# Patient Record
Sex: MALE | Race: OTHER | NOT HISPANIC OR LATINO | URBAN - METROPOLITAN AREA
[De-identification: names, ages, dates, MRNs, and addresses within clinical notes are randomized per-mention and may not be internally consistent; named-entity substitution may affect disease eponyms.]

---

## 2021-01-16 ENCOUNTER — INPATIENT (INPATIENT)
Facility: HOSPITAL | Age: 61
LOS: 10 days | Discharge: HOME | DRG: 640 | End: 2021-01-27
Attending: HOSPITALIST | Admitting: STUDENT IN AN ORGANIZED HEALTH CARE EDUCATION/TRAINING PROGRAM
Payer: MEDICAID

## 2021-01-16 VITALS
RESPIRATION RATE: 18 BRPM | OXYGEN SATURATION: 100 % | TEMPERATURE: 98 F | DIASTOLIC BLOOD PRESSURE: 121 MMHG | SYSTOLIC BLOOD PRESSURE: 190 MMHG | HEIGHT: 70 IN | WEIGHT: 105.82 LBS | HEART RATE: 111 BPM

## 2021-01-16 DIAGNOSIS — N18.6 END STAGE RENAL DISEASE: ICD-10-CM

## 2021-01-16 DIAGNOSIS — E87.5 HYPERKALEMIA: ICD-10-CM

## 2021-01-16 DIAGNOSIS — I77.0 ARTERIOVENOUS FISTULA, ACQUIRED: Chronic | ICD-10-CM

## 2021-01-16 DIAGNOSIS — I16.0 HYPERTENSIVE URGENCY: ICD-10-CM

## 2021-01-16 DIAGNOSIS — N18.9 CHRONIC KIDNEY DISEASE, UNSPECIFIED: ICD-10-CM

## 2021-01-16 DIAGNOSIS — N25.0 RENAL OSTEODYSTROPHY: ICD-10-CM

## 2021-01-16 LAB
ALBUMIN SERPL ELPH-MCNC: 4.3 G/DL — SIGNIFICANT CHANGE UP (ref 3.3–5)
ALP SERPL-CCNC: 152 U/L — HIGH (ref 40–120)
ALT FLD-CCNC: 23 U/L — SIGNIFICANT CHANGE UP (ref 10–45)
ANION GAP SERPL CALC-SCNC: 24 MMOL/L — HIGH (ref 5–17)
ANION GAP SERPL CALC-SCNC: 26 MMOL/L — HIGH (ref 5–17)
AST SERPL-CCNC: 18 U/L — SIGNIFICANT CHANGE UP (ref 10–40)
BASOPHILS # BLD AUTO: 0.02 K/UL — SIGNIFICANT CHANGE UP (ref 0–0.2)
BASOPHILS NFR BLD AUTO: 0.3 % — SIGNIFICANT CHANGE UP (ref 0–2)
BILIRUB SERPL-MCNC: 0.3 MG/DL — SIGNIFICANT CHANGE UP (ref 0.2–1.2)
BUN SERPL-MCNC: 112 MG/DL — HIGH (ref 7–23)
BUN SERPL-MCNC: 119 MG/DL — HIGH (ref 7–23)
CALCIUM SERPL-MCNC: 7 MG/DL — LOW (ref 8.4–10.5)
CALCIUM SERPL-MCNC: 8.3 MG/DL — LOW (ref 8.4–10.5)
CHLORIDE SERPL-SCNC: 100 MMOL/L — SIGNIFICANT CHANGE UP (ref 96–108)
CHLORIDE SERPL-SCNC: 101 MMOL/L — SIGNIFICANT CHANGE UP (ref 96–108)
CO2 SERPL-SCNC: 12 MMOL/L — LOW (ref 22–31)
CO2 SERPL-SCNC: 15 MMOL/L — LOW (ref 22–31)
CREAT SERPL-MCNC: 16.04 MG/DL — HIGH (ref 0.5–1.3)
CREAT SERPL-MCNC: 16.09 MG/DL — HIGH (ref 0.5–1.3)
EOSINOPHIL # BLD AUTO: 0.23 K/UL — SIGNIFICANT CHANGE UP (ref 0–0.5)
EOSINOPHIL NFR BLD AUTO: 3.8 % — SIGNIFICANT CHANGE UP (ref 0–6)
GLUCOSE SERPL-MCNC: 131 MG/DL — HIGH (ref 70–99)
GLUCOSE SERPL-MCNC: 152 MG/DL — HIGH (ref 70–99)
HCT VFR BLD CALC: 24 % — LOW (ref 39–50)
HGB BLD-MCNC: 7.4 G/DL — LOW (ref 13–17)
IMM GRANULOCYTES NFR BLD AUTO: 0.2 % — SIGNIFICANT CHANGE UP (ref 0–1.5)
LYMPHOCYTES # BLD AUTO: 1.07 K/UL — SIGNIFICANT CHANGE UP (ref 1–3.3)
LYMPHOCYTES # BLD AUTO: 17.9 % — SIGNIFICANT CHANGE UP (ref 13–44)
MAGNESIUM SERPL-MCNC: 3 MG/DL — HIGH (ref 1.6–2.6)
MCHC RBC-ENTMCNC: 27.1 PG — SIGNIFICANT CHANGE UP (ref 27–34)
MCHC RBC-ENTMCNC: 30.8 GM/DL — LOW (ref 32–36)
MCV RBC AUTO: 87.9 FL — SIGNIFICANT CHANGE UP (ref 80–100)
MONOCYTES # BLD AUTO: 0.71 K/UL — SIGNIFICANT CHANGE UP (ref 0–0.9)
MONOCYTES NFR BLD AUTO: 11.9 % — SIGNIFICANT CHANGE UP (ref 2–14)
NEUTROPHILS # BLD AUTO: 3.94 K/UL — SIGNIFICANT CHANGE UP (ref 1.8–7.4)
NEUTROPHILS NFR BLD AUTO: 65.9 % — SIGNIFICANT CHANGE UP (ref 43–77)
NRBC # BLD: 0 /100 WBCS — SIGNIFICANT CHANGE UP (ref 0–0)
PHOSPHATE SERPL-MCNC: 9.9 MG/DL — HIGH (ref 2.5–4.5)
PLATELET # BLD AUTO: 170 K/UL — SIGNIFICANT CHANGE UP (ref 150–400)
POTASSIUM SERPL-MCNC: 5.2 MMOL/L — SIGNIFICANT CHANGE UP (ref 3.5–5.3)
POTASSIUM SERPL-MCNC: 6.5 MMOL/L — CRITICAL HIGH (ref 3.5–5.3)
POTASSIUM SERPL-SCNC: 5.2 MMOL/L — SIGNIFICANT CHANGE UP (ref 3.5–5.3)
POTASSIUM SERPL-SCNC: 6.5 MMOL/L — CRITICAL HIGH (ref 3.5–5.3)
PROT SERPL-MCNC: 8.4 G/DL — HIGH (ref 6–8.3)
RBC # BLD: 2.73 M/UL — LOW (ref 4.2–5.8)
RBC # FLD: 15.2 % — HIGH (ref 10.3–14.5)
SODIUM SERPL-SCNC: 138 MMOL/L — SIGNIFICANT CHANGE UP (ref 135–145)
SODIUM SERPL-SCNC: 140 MMOL/L — SIGNIFICANT CHANGE UP (ref 135–145)
WBC # BLD: 5.98 K/UL — SIGNIFICANT CHANGE UP (ref 3.8–10.5)
WBC # FLD AUTO: 5.98 K/UL — SIGNIFICANT CHANGE UP (ref 3.8–10.5)

## 2021-01-16 PROCEDURE — 93010 ELECTROCARDIOGRAM REPORT: CPT

## 2021-01-16 PROCEDURE — 99291 CRITICAL CARE FIRST HOUR: CPT

## 2021-01-16 PROCEDURE — 71045 X-RAY EXAM CHEST 1 VIEW: CPT | Mod: 26

## 2021-01-16 RX ORDER — DEXTROSE 50 % IN WATER 50 %
50 SYRINGE (ML) INTRAVENOUS ONCE
Refills: 0 | Status: COMPLETED | OUTPATIENT
Start: 2021-01-16 | End: 2021-01-16

## 2021-01-16 RX ORDER — CARVEDILOL PHOSPHATE 80 MG/1
6.25 CAPSULE, EXTENDED RELEASE ORAL EVERY 12 HOURS
Refills: 0 | Status: DISCONTINUED | OUTPATIENT
Start: 2021-01-16 | End: 2021-01-16

## 2021-01-16 RX ORDER — NIFEDIPINE 30 MG
20 TABLET, EXTENDED RELEASE 24 HR ORAL ONCE
Refills: 0 | Status: COMPLETED | OUTPATIENT
Start: 2021-01-16 | End: 2021-01-16

## 2021-01-16 RX ORDER — SODIUM ZIRCONIUM CYCLOSILICATE 10 G/10G
10 POWDER, FOR SUSPENSION ORAL ONCE
Refills: 0 | Status: COMPLETED | OUTPATIENT
Start: 2021-01-16 | End: 2021-01-16

## 2021-01-16 RX ORDER — CALCIUM GLUCONATE 100 MG/ML
2 VIAL (ML) INTRAVENOUS ONCE
Refills: 0 | Status: COMPLETED | OUTPATIENT
Start: 2021-01-16 | End: 2021-01-16

## 2021-01-16 RX ORDER — CARVEDILOL PHOSPHATE 80 MG/1
6.25 CAPSULE, EXTENDED RELEASE ORAL ONCE
Refills: 0 | Status: COMPLETED | OUTPATIENT
Start: 2021-01-16 | End: 2021-01-16

## 2021-01-16 RX ORDER — ONDANSETRON 8 MG/1
4 TABLET, FILM COATED ORAL ONCE
Refills: 0 | Status: COMPLETED | OUTPATIENT
Start: 2021-01-16 | End: 2021-01-16

## 2021-01-16 RX ORDER — ALBUTEROL 90 UG/1
5 AEROSOL, METERED ORAL ONCE
Refills: 0 | Status: COMPLETED | OUTPATIENT
Start: 2021-01-16 | End: 2021-01-16

## 2021-01-16 RX ORDER — INSULIN HUMAN 100 [IU]/ML
5 INJECTION, SOLUTION SUBCUTANEOUS ONCE
Refills: 0 | Status: COMPLETED | OUTPATIENT
Start: 2021-01-16 | End: 2021-01-16

## 2021-01-16 RX ADMIN — CARVEDILOL PHOSPHATE 6.25 MILLIGRAM(S): 80 CAPSULE, EXTENDED RELEASE ORAL at 20:46

## 2021-01-16 RX ADMIN — INSULIN HUMAN 5 UNIT(S): 100 INJECTION, SOLUTION SUBCUTANEOUS at 21:30

## 2021-01-16 RX ADMIN — ONDANSETRON 4 MILLIGRAM(S): 8 TABLET, FILM COATED ORAL at 22:42

## 2021-01-16 RX ADMIN — Medication 20 MILLIGRAM(S): at 20:46

## 2021-01-16 RX ADMIN — SODIUM ZIRCONIUM CYCLOSILICATE 10 GRAM(S): 10 POWDER, FOR SUSPENSION ORAL at 21:53

## 2021-01-16 RX ADMIN — Medication 200 GRAM(S): at 21:53

## 2021-01-16 RX ADMIN — Medication 50 MILLILITER(S): at 21:53

## 2021-01-16 RX ADMIN — ALBUTEROL 5 MILLIGRAM(S): 90 AEROSOL, METERED ORAL at 21:54

## 2021-01-16 NOTE — CHART NOTE - NSCHARTNOTEFT_GEN_A_CORE
Dialysis Consent  Thoroughly reviewed risks and benefits of RRT/HD with patient's wife and son via telephone conversation who agrees to dialytic therapy if needed. All questions answered. Dialysis Consent  Thoroughly reviewed risks and benefits of RRT/HD with patient's wife (Ankur) and son (Dane) via telephone conversation who agrees to dialytic therapy if needed. All questions answered. Dialysis Consent   Thoroughly reviewed risks and benefits of RRT/HD with patient in ED who agrees to continue maintenance dialysis. All questions answered.   Witnessed by GIOVANA Oliveira.  Paper form consent obtained/signed and given to HD unit

## 2021-01-16 NOTE — ED PROVIDER NOTE - CARE PLAN
Principal Discharge DX:	Hyperkalemia  Secondary Diagnosis:	ESRD on hemodialysis   Principal Discharge DX:	Hyperkalemia  Secondary Diagnosis:	ESRD on hemodialysis  Secondary Diagnosis:	Anemia secondary to renal failure

## 2021-01-16 NOTE — ED PROVIDER NOTE - CCCP TRG CHIEF CMPLNT
sent for dialysis Crescentic Advancement Flap Text: The defect edges were debeveled with a #15 scalpel blade.  Given the location of the defect and the proximity to free margins a crescentic advancement flap was deemed most appropriate.  Using a sterile surgical marker, the appropriate advancement flap was drawn incorporating the defect and placing the expected incisions within the relaxed skin tension lines where possible.    The area thus outlined was incised deep to adipose tissue with a #15 scalpel blade.  The skin margins were undermined to an appropriate distance in all directions utilizing iris scissors.

## 2021-01-16 NOTE — CONSULT NOTE ADULT - ASSESSMENT
60M PMHx ESRD on HD (initiated in [facility] via LUE AVF), CAD s/p stent (on plavix/asa), DM2, HTN present to ED for weakness after not having hemodialysis over 3 days.        # ESRD  Pt. with ESRD (presumed diabetic nephropathy) on HD in [facility] (HD initiated 10/2020), minimal urine. Last HD in [facility] was on 1/13/21 via LUE AVF. Triage vitals hypertensive urgency BP 190s/120s.  Labs pending.  - Pt will likely need maintenance HD today, UF goal **L as BP tolerated  - please check renal duplex arteries/vein (son report told vessel ds in [facility])  - monitor electrolyte, strict I/O, daily weight, fluid restriction 1L, renally dose medication per HD, renal diet (low K/phos)  - will need / to setup outpatient HD center (family lives in [facility])    # Hypertensive urgency  On admission patient's triage /121 likely 2/2 missing HD.    - will need IV anti-hypertensive to bring down BP slowly, will likely plan for HD today  - monitor BP, low salt diet    # Anemia  Pt with hx anemia likely multifactorial including ACD in the setting of ESRD. Hgb level *** target (on admission hgb **).   - will hold off starting DEANA/epogen given uncontrolled BP, will need to be initiated after stabilization of BP  - Check iron studies and monitor Hgb    # Renal bone disease  Pt with hyperphosphatemia in the setting of ESRD.   - check serum phosphorus, if >5.5 would start phos binder (if serum calcium high start sevelamer 800 TID w/ meals and if serum calcium low start phos-low 667mg TID w/ meals).  Check intact PTH level. Low phosphorus diet advised. Monitor serum phosphorus 60M PMHx ESRD on HD (initiated in [facility] via LUE AVF), CAD s/p stent (on plavix/asa), DM2, HTN present to ED for weakness after not having hemodialysis over 3 days.        # ESRD  Pt. with ESRD (presumed diabetic nephropathy) on HD in [facility] (HD initiated 10/2020), minimal urine. Last HD in [facility] was on 1/13/21 via LUE AVF. Triage vitals hypertensive urgency BP 190s/120s.  Labs pending.  - ordered for hepatitis B/C needed prior to hemodialysis  - Pt will likely need maintenance HD today, UF goal **L as BP tolerated  - please check renal duplex arteries/vein (son report told vessel ds in [facility])  - monitor electrolyte, strict I/O, daily weight, fluid restriction 1L, renally dose medication per HD, renal diet (low K/phos)  - will need / to setup outpatient HD center (family lives in [facility])    # Hypertensive urgency  On admission patient's triage /121 likely 2/2 missing HD.    - will need IV anti-hypertensive to bring down BP slowly, will likely plan for HD today  - monitor BP, low salt diet    # Anemia  Pt with hx anemia likely multifactorial including ACD in the setting of ESRD. Hgb level *** target (on admission hgb **).   - will hold off starting DEANA/epogen given uncontrolled BP, will need to be initiated after stabilization of BP  - Check iron studies and monitor Hgb    # Renal bone disease  Pt with hyperphosphatemia in the setting of ESRD.   - check serum phosphorus, if >5.5 would start phos binder (if serum calcium high start sevelamer 800 TID w/ meals and if serum calcium low start phos-low 667mg TID w/ meals).  Check intact PTH level. Low phosphorus diet advised. Monitor serum phosphorus 60M PMHx ESRD on HD (initiated in [facility] via LUE AVF), CAD s/p stent (on plavix/asa), DM2, HTN present to ED for weakness after not having hemodialysis over 3 days.      Nephrology consulted for ESRD/HD management.  Per family, patient has history CKD secondary to diabetes (never had kidney biopsy) and was initiated on hemodialysis March 26, 2020 (at Dr [facility]), HD access LUE AVF, makes minimal urine, was on Monday/Wednesday/Saturday schedule with last hemodialysis treatment on Thursday 1/14/21, usual UF 2-3L and does have intradialytic hypotension.  Patient report had low blood count and has required multiple blood transfusion however unsure exact cause. Pt denies any history kidney stones, recurrent UTI, family history kidney disease, hematuria.  Outpatient medication include nifedipine 20, coreg 12.5, plavix, aspirin, omeprazole, iron IV.      # ESRD  Pt. with ESRD (presumed diabetic nephropathy) on HD in [facility] (HD initiated 3/26/2020), minimal urine. Last HD in [facility] was on 1/14/20 via LUE AVF. Triage vitals hypertensive urgency BP 190s/120s.  Labs pending.  - ordered for hepatitis B/C needed prior to hemodialysis  - Pt will likely need maintenance HD today, UF goal 2L as BP tolerated  - please check renal duplex arteries/vein (son report told vessel ds in [facility])  - monitor electrolyte, strict I/O, daily weight, fluid restriction 1L, renally dose medication per HD, renal diet (low K/phos)  - will need / to setup outpatient HD center    # Hypertensive urgency  On admission patient's triage /121 likely 2/2 missing HD.    - home meds include nifedipine, coreg  - will need IV anti-hypertensive to bring down BP slowly, will likely plan for HD today  - please check renal duplex arteries/vein r/o DAVONTE (son report told vessel ds in [facility])  - monitor BP, low salt diet    # Anemia  Pt with hx anemia likely multifactorial including ACD in the setting of ESRD. Pending CBC/Hgb  - will hold off starting DEANA/epogen given uncontrolled BP, will need to be initiated after stabilization of BP  - Check iron studies and monitor Hgb    # Renal bone disease  Pt with hyperphosphatemia in the setting of ESRD.   - check serum phosphorus, if >5.5 would start phos binder (if serum calcium high start sevelamer 800 TID w/ meals and if serum calcium low start phos-low 667mg TID w/ meals).  Check intact PTH level. Low phosphorus diet advised. Monitor serum phosphorus    Case discussed with Dr. Grossman and ED team 60M PMHx ESRD on HD (initiated in [facility] via LUE AVF), CAD s/p stent (on plavix/asa), DM2, HTN present to ED for weakness after not having hemodialysis over 3 days.      Nephrology consulted for ESRD/HD management.  Per family, patient has history CKD secondary to diabetes (never had kidney biopsy) and was initiated on hemodialysis March 26, 2020 (at Dr Andrade [facility]), HD access LUE AVF, makes minimal urine, was on Monday/Wednesday/Saturday schedule with last hemodialysis treatment on Thursday 1/14/21, usual UF 2-3L and does have intradialytic hypotension.  Patient report had low blood count and has required multiple blood transfusion however unsure exact cause. Pt denies any history kidney stones, recurrent UTI, family history kidney disease, hematuria.  Outpatient medication include nifedipine 20, coreg 12.5, plavix, aspirin, omeprazole, iron IV.      # ESRD  Pt. with ESRD (presumed diabetic nephropathy) on HD in [facility] (HD initiated 3/26/2020), minimal urine. Last HD in [facility] was on 1/14/20 via LUE AVF. Triage vitals hypertensive urgency BP 190s/120s.  Labs pending.  - ordered for hepatitis B/C needed prior to hemodialysis  - plan for maintenance HD today, UF goal 2L as BP tolerated  - ordered for renal duplex arteries/vein (son report told vessel ds in [facility])  - monitor electrolyte, strict I/O, daily weight, fluid restriction 1L, renally dose medication per HD, renal diet (low K/phos)  - will need / to setup outpatient HD center    # Hypertensive urgency  On admission patient's triage /121 likely 2/2 missing HD, possibly component fluid overload.    - home meds include nifedipine, coreg  - will need IV anti-hypertensive to bring down BP slowly, will likely plan for HD today  - please check renal duplex arteries/vein r/o DAVONTE (son report told vessel ds in [facility])  - monitor BP, low salt diet    # Anemia  Pt with hx anemia likely multifactorial including ACD in the setting of ESRD. Pending CBC/Hgb  - will hold off starting DEANA/epogen given uncontrolled BP, will need to be initiated after stabilization of BP  - Check iron studies and monitor Hgb    # Renal bone disease  Pt with hyperphosphatemia in the setting of ESRD.   - check serum phosphorus, if >5.5 would start phos binder (if serum calcium high start sevelamer 800 TID w/ meals and if serum calcium low start phos-low 667mg TID w/ meals).  Check intact PTH level. Low phosphorus diet advised. Monitor serum phosphorus    Case discussed with Dr. Grossman and ED team 60M PMHx ESRD on HD (initiated in [facility] via LUE AVF), CAD s/p stent (on plavix/asa), DM2, HTN present to ED for weakness after not having hemodialysis over 3 days.  Nephrology consulted for ESRD/HD management.       # ESRD  Pt. with ESRD (presumed diabetic nephropathy) on HD in [facility] (HD initiated 3/26/2020), minimal urine. Last HD in [facility] was on 1/14/20 via LUE AVF. Triage vitals hypertensive urgency BP 190s/120s.  Labs pending.  - ordered for hepatitis B/C needed prior to hemodialysis  - plan for maintenance HD today, UF goal 2L as BP tolerated  - ordered for renal duplex arteries/vein (son report told vessel ds in [facility])  - monitor electrolyte, strict I/O, daily weight, fluid restriction 1L, renally dose medication per HD, renal diet (low K/phos)  - will need / to setup outpatient HD center    # Hypertensive urgency  On admission patient's triage /121 likely 2/2 missing HD, possibly component fluid overload.    - home meds include nifedipine, coreg ? 2 calcium channel blocker?  - will need IV anti-hypertensive to bring down BP slowly, will likely plan for HD today  - please check renal duplex arteries/vein r/o DAVONTE (son report told vessel ds in [facility])  - monitor BP, low salt diet    # Anemia  Pt with hx anemia likely multifactorial including ACD in the setting of ESRD. Pending CBC/Hgb  - will hold off starting DEANA/epogen given uncontrolled BP, will need to be initiated after stabilization of BP  - Check iron studies and monitor Hgb    # Renal bone disease  Pt with hyperphosphatemia in the setting of ESRD.   - check serum phosphorus, if >5.5 would start phos binder (if serum calcium high start sevelamer 800 TID w/ meals and if serum calcium low start phos-low 667mg TID w/ meals).  Check intact PTH level. Low phosphorus diet advised. Monitor serum phosphorus    Case discussed with Dr. Grossman and ED team 60M PMHx ESRD on HD (initiated in [facility] via LUE AVF), CAD s/p stent (on plavix/asa), DM2, HTN present to ED for weakness after not having hemodialysis over 3 days.  Nephrology consulted for ESRD/HD management.       # ESRD  Pt. with ESRD (presumed diabetic nephropathy) on HD in [facility] (HD initiated 3/26/2020), minimal urine. Last HD in [facility] was on 1/14/20 via LUE AVF. Triage vitals hypertensive urgency BP 190s/120s.  Labs pending.  - ordered for hepatitis B/C needed prior to hemodialysis  - plan for maintenance HD today. Will do a session for 2.4 hrs and 250 BFR as he is at risk of dialysis dysequilibrium . UF goal 1L as BP tolerated  - ordered for renal duplex arteries/vein (son report told vessel ds in [facility])  - monitor electrolyte, strict I/O, daily weight, fluid restriction 1L, renally dose medication per HD, renal diet (low K/phos)  - will need / to setup outpatient HD center    # Hypertensive urgency  On admission patient's triage /121 likely 2/2 missing HD, possibly component fluid overload.    - home meds include nifedipine, coreg ? 2 calcium channel blocker?  - will need IV anti-hypertensive to bring down BP slowly, will likely plan for HD today  - please check renal duplex arteries/vein r/o DAVONTE (son report told vessel ds in [facility])  - monitor BP, low salt diet    # Anemia  Pt with hx anemia likely multifactorial including ACD in the setting of ESRD. Pending CBC/Hgb  - will hold off starting DEANA/epogen given uncontrolled BP, will need to be initiated after stabilization of BP  - Check iron studies and monitor Hgb    # Renal bone disease  Pt with hyperphosphatemia in the setting of ESRD.   - check serum phosphorus, if >5.5 would start phos binder (if serum calcium high start sevelamer 800 TID w/ meals and if serum calcium low start phos-low 667mg TID w/ meals).  Check intact PTH level. Low phosphorus diet advised. Monitor serum phosphorus    Case discussed with Dr. Grossman and ED team

## 2021-01-16 NOTE — ED ADULT NURSE REASSESSMENT NOTE - NS ED NURSE REASSESS COMMENT FT1
Report received from Roxanne AKHTAR. Pt resting comfortably without complaints & does not appear to be in any acute distress at this time with VSS. Updated pt that he is admitted, pt verbalizes understanding.

## 2021-01-16 NOTE — CONSULT NOTE ADULT - SUBJECTIVE AND OBJECTIVE BOX
[facility] DIVISION OF KIDNEY DISEASES AND HYPERTENSION   -- INITIAL CONSULT NOTE --  Tita Rolle, Nephrology Fellow     NS Pager: [phone] / KEISHA Pager: [phone]  After 5pm or on weekend, please page the on-call fellow)  --------------------------------------------------------------------------------    HPI:  60M PMHx ESRD on HD (initiated in [facility] via LUE AVF), CAD s/p stent (on plavix/asa), DM2, HTN who present to [facility] ED for weakness.  History obtain from patient's wife and son over phone - who report patient has history of kidney disease believed to be secondary to diabetes and was initiated on hemodialysis about 4 months ago (Oct 2020) in [facility]. Patient didn't have any fever, chills, sore throat, chest pain, shortness of breath, cough, nausea, vomiting, diarrhea, confusing.      Nephrology consulted for ESRD/HD management.  Per family, patient has history CKD secondary to diabetes and was initiated on hemodialysis about 4 months ago (October 2020), minimal urination, currently has LUE AVF, was on Monday/Wednesday/Friday schedule with last hemodialysis treatment on Wednesday 1/13/21.  Per son, patient had low blood count requiring transfusion often.  Also patient was told his blood vessels to kidneys were calcified and needed procedure however unclear exact details.         PAST HISTORY  --------------------------------------------------------------------------------  PAST MEDICAL & SURGICAL HISTORY:    FAMILY HISTORY:    PAST SOCIAL HISTORY:  ALLERGIES & MEDICATIONS  --------------------------------------------------------------------------------  Allergies  No Known Allergies    Intolerances    Standing Inpatient Medications    PRN Inpatient Medications    REVIEW OF SYSTEMS  Gen: no fever, chills, ++ weakness, fatigue  Respiratory: No dyspnea, cough  CV: No chest pain  GI: No abdominal pain, nausea, vomiting, diarrhea  MSK: edema  Neuro: No dizziness, lightheadedness  All other systems were reviewed and are negative, except as noted.    VITALS/PHYSICAL EXAM  --------------------------------------------------------------------------------  T(C): 36.4 (01-16-21 @ 17:40), Max: 36.6 (01-16-21 @ 14:38)  HR: 69 (01-16-21 @ 17:40) (69 - 111)  BP: 195/102 (01-16-21 @ 17:40) (190/121 - 195/102)  RR: 18 (01-16-21 @ 17:40) (18 - 18)  SpO2: 100% (01-16-21 @ 17:40) (100% - 100%)  Wt(kg): --  Height (cm): 177.8 (01-16-21 @ 14:38)  Weight (kg): 48 (01-16-21 @ 14:38)  BMI (kg/m2): 15.2 (01-16-21 @ 14:38)  BSA (m2): 1.59 (01-16-21 @ 14:38)    Physical Exam:      LABS/STUDIES  --------------------------------------------------------------------------------    Creatinine Trend:             [facility] DIVISION OF KIDNEY DISEASES AND HYPERTENSION   -- INITIAL CONSULT NOTE --  Tita Rolle, Nephrology Fellow     NS Pager: [phone] / KEISHA Pager: [phone]  After 5pm or on weekend, please page the on-call fellow)  --------------------------------------------------------------------------------    HPI: 60M PMHx ESRD on HD (initiated in [facility] via LUE AVF), CAD s/p stent (on plavix/asa), DM2, HTN, HLD who present to [facility] ED for weakness. Patient report he recently came back from [facility] last week and for past few days felt very weak, dizzy, lightheadedness.  Per family (wife/son), patient was very lethargic, sleeping all day. Patient didn't have any fever, chills, sore throat, chest pain, shortness of breath, cough, nausea, vomiting, diarrhea, confusing.      Nephrology consulted for ESRD/HD management.  Per family, patient has history CKD secondary to diabetes (never had kidney biopsy) and was initiated on hemodialysis March 26, 2020 (at Dr Andrade [facility]), HD access LUE AVF, makes minimal urine, was on Monday/Wednesday/Saturday schedule with last hemodialysis treatment on Thursday 1/14/21, usual UF 2-3L and does have intradialytic hypotension.  Patient report had low blood count and has required multiple blood transfusion however unsure exact cause. Pt denies any history kidney stones, recurrent UTI, family history kidney disease, hematuria.  Outpatient medication include nifedipine 20, coreg 12.5, plavix, aspirin, omeprazole, iron IV.    PAST HISTORY  ESRD on HD (initiated in [facility] via LUE AVF)  CAD s/p stent (on plavix/asa)  DM2  HTN  HLD   --------------------------------------------------------------------------------  PAST MEDICAL & SURGICAL HISTORY:  KWASI DICKERSON     FAMILY HISTORY:   PAST SOCIAL HISTORY:  with children, retired   ALLERGIES & MEDICATIONS  --------------------------------------------------------------------------------  Allergies  No Known Allergies    Intolerances    Standing Inpatient Medications    PRN Inpatient Medications    REVIEW OF SYSTEMS  Gen: no fever, chills, ++ weakness, fatigue  Respiratory: No dyspnea, cough  CV: No chest pain  GI: No abdominal pain, nausea, vomiting, diarrhea  MSK: edema  Neuro: No dizziness, lightheadedness  All other systems were reviewed and are negative, except as noted.    VITALS/PHYSICAL EXAM  --------------------------------------------------------------------------------  T(C): 36.4 (01-16-21 @ 17:40), Max: 36.6 (01-16-21 @ 14:38)  HR: 69 (01-16-21 @ 17:40) (69 - 111)  BP: 195/102 (01-16-21 @ 17:40) (190/121 - 195/102)  RR: 18 (01-16-21 @ 17:40) (18 - 18)  SpO2: 100% (01-16-21 @ 17:40) (100% - 100%)  Wt(kg): --  Height (cm): 177.8 (01-16-21 @ 14:38)  Weight (kg): 48 (01-16-21 @ 14:38)  BMI (kg/m2): 15.2 (01-16-21 @ 14:38)  BSA (m2): 1.59 (01-16-21 @ 14:38)    Physical Exam:      LABS/STUDIES  --------------------------------------------------------------------------------    Creatinine Trend:             [facility] DIVISION OF KIDNEY DISEASES AND HYPERTENSION   -- INITIAL CONSULT NOTE --  Tita Rolle, Nephrology Fellow     NS Pager: [phone] / KEISHA Pager: [phone]  After 5pm or on weekend, please page the on-call fellow)  --------------------------------------------------------------------------------    HPI: 60M PMHx ESRD on HD (initiated in [facility] via LUE AVF), CAD s/p stent (on plavix/asa), DM2, HTN, HLD who present to [facility] ED for weakness. Patient report he recently came back from [facility] last week and for past few days felt very weak, dizzy, lightheadedness.  Per family (wife/son), patient was very lethargic, sleeping all day. Patient didn't have any fever, chills, sore throat, chest pain, shortness of breath, cough, nausea, vomiting, diarrhea, confusing.      Nephrology consulted for ESRD/HD management.  Per family, patient has history CKD secondary to diabetes (never had kidney biopsy) and was initiated on hemodialysis March 26, 2020 (at Dr Andrade [facility]), HD access LUE AVF, makes minimal urine, was on Monday/Wednesday/Saturday schedule with last hemodialysis treatment on Thursday 1/14/21, usual UF 2-3L and does have intradialytic hypotension.  Patient report had low blood count and has required multiple blood transfusion however unsure exact cause. Pt denies any history kidney stones, recurrent UTI, family history kidney disease, hematuria.  Outpatient medication include nifedipine 20, coreg 12.5, plavix, aspirin, omeprazole, iron IV.    PAST HISTORY  ESRD on HD (initiated in [facility] via LUE AVF)  CAD s/p stent (on plavix/asa)  DM2  HTN  HLD   --------------------------------------------------------------------------------  PAST MEDICAL & SURGICAL HISTORY:  KWASI DICKERSON     FAMILY HISTORY:   PAST SOCIAL HISTORY:  with children, retired   ALLERGIES & MEDICATIONS  --------------------------------------------------------------------------------  Allergies  No Known Allergies    Intolerances    Standing Inpatient Medications    PRN Inpatient Medications    REVIEW OF SYSTEMS  Gen: no fever, chills, ++ weakness, fatigue  Respiratory: No dyspnea, cough  CV: No chest pain  GI: No abdominal pain, nausea, vomiting, diarrhea  MSK: edema  Neuro: No dizziness, lightheadedness  All other systems were reviewed and are negative, except as noted.    VITALS/PHYSICAL EXAM  --------------------------------------------------------------------------------  T(C): 36.4 (01-16-21 @ 17:40), Max: 36.6 (01-16-21 @ 14:38)  HR: 69 (01-16-21 @ 17:40) (69 - 111)  BP: 195/102 (01-16-21 @ 17:40) (190/121 - 195/102)  RR: 18 (01-16-21 @ 17:40) (18 - 18)  SpO2: 100% (01-16-21 @ 17:40) (100% - 100%)  Wt(kg): --  Height (cm): 177.8 (01-16-21 @ 14:38)  Weight (kg): 48 (01-16-21 @ 14:38)  BMI (kg/m2): 15.2 (01-16-21 @ 14:38)  BSA (m2): 1.59 (01-16-21 @ 14:38)    Physical Exam:  	Gen: NAD, well-appearing on room air  	HEENT: moist mucous membrane  	Pulm: CTA B/L, no crackles  	CV: RRR, S1S2; no rub/murmur  	GI: +BS, soft, nontender/nondistended  	MSK: Warm, no edema              Neuro: AAOx3  	Psych: Normal affect and mood  	Skin: Warm  	Vascular access: LUE AVF +thrills/bruits      LABS/STUDIES - pending   [facility] DIVISION OF KIDNEY DISEASES AND HYPERTENSION   -- INITIAL CONSULT NOTE --  Tita Rolle, Nephrology Fellow     NS Pager: [phone] / KEISHA Pager: [phone]  After 5pm or on weekend, please page the on-call fellow)  --------------------------------------------------------------------------------    HPI: 60M PMHx ESRD on HD (initiated in [facility] via LUE AVF), CAD s/p stent (on plavix/asa), DM2, HTN, HLD who present to [facility] ED for weakness. Patient report he recently came back from [facility] last week and for past few days felt very weak, dizzy, lightheadedness.  Per family (wife/son), patient was very lethargic, sleeping all day. Patient didn't have any fever, chills, sore throat, chest pain, shortness of breath, cough, nausea, vomiting, diarrhea, confusing.      Nephrology consulted for ESRD/HD management.  Per family, patient has history CKD secondary to diabetes (never had kidney biopsy) and was initiated on hemodialysis March 26, 2020 (at Dr Andrade [facility]), HD access LUE AVF, makes minimal urine, was on Monday/Wednesday/Saturday schedule with last hemodialysis treatment on Thursday 1/14/21, usual UF 2-3L and does have intradialytic hypotension.  Patient report had low blood count and has required multiple blood transfusion however unsure exact cause. Pt denies any history kidney stones, recurrent UTI, family history kidney disease, hematuria.  Outpatient medication list include nifedipine 20, coreg 12.5, plavix, aspirin, omeprazole, iron IV.    PAST HISTORY  ESRD on HD (initiated in [facility] via LUE AVF)  CAD s/p stent (on plavix/asa)  DM2  HTN  HLD   --------------------------------------------------------------------------------  PAST MEDICAL & SURGICAL HISTORY:  KWASI DICKERSON     FAMILY HISTORY:   PAST SOCIAL HISTORY:  with children, retired   ALLERGIES & MEDICATIONS  --------------------------------------------------------------------------------  Allergies  No Known Allergies    Intolerances    Standing Inpatient Medications    PRN Inpatient Medications    REVIEW OF SYSTEMS  Gen: no fever, chills, ++ weakness, fatigue  Respiratory: No dyspnea, cough  CV: No chest pain  GI: No abdominal pain, nausea, vomiting, diarrhea  MSK: edema  Neuro: No dizziness, lightheadedness  All other systems were reviewed and are negative, except as noted.    VITALS/PHYSICAL EXAM  --------------------------------------------------------------------------------  T(C): 36.4 (01-16-21 @ 17:40), Max: 36.6 (01-16-21 @ 14:38)  HR: 69 (01-16-21 @ 17:40) (69 - 111)  BP: 195/102 (01-16-21 @ 17:40) (190/121 - 195/102)  RR: 18 (01-16-21 @ 17:40) (18 - 18)  SpO2: 100% (01-16-21 @ 17:40) (100% - 100%)  Wt(kg): --  Height (cm): 177.8 (01-16-21 @ 14:38)  Weight (kg): 48 (01-16-21 @ 14:38)  BMI (kg/m2): 15.2 (01-16-21 @ 14:38)  BSA (m2): 1.59 (01-16-21 @ 14:38)    Physical Exam:  	Gen: NAD, well-appearing on room air  	HEENT: moist mucous membrane  	Pulm: CTA B/L, no crackles  	CV: RRR, S1S2; no rub/murmur  	GI: +BS, soft, nontender/nondistended  	MSK: Warm, no edema              Neuro: AAOx3  	Psych: Normal affect and mood  	Skin: Warm  	Vascular access: LUE AVF +thrills/bruits      LABS/STUDIES - pending

## 2021-01-16 NOTE — ED ADULT NURSE NOTE - OBJECTIVE STATEMENT
pt arrived from [facility] 5 days ago   he was on dialysis in [facility]  he arrives to ER with a need for dialysis

## 2021-01-16 NOTE — ED PROVIDER NOTE - OBJECTIVE STATEMENT
60 year old M with pmhx of ESRD on HD  (initiated in [facility] via LUE AVF), DM2, HTN, CAD s/p stent on Pavix and aspirin presents to ED c/o weakness. Pt notes decreased appetite, has been drinking liquids  Reports minimal urine output. Pt started HD in October 2020 while in [facility] where he had his last dialysis session was on 1/14/20. Pt returned from [facility] on 1/14/20 and has not had dialysis since arriving to [place]. Denies diarrhea, fever, and cough. Pt on Nifedipine 20mg, Carvedilol 6.25mg, Atorvastatin 20mg, Clopidogrel, Omperpazole, B complex. Nephrology at bedside

## 2021-01-16 NOTE — ED PROVIDER NOTE - PROGRESS NOTE DETAILS
Dr. Phoenix Note: appreciate nephro consult. Will treat for hyperkalemia, stable for tele admission.  SPoke to pt's wife on phone to update.

## 2021-01-16 NOTE — ED ADULT NURSE NOTE - CHIEF COMPLAINT QUOTE
sent for dialysis  M, We, Sa - did not get it today. left AV fistula. was getting dialysis in [facility], has never had it here.   caleb, son - [phone]

## 2021-01-16 NOTE — ED PROVIDER NOTE - PMH
CAD (coronary artery disease)    ESRD on hemodialysis    HTN (hypertension)    T2DM (type 2 diabetes mellitus)

## 2021-01-16 NOTE — ED ADULT TRIAGE NOTE - CHIEF COMPLAINT QUOTE
sent for dialysis  M, We, Sa - did not get it today. left AV fistula. was getting dialysis in [facility], has never had it here.   caleb, son - [phone]

## 2021-01-16 NOTE — ED PROVIDER NOTE - CLINICAL SUMMARY MEDICAL DECISION MAKING FREE TEXT BOX
Dr. Phoenix Note: acute hyperkalemia in setting of ESRD on HD with poor outpatient access, for admission for hyperkalemia treatment, dialysis, and further treatment

## 2021-01-17 ENCOUNTER — TRANSCRIPTION ENCOUNTER (OUTPATIENT)
Age: 61
End: 2021-01-17

## 2021-01-17 DIAGNOSIS — I10 ESSENTIAL (PRIMARY) HYPERTENSION: ICD-10-CM

## 2021-01-17 DIAGNOSIS — I25.10 ATHEROSCLEROTIC HEART DISEASE OF NATIVE CORONARY ARTERY WITHOUT ANGINA PECTORIS: ICD-10-CM

## 2021-01-17 DIAGNOSIS — R65.10 SYSTEMIC INFLAMMATORY RESPONSE SYNDROME (SIRS) OF NON-INFECTIOUS ORIGIN WITHOUT ACUTE ORGAN DYSFUNCTION: ICD-10-CM

## 2021-01-17 DIAGNOSIS — E11.9 TYPE 2 DIABETES MELLITUS WITHOUT COMPLICATIONS: ICD-10-CM

## 2021-01-17 DIAGNOSIS — E87.5 HYPERKALEMIA: ICD-10-CM

## 2021-01-17 DIAGNOSIS — E87.2 ACIDOSIS: ICD-10-CM

## 2021-01-17 DIAGNOSIS — Z29.9 ENCOUNTER FOR PROPHYLACTIC MEASURES, UNSPECIFIED: ICD-10-CM

## 2021-01-17 LAB
A1C WITH ESTIMATED AVERAGE GLUCOSE RESULT: 6.2 % — HIGH (ref 4–5.6)
ANION GAP SERPL CALC-SCNC: 24 MMOL/L — HIGH (ref 5–17)
ANION GAP SERPL CALC-SCNC: 24 MMOL/L — HIGH (ref 5–17)
APTT BLD: 30.8 SEC — SIGNIFICANT CHANGE UP (ref 27.5–35.5)
BASOPHILS # BLD AUTO: 0.02 K/UL — SIGNIFICANT CHANGE UP (ref 0–0.2)
BASOPHILS NFR BLD AUTO: 0.4 % — SIGNIFICANT CHANGE UP (ref 0–2)
BLD GP AB SCN SERPL QL: NEGATIVE — SIGNIFICANT CHANGE UP
BUN SERPL-MCNC: 122 MG/DL — HIGH (ref 7–23)
BUN SERPL-MCNC: 124 MG/DL — HIGH (ref 7–23)
CALCIUM SERPL-MCNC: 6.8 MG/DL — LOW (ref 8.4–10.5)
CALCIUM SERPL-MCNC: 7.2 MG/DL — LOW (ref 8.4–10.5)
CHLORIDE SERPL-SCNC: 101 MMOL/L — SIGNIFICANT CHANGE UP (ref 96–108)
CHLORIDE SERPL-SCNC: 96 MMOL/L — SIGNIFICANT CHANGE UP (ref 96–108)
CO2 SERPL-SCNC: 12 MMOL/L — LOW (ref 22–31)
CO2 SERPL-SCNC: 15 MMOL/L — LOW (ref 22–31)
CORTIS AM PEAK SERPL-MCNC: 11.7 UG/DL — SIGNIFICANT CHANGE UP (ref 6–18.4)
CREAT SERPL-MCNC: 15.91 MG/DL — HIGH (ref 0.5–1.3)
CREAT SERPL-MCNC: 16.22 MG/DL — HIGH (ref 0.5–1.3)
EOSINOPHIL # BLD AUTO: 0.16 K/UL — SIGNIFICANT CHANGE UP (ref 0–0.5)
EOSINOPHIL NFR BLD AUTO: 3 % — SIGNIFICANT CHANGE UP (ref 0–6)
ESTIMATED AVERAGE GLUCOSE: 131 MG/DL — HIGH (ref 68–114)
FERRITIN SERPL-MCNC: 551 NG/ML — HIGH (ref 30–400)
FOLATE SERPL-MCNC: 8.6 NG/ML — SIGNIFICANT CHANGE UP
GLUCOSE SERPL-MCNC: 125 MG/DL — HIGH (ref 70–99)
GLUCOSE SERPL-MCNC: 92 MG/DL — SIGNIFICANT CHANGE UP (ref 70–99)
HAPTOGLOB SERPL-MCNC: 51 MG/DL — SIGNIFICANT CHANGE UP (ref 34–200)
HBV CORE AB SER-ACNC: SIGNIFICANT CHANGE UP
HBV CORE IGM SER-ACNC: SIGNIFICANT CHANGE UP
HBV SURFACE AB SER-ACNC: <3 MIU/ML — LOW
HBV SURFACE AG SER-ACNC: SIGNIFICANT CHANGE UP
HCT VFR BLD CALC: 15.9 % — CRITICAL LOW (ref 39–50)
HCT VFR BLD CALC: 20.3 % — CRITICAL LOW (ref 39–50)
HCT VFR BLD CALC: 20.6 % — CRITICAL LOW (ref 39–50)
HCV AB S/CO SERPL IA: 0.09 S/CO — SIGNIFICANT CHANGE UP (ref 0–0.99)
HCV AB SERPL-IMP: SIGNIFICANT CHANGE UP
HGB BLD-MCNC: 5 G/DL — CRITICAL LOW (ref 13–17)
HGB BLD-MCNC: 6.4 G/DL — CRITICAL LOW (ref 13–17)
HGB BLD-MCNC: 6.5 G/DL — CRITICAL LOW (ref 13–17)
IMM GRANULOCYTES NFR BLD AUTO: 0.2 % — SIGNIFICANT CHANGE UP (ref 0–1.5)
INR BLD: 1.23 RATIO — HIGH (ref 0.88–1.16)
IRON SATN MFR SERPL: 28 % — SIGNIFICANT CHANGE UP (ref 16–55)
IRON SATN MFR SERPL: 58 UG/DL — SIGNIFICANT CHANGE UP (ref 45–165)
LDH SERPL L TO P-CCNC: 264 U/L — HIGH (ref 50–242)
LYMPHOCYTES # BLD AUTO: 0.75 K/UL — LOW (ref 1–3.3)
LYMPHOCYTES # BLD AUTO: 14.2 % — SIGNIFICANT CHANGE UP (ref 13–44)
MAGNESIUM SERPL-MCNC: 2.5 MG/DL — SIGNIFICANT CHANGE UP (ref 1.6–2.6)
MAGNESIUM SERPL-MCNC: 2.8 MG/DL — HIGH (ref 1.6–2.6)
MCHC RBC-ENTMCNC: 27.8 PG — SIGNIFICANT CHANGE UP (ref 27–34)
MCHC RBC-ENTMCNC: 27.9 PG — SIGNIFICANT CHANGE UP (ref 27–34)
MCHC RBC-ENTMCNC: 27.9 PG — SIGNIFICANT CHANGE UP (ref 27–34)
MCHC RBC-ENTMCNC: 31.4 GM/DL — LOW (ref 32–36)
MCHC RBC-ENTMCNC: 31.5 GM/DL — LOW (ref 32–36)
MCHC RBC-ENTMCNC: 31.6 GM/DL — LOW (ref 32–36)
MCV RBC AUTO: 88.3 FL — SIGNIFICANT CHANGE UP (ref 80–100)
MCV RBC AUTO: 88.4 FL — SIGNIFICANT CHANGE UP (ref 80–100)
MCV RBC AUTO: 88.6 FL — SIGNIFICANT CHANGE UP (ref 80–100)
MONOCYTES # BLD AUTO: 0.59 K/UL — SIGNIFICANT CHANGE UP (ref 0–0.9)
MONOCYTES NFR BLD AUTO: 11.2 % — SIGNIFICANT CHANGE UP (ref 2–14)
NEUTROPHILS # BLD AUTO: 3.74 K/UL — SIGNIFICANT CHANGE UP (ref 1.8–7.4)
NEUTROPHILS NFR BLD AUTO: 71 % — SIGNIFICANT CHANGE UP (ref 43–77)
NRBC # BLD: 0 /100 WBCS — SIGNIFICANT CHANGE UP (ref 0–0)
PHOSPHATE SERPL-MCNC: 9.7 MG/DL — HIGH (ref 2.5–4.5)
PHOSPHATE SERPL-MCNC: 9.8 MG/DL — HIGH (ref 2.5–4.5)
PLATELET # BLD AUTO: 144 K/UL — LOW (ref 150–400)
PLATELET # BLD AUTO: 145 K/UL — LOW (ref 150–400)
PLATELET # BLD AUTO: 155 K/UL — SIGNIFICANT CHANGE UP (ref 150–400)
POTASSIUM SERPL-MCNC: 4.7 MMOL/L — SIGNIFICANT CHANGE UP (ref 3.5–5.3)
POTASSIUM SERPL-MCNC: 6.1 MMOL/L — HIGH (ref 3.5–5.3)
POTASSIUM SERPL-SCNC: 4.7 MMOL/L — SIGNIFICANT CHANGE UP (ref 3.5–5.3)
POTASSIUM SERPL-SCNC: 6.1 MMOL/L — HIGH (ref 3.5–5.3)
PROTHROM AB SERPL-ACNC: 14.6 SEC — HIGH (ref 10.6–13.6)
RBC # BLD: 1.8 M/UL — LOW (ref 4.2–5.8)
RBC # BLD: 2.29 M/UL — LOW (ref 4.2–5.8)
RBC # BLD: 2.33 M/UL — LOW (ref 4.2–5.8)
RBC # FLD: 15 % — HIGH (ref 10.3–14.5)
RBC # FLD: 15.1 % — HIGH (ref 10.3–14.5)
RBC # FLD: 15.1 % — HIGH (ref 10.3–14.5)
RH IG SCN BLD-IMP: POSITIVE — SIGNIFICANT CHANGE UP
RH IG SCN BLD-IMP: POSITIVE — SIGNIFICANT CHANGE UP
SARS-COV-2 IGG SERPL QL IA: NEGATIVE — SIGNIFICANT CHANGE UP
SARS-COV-2 IGM SERPL IA-ACNC: 0.06 INDEX — SIGNIFICANT CHANGE UP
SARS-COV-2 RNA SPEC QL NAA+PROBE: SIGNIFICANT CHANGE UP
SODIUM SERPL-SCNC: 135 MMOL/L — SIGNIFICANT CHANGE UP (ref 135–145)
SODIUM SERPL-SCNC: 137 MMOL/L — SIGNIFICANT CHANGE UP (ref 135–145)
T4 AB SER-ACNC: 2.8 UG/DL — LOW (ref 4.6–12)
TIBC SERPL-MCNC: 204 UG/DL — LOW (ref 220–430)
TSH SERPL-MCNC: 1.46 UIU/ML — SIGNIFICANT CHANGE UP (ref 0.27–4.2)
UIBC SERPL-MCNC: 146 UG/DL — SIGNIFICANT CHANGE UP (ref 110–370)
VIT B12 SERPL-MCNC: >2000 PG/ML — HIGH (ref 232–1245)
WBC # BLD: 4.86 K/UL — SIGNIFICANT CHANGE UP (ref 3.8–10.5)
WBC # BLD: 5.27 K/UL — SIGNIFICANT CHANGE UP (ref 3.8–10.5)
WBC # BLD: 5.64 K/UL — SIGNIFICANT CHANGE UP (ref 3.8–10.5)
WBC # FLD AUTO: 4.86 K/UL — SIGNIFICANT CHANGE UP (ref 3.8–10.5)
WBC # FLD AUTO: 5.27 K/UL — SIGNIFICANT CHANGE UP (ref 3.8–10.5)
WBC # FLD AUTO: 5.64 K/UL — SIGNIFICANT CHANGE UP (ref 3.8–10.5)

## 2021-01-17 PROCEDURE — 71045 X-RAY EXAM CHEST 1 VIEW: CPT | Mod: 26

## 2021-01-17 PROCEDURE — 99223 1ST HOSP IP/OBS HIGH 75: CPT | Mod: GC

## 2021-01-17 RX ORDER — SODIUM CHLORIDE 9 MG/ML
1000 INJECTION, SOLUTION INTRAVENOUS
Refills: 0 | Status: DISCONTINUED | OUTPATIENT
Start: 2021-01-17 | End: 2021-01-27

## 2021-01-17 RX ORDER — DEXTROSE 50 % IN WATER 50 %
50 SYRINGE (ML) INTRAVENOUS ONCE
Refills: 0 | Status: COMPLETED | OUTPATIENT
Start: 2021-01-17 | End: 2021-01-17

## 2021-01-17 RX ORDER — CALCIUM ACETATE 667 MG
667 TABLET ORAL
Refills: 0 | Status: DISCONTINUED | OUTPATIENT
Start: 2021-01-17 | End: 2021-01-27

## 2021-01-17 RX ORDER — CALCIUM GLUCONATE 100 MG/ML
1 VIAL (ML) INTRAVENOUS ONCE
Refills: 0 | Status: COMPLETED | OUTPATIENT
Start: 2021-01-17 | End: 2021-01-17

## 2021-01-17 RX ORDER — DEXTROSE 50 % IN WATER 50 %
25 SYRINGE (ML) INTRAVENOUS ONCE
Refills: 0 | Status: DISCONTINUED | OUTPATIENT
Start: 2021-01-17 | End: 2021-01-27

## 2021-01-17 RX ORDER — SODIUM ZIRCONIUM CYCLOSILICATE 10 G/10G
10 POWDER, FOR SUSPENSION ORAL ONCE
Refills: 0 | Status: COMPLETED | OUTPATIENT
Start: 2021-01-17 | End: 2021-01-17

## 2021-01-17 RX ORDER — VANCOMYCIN HCL 1 G
750 VIAL (EA) INTRAVENOUS ONCE
Refills: 0 | Status: COMPLETED | OUTPATIENT
Start: 2021-01-17 | End: 2021-01-17

## 2021-01-17 RX ORDER — GLUCAGON INJECTION, SOLUTION 0.5 MG/.1ML
1 INJECTION, SOLUTION SUBCUTANEOUS ONCE
Refills: 0 | Status: DISCONTINUED | OUTPATIENT
Start: 2021-01-17 | End: 2021-01-27

## 2021-01-17 RX ORDER — INSULIN HUMAN 100 [IU]/ML
5 INJECTION, SOLUTION SUBCUTANEOUS ONCE
Refills: 0 | Status: COMPLETED | OUTPATIENT
Start: 2021-01-17 | End: 2021-01-17

## 2021-01-17 RX ORDER — PIPERACILLIN AND TAZOBACTAM 4; .5 G/20ML; G/20ML
3.38 INJECTION, POWDER, LYOPHILIZED, FOR SOLUTION INTRAVENOUS ONCE
Refills: 0 | Status: COMPLETED | OUTPATIENT
Start: 2021-01-17 | End: 2021-01-17

## 2021-01-17 RX ORDER — INFLUENZA VIRUS VACCINE 15; 15; 15; 15 UG/.5ML; UG/.5ML; UG/.5ML; UG/.5ML
0.5 SUSPENSION INTRAMUSCULAR ONCE
Refills: 0 | Status: DISCONTINUED | OUTPATIENT
Start: 2021-01-17 | End: 2021-01-27

## 2021-01-17 RX ORDER — ONDANSETRON 8 MG/1
4 TABLET, FILM COATED ORAL EVERY 6 HOURS
Refills: 0 | Status: DISCONTINUED | OUTPATIENT
Start: 2021-01-17 | End: 2021-01-27

## 2021-01-17 RX ORDER — ERYTHROPOIETIN 10000 [IU]/ML
4000 INJECTION, SOLUTION INTRAVENOUS; SUBCUTANEOUS ONCE
Refills: 0 | Status: COMPLETED | OUTPATIENT
Start: 2021-01-17 | End: 2021-01-17

## 2021-01-17 RX ORDER — ASPIRIN/CALCIUM CARB/MAGNESIUM 324 MG
81 TABLET ORAL DAILY
Refills: 0 | Status: DISCONTINUED | OUTPATIENT
Start: 2021-01-17 | End: 2021-01-27

## 2021-01-17 RX ORDER — DEXTROSE 50 % IN WATER 50 %
15 SYRINGE (ML) INTRAVENOUS ONCE
Refills: 0 | Status: DISCONTINUED | OUTPATIENT
Start: 2021-01-17 | End: 2021-01-27

## 2021-01-17 RX ORDER — INSULIN LISPRO 100/ML
VIAL (ML) SUBCUTANEOUS
Refills: 0 | Status: DISCONTINUED | OUTPATIENT
Start: 2021-01-17 | End: 2021-01-27

## 2021-01-17 RX ORDER — PIPERACILLIN AND TAZOBACTAM 4; .5 G/20ML; G/20ML
3.38 INJECTION, POWDER, LYOPHILIZED, FOR SOLUTION INTRAVENOUS EVERY 12 HOURS
Refills: 0 | Status: DISCONTINUED | OUTPATIENT
Start: 2021-01-17 | End: 2021-01-17

## 2021-01-17 RX ORDER — INSULIN LISPRO 100/ML
VIAL (ML) SUBCUTANEOUS AT BEDTIME
Refills: 0 | Status: DISCONTINUED | OUTPATIENT
Start: 2021-01-17 | End: 2021-01-27

## 2021-01-17 RX ORDER — DEXTROSE 50 % IN WATER 50 %
12.5 SYRINGE (ML) INTRAVENOUS ONCE
Refills: 0 | Status: DISCONTINUED | OUTPATIENT
Start: 2021-01-17 | End: 2021-01-27

## 2021-01-17 RX ORDER — CLOPIDOGREL BISULFATE 75 MG/1
75 TABLET, FILM COATED ORAL DAILY
Refills: 0 | Status: DISCONTINUED | OUTPATIENT
Start: 2021-01-17 | End: 2021-01-27

## 2021-01-17 RX ORDER — HEPARIN SODIUM 5000 [USP'U]/ML
5000 INJECTION INTRAVENOUS; SUBCUTANEOUS EVERY 12 HOURS
Refills: 0 | Status: DISCONTINUED | OUTPATIENT
Start: 2021-01-17 | End: 2021-01-17

## 2021-01-17 RX ADMIN — Medication 250 MILLIGRAM(S): at 06:18

## 2021-01-17 RX ADMIN — Medication 667 MILLIGRAM(S): at 09:32

## 2021-01-17 RX ADMIN — Medication 667 MILLIGRAM(S): at 13:11

## 2021-01-17 RX ADMIN — Medication 1: at 16:29

## 2021-01-17 RX ADMIN — Medication 50 MILLILITER(S): at 06:43

## 2021-01-17 RX ADMIN — PIPERACILLIN AND TAZOBACTAM 25 GRAM(S): 4; .5 INJECTION, POWDER, LYOPHILIZED, FOR SOLUTION INTRAVENOUS at 13:16

## 2021-01-17 RX ADMIN — Medication 667 MILLIGRAM(S): at 20:27

## 2021-01-17 RX ADMIN — SODIUM ZIRCONIUM CYCLOSILICATE 10 GRAM(S): 10 POWDER, FOR SUSPENSION ORAL at 13:11

## 2021-01-17 RX ADMIN — Medication 1: at 09:32

## 2021-01-17 RX ADMIN — INSULIN HUMAN 5 UNIT(S): 100 INJECTION, SOLUTION SUBCUTANEOUS at 06:47

## 2021-01-17 RX ADMIN — CLOPIDOGREL BISULFATE 75 MILLIGRAM(S): 75 TABLET, FILM COATED ORAL at 13:11

## 2021-01-17 RX ADMIN — Medication 81 MILLIGRAM(S): at 13:11

## 2021-01-17 RX ADMIN — SODIUM ZIRCONIUM CYCLOSILICATE 10 GRAM(S): 10 POWDER, FOR SUSPENSION ORAL at 06:16

## 2021-01-17 RX ADMIN — Medication 100 GRAM(S): at 06:43

## 2021-01-17 RX ADMIN — ERYTHROPOIETIN 4000 UNIT(S): 10000 INJECTION, SOLUTION INTRAVENOUS; SUBCUTANEOUS at 23:57

## 2021-01-17 RX ADMIN — PIPERACILLIN AND TAZOBACTAM 200 GRAM(S): 4; .5 INJECTION, POWDER, LYOPHILIZED, FOR SOLUTION INTRAVENOUS at 04:03

## 2021-01-17 NOTE — CONSULT NOTE ADULT - ASSESSMENT
60M PMHx ESRD on HD (initiated on 3/2020 in [facility] via LUE AVF), CAD s/p stent (06/2020, on plavix/asa), DM2, HTN, HLD, anemia (unknown cause) who present to [facility] ED for weakness that started 2 days ago. Patient in need of urgent HD but unable to receive HD through left forearm avf. Patient s/p left IJ shiley placement 1/17/21.     - Chest xray to confirm location  - Patient needs transfusion, primary team notified  - Obtain ultrasound of LUE  - Discussed with vascular fellow    1991

## 2021-01-17 NOTE — H&P ADULT - PROBLEM SELECTOR PLAN 2
On admission patient's triage /121 likely 2/2 missing HD, possibly component fluid overload.    --s/p oral Nifedipine 20mg and coreg 6.25mg   --BP improved to 100s/60s    --per nephro note, pt on nifedipine 20, coreg 12.5 at home. Need to confirm home frequency and restart home meds   --monitor BP, low salt diet On admission patient's triage /121 likely 2/2 missing HD, possibly component fluid overload.    --s/p oral Nifedipine 20mg and coreg 6.25mg   --BP improved to 100s/60s    --per nephro note, pt on nifedipine 20 TID, coreg 12.5 BID at home.  --monitor BP, low salt diet On admission patient's triage /121 likely 2/2 missing HD, possibly noncompliance  --s/p oral Nifedipine 20mg and coreg 6.25mg   --BP improved to 100s/60s  and then dropped to SBP 90s.  --per nephro note, pt on nifedipine 20 TID, coreg 12.5 BID at home.  We will hold BP meds for now given low BP.    --monitor BP, low salt diet

## 2021-01-17 NOTE — DISCHARGE NOTE PROVIDER - NSDCCPCAREPLAN_GEN_ALL_CORE_FT
PRINCIPAL DISCHARGE DIAGNOSIS  Diagnosis: Hyperkalemia  Assessment and Plan of Treatment: You came to the hospital because you were feeling lethargic and having a poor appetite. Your blood work showed you had an elevated potassium. You were given medications to help reduced your potassium and received dialysis.      SECONDARY DISCHARGE DIAGNOSES  Diagnosis: Anemia secondary to renal failure  Assessment and Plan of Treatment: Anemia secondary to renal failure    Diagnosis: ESRD on hemodialysis  Assessment and Plan of Treatment:      PRINCIPAL DISCHARGE DIAGNOSIS  Diagnosis: ESRD on hemodialysis  Assessment and Plan of Treatment: You presented with lethargy and were noted to have derangements in your electrolytes on labs. You were missing recent dialysis due to no dialysis placement in the [place]. Your labs and symptoms improved after dialysis. You have been set up with a dialysis center. Please continue to not miss any dialysis sessions.  Please follow up with nephrology for your kidney disease.   Please follow up with vascular surgery for further evaluation of your AV fistula***      SECONDARY DISCHARGE DIAGNOSES  Diagnosis: Orthostatic hypotension  Assessment and Plan of Treatment: You were noted to have a drop in blood pressure when changing from a lying down to sitting and standing position. Please make sure to take a few minutes sitting down between laying down and slowly standing. You have been prescribed a medication (midodrine)*** to help you with this. If you continue to feel as if you are about to pass out, please seek medical attention.  You also have a history of heart disease for which you should follow up with cardiology as well.     PRINCIPAL DISCHARGE DIAGNOSIS  Diagnosis: ESRD on hemodialysis  Assessment and Plan of Treatment: You presented with lethargy and were noted to have derangements in your electrolytes on labs. You were missing recent dialysis due to no dialysis placement in the [place]. Your labs and symptoms improved after dialysis. You have been set up with a dialysis center. Please continue to not miss any dialysis sessions.  Please follow up with nephrology for your kidney disease.   Please follow up with interventional radiology or vascular surgery for further evaluation of your AV fistula. You have been provided with referral information today.      SECONDARY DISCHARGE DIAGNOSES  Diagnosis: Orthostatic hypotension  Assessment and Plan of Treatment: You were noted to have a drop in blood pressure when changing from a lying down to sitting and standing position. Please make sure to take a few minutes sitting down between laying down and slowly standing. You have been prescribed a medication (droxidopa[Northera]) to help you with this. If you continue to feel as if you are about to pass out, please seek medical attention.  You also have a history of heart disease for which you should follow up with cardiology as well. You have been provided a referral.     PRINCIPAL DISCHARGE DIAGNOSIS  Diagnosis: ESRD on hemodialysis  Assessment and Plan of Treatment: You presented with lethargy and were noted to have derangements in your electrolytes on labs. You were missing recent dialysis due to no dialysis placement in the [place]. Your labs and symptoms improved after dialysis. You have been set up with a dialysis center. Please continue to not miss any dialysis sessions.  Please follow up with nephrology for your kidney disease.   Please follow up with interventional radiology or vascular surgery for further evaluation of your AV fistula. You have been provided with referral information today.      SECONDARY DISCHARGE DIAGNOSES  Diagnosis: Orthostatic hypotension  Assessment and Plan of Treatment: You were noted to have a drop in blood pressure when changing from a lying down to sitting and standing position. Please make sure to take a few minutes sitting down between laying down and slowly standing. You have been prescribed a medication (droxidopa[Northera]) to help you with this. If you continue to feel as if you are about to pass out, please seek medical attention.  You have been prescribed Northera and   You also have a history of heart disease for which you should follow up with cardiology as well. You have been provided a referral.

## 2021-01-17 NOTE — H&P ADULT - PROBLEM SELECTOR PLAN 9
--DVT: aspirin/plavix  --Diet: DASH/TLC, Renal, CC   --Dispo: Home Likely 2/2 ESRD.   --per nephro, will hold off starting DEANA/epogen given uncontrolled BP, will consider restarting when BP is under control  --f/u iron studies, B12, folate and hemolysis labs

## 2021-01-17 NOTE — H&P ADULT - NSHPLABSRESULTS_GEN_ALL_CORE
Labs:                        7.4    5.98  )-----------( 170      ( 16 Jan 2021 20:00 )             24.0     Auto Eosinophil # 0.23  / Auto Eosinophil % 3.8   / Auto Neutrophil # 3.94  / Auto Neutrophil % 65.9  / BANDS % x        Hgb Trend: 7.4<--    01-16    138  |  100  |  112<H>  ----------------------------<  152<H>  5.2   |  12<L>  |  16.04<H>  01-16    140  |  101  |  119<H>  ----------------------------<  131<H>  6.5<HH>   |  15<L>  |  16.09<H>    Ca    8.3<L>      16 Jan 2021 22:40  Mg     3.0     01-16  Phos  9.9     01-16  TPro  8.4<H>  /  Alb  4.3  /  TBili  0.3  /  DBili  x   /  AST  18  /  ALT  23  /  AlkPhos  152<H>  01-16    Creatinine Trend: 16.04<--, 16.09<--            ABG:   VENT:     EKG: NSR, HR: 60, peaked T waves    Labs result reviewed by me. Labs:                        7.4    5.98  )-----------( 170      ( 16 Jan 2021 20:00 )             24.0     Auto Eosinophil # 0.23  / Auto Eosinophil % 3.8   / Auto Neutrophil # 3.94  / Auto Neutrophil % 65.9  / BANDS % x        Hgb Trend: 7.4<--    01-16    138  |  100  |  112<H>  ----------------------------<  152<H>  5.2   |  12<L>  |  16.04<H>  01-16    140  |  101  |  119<H>  ----------------------------<  131<H>  6.5<HH>   |  15<L>  |  16.09<H>    Ca    8.3<L>      16 Jan 2021 22:40  Mg     3.0     01-16  Phos  9.9     01-16  TPro  8.4<H>  /  Alb  4.3  /  TBili  0.3  /  DBili  x   /  AST  18  /  ALT  23  /  AlkPhos  152<H>  01-16    Creatinine Trend: 16.04<--, 16.09<--            ABG:   VENT:     EKG: NSR, HR: 60, peaked T waves  CXR: No evidence of volume overload    Labs/imaging result reviewed by me. Labs reviewed:                        7.4    5.98  )-----------( 170      ( 16 Jan 2021 20:00 )             24.0     Auto Eosinophil # 0.23  / Auto Eosinophil % 3.8   / Auto Neutrophil # 3.94  / Auto Neutrophil % 65.9  / BANDS % x        Hgb Trend: 7.4<--    01-16    138  |  100  |  112<H>  ----------------------------<  152<H>  5.2   |  12<L>  |  16.04<H>  01-16    140  |  101  |  119<H>  ----------------------------<  131<H>  6.5<HH>   |  15<L>  |  16.09<H>    Ca    8.3<L>      16 Jan 2021 22:40  Mg     3.0     01-16  Phos  9.9     01-16  TPro  8.4<H>  /  Alb  4.3  /  TBili  0.3  /  DBili  x   /  AST  18  /  ALT  23  /  AlkPhos  152<H>  01-16    Creatinine Trend: 16.04<--, 16.09<--    ABG:   VENT:     EKG reviewed: NSR, HR: 60, peaked T waves  CXR reviewed: No evidence of volume overload    Labs/imaging result reviewed by me.

## 2021-01-17 NOTE — H&P ADULT - PROBLEM SELECTOR PLAN 3
Pt. with ESRD (presumed diabetic nephropathy) on HD in [facility] (HD initiated 3/26/2020), minimal urine. Last HD in [facility] was on 1/14/20 via LUE AVF.   - plan for maintenance HD today, UF goal 2L as BP tolerated   - f/u renal duplex arteries/vein (son report told vessel ds in [facility])   --start phosplo 667 TID   - monitor electrolyte, strict I/O, daily weight, fluid restriction 1L, renally dose medication per HD, renal diet (low K/phos)   - social work consult placed to setup outpatient HD center Pt became hypothermic (93F), hypotensive (90s/60s) and tachypnic. No clear sourse of infection  --f/u B/C  --f/u UA, U/C (patient makes some urine)  --CXR unremarkable  --Vancomycin 750mg x1 given  --start Zosyn Pt became hypothermic (93F), hypotensive (90s/60s) and tachypnic. No clear sourse of infection  --f/u B/C  --f/u UA, U/C (patient makes some urine)  --CXR unremarkable  --Vancomycin 750mg x1 given  --start Zosyn  - hold BP meds  - EKG showing sinus chantel  - repeat CBC, CMP; monitor electrolytes

## 2021-01-17 NOTE — CONSULT NOTE ADULT - SUBJECTIVE AND OBJECTIVE BOX
Vascular Surgery Consult  Consulting surgical team: Vascular  Consulting attending: Dr. Hurst    Vascular consulted for non-working left forearm AVF. Attempted yesterday and today by HD nurse with no success on low-flow HD. Patient in need of HD tonight and blood transfusion.     HPI:  60M PMHx ESRD on HD (initiated on 3/2020 in [facility] via LUE AVF), CAD s/p stent (06/2020, on plavix/asa), DM2, HTN, HLD, anemia (unknown cause) who present to [facility] ED for weakness that started 2 days ago. Patient report he recently came back from [facility] two days ago and for the past 2 days felt very weak, dizzy, and nausea that is progressive worsening, with 1 episode of NBNB emesis in the ED.  Per family (wife/son), patient was very lethargic, sleeping all day which prompted him to go to the ED. Pt usually gets dialysis M/W/Saturday in [facility], last got dialysis on Thursday before flight to [place]. Pt makes minimal urine at baseline. ROS negative for fever, chills, sore throat, chest pain, shortness of breath, cough, diarrhea. No history of kidney stones, recurrent UTI, family history kidney disease, hematuria.      ED: afebrile, HR: 111, BP: 190/121, 100% RA. K+ 6.5, Regular insulin 5 units, albuterol, calcium gluconate, lokelma, nifedipine 20, coreg 6.25   (17 Jan 2021 01:24)      PAST MEDICAL HISTORY:  HTN (hypertension)    CAD (coronary artery disease)    T2DM (type 2 diabetes mellitus)    ESRD on hemodialysis        PAST SURGICAL HISTORY:  AV fistula        MEDICATIONS:  aspirin enteric coated 81 milliGRAM(s) Oral daily  calcium acetate 667 milliGRAM(s) Oral three times a day with meals  clopidogrel Tablet 75 milliGRAM(s) Oral daily  dextrose 40% Gel 15 Gram(s) Oral once  dextrose 5%. 1000 milliLiter(s) IV Continuous <Continuous>  dextrose 5%. 1000 milliLiter(s) IV Continuous <Continuous>  dextrose 50% Injectable 25 Gram(s) IV Push once  dextrose 50% Injectable 12.5 Gram(s) IV Push once  dextrose 50% Injectable 25 Gram(s) IV Push once  epoetin junior-epbx (RETACRIT) Injectable 4000 Unit(s) IV Push once  glucagon  Injectable 1 milliGRAM(s) IntraMuscular once  influenza   Vaccine 0.5 milliLiter(s) IntraMuscular once  insulin lispro (ADMELOG) corrective regimen sliding scale   SubCutaneous three times a day before meals  insulin lispro (ADMELOG) corrective regimen sliding scale   SubCutaneous at bedtime  ondansetron Injectable 4 milliGRAM(s) IV Push every 6 hours PRN      ALLERGIES:  No Known Allergies      VITALS & I/Os:  Vital Signs Last 24 Hrs  T(C): 36.4 (17 Jan 2021 19:51), Max: 36.9 (16 Jan 2021 22:43)  T(F): 97.5 (17 Jan 2021 19:51), Max: 98.4 (16 Jan 2021 22:43)  HR: 70 (17 Jan 2021 19:51) (45 - 70)  BP: 176/88 (17 Jan 2021 19:51) (96/57 - 176/88)  BP(mean): 74 (17 Jan 2021 04:15) (74 - 81)  RR: 20 (17 Jan 2021 19:51) (14 - 22)  SpO2: 98% (17 Jan 2021 19:51) (98% - 100%)    I&O's Summary      PHYSICAL EXAM:  General: No acute distress  Respiratory: Nonlabored  Cardiovascular: RRR  Abdominal: Soft, nondistended, nontender.  Extremities: Warm, left avf with good thrill    LABS:                        5.0    5.64  )-----------( 155      ( 17 Jan 2021 21:23 )             15.9     01-17    135  |  96  |  124<H>  ----------------------------<  125<H>  4.7   |  15<L>  |  16.22<H>    Ca    6.8<L>      17 Jan 2021 21:23  Phos  9.7     01-17  Mg     2.5     01-17    TPro  8.4<H>  /  Alb  4.3  /  TBili  0.3  /  DBili  x   /  AST  18  /  ALT  23  /  AlkPhos  152<H>  01-16    Lactate:  01-17 @ 04:37  1.3    PT/INR - ( 17 Jan 2021 21:23 )   PT: 14.6 sec;   INR: 1.23 ratio         PTT - ( 17 Jan 2021 21:23 )  PTT:30.8 sec              IMAGING:

## 2021-01-17 NOTE — H&P ADULT - NSHPPHYSICALEXAM_GEN_ALL_CORE
T(C): 36.9 (01-16-21 @ 22:43), Max: 36.9 (01-16-21 @ 22:43)  HR: 45 (01-17-21 @ 00:49) (45 - 111)  BP: 106/69 (01-17-21 @ 00:49) (103/89 - 195/102)  RR: 16 (01-17-21 @ 00:49) (14 - 18)  SpO2: 99% (01-17-21 @ 00:49) (99% - 100%)    GENERAL: Laying comfortably, NAD  EYES: EOMI, PERRL, no scleral icterus  NECK: No JVD  LUNG: Clear to auscultation bilaterally; No wheeze, crackles or rhonci  HEART: Regular rate and rhythm; No murmurs, rubs, or gallops  ABDOMEN: Soft, Nontender, Nondistended  EXTREMITIES:  No LE edema, Peripheral Pulses intact, No clubbing, cyanosis, or edema  PSYCH: AAOx3  NEUROLOGY: non-focal, strength 5/5 in all extremities, sensation intact  SKIN: No rashes or lesions

## 2021-01-17 NOTE — PROGRESS NOTE ADULT - PROBLEM SELECTOR PLAN 3
Pt became hypothermic (93F), hypotensive (90s/60s) and tachypnic. No clear sourse of infection  --f/u B/C  --f/u UA, U/C (patient makes some urine)  --CXR unremarkable  --Vancomycin 750mg x1 given  --start Zosyn  - hold BP meds  - EKG showing sinus chantel  - repeat CBC, CMP; monitor electrolytes

## 2021-01-17 NOTE — H&P ADULT - PROBLEM SELECTOR PROBLEM 10
Bethany Lopez is a 44 y.o. female (: 1976) presenting to address:    Chief Complaint   Patient presents with    Abdominal Pain       Medication list and allergies have been reviewed with Bethany Lopez and updated as of today's date. I have gone over all Medical, Surgical and Social History with Bethany Lopez and updated/added the information accordingly. 1. Have you been to the ER, Urgent Care or Hospitalized since your last visit? YES      2. Have you followed up with your PCP or any other Physicians since your procedure/ last office visit?    NO Preventive measure

## 2021-01-17 NOTE — H&P ADULT - NSICDXPASTMEDICALHX_GEN_ALL_CORE_FT
PAST MEDICAL HISTORY:  CAD (coronary artery disease) s/p sent in 06/2020    ESRD on hemodialysis M/W/Saturday at home in [facility]    HTN (hypertension)     T2DM (type 2 diabetes mellitus)

## 2021-01-17 NOTE — PROGRESS NOTE ADULT - PROBLEM SELECTOR PLAN 7
Per nephro note, pt on nifedipine 20 TID, coreg 12.5 BID at home.. Patient, son and wife do not know patients medications  --most recent BP 90s/60s, hold home BP meds

## 2021-01-17 NOTE — ED ADULT NURSE REASSESSMENT NOTE - NS ED NURSE REASSESS COMMENT FT1
Pt returned from HD. Per Jeri HD RN, pt was unable to get HD treatment d/t symptomatic bradycardia & hypotension. Upon arrival, pt has reports of dizziness with rectal temperature 93.1, HR 47, & BP 96/57 MAP 74. Pt remains A&Ox4 & answering questions appropriately. EKG obtained, BCx drawn, & warm blankets & Beir hugger applied. Jay LE (Spectra# [id number]) coming to assess pt at bedside.

## 2021-01-17 NOTE — ED ADULT NURSE REASSESSMENT NOTE - NS ED NURSE REASSESS COMMENT FT1
Pt's HR 45. Woke pt up from sleeping & started to talk to him but HR did not increase. Pt remains A&Ox4, mentating appropriately & denies dizziness or lightheadedness. Jay LE aware, no recommendations at this time.

## 2021-01-17 NOTE — PROVIDER CONTACT NOTE (CRITICAL VALUE NOTIFICATION) - ACTION/TREATMENT ORDERED:
Team notified. Shiley being put in at bedside. Pt will receive dialysis upon insertion of shiley. PRBC's to be ordered during dialysis.

## 2021-01-17 NOTE — H&P ADULT - PROBLEM SELECTOR PLAN 7
Likely 2/2 ESRD.   --per nephro, will hold off starting DEANA/epogen given uncontrolled BP, will consider restarting when BP is under control  --f/u iron studies, B12, folate and hemolysis labs Not on any meds at home according to patient   --f/u A1c   --Low dose ISS qac, qhs   --CC diet Per nephro note, pt on nifedipine 20 TID, coreg 12.5 BID at home.. Patient, son and wife do not know patients medications  --most recent BP 90s/60s, hold home BP meds

## 2021-01-17 NOTE — H&P ADULT - ATTENDING COMMENTS
I was physically present for the key portions of the evaluation and management (E/M) service provided.  I agree with the above history, physical, and plan which I have reviewed and edited where appropriate.     Plan discussed with Patient, RN, Resident    60M PMHx ESRD on HD (initiated on 3/2020 in [facility] via LUE AVF), CAD s/p stent (06/2020, on plavix/asa), DM2, HTN, HLD, anemia, who present to [facility] ED for weakness that started 2 days ago after coming from [facility].  Patient otherwise denies cough, SOB, nausea, vomiting, fever or chills.  In ED, patient was found to have hyperkalemia and hypertensive urgency.  s/p hyperkalemia cocktail and restarting home medication (nifedipine and carvedilol).  Course complicated by hypotension, bradycardia, hyperthermia of unclear etiology.  Full septic work up sent.  Started on IV vanc/zosyn.  Repeat CMP and VBG ordered.

## 2021-01-17 NOTE — PROGRESS NOTE ADULT - PROBLEM SELECTOR PLAN 4
Pt. with ESRD (presumed diabetic nephropathy) on HD in [facility] [place] (HD initiated 3/26/2020), minimal urine. Last HD in [facility] was on 1/14/20 via LUE AVF.   - plan for maintenance HD today, UF goal 2L as BP tolerated   - f/u renal duplex arteries/vein (son report told vessel ds in [facility])   --start phosplo 667 TID   - monitor electrolyte, strict I/O, daily weight, fluid restriction 1L, renally dose medication per HD, renal diet (low K/phos)   - social work consult placed to setup outpatient HD center

## 2021-01-17 NOTE — PROGRESS NOTE ADULT - ASSESSMENT
60M PMHx ESRD on HD (initiated in [facility] via LUE AVF), CAD s/p stent (on plavix/asa), DM2, HTN present to ED for weakness after not having hemodialysis over 3 days.  Nephrology consulted for ESRD/HD management.       # ESRD  Pt. with ESRD (presumed diabetic nephropathy) on HD in [facility] (HD initiated 3/26/2020), minimal urine. Last outpatient HD in [facility] was on 1/14/20 via LUE AVF. Triage vitals hypertensive urgency BP 190s/120s.  BUN/Cr elevated 122/15.6  - plan for maintenance HD today, 2.5 hour, , UF goal 2L as BP tolerated  - consider CT head r/o any acute process  - ordered for renal duplex arteries/vein (son report told vessel ds in [facility])  - monitor electrolyte, strict I/O, daily weight, fluid restriction 1L, renally dose medication per HD, renal diet (low K/phos)  - will need / to setup outpatient HD center prior to discharge    # Hyperkalemia  Pt with hyperkalemia in the setting of ESRD not having HD few days, last serum K 6.1  - ordered for lokelma 10g x 1 dose stat  - plan for HD as outline above, monitor electrolytes, low potassium diet    # Hypertensive urgency  On admission patient's triage /121 likely 2/2 missing HD, decrease BP after meds given 90-100s overnight, last BP 120s/60s  - please check CT head r/o any acute process  - please check renal duplex arteries/vein r/o DAVONTE (son report told vessel ds in [facility])  - monitor BP, low salt diet    # Anemia  Pt with hx anemia likely multifactorial including ACD in the setting of ESRD. Hgb level below target (on admission hgb 7.4), drop hgb 6.5 s/p 1U prbc 1/17.   - will start epogen 4000U TIW on HD days   - Check iron studies and monitor Hgb    # Renal bone disease  Pt with hyperphosphatemia in the setting of ESRD.   - continue phos binder phos-low 667mg TID w/ meals,  Check intact PTH level. Low phosphorus diet advised. Monitor serum phosphorus 60M PMHx ESRD on HD (initiated in [facility] via LUE AVF), CAD s/p stent (on plavix/asa), DM2, HTN present to ED for weakness after not having hemodialysis over 3 days.  Nephrology consulted for ESRD/HD management.       # ESRD  Pt. with ESRD (presumed diabetic nephropathy) on HD in [facility] (HD initiated 3/26/2020), minimal urine. Last outpatient HD in [facility] was on 1/14/20 via LUE AVF. Triage vitals hypertensive urgency BP 190s/120s.  BUN/Cr elevated 122/15.6  - plan for maintenance HD today, 2.5 hour, , UF goal 2L as BP tolerated  - consider CT head r/o any acute process  - ordered for renal duplex arteries/vein (son report told vessel ds in [facility])  - monitor electrolyte, strict I/O, daily weight, fluid restriction 1L, renally dose medication per HD, renal diet (low K/phos)  - will need / to setup outpatient HD center prior to discharge    # Hyperkalemia  Pt with hyperkalemia in the setting of ESRD not having HD few days, last serum K 6.1  - ordered for lokelma 10g x 1 dose stat  - plan for HD as outline above, monitor electrolytes, low potassium diet    # Hypertensive urgency  On admission patient's triage /121 likely 2/2 missing HD, decrease BP after meds given 90-100s overnight, last BP 120s/60s  - please check CT head r/o any acute process  - please check renal duplex arteries/vein r/o DAVONTE (son report told vessel ds in [facility])  - monitor BP, low salt diet    # Anemia  Pt with hx anemia likely multifactorial including ACD in the setting of ESRD. Hgb level below target (on admission hgb 7.4), drop hgb 6.5 s/p 1U prbc 1/17.   - will start epogen 4000U TIW on HD days   - Iron stores replete    # Renal bone disease  Pt with hyperphosphatemia in the setting of ESRD.   - continue phos binder phos-low 667mg TID w/ meals,  Check intact PTH level. Low phosphorus diet advised. Monitor serum phosphorus

## 2021-01-17 NOTE — PROGRESS NOTE ADULT - PROBLEM SELECTOR PLAN 6
Decreased bicarb, elevated anion gap. Likely in the setting of uremia  --f/u VBG, lactate  --monitor BMP

## 2021-01-17 NOTE — PROGRESS NOTE ADULT - SUBJECTIVE AND OBJECTIVE BOX
Debbie York MD  PGY 1 Department of Internal Medicine  Pager: [phone], [phone] J      Patient is a 60y old  Male who presents with a chief complaint of Weakness (17 Jan 2021 01:24)      SUBJECTIVE / OVERNIGHT EVENTS: Pt seen and examined. No acute overnight events.   Denies fevers, chills, CP/palpitations, SOB/cough, abdominal pain, N/V, constipation, or diarrhea.        MEDICATIONS  (STANDING):  aspirin enteric coated 81 milliGRAM(s) Oral daily  calcium acetate 667 milliGRAM(s) Oral three times a day with meals  clopidogrel Tablet 75 milliGRAM(s) Oral daily  dextrose 40% Gel 15 Gram(s) Oral once  dextrose 5%. 1000 milliLiter(s) (50 mL/Hr) IV Continuous <Continuous>  dextrose 5%. 1000 milliLiter(s) (100 mL/Hr) IV Continuous <Continuous>  dextrose 50% Injectable 25 Gram(s) IV Push once  dextrose 50% Injectable 12.5 Gram(s) IV Push once  dextrose 50% Injectable 25 Gram(s) IV Push once  glucagon  Injectable 1 milliGRAM(s) IntraMuscular once  insulin lispro (ADMELOG) corrective regimen sliding scale   SubCutaneous three times a day before meals  insulin lispro (ADMELOG) corrective regimen sliding scale   SubCutaneous at bedtime  piperacillin/tazobactam IVPB.. 3.375 Gram(s) IV Intermittent every 12 hours    MEDICATIONS  (PRN):  ondansetron Injectable 4 milliGRAM(s) IV Push every 6 hours PRN Nausea      I&O's Summary      Vital Signs Last 24 Hrs  T(C): 35.7 (17 Jan 2021 05:30), Max: 36.9 (16 Jan 2021 22:43)  T(F): 96.3 (17 Jan 2021 05:30), Max: 98.4 (16 Jan 2021 22:43)  HR: 53 (17 Jan 2021 06:11) (45 - 111)  BP: 117/69 (17 Jan 2021 06:11) (96/57 - 195/102)  BP(mean): 74 (17 Jan 2021 04:15) (74 - 81)  RR: 17 (17 Jan 2021 06:11) (14 - 22)  SpO2: 100% (17 Jan 2021 06:11) (99% - 100%)    CAPILLARY BLOOD GLUCOSE      POCT Blood Glucose.: 104 mg/dL (16 Jan 2021 22:40)      PHYSICAL EXAM:  GENERAL: NAD,   HEAD:  Atraumatic, Normocephalic  EYES: EOMI, PERRL, conjunctiva and sclera clear  NECK: No JVD  CHEST/LUNG: Clear to auscultation bilaterally; No wheeze  HEART: Regular rate and rhythm; No murmurs, rubs, or gallops  ABDOMEN: Soft, Nontender, Nondistended; Bowel sounds present  EXTREMITIES:  2+ Peripheral Pulses, No clubbing, cyanosis, or edema  PSYCH: AAOx3  NEUROLOGY: non-focal  SKIN: No rashes or lesions       LABS:                        6.4    4.86  )-----------( 144      ( 17 Jan 2021 05:46 )             20.3     Auto Eosinophil # x     / Auto Eosinophil % x     / Auto Neutrophil # x     / Auto Neutrophil % x     / BANDS % x                            6.5    5.27  )-----------( 145      ( 17 Jan 2021 04:29 )             20.6     Auto Eosinophil # 0.16  / Auto Eosinophil % 3.0   / Auto Neutrophil # 3.74  / Auto Neutrophil % 71.0  / BANDS % x                            7.4    5.98  )-----------( 170      ( 16 Jan 2021 20:00 )             24.0     Auto Eosinophil # 0.23  / Auto Eosinophil % 3.8   / Auto Neutrophil # 3.94  / Auto Neutrophil % 65.9  / BANDS % x        01-17    137  |  101  |  122<H>  ----------------------------<  92  6.1<H>   |  12<L>  |  15.91<H>  01-16    138  |  100  |  112<H>  ----------------------------<  152<H>  5.2   |  12<L>  |  16.04<H>  01-16    140  |  101  |  119<H>  ----------------------------<  131<H>  6.5<HH>   |  15<L>  |  16.09<H>    Ca    7.2<L>      17 Jan 2021 04:29  Mg     2.8     01-17  Phos  9.8     01-17  TPro  8.4<H>  /  Alb  4.3  /  TBili  0.3  /  DBili  x   /  AST  18  /  ALT  23  /  AlkPhos  152<H>  01-16

## 2021-01-17 NOTE — PROGRESS NOTE ADULT - SUBJECTIVE AND OBJECTIVE BOX
[facility] DIVISION OF KIDNEY DISEASES AND HYPERTENSION   -- FOLLOW UP NOTE --   Tita Rolle  Nephrology Fellow  Pager NS: [phone]   /  Pager KEISHA: [phone]  (after 5pm or weekend please page the on-call fellow)  ======================================================  24 hour events/subjective:  - patient yesterday went to HD unit however was bradycardic 40s, hypotensive sbp 80 therefore HD aborted.  Pt given glucagon IV w/ improve HR  - overnight hypothermic 92.6F given Beir hugger, on room air  - patient seen and examined at bedside this morning without complaints on room air in no acute distress  - vitals/lab/medications reviewed, noted for BUN/Cr 122/15.9, K 6.1    PAST HISTORY  --------------------------------------------------------------------------------  No significant changes to PMH, PSH, FHx, SHx, unless otherwise noted    ALLERGIES & MEDICATIONS  --------------------------------------------------------------------------------  Allergies  No Known Allergies    Intolerances    Standing Inpatient Medications  aspirin enteric coated 81 milliGRAM(s) Oral daily  calcium acetate 667 milliGRAM(s) Oral three times a day with meals  clopidogrel Tablet 75 milliGRAM(s) Oral daily  dextrose 40% Gel 15 Gram(s) Oral once  dextrose 5%. 1000 milliLiter(s) IV Continuous <Continuous>  dextrose 5%. 1000 milliLiter(s) IV Continuous <Continuous>  dextrose 50% Injectable 25 Gram(s) IV Push once  dextrose 50% Injectable 12.5 Gram(s) IV Push once  dextrose 50% Injectable 25 Gram(s) IV Push once  glucagon  Injectable 1 milliGRAM(s) IntraMuscular once  insulin lispro (ADMELOG) corrective regimen sliding scale   SubCutaneous three times a day before meals  insulin lispro (ADMELOG) corrective regimen sliding scale   SubCutaneous at bedtime  piperacillin/tazobactam IVPB.. 3.375 Gram(s) IV Intermittent every 12 hours    PRN Inpatient Medications  ondansetron Injectable 4 milliGRAM(s) IV Push every 6 hours PRN    REVIEW OF SYSTEMS  --------------------------------------------------------------------------------  Gen: no fever, chills, weakness. +fatigue  Respiratory: No dyspnea, cough  CV: No chest pain, orthopnea  GI: No abdominal pain, nausea, vomiting, diarrhea  MSK: no edema  Neuro: No dizziness, lightheadedness  Heme: No bleeding  All other systems were reviewed and are negative, except as noted.    VITALS/PHYSICAL EXAM  --------------------------------------------------------------------------------  T(C): 36.3 (01-17-21 @ 07:51), Max: 36.9 (01-16-21 @ 22:43)  HR: 62 (01-17-21 @ 07:51) (45 - 111)  BP: 125/69 (01-17-21 @ 07:51) (96/57 - 195/102)  RR: 20 (01-17-21 @ 07:51) (14 - 22)  SpO2: 100% (01-17-21 @ 07:51) (99% - 100%)  Wt(kg): --  Height (cm): 177.8 (01-16-21 @ 14:38)  Weight (kg): 48 (01-16-21 @ 14:38)  BMI (kg/m2): 15.2 (01-16-21 @ 14:38)  BSA (m2): 1.59 (01-16-21 @ 14:38)    Physical Exam:  	Gen: NAD, well-appearing on room air  	HEENT: moist mucous membrane  	Pulm: CTA B/L, no crackles  	CV: RRR, S1S2; no rub/murmur  	GI: +BS, soft, nontender/nondistended  	MSK: Warm, no edema              Neuro: AAOx3  	Psych: sleepy  	Skin: Warm  	Vascular access: LUE AVF +thrills/bruits    LABS/STUDIES  --------------------------------------------------------------------------------              6.4    4.86  >-----------<  144      [01-17-21 @ 05:46]              20.3     137  |  101  |  122  ----------------------------<  92      [01-17-21 @ 04:29]  6.1   |  12  |  15.91        Ca     7.2     [01-17-21 @ 04:29]      Mg     2.8     [01-17-21 @ 04:29]      Phos  9.8     [01-17-21 @ 04:29]    TPro  8.4  /  Alb  4.3  /  TBili  0.3  /  DBili  x   /  AST  18  /  ALT  23  /  AlkPhos  152  [01-16-21 @ 20:00]          [01-17-21 @ 07:55]    Creatinine Trend:  SCr 15.91 [01-17 @ 04:29]  SCr 16.04 [01-16 @ 22:40]  SCr 16.09 [01-16 @ 20:00]    Iron 58, TIBC 204, %sat 28      [01-17-21 @ 10:08]    HBsAb <3.0      [01-17-21 @ 00:52]  HBsAg Nonreact      [01-17-21 @ 00:52]  HBcAb Nonreact      [01-17-21 @ 00:52]  HCV 0.09, Nonreact      [01-17-21 @ 00:52]

## 2021-01-17 NOTE — DISCHARGE NOTE PROVIDER - NSFOLLOWUPCLINICS_GEN_ALL_ED_FT
[facility]  Nephrology  [address] [place] [address], [address] [place] [address]  Phone: [phone]  Fax:   Follow Up Time:

## 2021-01-17 NOTE — H&P ADULT - HISTORY OF PRESENT ILLNESS
60M PMHx ESRD on HD (initiated on 3/2020 in [facility] via LUE AVF), CAD s/p stent (06/2020, on plavix/asa), DM2, HTN, HLD, anemia (unknown cause) who present to [facility] ED for weakness that started 2 days ago. Patient report he recently came back from [facility] two days ago and for the past 2 days felt very weak, dizzy, and nausea that is progressive worsening, with 1 episode of NBNB emesis in the ED.  Per family (wife/son), patient was very lethargic, sleeping all day which prompted him to go to the ED. Pt usually gets dialysis M/W/Saturday in [facility], last got dialysis on Thursday before flight to [place]. Pt makes minimal urine at baseline. ROS negative for fever, chills, sore throat, chest pain, shortness of breath, cough, diarrhea. No history of kidney stones, recurrent UTI, family history kidney disease, hematuria.      ED: afebrile, HR: 111, BP: 190/121, 100% RA. K+ 6.5, Regular insulin 5 units, albuterol, calcium gluconate, lokelma, nifedipine 20, coreg 6.25

## 2021-01-17 NOTE — DISCHARGE NOTE PROVIDER - HOSPITAL COURSE
60M PMHx ESRD on HD (initiated on 3/2020 in [facility] via LUE AVF), CAD s/p stent (06/2020, on plavix/asa), DM2, HTN, HLD, anemia (unknown cause) who present to [facility] ED for weakness that started 2 days ago. Patient report he recently came back from [facility] two days ago and for the past 2 days felt very weak, dizzy, and nausea that is progressive worsening, with 1 episode of NBNB emesis in the ED.  Per family (wife/son), patient was very lethargic, sleeping all day which prompted him to go to the ED. Pt usually gets dialysis M/W/Saturday in [facility], last got dialysis on Thursday before flight to [place]. Pt makes minimal urine at baseline. ROS negative for fever, chills, sore throat, chest pain, shortness of breath, cough, diarrhea. No history of kidney stones, recurrent UTI, family history kidney disease, hematuria.      ED: afebrile, HR: 111, BP: 190/121, 100% RA. K+ 6.5, Regular insulin 5 units, albuterol, calcium gluconate, lokelma, nifedipine 20, coreg 6.25      Hospital Course    CBC remarkable for 6.4. Pt received 1 pRBC during hemodialysis. 60M PMHx ESRD on HD (initiated on 3/2020 in [facility] via LUE AVF), CAD s/p stent (06/2020, on plavix/asa), DM2, HTN, HLD, anemia (unknown cause) who present to [facility] ED for weakness that started 2 days ago. Patient report he recently came back from [facility] two days ago and for the past 2 days felt very weak, dizzy, and nausea that is progressive worsening, with 1 episode of NBNB emesis in the ED.  Per family (wife/son), patient was very lethargic, sleeping all day which prompted him to go to the ED. Pt usually gets dialysis M/W/Saturday in [facility], last got dialysis on Thursday before flight to [place]. Pt makes minimal urine at baseline. ROS negative for fever, chills, sore throat, chest pain, shortness of breath, cough, diarrhea. No history of kidney stones, recurrent UTI, family history kidney disease, hematuria.      ED: afebrile, HR: 111, BP: 190/121, 100% RA. K+ 6.5, Regular insulin 5 units, albuterol, calcium gluconate, lokelma, nifedipine 20, coreg 6.25      Hospital Course    Patient admitted to the hospital for emergent hemodialysis. Patient was noted to have non function left radiocephalic fistula and a Shiley for dialysis was placed by IR. Patient also noted to be anemic to 6.4 and received a unit of PRBCs. Patient's symptoms improved after dialysis and subsequently had placement of a dialysis catheter. Patients' course complicated by orthostatic hypotension and was started on midodrine. Patient was seen by cardiology and nephrology during stay. Patient's orthostasis improved. Patient received an outpatient dialysis center. On ***, patient afebrile, lightheadedness improved, orthostatics improved, and ready for discharge home with dialysis set up. He will follow up with nephrology and cardiology. He will be seen by vascular surgery as an outpatient for possible intervention on non functioning AV fistula*** 60M PMHx ESRD on HD (initiated on 3/2020 in [facility] via LUE AVF), CAD s/p stent (06/2020, on plavix/asa), DM2, HTN, HLD, anemia (unknown cause) who present to [facility] ED for weakness that started 2 days ago. Patient report he recently came back from [facility] two days ago and for the past 2 days felt very weak, dizzy, and nausea that is progressive worsening, with 1 episode of NBNB emesis in the ED.  Per family (wife/son), patient was very lethargic, sleeping all day which prompted him to go to the ED. Pt usually gets dialysis M/W/Saturday in [facility], last got dialysis on Thursday before flight to [place]. Pt makes minimal urine at baseline. ROS negative for fever, chills, sore throat, chest pain, shortness of breath, cough, diarrhea. No history of kidney stones, recurrent UTI, family history kidney disease, hematuria.      ED: afebrile, HR: 111, BP: 190/121, 100% RA. K+ 6.5, Regular insulin 5 units, albuterol, calcium gluconate, lokelma, nifedipine 20, coreg 6.25      Hospital Course    Patient admitted to the hospital for emergent hemodialysis. Patient was noted to have non function left radiocephalic fistula and a Shiley for dialysis was placed by IR. Patient also noted to be anemic to 6.4 and received a unit of PRBCs. Patient's symptoms improved after dialysis and subsequently had placement of a dialysis catheter. Patients' course complicated by orthostatic hypotension and was started on midodrine. Patient was seen by cardiology and nephrology during stay. He was switched from Coreg to metoprolol for less alpha blockade. He was switched to droxidopa from midodrine with resolution of patient's orthostasis. Patient received an outpatient dialysis center. On 1/27/2021, patient afebrile, orthostatics resolved, and ready for discharge home with dialysis set up. He will follow up with nephrology and cardiology. He will be seen by interventional radiology/vascular surgery as an outpatient for possible intervention on non functioning AV fistula. 60M PMHx ESRD on HD (initiated on 3/2020 in [facility] via LUE AVF), CAD s/p stent (06/2020, on plavix/asa), DM2, HTN, HLD, anemia (unknown cause) who present to [facility] ED for weakness that started 2 days ago. Patient report he recently came back from [facility] two days ago and for the past 2 days felt very weak, dizzy, and nausea that is progressive worsening, with 1 episode of NBNB emesis in the ED.  Per family (wife/son), patient was very lethargic, sleeping all day which prompted him to go to the ED. Pt usually gets dialysis M/W/Saturday in [facility], last got dialysis on Thursday before flight to [place]. Pt makes minimal urine at baseline. ROS negative for fever, chills, sore throat, chest pain, shortness of breath, cough, diarrhea. No history of kidney stones, recurrent UTI, family history kidney disease, hematuria.      ED: afebrile, HR: 111, BP: 190/121, 100% RA. K+ 6.5, Regular insulin 5 units, albuterol, calcium gluconate, lokelma, nifedipine 20, coreg 6.25      Hospital Course    Patient admitted to the hospital for emergent hemodialysis. Patient was noted to have non function left radiocephalic fistula and a Shiley for dialysis was placed by IR. Patient also noted to be anemic to 6.4 and received a unit of PRBCs. Patient's symptoms improved after dialysis and subsequently had placement of a dialysis catheter. Patients' course complicated by orthostatic hypotension and was started on midodrine. Patient was seen by cardiology and nephrology during stay. He was switched from Coreg to metoprolol for less alpha blockade. He was switched to droxidopa from midodrine with resolution of patient's orthostasis. Patient accepted by an outpatient dialysis center. During hospitalization pt was on up to 3U Lantus qHS and 1-2U Admelog ISS qAC for elevated blood sugars to 200s-300s. HA1c 6.2% but may be unreliable in ESRD pts. Pt to be discharged on Januvia 25mg PO daily. On 1/27/2021, patient afebrile, orthostatics resolved, and ready for discharge home with dialysis set up. He will follow up with nephrology and cardiology. He will be seen by interventional radiology/vascular surgery as an outpatient for possible intervention on non functioning AV fistula.

## 2021-01-17 NOTE — DISCHARGE NOTE PROVIDER - DETAILS OF MALNUTRITION DIAGNOSIS/DIAGNOSES
This patient has been assessed with a concern for Malnutrition and was treated during this hospitalization for the following Nutrition diagnosis/diagnoses:     -  01/19/2021: Severe protein-calorie malnutrition   -  01/19/2021: Underweight (BMI < 19)   This patient has been assessed with a concern for Malnutrition and was treated during this hospitalization for the following Nutrition diagnosis/diagnoses:     -  01/19/2021: Severe protein-calorie malnutrition   -  01/19/2021: Underweight (BMI < 19)    This patient has been assessed with a concern for Malnutrition and was treated during this hospitalization for the following Nutrition diagnosis/diagnoses:     -  01/19/2021: Severe protein-calorie malnutrition   -  01/19/2021: Underweight (BMI < 19)   This patient has been assessed with a concern for Malnutrition and was treated during this hospitalization for the following Nutrition diagnosis/diagnoses:     -  01/19/2021: Severe protein-calorie malnutrition   -  01/19/2021: Underweight (BMI < 19)    This patient has been assessed with a concern for Malnutrition and was treated during this hospitalization for the following Nutrition diagnosis/diagnoses:     -  01/19/2021: Severe protein-calorie malnutrition   -  01/19/2021: Underweight (BMI < 19)    This patient has been assessed with a concern for Malnutrition and was treated during this hospitalization for the following Nutrition diagnosis/diagnoses:     -  01/19/2021: Severe protein-calorie malnutrition   -  01/19/2021: Underweight (BMI < 19)   This patient has been assessed with a concern for Malnutrition and was treated during this hospitalization for the following Nutrition diagnosis/diagnoses:     -  01/19/2021: Severe protein-calorie malnutrition   -  01/19/2021: Underweight (BMI < 19)    This patient has been assessed with a concern for Malnutrition and was treated during this hospitalization for the following Nutrition diagnosis/diagnoses:     -  01/19/2021: Severe protein-calorie malnutrition   -  01/19/2021: Underweight (BMI < 19)    This patient has been assessed with a concern for Malnutrition and was treated during this hospitalization for the following Nutrition diagnosis/diagnoses:     -  01/19/2021: Severe protein-calorie malnutrition   -  01/19/2021: Underweight (BMI < 19)    This patient has been assessed with a concern for Malnutrition and was treated during this hospitalization for the following Nutrition diagnosis/diagnoses:     -  01/19/2021: Severe protein-calorie malnutrition   -  01/19/2021: Underweight (BMI < 19)   This patient has been assessed with a concern for Malnutrition and was treated during this hospitalization for the following Nutrition diagnosis/diagnoses:     -  01/19/2021: Severe protein-calorie malnutrition   -  01/19/2021: Underweight (BMI < 19)    This patient has been assessed with a concern for Malnutrition and was treated during this hospitalization for the following Nutrition diagnosis/diagnoses:     -  01/19/2021: Severe protein-calorie malnutrition   -  01/19/2021: Underweight (BMI < 19)    This patient has been assessed with a concern for Malnutrition and was treated during this hospitalization for the following Nutrition diagnosis/diagnoses:     -  01/19/2021: Severe protein-calorie malnutrition   -  01/19/2021: Underweight (BMI < 19)    This patient has been assessed with a concern for Malnutrition and was treated during this hospitalization for the following Nutrition diagnosis/diagnoses:     -  01/19/2021: Severe protein-calorie malnutrition   -  01/19/2021: Underweight (BMI < 19)    This patient has been assessed with a concern for Malnutrition and was treated during this hospitalization for the following Nutrition diagnosis/diagnoses:     -  01/19/2021: Severe protein-calorie malnutrition   -  01/19/2021: Underweight (BMI < 19)   This patient has been assessed with a concern for Malnutrition and was treated during this hospitalization for the following Nutrition diagnosis/diagnoses:     -  01/19/2021: Severe protein-calorie malnutrition   -  01/19/2021: Underweight (BMI < 19)    This patient has been assessed with a concern for Malnutrition and was treated during this hospitalization for the following Nutrition diagnosis/diagnoses:     -  01/19/2021: Severe protein-calorie malnutrition   -  01/19/2021: Underweight (BMI < 19)    This patient has been assessed with a concern for Malnutrition and was treated during this hospitalization for the following Nutrition diagnosis/diagnoses:     -  01/19/2021: Severe protein-calorie malnutrition   -  01/19/2021: Underweight (BMI < 19)    This patient has been assessed with a concern for Malnutrition and was treated during this hospitalization for the following Nutrition diagnosis/diagnoses:     -  01/19/2021: Severe protein-calorie malnutrition   -  01/19/2021: Underweight (BMI < 19)    This patient has been assessed with a concern for Malnutrition and was treated during this hospitalization for the following Nutrition diagnosis/diagnoses:     -  01/19/2021: Severe protein-calorie malnutrition   -  01/19/2021: Underweight (BMI < 19)    This patient has been assessed with a concern for Malnutrition and was treated during this hospitalization for the following Nutrition diagnosis/diagnoses:     -  01/19/2021: Severe protein-calorie malnutrition   -  01/19/2021: Underweight (BMI < 19)   This patient has been assessed with a concern for Malnutrition and was treated during this hospitalization for the following Nutrition diagnosis/diagnoses:     -  01/19/2021: Severe protein-calorie malnutrition   -  01/19/2021: Underweight (BMI < 19)    This patient has been assessed with a concern for Malnutrition and was treated during this hospitalization for the following Nutrition diagnosis/diagnoses:     -  01/19/2021: Severe protein-calorie malnutrition   -  01/19/2021: Underweight (BMI < 19)    This patient has been assessed with a concern for Malnutrition and was treated during this hospitalization for the following Nutrition diagnosis/diagnoses:     -  01/19/2021: Severe protein-calorie malnutrition   -  01/19/2021: Underweight (BMI < 19)    This patient has been assessed with a concern for Malnutrition and was treated during this hospitalization for the following Nutrition diagnosis/diagnoses:     -  01/19/2021: Severe protein-calorie malnutrition   -  01/19/2021: Underweight (BMI < 19)    This patient has been assessed with a concern for Malnutrition and was treated during this hospitalization for the following Nutrition diagnosis/diagnoses:     -  01/19/2021: Severe protein-calorie malnutrition   -  01/19/2021: Underweight (BMI < 19)    This patient has been assessed with a concern for Malnutrition and was treated during this hospitalization for the following Nutrition diagnosis/diagnoses:     -  01/19/2021: Severe protein-calorie malnutrition   -  01/19/2021: Underweight (BMI < 19)    This patient has been assessed with a concern for Malnutrition and was treated during this hospitalization for the following Nutrition diagnosis/diagnoses:     -  01/19/2021: Severe protein-calorie malnutrition   -  01/19/2021: Underweight (BMI < 19)   This patient has been assessed with a concern for Malnutrition and was treated during this hospitalization for the following Nutrition diagnosis/diagnoses:     -  01/19/2021: Severe protein-calorie malnutrition   -  01/19/2021: Underweight (BMI < 19)    This patient has been assessed with a concern for Malnutrition and was treated during this hospitalization for the following Nutrition diagnosis/diagnoses:     -  01/19/2021: Severe protein-calorie malnutrition   -  01/19/2021: Underweight (BMI < 19)    This patient has been assessed with a concern for Malnutrition and was treated during this hospitalization for the following Nutrition diagnosis/diagnoses:     -  01/19/2021: Severe protein-calorie malnutrition   -  01/19/2021: Underweight (BMI < 19)    This patient has been assessed with a concern for Malnutrition and was treated during this hospitalization for the following Nutrition diagnosis/diagnoses:     -  01/19/2021: Severe protein-calorie malnutrition   -  01/19/2021: Underweight (BMI < 19)    This patient has been assessed with a concern for Malnutrition and was treated during this hospitalization for the following Nutrition diagnosis/diagnoses:     -  01/19/2021: Severe protein-calorie malnutrition   -  01/19/2021: Underweight (BMI < 19)    This patient has been assessed with a concern for Malnutrition and was treated during this hospitalization for the following Nutrition diagnosis/diagnoses:     -  01/19/2021: Severe protein-calorie malnutrition   -  01/19/2021: Underweight (BMI < 19)    This patient has been assessed with a concern for Malnutrition and was treated during this hospitalization for the following Nutrition diagnosis/diagnoses:     -  01/19/2021: Severe protein-calorie malnutrition   -  01/19/2021: Underweight (BMI < 19)    This patient has been assessed with a concern for Malnutrition and was treated during this hospitalization for the following Nutrition diagnosis/diagnoses:     -  01/19/2021: Severe protein-calorie malnutrition   -  01/19/2021: Underweight (BMI < 19)   This patient has been assessed with a concern for Malnutrition and was treated during this hospitalization for the following Nutrition diagnosis/diagnoses:     -  01/19/2021: Severe protein-calorie malnutrition   -  01/19/2021: Underweight (BMI < 19)    This patient has been assessed with a concern for Malnutrition and was treated during this hospitalization for the following Nutrition diagnosis/diagnoses:     -  01/19/2021: Severe protein-calorie malnutrition   -  01/19/2021: Underweight (BMI < 19)    This patient has been assessed with a concern for Malnutrition and was treated during this hospitalization for the following Nutrition diagnosis/diagnoses:     -  01/19/2021: Severe protein-calorie malnutrition   -  01/19/2021: Underweight (BMI < 19)    This patient has been assessed with a concern for Malnutrition and was treated during this hospitalization for the following Nutrition diagnosis/diagnoses:     -  01/19/2021: Severe protein-calorie malnutrition   -  01/19/2021: Underweight (BMI < 19)    This patient has been assessed with a concern for Malnutrition and was treated during this hospitalization for the following Nutrition diagnosis/diagnoses:     -  01/19/2021: Severe protein-calorie malnutrition   -  01/19/2021: Underweight (BMI < 19)    This patient has been assessed with a concern for Malnutrition and was treated during this hospitalization for the following Nutrition diagnosis/diagnoses:     -  01/19/2021: Severe protein-calorie malnutrition   -  01/19/2021: Underweight (BMI < 19)    This patient has been assessed with a concern for Malnutrition and was treated during this hospitalization for the following Nutrition diagnosis/diagnoses:     -  01/19/2021: Severe protein-calorie malnutrition   -  01/19/2021: Underweight (BMI < 19)    This patient has been assessed with a concern for Malnutrition and was treated during this hospitalization for the following Nutrition diagnosis/diagnoses:     -  01/19/2021: Severe protein-calorie malnutrition   -  01/19/2021: Underweight (BMI < 19)    This patient has been assessed with a concern for Malnutrition and was treated during this hospitalization for the following Nutrition diagnosis/diagnoses:     -  01/19/2021: Severe protein-calorie malnutrition   -  01/19/2021: Underweight (BMI < 19)

## 2021-01-17 NOTE — H&P ADULT - PROBLEM SELECTOR PLAN 8
--DVT: Heparin 5000 BID   --Diet: DASH/TLC, Renal, CC   --Dispo: Home Likely 2/2 ESRD.   --per nephro, will hold off starting DEANA/epogen given uncontrolled BP, will consider restarting when BP is under control  --f/u iron studies, B12, folate and hemolysis labs Not on any meds at home according to patient   --f/u A1c   --Low dose ISS qac, qhs   --CC diet

## 2021-01-17 NOTE — PROGRESS NOTE ADULT - ASSESSMENT
60M PMHx ESRD on HD (initiated on 3/2020 in [facility] via LUE AVF), CAD s/p stent (06/2020, on plavix/asa), DM2, HTN, HLD, anemia (unknown cause) who present to [facility] ED for weakness that started 2 days ago.

## 2021-01-17 NOTE — H&P ADULT - PROBLEM SELECTOR PLAN 4
S/p sent in June 2020   --c/w home aspirin, plavix Pt. with ESRD (presumed diabetic nephropathy) on HD in [facility] (HD initiated 3/26/2020), minimal urine. Last HD in [facility] was on 1/14/20 via LUE AVF.   - plan for maintenance HD today, UF goal 2L as BP tolerated   - f/u renal duplex arteries/vein (son report told vessel ds in [facility])   --start phosplo 667 TID   - monitor electrolyte, strict I/O, daily weight, fluid restriction 1L, renally dose medication per HD, renal diet (low K/phos)   - social work consult placed to setup outpatient HD center

## 2021-01-17 NOTE — ED ADULT NURSE REASSESSMENT NOTE - NS ED NURSE REASSESS COMMENT FT1
Pt's temperature increased to 93.6 but remains low despite Beir hugger, hot packs, & warm blankets. BCx drawn, abx administered, & repeat labs drawn. Pt's temperature increased to 93.6 but remains low despite Beir hugger, hot packs, & warm blankets. BCx drawn, abx administered, & repeat labs drawn. Pt remains A&Ox4, mentating appropriately & arousable to verbal stimuli. Sergio LE (pager# [phone]) aware, no recommendations at this time.

## 2021-01-17 NOTE — ED ADULT NURSE REASSESSMENT NOTE - NS ED NURSE REASSESS COMMENT FT1
Pt's rectal temperature decreased to 92.6 with Beir hugger applied. HR remains similar from earlier & BP improved to 104/60 MAP 74. Pt remains A&Ox4 & answering questions appropriately, reports relief of dizziness & lightheadedness. Stacey LE (medicine attending) aware & at bedside assessing pt, recommends abx administration & repeat labs.

## 2021-01-17 NOTE — DISCHARGE NOTE PROVIDER - NSDCFUADDAPPT_GEN_ALL_CORE_FT
Please establish care with a primary care doctor:  You may follow up with  Alesia Rojo MD  General Internal Medicine  [address], [address]  [place] [address]  Call [phone]  Follow up time: 2/1/2021 01:30 PM  Please call the clinic in 1 to 2 days to confirm appointment

## 2021-01-17 NOTE — H&P ADULT - ASSESSMENT
60M PMHx ESRD on HD (initiated on 3/2020 in [facility] via LUE AVF), CAD s/p stent (06/2020, on plavix/asa), DM2, HTN, HLD, anemia (unknown cause) who present to [facility] ED for weakness that started 2 days ago.

## 2021-01-17 NOTE — H&P ADULT - PROBLEM SELECTOR PLAN 5
According to Nephro note, pt on Nifedipine 20mg and Coreg 12.5mg. Patient, son and wife do not know patients medications  --confirm home meds and restart home meds Decreased bicarb, elevated anion gap. Likely in the setting of uremia  --f/u VBG  --monitor BMP S/p sent in June 2020   --c/w home aspirin, plavix

## 2021-01-17 NOTE — DISCHARGE NOTE PROVIDER - CARE PROVIDER_API CALL
Joe Navarro (DO)  Cardiology; Internal Medicine  [address] [place] [address], [address]  [place] [address]  Phone: [phone]  Fax: [phone]  Follow Up Time:    Joe Navarro (DO)  Cardiology; Internal Medicine  [address] [place] [address], [address]  [place] [address]  Phone: [phone]  Fax: [phone]  Follow Up Time: 1 week

## 2021-01-17 NOTE — PROGRESS NOTE ADULT - PROBLEM SELECTOR PLAN 9
Likely 2/2 ESRD.   --per nephro, will hold off starting DEANA/epogen given uncontrolled BP, will consider restarting when BP is under control  --f/u iron studies, B12, folate and hemolysis labs

## 2021-01-17 NOTE — H&P ADULT - PROBLEM SELECTOR PLAN 1
Likely the cause of his symptom's weakness, nausea. S/p Insulin, Albuterol, Lokelma, calcium gluconate in ED   --K+ improved 6.5 --> 5.2   --monitor K+ q12   --patient pending dialysis today

## 2021-01-17 NOTE — DISCHARGE NOTE PROVIDER - NSDCMRMEDTOKEN_GEN_ALL_CORE_FT
Aspir 81 oral delayed release tablet: 1 tab(s) orally once a day  Coreg 12.5 mg oral tablet: 1 tab(s) orally 2 times a day  NIFEdipine 20 mg oral capsule: 1 cap(s) orally 3 times a day  Plavix 75 mg oral tablet: 1 tab(s) orally once a day   Aspir 81 oral delayed release tablet: 1 tab(s) orally once a day  droxidopa 100 mg oral capsule: 1 cap(s) orally 3 times a day  metoprolol tartrate 25 mg oral tablet: 1 tab(s) orally 2 times a day  Plavix 75 mg oral tablet: 1 tab(s) orally once a day   Aspir 81 oral delayed release tablet: 1 tab(s) orally once a day  droxidopa 100 mg oral capsule: 1 cap(s) orally 3 times a day  Januvia 25 mg oral tablet: 1 tab(s) orally once a day   metoprolol tartrate 25 mg oral tablet: 1 tab(s) orally 2 times a day  Plavix 75 mg oral tablet: 1 tab(s) orally once a day

## 2021-01-17 NOTE — H&P ADULT - NSHPREVIEWOFSYSTEMS_GEN_ALL_CORE
CONSTITUTIONAL:  No weight loss, fever, chills, weakness or fatigue.  HEENT:  Eyes:  No visual loss, blurred vision, double vision or yellow sclerae. Ears, Nose, Throat:  No hearing loss, sneezing, congestion, runny nose or sore throat.  SKIN:  No rash or itching.  CARDIOVASCULAR:  No chest pain, chest pressure or chest discomfort. No palpitations.  RESPIRATORY:  No shortness of breath, cough or sputum.  GASTROINTESTINAL: +N/V. No diarrhea. No abdominal pain or blood.  GENITOURINARY:  Denies hematuria, dysuria.   NEUROLOGICAL:  +dizziness, weakness, No syncope, paralysis, ataxia, numbness or tingling in the extremities. No change in bowel or bladder control.  MUSCULOSKELETAL:  No muscle, back pain, joint pain or stiffness.  HEMATOLOGIC:  No anemia, bleeding or bruising.  LYMPHATICS:  No enlarged nodes.   PSYCHIATRIC:  No history of depression or anxiety.  ENDOCRINOLOGIC:  No reports of sweating, cold or heat intolerance. No polyuria or polydipsia.  ALLERGIES:  No history of asthma, hives, eczema or rhinitis.

## 2021-01-17 NOTE — H&P ADULT - NSHPSOCIALHISTORY_GEN_ALL_CORE
5 pack year smoking history, quit 1 yr ago. Quit drinking alcohol 1 year ago, used to drink 4x/week, ½ liter of hard liquor for many years. Denies drug useJust [facility], will live with wife and son. Walks without problem.

## 2021-01-17 NOTE — H&P ADULT - PROBLEM SELECTOR PLAN 6
Not on any meds at home according to patient   --f/u A1c   --Low dose ISS qac, qhs   --CC diet Per nephro note, pt on nifedipine 20 TID, coreg 12.5 BID at home.. Patient, son and wife do not know patients medications  --most recent BP 90s/60s, hold home BP meds Decreased bicarb, elevated anion gap. Likely in the setting of uremia  --f/u VBG, lactate  --monitor BMP

## 2021-01-17 NOTE — DISCHARGE NOTE PROVIDER - NSDCFUADDINST_GEN_ALL_CORE_FT
Please establish care with a primary care doctor:  You may follow up with  Alesia Rojo MD  General Internal Medicine  [address], [address]  [place] [address]  Call [phone] Please establish care with a primary care doctor:  You may follow up with  Alesia Rojo MD  General Internal Medicine  [address], [address]  [place] [address]  Call [phone]  Follow up time: 1 week    For your AV fistula, please reach out to Interventional Radiology within the next month:  Vascular and Interventional Radiology at [facility]  [address] [place] [address]  Phone: [phone]    If you are unable to follow up with Interventional Radiology above, please follow up with Vascular Surgery:  [facility] at [address]  [place] [address]   Phone: [phone]   For your AV fistula, please reach out to Interventional Radiology within the next month:  Vascular and Interventional Radiology at [address] [address]  Phone: [phone]    If you are unable to follow up with Interventional Radiology above, please follow up with Vascular Surgery:  [facility] at [address] [address]   Phone: [phone]

## 2021-01-17 NOTE — PROGRESS NOTE ADULT - PROBLEM SELECTOR PLAN 2
On admission patient's triage /121 likely 2/2 missing HD, possibly noncompliance  --s/p oral Nifedipine 20mg and coreg 6.25mg   --BP improved to 100s/60s  and then dropped to SBP 90s.  --per nephro note, pt on nifedipine 20 TID, coreg 12.5 BID at home.  We will hold BP meds for now given low BP.    --monitor BP, low salt diet

## 2021-01-18 LAB
ANION GAP SERPL CALC-SCNC: 17 MMOL/L — SIGNIFICANT CHANGE UP (ref 5–17)
ANION GAP SERPL CALC-SCNC: 18 MMOL/L — HIGH (ref 5–17)
APTT BLD: 31.4 SEC — SIGNIFICANT CHANGE UP (ref 27.5–35.5)
BASE EXCESS BLDV CALC-SCNC: -2.6 MMOL/L — LOW (ref -2–2)
BASOPHILS # BLD AUTO: 0.02 K/UL — SIGNIFICANT CHANGE UP (ref 0–0.2)
BASOPHILS NFR BLD AUTO: 0.3 % — SIGNIFICANT CHANGE UP (ref 0–2)
BUN SERPL-MCNC: 34 MG/DL — HIGH (ref 7–23)
BUN SERPL-MCNC: 67 MG/DL — HIGH (ref 7–23)
CA-I SERPL-SCNC: 1.02 MMOL/L — LOW (ref 1.12–1.3)
CALCIUM SERPL-MCNC: 8.3 MG/DL — LOW (ref 8.4–10.5)
CALCIUM SERPL-MCNC: 8.7 MG/DL — SIGNIFICANT CHANGE UP (ref 8.4–10.5)
CHLORIDE BLDV-SCNC: 102 MMOL/L — SIGNIFICANT CHANGE UP (ref 96–108)
CHLORIDE SERPL-SCNC: 96 MMOL/L — SIGNIFICANT CHANGE UP (ref 96–108)
CHLORIDE SERPL-SCNC: 98 MMOL/L — SIGNIFICANT CHANGE UP (ref 96–108)
CO2 BLDV-SCNC: 22 MMOL/L — SIGNIFICANT CHANGE UP (ref 22–30)
CO2 SERPL-SCNC: 18 MMOL/L — LOW (ref 22–31)
CO2 SERPL-SCNC: 23 MMOL/L — SIGNIFICANT CHANGE UP (ref 22–31)
CREAT SERPL-MCNC: 5.78 MG/DL — HIGH (ref 0.5–1.3)
CREAT SERPL-MCNC: 9.19 MG/DL — HIGH (ref 0.5–1.3)
EOSINOPHIL # BLD AUTO: 0.19 K/UL — SIGNIFICANT CHANGE UP (ref 0–0.5)
EOSINOPHIL NFR BLD AUTO: 3.2 % — SIGNIFICANT CHANGE UP (ref 0–6)
GAS PNL BLDV: 133 MMOL/L — LOW (ref 135–145)
GAS PNL BLDV: SIGNIFICANT CHANGE UP
GLUCOSE BLDV-MCNC: 111 MG/DL — HIGH (ref 70–99)
GLUCOSE SERPL-MCNC: 110 MG/DL — HIGH (ref 70–99)
GLUCOSE SERPL-MCNC: 123 MG/DL — HIGH (ref 70–99)
HCO3 BLDV-SCNC: 21 MMOL/L — SIGNIFICANT CHANGE UP (ref 21–29)
HCT VFR BLD CALC: 24.9 % — LOW (ref 39–50)
HCT VFR BLD CALC: 29.7 % — LOW (ref 39–50)
HCT VFR BLDA CALC: 26 % — LOW (ref 39–50)
HGB BLD CALC-MCNC: 8.4 G/DL — LOW (ref 13–17)
HGB BLD-MCNC: 8.1 G/DL — LOW (ref 13–17)
HGB BLD-MCNC: 9.7 G/DL — LOW (ref 13–17)
IMM GRANULOCYTES NFR BLD AUTO: 0.3 % — SIGNIFICANT CHANGE UP (ref 0–1.5)
LACTATE BLDV-MCNC: 0.9 MMOL/L — SIGNIFICANT CHANGE UP (ref 0.7–2)
LYMPHOCYTES # BLD AUTO: 1.14 K/UL — SIGNIFICANT CHANGE UP (ref 1–3.3)
LYMPHOCYTES # BLD AUTO: 19.4 % — SIGNIFICANT CHANGE UP (ref 13–44)
MAGNESIUM SERPL-MCNC: 2.1 MG/DL — SIGNIFICANT CHANGE UP (ref 1.6–2.6)
MCHC RBC-ENTMCNC: 27.7 PG — SIGNIFICANT CHANGE UP (ref 27–34)
MCHC RBC-ENTMCNC: 27.7 PG — SIGNIFICANT CHANGE UP (ref 27–34)
MCHC RBC-ENTMCNC: 32.5 GM/DL — SIGNIFICANT CHANGE UP (ref 32–36)
MCHC RBC-ENTMCNC: 32.7 GM/DL — SIGNIFICANT CHANGE UP (ref 32–36)
MCV RBC AUTO: 84.9 FL — SIGNIFICANT CHANGE UP (ref 80–100)
MCV RBC AUTO: 85.3 FL — SIGNIFICANT CHANGE UP (ref 80–100)
MONOCYTES # BLD AUTO: 0.87 K/UL — SIGNIFICANT CHANGE UP (ref 0–0.9)
MONOCYTES NFR BLD AUTO: 14.8 % — HIGH (ref 2–14)
NEUTROPHILS # BLD AUTO: 3.64 K/UL — SIGNIFICANT CHANGE UP (ref 1.8–7.4)
NEUTROPHILS NFR BLD AUTO: 62 % — SIGNIFICANT CHANGE UP (ref 43–77)
NRBC # BLD: 0 /100 WBCS — SIGNIFICANT CHANGE UP (ref 0–0)
NRBC # BLD: 0 /100 WBCS — SIGNIFICANT CHANGE UP (ref 0–0)
OTHER CELLS CSF MANUAL: 12 ML/DL — LOW (ref 18–22)
PCO2 BLDV: 32 MMHG — LOW (ref 35–50)
PH BLDV: 7.43 — SIGNIFICANT CHANGE UP (ref 7.35–7.45)
PHOSPHATE SERPL-MCNC: 4.5 MG/DL — SIGNIFICANT CHANGE UP (ref 2.5–4.5)
PLATELET # BLD AUTO: 149 K/UL — LOW (ref 150–400)
PLATELET # BLD AUTO: 174 K/UL — SIGNIFICANT CHANGE UP (ref 150–400)
PO2 BLDV: 129 MMHG — HIGH (ref 25–45)
POTASSIUM BLDV-SCNC: 3.4 MMOL/L — LOW (ref 3.5–5.3)
POTASSIUM SERPL-MCNC: 3.6 MMOL/L — SIGNIFICANT CHANGE UP (ref 3.5–5.3)
POTASSIUM SERPL-MCNC: 3.8 MMOL/L — SIGNIFICANT CHANGE UP (ref 3.5–5.3)
POTASSIUM SERPL-SCNC: 3.6 MMOL/L — SIGNIFICANT CHANGE UP (ref 3.5–5.3)
POTASSIUM SERPL-SCNC: 3.8 MMOL/L — SIGNIFICANT CHANGE UP (ref 3.5–5.3)
RBC # BLD: 2.92 M/UL — LOW (ref 4.2–5.8)
RBC # BLD: 3.5 M/UL — LOW (ref 4.2–5.8)
RBC # FLD: 14.7 % — HIGH (ref 10.3–14.5)
RBC # FLD: 15.1 % — HIGH (ref 10.3–14.5)
SAO2 % BLDV: 98 % — HIGH (ref 67–88)
SODIUM SERPL-SCNC: 134 MMOL/L — LOW (ref 135–145)
SODIUM SERPL-SCNC: 136 MMOL/L — SIGNIFICANT CHANGE UP (ref 135–145)
WBC # BLD: 5.05 K/UL — SIGNIFICANT CHANGE UP (ref 3.8–10.5)
WBC # BLD: 5.88 K/UL — SIGNIFICANT CHANGE UP (ref 3.8–10.5)
WBC # FLD AUTO: 5.05 K/UL — SIGNIFICANT CHANGE UP (ref 3.8–10.5)
WBC # FLD AUTO: 5.88 K/UL — SIGNIFICANT CHANGE UP (ref 3.8–10.5)

## 2021-01-18 PROCEDURE — 99233 SBSQ HOSP IP/OBS HIGH 50: CPT | Mod: GC

## 2021-01-18 PROCEDURE — 99232 SBSQ HOSP IP/OBS MODERATE 35: CPT | Mod: GC

## 2021-01-18 RX ORDER — NIFEDIPINE 30 MG
30 TABLET, EXTENDED RELEASE 24 HR ORAL DAILY
Refills: 0 | Status: DISCONTINUED | OUTPATIENT
Start: 2021-01-19 | End: 2021-01-19

## 2021-01-18 RX ORDER — CARVEDILOL PHOSPHATE 80 MG/1
12.5 CAPSULE, EXTENDED RELEASE ORAL EVERY 12 HOURS
Refills: 0 | Status: DISCONTINUED | OUTPATIENT
Start: 2021-01-19 | End: 2021-01-20

## 2021-01-18 RX ORDER — NIFEDIPINE 30 MG
30 TABLET, EXTENDED RELEASE 24 HR ORAL ONCE
Refills: 0 | Status: COMPLETED | OUTPATIENT
Start: 2021-01-18 | End: 2021-01-18

## 2021-01-18 RX ORDER — HYDRALAZINE HCL 50 MG
5 TABLET ORAL ONCE
Refills: 0 | Status: COMPLETED | OUTPATIENT
Start: 2021-01-18 | End: 2021-01-18

## 2021-01-18 RX ADMIN — Medication 30 MILLIGRAM(S): at 13:23

## 2021-01-18 RX ADMIN — Medication 667 MILLIGRAM(S): at 11:00

## 2021-01-18 RX ADMIN — Medication 81 MILLIGRAM(S): at 11:00

## 2021-01-18 RX ADMIN — CLOPIDOGREL BISULFATE 75 MILLIGRAM(S): 75 TABLET, FILM COATED ORAL at 11:00

## 2021-01-18 RX ADMIN — Medication 667 MILLIGRAM(S): at 17:06

## 2021-01-18 RX ADMIN — Medication 5 MILLIGRAM(S): at 04:36

## 2021-01-18 NOTE — PROGRESS NOTE ADULT - PROBLEM SELECTOR PLAN 8
Not on any meds at home according to patient   --f/u A1c   --Low dose ISS qac, qhs   --CC diet Likely 2/2 ESRD.   --per nephro, will hold off starting DEANA/epogen given uncontrolled BP initially  --f/u iron studies, B12, folate and hemolysis labs

## 2021-01-18 NOTE — PROGRESS NOTE ADULT - SUBJECTIVE AND OBJECTIVE BOX
PROGRESS NOTE:   Authored by Alesia Rojo MD  Pager [phone] [facility]     Patient is a 60y old  Male who presents with a chief complaint of Weakness (17 Jan 2021 22:17)      SUBJECTIVE / OVERNIGHT EVENTS:    MEDICATIONS  (STANDING):  aspirin enteric coated 81 milliGRAM(s) Oral daily  calcium acetate 667 milliGRAM(s) Oral three times a day with meals  clopidogrel Tablet 75 milliGRAM(s) Oral daily  dextrose 40% Gel 15 Gram(s) Oral once  dextrose 5%. 1000 milliLiter(s) (50 mL/Hr) IV Continuous <Continuous>  dextrose 5%. 1000 milliLiter(s) (100 mL/Hr) IV Continuous <Continuous>  dextrose 50% Injectable 25 Gram(s) IV Push once  dextrose 50% Injectable 12.5 Gram(s) IV Push once  dextrose 50% Injectable 25 Gram(s) IV Push once  glucagon  Injectable 1 milliGRAM(s) IntraMuscular once  influenza   Vaccine 0.5 milliLiter(s) IntraMuscular once  insulin lispro (ADMELOG) corrective regimen sliding scale   SubCutaneous three times a day before meals  insulin lispro (ADMELOG) corrective regimen sliding scale   SubCutaneous at bedtime    MEDICATIONS  (PRN):  ondansetron Injectable 4 milliGRAM(s) IV Push every 6 hours PRN Nausea      I&O's Summary    17 Jan 2021 07:01  -  18 Jan 2021 05:31  --------------------------------------------------------  IN: 0 mL / OUT: 1500 mL / NET: -1500 mL        PHYSICAL EXAM:  Vital Signs Last 24 Hrs  T(C): 37.1 (18 Jan 2021 04:43), Max: 37.1 (18 Jan 2021 04:43)  T(F): 98.7 (18 Jan 2021 04:43), Max: 98.7 (18 Jan 2021 04:43)  HR: 71 (18 Jan 2021 04:30) (53 - 71)  BP: 193/91 (18 Jan 2021 04:30) (111/65 - 193/91)  BP(mean): --  RR: 18 (18 Jan 2021 04:43) (17 - 20)  SpO2: 99% (18 Jan 2021 04:43) (98% - 100%)        LABS:                        8.1    5.05  )-----------( 149      ( 18 Jan 2021 04:00 )             24.9     01-18    134<L>  |  98  |  67<H>  ----------------------------<  110<H>  3.6   |  18<L>  |  9.19<H>    Ca    8.3<L>      18 Jan 2021 04:00  Phos  4.5     01-18  Mg     2.1     01-18    TPro  8.4<H>  /  Alb  4.3  /  TBili  0.3  /  DBili  x   /  AST  18  /  ALT  23  /  AlkPhos  152<H>  01-16    PT/INR - ( 17 Jan 2021 21:23 )   PT: 14.6 sec;   INR: 1.23 ratio         PTT - ( 18 Jan 2021 04:00 )  PTT:31.4 sec           PROGRESS NOTE:   Authored by Alesia Rojo MD  Pager [phone] [facility]     Patient is a 60y old  Male who presents with a chief complaint of Weakness (17 Jan 2021 22:17)      SUBJECTIVE / OVERNIGHT EVENTS:  Afebrile. Overnight hypertensive and received 5 mg hydralazine, asymptomatic. AVF did not work in dialysis and had to get Shiley placed overnight. This AM, feeling well.    MEDICATIONS  (STANDING):  aspirin enteric coated 81 milliGRAM(s) Oral daily  calcium acetate 667 milliGRAM(s) Oral three times a day with meals  clopidogrel Tablet 75 milliGRAM(s) Oral daily  dextrose 40% Gel 15 Gram(s) Oral once  dextrose 5%. 1000 milliLiter(s) (50 mL/Hr) IV Continuous <Continuous>  dextrose 5%. 1000 milliLiter(s) (100 mL/Hr) IV Continuous <Continuous>  dextrose 50% Injectable 25 Gram(s) IV Push once  dextrose 50% Injectable 12.5 Gram(s) IV Push once  dextrose 50% Injectable 25 Gram(s) IV Push once  glucagon  Injectable 1 milliGRAM(s) IntraMuscular once  influenza   Vaccine 0.5 milliLiter(s) IntraMuscular once  insulin lispro (ADMELOG) corrective regimen sliding scale   SubCutaneous three times a day before meals  insulin lispro (ADMELOG) corrective regimen sliding scale   SubCutaneous at bedtime    MEDICATIONS  (PRN):  ondansetron Injectable 4 milliGRAM(s) IV Push every 6 hours PRN Nausea      I&O's Summary    17 Jan 2021 07:01  -  18 Jan 2021 05:31  --------------------------------------------------------  IN: 0 mL / OUT: 1500 mL / NET: -1500 mL        PHYSICAL EXAM:  Vital Signs Last 24 Hrs  T(C): 37.1 (18 Jan 2021 04:43), Max: 37.1 (18 Jan 2021 04:43)  T(F): 98.7 (18 Jan 2021 04:43), Max: 98.7 (18 Jan 2021 04:43)  HR: 71 (18 Jan 2021 04:30) (53 - 71)  BP: 193/91 (18 Jan 2021 04:30) (111/65 - 193/91)  BP(mean): --  RR: 18 (18 Jan 2021 04:43) (17 - 20)  SpO2: 99% (18 Jan 2021 04:43) (98% - 100%)    GENERAL: Laying comfortably, NAD  EYES: EOMI,   NECK: No JVD  LUNG: Clear to auscultation bilaterally; No wheeze, crackles or rhonchi  HEART: Regular rate and rhythm; No murmurs, rubs, or gallops  ABDOMEN: Soft, Nontender, Nondistended  EXTREMITIES:  No LE edema, Peripheral Pulses intact, No clubbing, cyanosis, or edema. RUE no palpable thrill on fistula.  PSYCH: AAOx3  NEUROLOGY: non-focal, strength 5/5 in all extremities, sensation intact  SKIN: No rashes or lesions    LABS:                        8.1    5.05  )-----------( 149      ( 18 Jan 2021 04:00 )             24.9     01-18    134<L>  |  98  |  67<H>  ----------------------------<  110<H>  3.6   |  18<L>  |  9.19<H>    Ca    8.3<L>      18 Jan 2021 04:00  Phos  4.5     01-18  Mg     2.1     01-18    TPro  8.4<H>  /  Alb  4.3  /  TBili  0.3  /  DBili  x   /  AST  18  /  ALT  23  /  AlkPhos  152<H>  01-16    PT/INR - ( 17 Jan 2021 21:23 )   PT: 14.6 sec;   INR: 1.23 ratio         PTT - ( 18 Jan 2021 04:00 )  PTT:31.4 sec

## 2021-01-18 NOTE — PROGRESS NOTE ADULT - PROBLEM SELECTOR PLAN 2
On admission patient's triage /121 likely 2/2 missing HD, possibly noncompliance  --s/p oral Nifedipine 20mg and coreg 6.25mg   --BP improved to 100s/60s  and then dropped to SBP 90s.  --per nephro note, pt on nifedipine 20 TID, coreg 12.5 BID at home.  We will hold BP meds for now given low BP.    --monitor BP, low salt diet On admission patient's triage /121 likely 2/2 missing HD, possibly noncompliance  --s/p oral Nifedipine 20mg and coreg 6.25mg   --BP improved to 100s/60s  and then dropped to SBP 90s.  --per nephro note, pt on nifedipine 20 TID, coreg 12.5 BID at home.  We will hold BP meds for now given low BP.  -Stat nifedipine 30 today. Will resume home medications tomorrow.  --monitor BP, low salt diet

## 2021-01-18 NOTE — PROGRESS NOTE ADULT - ASSESSMENT
60M PMHx ESRD on HD (initiated in [facility] via LUE AVF), CAD s/p stent (on plavix/asa), DM2, HTN present to ED for weakness after not having hemodialysis over 3 days.  Nephrology consulted for ESRD/HD management.       # ESRD  Pt. with ESRD (presumed diabetic nephropathy) on HD in [facility] (HD initiated 3/26/2020), minimal urine. Last outpatient HD in [facility] was on 1/14/20 via LUE AVF. Triage vitals hypertensive urgency BP 190s/120s.  BUN/Cr elevated 122/15.6  - plan for maintenance HD today, 2.5 hour, UF goal 2L as BP tolerated  - Vascular evaluation for right AVF, Will need tunnelled HD catheter placement   - monitor electrolyte, strict I/O, daily weight, fluid restriction 1L, renally dose medication per HD, renal diet (low K/phos)  - will need / to setup outpatient HD center prior to discharge    # Hyperkalemia- resolved  Pt with hyperkalemia in the setting of ESRD not having HD few days  - plan for HD as outline above, monitor electrolytes, low potassium diet    # Hypertensive urgency  On admission patient's triage /121 likely 2/2 missing HD  - please check renal duplex arteries/vein r/o DAVONTE (son report told vessel ds in [facility])  - monitor BP, low salt diet    # Anemia  Pt with hx anemia likely multifactorial including ACD in the setting of ESRD. Hgb level below target at 8.1 today  - On epogen 4000U TIW on HD days   - Iron stores replete    # Renal bone disease  Pt with hyperphosphatemia in the setting of ESRD.   - continue phos binder phos-low 667mg TID w/ meals,  Check intact PTH level. Low phosphorus diet advised. Monitor serum phosphorus    If you have any questions, please feel free to contact me  Renetta Veronica  Nephrology Fellow  Pager: [phone] / [phone]  (After 5pm or on weekends please page the on-call fellow)

## 2021-01-18 NOTE — PROGRESS NOTE ADULT - PROBLEM SELECTOR PLAN 1
Likely the cause of his symptom's weakness, nausea. S/p Insulin, Albuterol, Lokelma, calcium gluconate in ED   --K+ improved 6.5 --> 5.2   --monitor K+ q12   --patient pending dialysis today Likely the cause of his symptom's weakness, nausea. S/p Insulin, Albuterol, Lokelma, calcium gluconate in ED   - Continue dialysis per renal  --monitor K+ q12

## 2021-01-18 NOTE — PROGRESS NOTE ADULT - SUBJECTIVE AND OBJECTIVE BOX
[facility] Division of Kidney Diseases & Hypertension  FOLLOW UP NOTE  [phone]--------------------------------------------------------------------------------  Chief Complaint:Hyperkalemia        24 hour events/subjective:        PAST HISTORY  --------------------------------------------------------------------------------  No significant changes to PMH, PSH, FHx, SHx, unless otherwise noted    ALLERGIES & MEDICATIONS  --------------------------------------------------------------------------------  Allergies    No Known Allergies    Intolerances      Standing Inpatient Medications  aspirin enteric coated 81 milliGRAM(s) Oral daily  calcium acetate 667 milliGRAM(s) Oral three times a day with meals  clopidogrel Tablet 75 milliGRAM(s) Oral daily  dextrose 40% Gel 15 Gram(s) Oral once  dextrose 5%. 1000 milliLiter(s) IV Continuous <Continuous>  dextrose 5%. 1000 milliLiter(s) IV Continuous <Continuous>  dextrose 50% Injectable 25 Gram(s) IV Push once  dextrose 50% Injectable 12.5 Gram(s) IV Push once  dextrose 50% Injectable 25 Gram(s) IV Push once  glucagon  Injectable 1 milliGRAM(s) IntraMuscular once  influenza   Vaccine 0.5 milliLiter(s) IntraMuscular once  insulin lispro (ADMELOG) corrective regimen sliding scale   SubCutaneous three times a day before meals  insulin lispro (ADMELOG) corrective regimen sliding scale   SubCutaneous at bedtime    PRN Inpatient Medications  ondansetron Injectable 4 milliGRAM(s) IV Push every 6 hours PRN      REVIEW OF SYSTEMS  --------------------------------------------------------------------------------  Gen: No  fevers/chills  Skin: No rashes  Head/Eyes/Ears/Mouth: No headache; Normal hearing; Normal vision w/o blurriness  Respiratory: No dyspnea, cough, wheezing, hemoptysis  CV: No chest pain, PND, orthopnea  GI: No abdominal pain, diarrhea, constipation, nausea, vomiting  : No increased frequency, dysuria, hematuria, nocturia  MSK: No joint pain/swelling; no back pain; no edema  Neuro: No dizziness/lightheadedness, weakness, seizures, numbness, tingling      All other systems were reviewed and are negative, except as noted.    VITALS/PHYSICAL EXAM  --------------------------------------------------------------------------------  T(C): 36.6 (01-18-21 @ 13:13), Max: 37.1 (01-18-21 @ 04:43)  HR: 72 (01-18-21 @ 13:13) (62 - 72)  BP: 202/109 (01-18-21 @ 13:13) (147/67 - 202/109)  RR: 20 (01-18-21 @ 13:13) (18 - 20)  SpO2: 98% (01-18-21 @ 13:13) (98% - 100%)  Wt(kg): --  Height (cm): 177.8 (01-16-21 @ 14:38)  Weight (kg): 48 (01-16-21 @ 14:38)  BMI (kg/m2): 15.2 (01-16-21 @ 14:38)  BSA (m2): 1.59 (01-16-21 @ 14:38)      01-17-21 @ 07:01  -  01-18-21 @ 07:00  --------------------------------------------------------  IN: 0 mL / OUT: 1500 mL / NET: -1500 mL      Physical Exam:  	Gen: NAD, well-appearing  	HEENT: PERRL, supple neck, clear oropharynx  	Pulm: CTA B/L  	CV: RRR, S1S2;  	Back: No spinal or CVA tenderness  	Abd: +BS, soft, nontender/nondistended  	: No suprapubic tenderness                      Extremities: no bilateral LE edema noted.                       Neuro: No focal deficits, intact gait  	Skin: Warm, without rashes  	Vascular access:    LABS/STUDIES  --------------------------------------------------------------------------------              8.1    5.05  >-----------<  149      [01-18-21 @ 04:00]              24.9     134  |  98  |  67  ----------------------------<  110      [01-18-21 @ 04:00]  3.6   |  18  |  9.19        Ca     8.3     [01-18-21 @ 04:00]      Mg     2.1     [01-18-21 @ 04:00]      Phos  4.5     [01-18-21 @ 04:00]    TPro  8.4  /  Alb  4.3  /  TBili  0.3  /  DBili  x   /  AST  18  /  ALT  23  /  AlkPhos  152  [01-16-21 @ 20:00]    PT/INR: PT 14.6 , INR 1.23       [01-17-21 @ 21:23]  PTT: 31.4       [01-18-21 @ 04:00]          [01-17-21 @ 07:55]    Creatinine Trend:  SCr 9.19 [01-18 @ 04:00]  SCr 16.22 [01-17 @ 21:23]  SCr 15.91 [01-17 @ 04:29]  SCr 16.04 [01-16 @ 22:40]  SCr 16.09 [01-16 @ 20:00]        Iron 58, TIBC 204, %sat 28      [01-17-21 @ 10:08]  Ferritin 551      [01-17-21 @ 10:07]  TSH 1.46      [01-17-21 @ 10:07]    HBsAb <3.0      [01-17-21 @ 00:52]  HBsAg Nonreact      [01-17-21 @ 00:52]  HBcAb Nonreact      [01-17-21 @ 00:52]  HCV 0.09, Nonreact      [01-17-21 @ 00:52]

## 2021-01-18 NOTE — PROGRESS NOTE ADULT - PROBLEM SELECTOR PLAN 9
Likely 2/2 ESRD.   --per nephro, will hold off starting DEANA/epogen given uncontrolled BP, will consider restarting when BP is under control  --f/u iron studies, B12, folate and hemolysis labs --DVT: aspirin/plavix  --Diet: DASH/TLC, Renal, CC

## 2021-01-18 NOTE — PROGRESS NOTE ADULT - PROBLEM SELECTOR PLAN 3
Pt became hypothermic (93F), hypotensive (90s/60s) and tachypnic. No clear sourse of infection  --f/u B/C  --f/u UA, U/C (patient makes some urine)  --CXR unremarkable  --Vancomycin 750mg x1 given  --start Zosyn  - hold BP meds  - EKG showing sinus chantel  - repeat CBC, CMP; monitor electrolytes Pt became hypothermic (93F), hypotensive (90s/60s) and tachypnic. No clear sourse of infection  --f/u B/C  --f/u UA, U/C (patient makes some urine)  --CXR unremarkable  --Vancomycin 750mg x1 given  --start Zosyn  - repeat CBC, CMP; monitor electrolytes

## 2021-01-18 NOTE — PROGRESS NOTE ADULT - ASSESSMENT
60M PMHx ESRD on HD (initiated on 3/2020 in [facility] via LUE AVF), CAD s/p stent (06/2020, on plavix/asa), DM2, HTN, HLD, anemia (unknown cause) who present to [facility] ED for weakness that started 2 days ago.  60M PMHx ESRD on HD (initiated on 3/2020 in [facility] via LUE AVF), CAD s/p stent (06/2020, on plavix/asa), DM2, HTN, HLD, anemia (unknown cause) who present to [facility] ED for weakness that started 2 days ago requiring emergent dialysis

## 2021-01-18 NOTE — CONSULT NOTE ADULT - SUBJECTIVE AND OBJECTIVE BOX
Vascular & Interventional Radiology Brief Consult Note    Evaluate for Procedure: LUE fistulogram and Permacath placement    HPI: 60M PMHx ESRD on HD (initiated on 3/2020 in [facility] via LUE AVF), CAD s/p stent (06/2020, on plavix/asa), DM2, HTN, HLD, anemia (unknown cause) who present to [facility] ED for weakness that started 2 days ago. Patient in need of urgent HD but unable to receive HD through left forearm avf. Patient s/p left IJ shiley placement 1/17/21.    Allergies:   Medications (Abx/Cardiac/Anticoagulation/Blood Products)  aspirin enteric coated: 81 milliGRAM(s) Oral (01-18 @ 11:00)  carvedilol: 6.25 milliGRAM(s) Oral (01-16 @ 20:46)  clopidogrel Tablet: 75 milliGRAM(s) Oral (01-18 @ 11:00)  hydrALAZINE Injectable: 5 milliGRAM(s) IV Push (01-18 @ 04:36)  NIFEdipine IR: 20 milliGRAM(s) Oral (01-16 @ 20:46)  NIFEdipine XL: 30 milliGRAM(s) Oral (01-18 @ 13:23)  piperacillin/tazobactam IVPB.: 200 mL/Hr IV Intermittent (01-17 @ 04:03)  piperacillin/tazobactam IVPB..: 25 mL/Hr IV Intermittent (01-17 @ 13:16)  vancomycin  IVPB: 250 mL/Hr IV Intermittent (01-17 @ 06:18)    Data:    T(C): 36.7  HR: 61  BP: 115/70  RR: 18  SpO2: 100%    -WBC 5.88 / HgB 9.7 / Hct 29.7 / Plt 174  -Na 134 / Cl 98 / BUN 67 / Glucose 110  -K 3.6 / CO2 18 / Cr 9.19  -ALT -- / Alk Phos -- / T.Bili --  -INR -- / PTT 31.4    Imaging: N/A    Assessment/Plan:   -60M PMHx ESRD on HD (initiated on 3/2020 in [facility] via LUE AVF), CAD s/p stent (06/2020, on plavix/asa), DM2, HTN, HLD, anemia (unknown cause) who present to [facility] ED for weakness that started 2 days ago. Patient in need of urgent HD but unable to receive HD through left forearm avf. Patient s/p left IJ shiley placement 1/17/21. IR consulted for fistulogram and permacath placement.   -Case approved for Wednesday.  -Place order under Dr. Choudhury. NPO after midnight on Tuesday and obtain cbc/bmp/coags on morning of procedure.  -Discussed with Dr. Choudhury.  Vascular & Interventional Radiology Brief Consult Note    Evaluate for Procedure: LUE fistulogram and Permacath placement    HPI: 60M PMHx ESRD on HD (initiated on 3/2020 in [facility] via LUE AVF), CAD s/p stent (06/2020, on plavix/asa), DM2, HTN, HLD, anemia (unknown cause) who present to [facility] ED for weakness that started 2 days ago. Patient in need of urgent HD but unable to receive HD through left forearm avf. Patient s/p left IJ shiley placement 1/17/21.    Allergies:   Medications (Abx/Cardiac/Anticoagulation/Blood Products)  aspirin enteric coated: 81 milliGRAM(s) Oral (01-18 @ 11:00)  carvedilol: 6.25 milliGRAM(s) Oral (01-16 @ 20:46)  clopidogrel Tablet: 75 milliGRAM(s) Oral (01-18 @ 11:00)  hydrALAZINE Injectable: 5 milliGRAM(s) IV Push (01-18 @ 04:36)  NIFEdipine IR: 20 milliGRAM(s) Oral (01-16 @ 20:46)  NIFEdipine XL: 30 milliGRAM(s) Oral (01-18 @ 13:23)  piperacillin/tazobactam IVPB.: 200 mL/Hr IV Intermittent (01-17 @ 04:03)  piperacillin/tazobactam IVPB..: 25 mL/Hr IV Intermittent (01-17 @ 13:16)  vancomycin  IVPB: 250 mL/Hr IV Intermittent (01-17 @ 06:18)    Data:    T(C): 36.7  HR: 61  BP: 115/70  RR: 18  SpO2: 100%    -WBC 5.88 / HgB 9.7 / Hct 29.7 / Plt 174  -Na 134 / Cl 98 / BUN 67 / Glucose 110  -K 3.6 / CO2 18 / Cr 9.19  -INR -- / PTT 31.4    Imaging: N/A    Assessment/Plan:   -60M PMHx ESRD on HD (initiated on 3/2020 in [facility] via LUE AVF), CAD s/p stent (06/2020, on plavix/asa), DM2, HTN, HLD, anemia (unknown cause) who present to [facility] ED for weakness that started 2 days ago. Patient in need of urgent HD but unable to receive HD through left forearm avf. Patient s/p left IJ shiley placement 1/17/21. IR consulted for fistulogram and permacath placement.   -Case approved for Wednesday.  -Place order under Dr. Choudhury. NPO after midnight on Tuesday and obtain cbc/bmp/coags on morning of procedure.  -Discussed with Dr. Choudhury.

## 2021-01-18 NOTE — PROGRESS NOTE ADULT - PROBLEM SELECTOR PLAN 6
Decreased bicarb, elevated anion gap. Likely in the setting of uremia  --f/u VBG, lactate  --monitor BMP Per nephro note, pt on nifedipine 20 TID, coreg 12.5 BID at home.. Patient, son and wife do not know patients medications  --most recent BP 90s/60s, hold home BP meds

## 2021-01-18 NOTE — PROGRESS NOTE ADULT - PROBLEM SELECTOR PLAN 7
Per nephro note, pt on nifedipine 20 TID, coreg 12.5 BID at home.. Patient, son and wife do not know patients medications  --most recent BP 90s/60s, hold home BP meds Not on any meds at home according to patient   --f/u A1c   --Low dose ISS qac, qhs   --CC diet

## 2021-01-18 NOTE — PROGRESS NOTE ADULT - PROBLEM SELECTOR PLAN 4
Pt. with ESRD (presumed diabetic nephropathy) on HD in [facility] (HD initiated 3/26/2020), minimal urine. Last HD in [facility] was on 1/14/20 via LUE AVF.   - plan for maintenance HD today, UF goal 2L as BP tolerated   - f/u renal duplex arteries/vein (son report told vessel ds in [facility])   --start phosplo 667 TID   - monitor electrolyte, strict I/O, daily weight, fluid restriction 1L, renally dose medication per HD, renal diet (low K/phos)   - social work consult placed to setup outpatient HD center Pt. with ESRD (presumed diabetic nephropathy) on HD in [facility] (HD initiated 3/26/2020), minimal urine. Had HD in [facility] was on 1/14/20 via LUE AVF.   - Dialysis per nephrology inpatient. Will need establishment of dialysis as outpatient here.  - f/u renal duplex arteries/vein (son report told vessel ds in [facility])   --start phosplo 667 TID   - monitor electrolyte, strict I/O, daily weight, fluid restriction 1L, renally dose medication per HD, renal diet (low K/phos)   - social work consult placed to setup outpatient HD center

## 2021-01-19 DIAGNOSIS — I95.1 ORTHOSTATIC HYPOTENSION: ICD-10-CM

## 2021-01-19 DIAGNOSIS — R55 SYNCOPE AND COLLAPSE: ICD-10-CM

## 2021-01-19 LAB
ANION GAP SERPL CALC-SCNC: 18 MMOL/L — HIGH (ref 5–17)
BASE EXCESS BLDV CALC-SCNC: 0.7 MMOL/L — SIGNIFICANT CHANGE UP (ref -2–2)
BUN SERPL-MCNC: 40 MG/DL — HIGH (ref 7–23)
CA-I SERPL-SCNC: 1.01 MMOL/L — LOW (ref 1.12–1.3)
CALCIUM SERPL-MCNC: 8.7 MG/DL — SIGNIFICANT CHANGE UP (ref 8.4–10.5)
CHLORIDE BLDV-SCNC: 103 MMOL/L — SIGNIFICANT CHANGE UP (ref 96–108)
CHLORIDE SERPL-SCNC: 97 MMOL/L — SIGNIFICANT CHANGE UP (ref 96–108)
CK MB BLD-MCNC: 4 % — HIGH (ref 0–3.5)
CK MB CFR SERPL CALC: 10.8 NG/ML — HIGH (ref 0–6.7)
CK SERPL-CCNC: 269 U/L — HIGH (ref 30–200)
CO2 BLDV-SCNC: 25 MMOL/L — SIGNIFICANT CHANGE UP (ref 22–30)
CO2 SERPL-SCNC: 21 MMOL/L — LOW (ref 22–31)
CREAT SERPL-MCNC: 7.53 MG/DL — HIGH (ref 0.5–1.3)
GAS PNL BLDV: 133 MMOL/L — LOW (ref 135–145)
GAS PNL BLDV: SIGNIFICANT CHANGE UP
GAS PNL BLDV: SIGNIFICANT CHANGE UP
GLUCOSE BLDC GLUCOMTR-MCNC: 203 MG/DL — HIGH (ref 70–99)
GLUCOSE BLDC GLUCOMTR-MCNC: 326 MG/DL — HIGH (ref 70–99)
GLUCOSE BLDC GLUCOMTR-MCNC: 511 MG/DL — CRITICAL HIGH (ref 70–99)
GLUCOSE BLDC GLUCOMTR-MCNC: 97 MG/DL — SIGNIFICANT CHANGE UP (ref 70–99)
GLUCOSE BLDV-MCNC: 152 MG/DL — HIGH (ref 70–99)
GLUCOSE SERPL-MCNC: 138 MG/DL — HIGH (ref 70–99)
HCO3 BLDV-SCNC: 24 MMOL/L — SIGNIFICANT CHANGE UP (ref 21–29)
HCT VFR BLD CALC: 27 % — LOW (ref 39–50)
HCT VFR BLD CALC: 27.1 % — LOW (ref 39–50)
HCT VFR BLDA CALC: 29 % — LOW (ref 39–50)
HGB BLD CALC-MCNC: 9.3 G/DL — LOW (ref 13–17)
HGB BLD-MCNC: 8.8 G/DL — LOW (ref 13–17)
HGB BLD-MCNC: 8.9 G/DL — LOW (ref 13–17)
LACTATE BLDV-MCNC: 1.6 MMOL/L — SIGNIFICANT CHANGE UP (ref 0.7–2)
MAGNESIUM SERPL-MCNC: 2.2 MG/DL — SIGNIFICANT CHANGE UP (ref 1.6–2.6)
MCHC RBC-ENTMCNC: 27.8 PG — SIGNIFICANT CHANGE UP (ref 27–34)
MCHC RBC-ENTMCNC: 28.2 PG — SIGNIFICANT CHANGE UP (ref 27–34)
MCHC RBC-ENTMCNC: 32.5 GM/DL — SIGNIFICANT CHANGE UP (ref 32–36)
MCHC RBC-ENTMCNC: 33 GM/DL — SIGNIFICANT CHANGE UP (ref 32–36)
MCV RBC AUTO: 85.4 FL — SIGNIFICANT CHANGE UP (ref 80–100)
MCV RBC AUTO: 85.5 FL — SIGNIFICANT CHANGE UP (ref 80–100)
NRBC # BLD: 0 /100 WBCS — SIGNIFICANT CHANGE UP (ref 0–0)
NRBC # BLD: 0 /100 WBCS — SIGNIFICANT CHANGE UP (ref 0–0)
OTHER CELLS CSF MANUAL: 11 ML/DL — LOW (ref 18–22)
PCO2 BLDV: 36 MMHG — SIGNIFICANT CHANGE UP (ref 35–50)
PH BLDV: 7.44 — SIGNIFICANT CHANGE UP (ref 7.35–7.45)
PHOSPHATE SERPL-MCNC: 4.8 MG/DL — HIGH (ref 2.5–4.5)
PLATELET # BLD AUTO: 168 K/UL — SIGNIFICANT CHANGE UP (ref 150–400)
PLATELET # BLD AUTO: 168 K/UL — SIGNIFICANT CHANGE UP (ref 150–400)
PO2 BLDV: 51 MMHG — HIGH (ref 25–45)
POTASSIUM BLDV-SCNC: 3.4 MMOL/L — LOW (ref 3.5–5.3)
POTASSIUM SERPL-MCNC: 3.7 MMOL/L — SIGNIFICANT CHANGE UP (ref 3.5–5.3)
POTASSIUM SERPL-SCNC: 3.7 MMOL/L — SIGNIFICANT CHANGE UP (ref 3.5–5.3)
RBC # BLD: 3.16 M/UL — LOW (ref 4.2–5.8)
RBC # BLD: 3.17 M/UL — LOW (ref 4.2–5.8)
RBC # FLD: 14.8 % — HIGH (ref 10.3–14.5)
RBC # FLD: 15 % — HIGH (ref 10.3–14.5)
SAO2 % BLDV: 87 % — SIGNIFICANT CHANGE UP (ref 67–88)
SODIUM SERPL-SCNC: 136 MMOL/L — SIGNIFICANT CHANGE UP (ref 135–145)
TROPONIN T, HIGH SENSITIVITY RESULT: 590 NG/L — HIGH (ref 0–51)
TROPONIN T, HIGH SENSITIVITY RESULT: 625 NG/L — HIGH (ref 0–51)
WBC # BLD: 5.1 K/UL — SIGNIFICANT CHANGE UP (ref 3.8–10.5)
WBC # BLD: 5.82 K/UL — SIGNIFICANT CHANGE UP (ref 3.8–10.5)
WBC # FLD AUTO: 5.1 K/UL — SIGNIFICANT CHANGE UP (ref 3.8–10.5)
WBC # FLD AUTO: 5.82 K/UL — SIGNIFICANT CHANGE UP (ref 3.8–10.5)

## 2021-01-19 PROCEDURE — 99233 SBSQ HOSP IP/OBS HIGH 50: CPT | Mod: GC

## 2021-01-19 PROCEDURE — 36558 INSERT TUNNELED CV CATH: CPT

## 2021-01-19 PROCEDURE — 76937 US GUIDE VASCULAR ACCESS: CPT | Mod: 26

## 2021-01-19 PROCEDURE — 93306 TTE W/DOPPLER COMPLETE: CPT | Mod: 26

## 2021-01-19 PROCEDURE — ZZZZZ: CPT

## 2021-01-19 PROCEDURE — 93010 ELECTROCARDIOGRAM REPORT: CPT | Mod: 76

## 2021-01-19 PROCEDURE — 93010 ELECTROCARDIOGRAM REPORT: CPT | Mod: 77

## 2021-01-19 PROCEDURE — 77001 FLUOROGUIDE FOR VEIN DEVICE: CPT | Mod: 26

## 2021-01-19 RX ORDER — SODIUM CHLORIDE 9 MG/ML
10 INJECTION INTRAMUSCULAR; INTRAVENOUS; SUBCUTANEOUS
Refills: 0 | Status: DISCONTINUED | OUTPATIENT
Start: 2021-01-19 | End: 2021-01-27

## 2021-01-19 RX ORDER — CHLORHEXIDINE GLUCONATE 213 G/1000ML
1 SOLUTION TOPICAL
Refills: 0 | Status: DISCONTINUED | OUTPATIENT
Start: 2021-01-19 | End: 2021-01-27

## 2021-01-19 RX ORDER — CALCIUM GLUCONATE 100 MG/ML
1 VIAL (ML) INTRAVENOUS ONCE
Refills: 0 | Status: COMPLETED | OUTPATIENT
Start: 2021-01-19 | End: 2021-01-19

## 2021-01-19 RX ORDER — HEPARIN SODIUM 5000 [USP'U]/ML
5000 INJECTION INTRAVENOUS; SUBCUTANEOUS EVERY 8 HOURS
Refills: 0 | Status: DISCONTINUED | OUTPATIENT
Start: 2021-01-19 | End: 2021-01-19

## 2021-01-19 RX ADMIN — Medication 81 MILLIGRAM(S): at 11:52

## 2021-01-19 RX ADMIN — Medication 1 TABLET(S): at 11:54

## 2021-01-19 RX ADMIN — Medication 667 MILLIGRAM(S): at 18:41

## 2021-01-19 RX ADMIN — CLOPIDOGREL BISULFATE 75 MILLIGRAM(S): 75 TABLET, FILM COATED ORAL at 11:52

## 2021-01-19 RX ADMIN — Medication 100 GRAM(S): at 02:25

## 2021-01-19 RX ADMIN — CARVEDILOL PHOSPHATE 12.5 MILLIGRAM(S): 80 CAPSULE, EXTENDED RELEASE ORAL at 18:41

## 2021-01-19 RX ADMIN — Medication 2: at 22:23

## 2021-01-19 NOTE — PROGRESS NOTE ADULT - PROBLEM SELECTOR PLAN 3
Pt became hypothermic (93F), hypotensive (90s/60s) and tachypnic. No clear sourse of infection  --f/u B/C  --f/u UA, U/C (patient makes some urine)  --CXR unremarkable  --Vancomycin 750mg x1 given  --start Zosyn  - repeat CBC, CMP; monitor electrolytes S/p stent in June 2020   -c/w home aspirin, plavix  - Troponins overnight 1/18-1/19 in ESRD with no significant trend.

## 2021-01-19 NOTE — DIETITIAN INITIAL EVALUATION ADULT. - SIGNS/SYMPTOMS
recent wt loss, pt with ESRD on HD po intake meeting <75% nutrient needs >1mo PTA, 8.6-15.9% ? wt loss, muscle/fat loss, BMI<19

## 2021-01-19 NOTE — PROGRESS NOTE ADULT - SUBJECTIVE AND OBJECTIVE BOX
Interventional Radiology Brief Post Procedure Note    Procedure: Tunneled dialysis catheter placement. Nontunneled dialysis catheter removal.     Operators: John Monge MD    Anesthesia (type): Provided by anesthesiology.     Contrast: None.     EBL: Minimal.     Findings/Follow up Plan of Care: Successful tunneled dialysis catheter placement via the right IJ.     Specimens Removed: Old nontunneled dialysis catheter.     Implants: 23 cm tip to cuff tunneled dialysis catheter.     Complications: No immediate complications.     Condition/Disposition: To recovery room.     Please call Interventional Radiology x [phone] with any questions, concerns, or issues.

## 2021-01-19 NOTE — DIETITIAN INITIAL EVALUATION ADULT. - OTHER INFO
Pt reports no medication for BG management PTA, no SMBG, does not own a glucometer.  Nutrition Supplements PTA: none    Pt UBW: ~106 lbs with pt endorses 10-20 lb wt loss over the past 10 months PTA.  Pt dosing wt noted as 105.8 lbs (stated wt) and subsequent weights obtained as follows: 117.5 lbs (1-18 post HD), 119.2 lbs (1-18 post HD). Recent weights are higher than pt stated weight, however no peripheral edema noted since admission ? Visually, pt appears thin.    Pt reports ongoing low appetite in-patient. He is NPO this AM pending testing. Yesterday, he is noted with 40-80% po intake x 3 meals documented in nursing flow sheet.  Pt denies chewing/swallowing difficulties.  Pt has no c/o nausea, vomiting, diarrhea, or constipation.     Nutrition education deferred per pt request at this time. Pt amenable to addition of Nepro supplement with diet resumption as able. Pt reports no medication for BG management PTA, no SMBG, does not own a glucometer.  Nutrition Supplements PTA: none    Pt reports he has recently been weighing ~106 lbs with pt reports 10-20 lb (8.6-15.9% ?) wt loss over the past 10 months PTA. Pt unable to state UBW.  Pt dosing wt noted as 105.8 lbs (stated wt) and subsequent weights obtained as follows: 117.5 lbs (1-18 post HD), 119.2 lbs (1-18 post HD). Recent weights are higher than pt stated weight, however no peripheral edema noted since admission ? Visually, pt appears thin.    Pt reports ongoing low appetite in-patient. He is NPO this AM pending testing. Yesterday, he is noted with 40-80% po intake x 3 meals documented in nursing flow sheet.  Pt denies chewing/swallowing difficulties.  Pt has no c/o nausea, vomiting, diarrhea, or constipation.     Nutrition education deferred per pt request at this time. Pt amenable to addition of Nepro supplement with diet resumption as able.

## 2021-01-19 NOTE — RAPID RESPONSE TEAM SUMMARY - NSADDTLFINDINGSRRT_GEN_ALL_CORE
Upon arrival, pt AOX4, in bed, in no visible distress, speaking in full sentences. Pt reported that he was walking to the bathroom to move his bowels.  Pt denies Chest pain, SOB, no LOC, palpitations, HA, dizziness, visual changes, chills, fever. On exam, CTA b/l, S1, S2 positive, RRR, skin warm and dry, mucus membranes dry, b/l LE with no edema noted, pulses present. As per primary RN, pt had an episode of blank staring and unresponsiveness while standing by the toilet, and with unsteady gait, did not response to his name.  No fall or injury, pt was assisted by 2 RNs into bed. Telemetry review, no arrhythmia on telemetry, NSR at 70bpm.   Near syncopal episode likely multifactorial in a setting of orthostatic hypotension due to recent fluid removal during HD tx, vs cardiac events r/o STEMI vs NSTEMI, r/o HTN emergency, vs electrolytes derangements, vs acute blood anemia, vs seizure like activity.   1. Most likely orthostatic hypotension due to recent fluid removal of 2.5 L during HD tx/ poor PO intake. Orthostatic +, lying SBP>180, sitting 's and standing SBP 70's. HR remained in the 60bmp; however, pt is on coreg.  Hold antihypertensives. Will administered NS 250ml bolus x1. Report orthostatic BP in 3hrs. Fall precautions, c/w telemetry and neuro checks q 3hrs.  2. Demand ischemia in a setting of orthostatic changes; EKG with TWI in inferior leads. Pending TTE, trend cardiac enzymes.  Pt has hx of CAD s/p stents continue with ASA and Plavix. Cardiology consult by primary team. Continue telemetry monitoring, repeat EKG in 6hrs.   3. On admission HTN emergency; however, found to be orthostatic hypotension, continue BP q3 hrs, continu   Upon arrival, pt AOX4, in bed, in no visible distress, speaking in full sentences. Pt reported that he was walking to the bathroom to move his bowels.  Pt denies Chest pain, SOB, no LOC, palpitations, HA, dizziness, visual changes, chills, fever. On exam, CTA b/l, S1, S2 positive, RRR, skin warm and dry, mucus membranes dry, b/l LE with no edema noted, pulses present. As per primary RN, pt had an episode of blank staring and unresponsiveness while standing by the toilet, and with unsteady gait, did not response to his name.  No fall or injury, pt was assisted by 2 RNs into bed. Telemetry review, no arrhythmia on telemetry, NSR at 70bpm.   Near syncopal episode likely multifactorial in a setting of orthostatic hypotension due to recent fluid removal during HD tx, vs cardiac events r/o STEMI vs NSTEMI, r/o HTN emergency, vs electrolytes derangements, vs acute blood anemia, vs seizure like activity.   1. Most likely orthostatic hypotension due to recent fluid removal of 2.5 L during HD tx/ poor PO intake. Orthostatic +, lying SBP>180, sitting 's and standing SBP 70's. HR remained in the 60bmp; however, pt is on coreg.  Hold antihypertensives. Will administered NS 250ml bolus x1. Report orthostatic BP in 3hrs. Fall precautions, c/w telemetry and neuro checks q 3hrs.  2. Demand ischemia in a setting of orthostatic changes; EKG with TWI in inferior leads. Pending TTE, trend cardiac enzymes.  Pt has hx of CAD s/p stents continue with ASA and Plavix. Cardiology consult by primary team. Continue telemetry monitoring, repeat EKG in 6hrs.   3. On admission HTN emergency; however, found to be orthostatic hypotension, continue BP q3 hrs monitoring.  4.  Upon arrival, pt AOX4, in bed, in no visible distress, speaking in full sentences. Pt reported that he was walking to the bathroom to move his bowels.  Pt denies Chest pain, SOB, no LOC, palpitations, HA, dizziness, visual changes, chills, fever. On exam, CTA b/l, S1, S2 positive, RRR, skin warm and dry, mucus membranes dry, b/l LE with no edema noted, pulses present. As per primary RN, pt had an episode of blank staring and unresponsiveness while standing by the toilet, and with unsteady gait, did not response to his name.  No fall or injury, pt was assisted by 2 RNs into bed. Telemetry review, no arrhythmia on telemetry, NSR at 70bpm.   Near syncopal episode likely multifactorial in a setting of orthostatic hypotension due to recent fluid removal during HD tx, vs cardiac events r/o STEMI vs NSTEMI, r/o HTN emergency, vs electrolytes derangements, vs acute blood anemia, vs seizure like activity.   1. Most likely orthostatic hypotension due to recent fluid removal of 2.5 L during HD tx/ poor PO intake. Orthostatic +, lying SBP>180, sitting 's and standing SBP 70's. HR remained in the 60bmp; however, pt is on coreg.  Hold antihypertensives. Will administered NS 250ml bolus x1. Report orthostatic BP in 3hrs. Fall precautions, c/w telemetry and neuro checks q 3hrs.  2. Demand ischemia in a setting of orthostatic changes; EKG with TWI in inferior leads. Pending TTE, trend cardiac enzymes.  Pt has hx of CAD s/p stents continue with ASA and Plavix. Cardiology consult by primary team. Continue telemetry monitoring, repeat EKG in 6hrs.   3. On admission HTN emergency; however, found to be orthostatic hypotension, continue BP q3 hrs monitoring.  4.R/O Electrolytes derangements, on admission pt found anemic, s/p PRBC 1/17; sent CMP, Mg, Aric, VBG with lytes, lactate, CBC, PT, INR, APTT.  5. Seizure less likely, pt not post ictal; A&Ox4. Continue neuro checks. If change obtain CT head.   Upon arrival, pt AOX4, in bed, in no visible distress, speaking in full sentences. Pt reported that he was walking to the bathroom to move his bowels.  Pt denies Chest pain, SOB, no LOC, palpitations, HA, dizziness, visual changes, chills, fever. On exam, CTA b/l, S1, S2 positive, RRR, skin warm and dry, mucus membranes dry, b/l LE with no edema noted, pulses present. As per primary RN, pt had an episode of blank staring and unresponsiveness while standing by the toilet, and with unsteady gait, did not response to his name.  No fall or injury, pt was assisted by 2 RNs into bed. Telemetry review, no arrhythmia on telemetry, NSR at 70bpm.   Near syncopal episode likely multifactorial in a setting of orthostatic hypotension due to recent fluid removal during HD tx, vs cardiac events r/o STEMI vs NSTEMI, r/o HTN emergency, vs electrolytes derangements, vs acute blood anemia, vs seizure like activity.   1. Most likely orthostatic hypotension due to recent fluid removal of 2.5 L during HD tx/ poor PO intake. Orthostatic +, lying SBP>180, sitting 's and standing SBP 70's. HR remained in the 60bmp; however, pt is on coreg.  Hold antihypertensives. Will administered NS 250ml bolus x1. Report orthostatic BP in 3hrs. Fall precautions, c/w telemetry and neuro checks q 3hrs. See RRT flow sheet for complete vital record.   2. Demand ischemia in a setting of orthostatic changes; EKG with TWI in inferior leads. Pending TTE, trend cardiac enzymes.  Pt has hx of CAD s/p stents continue with ASA and Plavix. Cardiology consult by primary team. Continue telemetry monitoring, repeat EKG in 6hrs.   3. On admission HTN emergency; however, found to be orthostatic hypotension, continue BP q3 hrs monitoring.  4.R/O Electrolytes derangements, on admission pt found anemic, s/p PRBC 1/17; POCG 160. sent CMP, Mg, Aric, VBG with lytes, lactate, CBC, PT, INR, APTT.  5. Seizure less likely, pt not post ictal; A&Ox4. Continue neuro checks. If change obtain CT head.   Upon arrival, pt AOX4, in bed, in no visible distress, speaking in full sentences. Pt reported that he was walking to the bathroom to move his bowels.  Pt denies Chest pain, SOB, no LOC, palpitations, HA, dizziness, visual changes, chills, fever. On exam, CTA b/l, S1, S2 positive, RRR, skin warm and dry, mucus membranes dry, b/l LE with no edema noted, pulses present. As per primary RN, pt had an episode of blank staring and unresponsiveness while standing by the toilet, and with unsteady gait, did not response to his name.  No fall or injury, pt was assisted by 2 RNs into bed. Telemetry review, no arrhythmia on telemetry, NSR at 70bpm.   Near syncopal episode likely multifactorial in a setting of orthostatic hypotension due to recent fluid removal during HD tx, vs cardiac events r/o STEMI vs NSTEMI, r/o HTN emergency, vs electrolytes derangements, vs acute blood anemia, vs seizure like activity vs other intercranial pathology included dialysis  dysequilibrium.   1. Most likely orthostatic hypotension due to recent fluid removal of 2.5 L during HD tx/ poor PO intake. Orthostatic +, lying SBP>180, sitting 's and standing SBP 70's. HR remained in the 60bmp; however, pt is on coreg.  Hold antihypertensives. Will administered NS 250ml bolus x1. Report orthostatic BP in 3hrs. Fall precautions, c/w telemetry and neuro checks q 3hrs. See RRT flow sheet for complete vital record.   2. Demand ischemia in a setting of orthostatic changes; EKG with TWI in inferior leads. Pending TTE, trend cardiac enzymes.  Pt has hx of CAD s/p stents continue with ASA and Plavix. Cardiology consult by primary team. Continue telemetry monitoring, repeat EKG in 6hrs.   3. On admission HTN emergency; however, found to be orthostatic hypotension, continue BP q3 hrs monitoring.  4.R/O Electrolytes derangements, on admission pt found anemic, s/p PRBC 1/17; POCG 160. sent CMP, Mg, Aric, VBG with lytes, lactate, CBC, PT, INR, APTT.  5. Seizure less likely, pt not post ictal; A&Ox4. Questionable dialysis dysequilibrium; however, at this time pt orthostatic +. Continue neuro checks. If change obtain CT head. Neurology consult. Nephrology follow up.

## 2021-01-19 NOTE — PROGRESS NOTE ADULT - PROBLEM SELECTOR PLAN 2
On admission patient's triage /121 likely 2/2 missing HD, possibly noncompliance  --s/p oral Nifedipine 20mg and coreg 6.25mg   --BP improved to 100s/60s  and then dropped to SBP 90s.  --per nephro note, pt on nifedipine 20 TID, coreg 12.5 BID at home.  We will hold BP meds for now given low BP.  -Stat nifedipine 30 today. Will resume home medications tomorrow.  --monitor BP, low salt diet - Hold Nifedepine  - Continue Coreg BID with hold parameters  - Fall precautions

## 2021-01-19 NOTE — PHYSICAL THERAPY INITIAL EVALUATION ADULT - PERTINENT HX OF CURRENT PROBLEM, REHAB EVAL
Pt is a 61 y/o M with PMH of ESRD on HD (via LUE AVF), CAD s/p stent, DM2, HTN, anemia (unknown cause) who presented to [facility] ED for weakness that started 2 days ago. Reported he recently came back from [facility] two days ago and for the past 2 days felt very weak, dizzy, and nausea that is progressive worsening, with 1 episode of NBNB emesis in the ED.  Per family (wife/son), patient very lethargic, sleeping all day which prompted him to go to the ED.

## 2021-01-19 NOTE — DIETITIAN NUTRITION RISK NOTIFICATION - TREATMENT: THE FOLLOWING DIET HAS BEEN RECOMMENDED
Diet, Consistent Carbohydrate Renal/No Snacks:   DASH/TLC {Sodium & Cholesterol Restricted} (DASH) (01-17-21 @ 01:03) [Active]

## 2021-01-19 NOTE — DIETITIAN INITIAL EVALUATION ADULT. - ORAL INTAKE PTA/DIET HISTORY
Pt reports poor appetite and po intake since initiation of HD x 10 months ago. He reports no therapeutic diet followed PTA, nor does he admit to any previous history of nutrition education. NKFA reported.

## 2021-01-19 NOTE — PRE-ANESTHESIA EVALUATION ADULT - NSANTHADDINFOFT_GEN_ALL_CORE
SIRS criteria met but no septic source  + troponins but assume elevated troponins related to ESRD  Continue ASA and Plavix  Repeat EKG post RRT reviewed.

## 2021-01-19 NOTE — CONSULT NOTE ADULT - SUBJECTIVE AND OBJECTIVE BOX
CHIEF COMPLAINT:    HISTORY OF PRESENT ILLNESS:  60M PMHx ESRD on HD (initiated in [facility] via LUE AVF), CAD s/p stent (on plavix/asa), DM2, HTN, HLD who present to [facility] ED for weakness on 1/16 and was found to be in HTN emergency SBP >190, anemic hgb<7, s/p 1 prbc, with appropriate response, and acute on chronic kidney failure, BUN>100 and cr >16, since pt had 2 episodes of HD tx within 24 hrs (on 1/18) with total removal of 2.5L.  He had a RRT called for syncopal episode earlier this am.  Pt reported that he was walking to the bathroom to move his bowels.  Pt denies Chest pain, SOB, no LOC, palpitations, HA, dizziness, visual changes, chills, fever.     PAST MEDICAL & SURGICAL HISTORY:  HTN (hypertension)    CAD (coronary artery disease)  s/p sent in 06/2020    T2DM (type 2 diabetes mellitus)    ESRD on hemodialysis  M/W/Saturday at home in [facility]    AV fistula            MEDICATIONS:  aspirin enteric coated 81 milliGRAM(s) Oral daily  carvedilol 12.5 milliGRAM(s) Oral every 12 hours  clopidogrel Tablet 75 milliGRAM(s) Oral daily        ondansetron Injectable 4 milliGRAM(s) IV Push every 6 hours PRN      dextrose 40% Gel 15 Gram(s) Oral once  dextrose 50% Injectable 25 Gram(s) IV Push once  dextrose 50% Injectable 12.5 Gram(s) IV Push once  dextrose 50% Injectable 25 Gram(s) IV Push once  glucagon  Injectable 1 milliGRAM(s) IntraMuscular once  insulin lispro (ADMELOG) corrective regimen sliding scale   SubCutaneous three times a day before meals  insulin lispro (ADMELOG) corrective regimen sliding scale   SubCutaneous at bedtime    calcium acetate 667 milliGRAM(s) Oral three times a day with meals  dextrose 5%. 1000 milliLiter(s) IV Continuous <Continuous>  dextrose 5%. 1000 milliLiter(s) IV Continuous <Continuous>  influenza   Vaccine 0.5 milliLiter(s) IntraMuscular once  Nephro-gabi 1 Tablet(s) Oral daily      FAMILY HISTORY:      SOCIAL HISTORY:    [ ] Non-smoker  [ ] Smoker  [ ] Alcohol    Allergies    No Known Allergies    Intolerances    	    REVIEW OF SYSTEMS:  CONSTITUTIONAL: No fever, weight loss, + fatigue  EYES: No eye pain, visual disturbances, or discharge  ENMT:  No difficulty hearing, tinnitus, vertigo; No sinus or throat pain  NECK: No pain or stiffness  RESPIRATORY: No cough, wheezing, chills or hemoptysis; No Shortness of Breath  CARDIOVASCULAR: No chest pain, palpitations, passing out, dizziness, or leg swelling  GASTROINTESTINAL: No abdominal or epigastric pain. No nausea, vomiting, or hematemesis; No diarrhea or constipation. No melena or hematochezia.  GENITOURINARY: No dysuria, frequency, hematuria, or incontinence  NEUROLOGICAL: No headaches, memory loss, loss of strength, numbness, or tremors  SKIN: No itching, burning, rashes, or lesions   LYMPH Nodes: No enlarged glands  ENDOCRINE: No heat or cold intolerance; No hair loss  MUSCULOSKELETAL: No joint pain or swelling; No muscle, back, or extremity pain  PSYCHIATRIC: No depression, anxiety, mood swings, or difficulty sleeping  HEME/LYMPH: No easy bruising, or bleeding gums  ALLERY AND IMMUNOLOGIC: No hives or eczema	    [ ] All others negative	  [ ] Unable to obtain    PHYSICAL EXAM:  T(C): 37.3 (01-19-21 @ 04:30), Max: 37.3 (01-19-21 @ 04:30)  HR: 69 (01-19-21 @ 04:30) (61 - 82)  BP: 144/77 (01-19-21 @ 04:30) (73/52 - 202/109)  RR: 18 (01-19-21 @ 04:30) (18 - 20)  SpO2: 98% (01-19-21 @ 04:30) (98% - 100%)  Wt(kg): --  I&O's Summary    18 Jan 2021 07:01  -  19 Jan 2021 07:00  --------------------------------------------------------  IN: 0 mL / OUT: 1000 mL / NET: -1000 mL        Appearance: Normal	  HEENT:   Normal oral mucosa, PERRL, EOMI	  Lymphatic: No lymphadenopathy R IJ HD catheter   Cardiovascular: Normal S1 S2, No JVD, No murmurs, No edema  Respiratory: Lungs clear to auscultation	  Psychiatry: A & O x 3, Mood & affect appropriate  Gastrointestinal:  Soft, Non-tender, + BS	  Skin: No rashes, No ecchymoses, No cyanosis	  Neurologic: Non-focal  Extremities: Normal range of motion, No clubbing, cyanosis or edema  Vascular: Peripheral pulses palpable 2+ bilaterally    TELEMETRY: SR	    ECG:  	NSR< no acute ischemic stt changes   RADIOLOGY:  < from: Xray Chest 1 View- PORTABLE-Urgent (Xray Chest 1 View- PORTABLE-Urgent .) (01.17.21 @ 22:37) >  EXAM:  XR CHEST PORTABLE URGENT 1V                            PROCEDURE DATE:  01/17/2021            INTERPRETATION:  TIME OF EXAM: January 17, 2021 at 10:31 PM    CLINICAL INFORMATION: Shiley catheter placement    TECHNIQUE:   Portable chest    INTERPRETATION:    Since the last exam, a right-sided IJ hemodialysis catheter has been placed. No complicating pneumothorax. The lungs are clear and there are no effusions.      COMPARISON:  January 16      IMPRESSION:  Status post hemodialysis catheter placement.      < end of copied text >    OTHER: 	  	  LABS:	 	    CARDIAC MARKERS:                                  8.9    5.82  )-----------( 168      ( 19 Jan 2021 06:01 )             27.0     01-19    136  |  97  |  40<H>  ----------------------------<  138<H>  3.7   |  21<L>  |  7.53<H>    Ca    8.7      19 Jan 2021 06:01  Phos  4.8     01-19  Mg     2.2     01-19      proBNP:   Lipid Profile:   HgA1c:   TSH:

## 2021-01-19 NOTE — DIETITIAN INITIAL EVALUATION ADULT. - ETIOLOGY
increased physiological demand for nutrients in setting of renal replacement therapy and with consideration for promoting wt re-gain poor appetite since initiation of HD

## 2021-01-19 NOTE — DIETITIAN INITIAL EVALUATION ADULT. - PROBLEM SELECTOR PLAN 4
Pt. with ESRD (presumed diabetic nephropathy) on HD in [facility] (HD initiated 3/26/2020), minimal urine. Last HD in [facility] was on 1/14/20 via LUE AVF.   - plan for maintenance HD today, UF goal 2L as BP tolerated   - f/u renal duplex arteries/vein (son report told vessel ds in [facility])   --start phosplo 667 TID   - monitor electrolyte, strict I/O, daily weight, fluid restriction 1L, renally dose medication per HD, renal diet (low K/phos)   - social work consult placed to setup outpatient HD center

## 2021-01-19 NOTE — PROGRESS NOTE ADULT - PROBLEM SELECTOR PLAN 7
Not on any meds at home according to patient   --f/u A1c   --Low dose ISS qac, qhs   --CC diet Likely 2/2 ESRD.   --per nephro, will hold off starting DEANA/epogen given uncontrolled BP initially

## 2021-01-19 NOTE — PROGRESS NOTE ADULT - PROBLEM SELECTOR PLAN 8
Likely 2/2 ESRD.   --per nephro, will hold off starting DEANA/epogen given uncontrolled BP initially  --f/u iron studies, B12, folate and hemolysis labs --DVT: heparin subq  --Diet: DASH/TLC, Renal, CC

## 2021-01-19 NOTE — DIETITIAN INITIAL EVALUATION ADULT. - ADD RECOMMEND
1. Recommend resume po diet as able, would recommend renal restrictions only in order to promote adequate po intake, and ongoing monitoring for need for Consistent Carbohydrate restriction 2. With po diet resumption, recommend provide 2 nepro daily 3. Consider addition of renal multivitamin if no contraindications 4. Consider appetite stimulant if no contraindications given prolonged poor appetite and wt loss PTA 5. Provide/review nutrition education as indicated- it appears pt with limited exposure PTA 6. Will continue to monitor nutrient intake, wt, labs, f/u per protocol

## 2021-01-19 NOTE — DIETITIAN INITIAL EVALUATION ADULT. - CHIEF COMPLAINT
The patient is a 60y Male complaining of  need for dialysis Per chart, pt is "60M PMHx ESRD on HD (initiated on 3/2020 in [facility] [place] via LUE AVF), CAD s/p stent (06/2020, on plavix/asa), DM2, HTN, HLD, anemia (unknown cause) who present to [facility] ED for weakness that started 2 days ago requiring emergent dialysis." SW following.

## 2021-01-19 NOTE — PROGRESS NOTE ADULT - SUBJECTIVE AND OBJECTIVE BOX
PROGRESS NOTE:   Authored by Alesia Rojo MD  Pager [phone] [facility]     Patient is a 60y old  Male who presents with a chief complaint of Weakness (18 Jan 2021 17:48)      SUBJECTIVE / OVERNIGHT EVENTS:    MEDICATIONS  (STANDING):  aspirin enteric coated 81 milliGRAM(s) Oral daily  calcium acetate 667 milliGRAM(s) Oral three times a day with meals  carvedilol 12.5 milliGRAM(s) Oral every 12 hours  clopidogrel Tablet 75 milliGRAM(s) Oral daily  dextrose 40% Gel 15 Gram(s) Oral once  dextrose 5%. 1000 milliLiter(s) (50 mL/Hr) IV Continuous <Continuous>  dextrose 5%. 1000 milliLiter(s) (100 mL/Hr) IV Continuous <Continuous>  dextrose 50% Injectable 25 Gram(s) IV Push once  dextrose 50% Injectable 12.5 Gram(s) IV Push once  dextrose 50% Injectable 25 Gram(s) IV Push once  glucagon  Injectable 1 milliGRAM(s) IntraMuscular once  influenza   Vaccine 0.5 milliLiter(s) IntraMuscular once  insulin lispro (ADMELOG) corrective regimen sliding scale   SubCutaneous three times a day before meals  insulin lispro (ADMELOG) corrective regimen sliding scale   SubCutaneous at bedtime  NIFEdipine XL 30 milliGRAM(s) Oral daily    MEDICATIONS  (PRN):  ondansetron Injectable 4 milliGRAM(s) IV Push every 6 hours PRN Nausea      I&O's Summary    17 Jan 2021 07:01  -  18 Jan 2021 07:00  --------------------------------------------------------  IN: 0 mL / OUT: 1500 mL / NET: -1500 mL    18 Jan 2021 07:01  -  19 Jan 2021 06:46  --------------------------------------------------------  IN: 0 mL / OUT: 1000 mL / NET: -1000 mL        PHYSICAL EXAM:  Vital Signs Last 24 Hrs  T(C): 37.3 (19 Jan 2021 04:30), Max: 37.3 (19 Jan 2021 04:30)  T(F): 99.1 (19 Jan 2021 04:30), Max: 99.1 (19 Jan 2021 04:30)  HR: 69 (19 Jan 2021 04:30) (61 - 82)  BP: 144/77 (19 Jan 2021 04:30) (73/52 - 202/109)  BP(mean): --  RR: 18 (19 Jan 2021 04:30) (18 - 20)  SpO2: 98% (19 Jan 2021 04:30) (98% - 100%)        LABS:                        8.9    5.82  )-----------( 168      ( 19 Jan 2021 06:01 )             27.0     01-19    136  |  97  |  40<H>  ----------------------------<  138<H>  3.7   |  21<L>  |  7.53<H>    Ca    8.7      19 Jan 2021 06:01  Phos  4.8     01-19  Mg     2.2     01-19      PT/INR - ( 17 Jan 2021 21:23 )   PT: 14.6 sec;   INR: 1.23 ratio         PTT - ( 18 Jan 2021 04:00 )  PTT:31.4 sec  CARDIAC MARKERS ( 19 Jan 2021 00:52 )  x     / x     / 269 U/L / x     / 10.8 ng/mL          Culture - Blood (collected 17 Jan 2021 08:16)  Source: .Blood Blood-Venous  Preliminary Report (18 Jan 2021 09:01):    No growth to date.    Culture - Blood (collected 17 Jan 2021 08:16)  Source: .Blood Blood-Peripheral  Preliminary Report (18 Jan 2021 09:01):    No growth to date.       PROGRESS NOTE:   Authored by Alesia Rojo MD  Pager [phone] [facility]     Patient is a 60y old  Male who presents with a chief complaint of Weakness (18 Jan 2021 17:48)      SUBJECTIVE / OVERNIGHT EVENTS:  Afebrile. Overnight concern for near-syncopal episode and had orthostatic hypotension (see RRT note). Patient denies chest pain, SOB. States feels well. Denies passing out. States usually has BMs twice a day but here not since weekend.    MEDICATIONS  (STANDING):  aspirin enteric coated 81 milliGRAM(s) Oral daily  calcium acetate 667 milliGRAM(s) Oral three times a day with meals  carvedilol 12.5 milliGRAM(s) Oral every 12 hours  clopidogrel Tablet 75 milliGRAM(s) Oral daily  dextrose 40% Gel 15 Gram(s) Oral once  dextrose 5%. 1000 milliLiter(s) (50 mL/Hr) IV Continuous <Continuous>  dextrose 5%. 1000 milliLiter(s) (100 mL/Hr) IV Continuous <Continuous>  dextrose 50% Injectable 25 Gram(s) IV Push once  dextrose 50% Injectable 12.5 Gram(s) IV Push once  dextrose 50% Injectable 25 Gram(s) IV Push once  glucagon  Injectable 1 milliGRAM(s) IntraMuscular once  influenza   Vaccine 0.5 milliLiter(s) IntraMuscular once  insulin lispro (ADMELOG) corrective regimen sliding scale   SubCutaneous three times a day before meals  insulin lispro (ADMELOG) corrective regimen sliding scale   SubCutaneous at bedtime  NIFEdipine XL 30 milliGRAM(s) Oral daily    MEDICATIONS  (PRN):  ondansetron Injectable 4 milliGRAM(s) IV Push every 6 hours PRN Nausea      I&O's Summary    17 Jan 2021 07:01  -  18 Jan 2021 07:00  --------------------------------------------------------  IN: 0 mL / OUT: 1500 mL / NET: -1500 mL    18 Jan 2021 07:01  -  19 Jan 2021 06:46  --------------------------------------------------------  IN: 0 mL / OUT: 1000 mL / NET: -1000 mL        PHYSICAL EXAM:  Vital Signs Last 24 Hrs  T(C): 37.3 (19 Jan 2021 04:30), Max: 37.3 (19 Jan 2021 04:30)  T(F): 99.1 (19 Jan 2021 04:30), Max: 99.1 (19 Jan 2021 04:30)  HR: 69 (19 Jan 2021 04:30) (61 - 82)  BP: 144/77 (19 Jan 2021 04:30) (73/52 - 202/109)  BP(mean): --  RR: 18 (19 Jan 2021 04:30) (18 - 20)  SpO2: 98% (19 Jan 2021 04:30) (98% - 100%)    GENERAL: Laying comfortably, NAD  EYES: EOMI,   NECK: No JVD  LUNG: Clear to auscultation bilaterally; No wheeze, crackles or rhonchi  HEART: Regular rate and rhythm; No murmurs, rubs, or gallops  ABDOMEN: Soft, Nontender, Nondistended  EXTREMITIES:  No LE edema, Peripheral Pulses intact, No clubbing, cyanosis, or edema. RUE no palpable thrill on fistula.  PSYCH: AAOx3  NEUROLOGY: non-focal, strength 5/5 in all extremities, sensation intact  SKIN: No rashes or lesions      LABS:                        8.9    5.82  )-----------( 168      ( 19 Jan 2021 06:01 )             27.0     01-19    136  |  97  |  40<H>  ----------------------------<  138<H>  3.7   |  21<L>  |  7.53<H>    Ca    8.7      19 Jan 2021 06:01  Phos  4.8     01-19  Mg     2.2     01-19      PT/INR - ( 17 Jan 2021 21:23 )   PT: 14.6 sec;   INR: 1.23 ratio         PTT - ( 18 Jan 2021 04:00 )  PTT:31.4 sec  CARDIAC MARKERS ( 19 Jan 2021 00:52 )  x     / x     / 269 U/L / x     / 10.8 ng/mL          Culture - Blood (collected 17 Jan 2021 08:16)  Source: .Blood Blood-Venous  Preliminary Report (18 Jan 2021 09:01):    No growth to date.    Culture - Blood (collected 17 Jan 2021 08:16)  Source: .Blood Blood-Peripheral  Preliminary Report (18 Jan 2021 09:01):    No growth to date.

## 2021-01-19 NOTE — DIETITIAN INITIAL EVALUATION ADULT. - PERTINENT MEDS FT
MEDICATIONS  (STANDING):  aspirin enteric coated 81 milliGRAM(s) Oral daily  calcium acetate 667 milliGRAM(s) Oral three times a day with meals  carvedilol 12.5 milliGRAM(s) Oral every 12 hours  clopidogrel Tablet 75 milliGRAM(s) Oral daily  dextrose 40% Gel 15 Gram(s) Oral once  dextrose 5%. 1000 milliLiter(s) (50 mL/Hr) IV Continuous <Continuous>  dextrose 5%. 1000 milliLiter(s) (100 mL/Hr) IV Continuous <Continuous>  dextrose 50% Injectable 25 Gram(s) IV Push once  dextrose 50% Injectable 12.5 Gram(s) IV Push once  dextrose 50% Injectable 25 Gram(s) IV Push once  glucagon  Injectable 1 milliGRAM(s) IntraMuscular once  influenza   Vaccine 0.5 milliLiter(s) IntraMuscular once  insulin lispro (ADMELOG) corrective regimen sliding scale   SubCutaneous three times a day before meals  insulin lispro (ADMELOG) corrective regimen sliding scale   SubCutaneous at bedtime  NIFEdipine XL 30 milliGRAM(s) Oral daily    MEDICATIONS  (PRN):  ondansetron Injectable 4 milliGRAM(s) IV Push every 6 hours PRN Nausea

## 2021-01-19 NOTE — DIETITIAN INITIAL EVALUATION ADULT. - PHYSCIAL ASSESSMENT
Nutrition Focused Physical Exam performed with pt's verbal consent with findings noted below/underweight/other (specify)

## 2021-01-19 NOTE — RAPID RESPONSE TEAM SUMMARY - NSSITUATIONBACKGROUNDRRT_GEN_ALL_CORE
60M PMHx ESRD on HD (initiated in [facility] via LUE AVF), CAD s/p stent (on plavix/asa), DM2, HTN, HLD who present to [facility] ED for weakness  60M PMHx ESRD on HD (initiated in [facility] via LUE AVF), CAD s/p stent (on plavix/asa), DM2, HTN, HLD who present to [facility] ED for weakness on 1/16 and was found to be in HTN emergency SBP >190, anemic hgb<7, s/p 1 prbc, with appropriate response, and acute on chronic kidney failure, BUN>100 and cr >16, since pt had 2 episodes of HD tx within 24 hrs (on 1/18) with total removal of 2.5L.  Now RRT called for syncopal episode.

## 2021-01-19 NOTE — PROGRESS NOTE ADULT - PROBLEM SELECTOR PLAN 5
S/p stent in June 2020   --c/w home aspirin, plavix Per nephro note, pt on nifedipine 20 TID, coreg 12.5 BID at home.  - Management of orthostatic hypotension as above  - Continue Coreg BID. Hold Nifedipine

## 2021-01-19 NOTE — CONSULT NOTE ADULT - PROBLEM SELECTOR RECOMMENDATION 9
Suspect vasovagal etiology   check orthostatics  He has supine hypertension. For now cont with Coreg as ordered.   Compression stockings   Check TTE

## 2021-01-19 NOTE — CONSULT NOTE ADULT - ATTENDING COMMENTS
Advanced care planning was discussed with patient and family.  Advanced care planning forms were reviewed and discussed as appropriate.  Differential diagnosis and plan of care discussed with patient after the evaluation.   Pain assessed and judicious use of narcotics when appropriate was discussed.  Importance of Fall prevention discussed.  Counseling on Smoking and Alcohol cessation was offered when appropriate.  Counseling on Diet, exercise, and medication compliance was done.
ESRD: Travelled from [facility]  With elevated BUN/Cr which appears disproportionately elevated for someone who is on chronic HD.   Will schedule for HD today. A short session, given the high BUN/Cr. Schedule another session in am   Please get CT scan of head to r/o intracranial pathology contributing to his AMS  Iron store replete. Start DEANA  pRBC can be given with HD  Give a dose of lokelma  Rest per Dr. Aquilino Grossman MD  O: [phone]  C: [phone]

## 2021-01-19 NOTE — PROGRESS NOTE ADULT - ASSESSMENT
60M PMHx ESRD on HD (initiated on 3/2020 in [facility] via LUE AVF), CAD s/p stent (06/2020, on plavix/asa), DM2, HTN, HLD, anemia (unknown cause) who present to [facility] ED for weakness that started 2 days ago requiring emergent dialysis

## 2021-01-19 NOTE — PROGRESS NOTE ADULT - PROBLEM SELECTOR PLAN 1
Likely the cause of his symptom's weakness, nausea. S/p Insulin, Albuterol, Lokelma, calcium gluconate in ED   - Continue dialysis per renal  --monitor K+ q12 Pt. with ESRD (presumed diabetic nephropathy) on HD in [facility] (HD initiated 3/26/2020), minimal urine. Had HD in [facility] was on 1/14/20 via LUE AVF.   - Dialysis per nephrology inpatient. Will need establishment of dialysis as outpatient here. SW assisting  - f/u renal duplex arteries/vein (son report told vessel ds in [facility])   - phosplo 667 TID   - monitor electrolyte, strict I/O, daily weight, fluid restriction 1L, renally dose medication per HD, renal diet (low K/phos)   - social work consult placed to setup outpatient HD center

## 2021-01-19 NOTE — DIETITIAN INITIAL EVALUATION ADULT. - REASON INDICATOR FOR ASSESSMENT
Pt seen for BMI<19 indicator  Information obtained from: pt (seems reluctant to participate in RD interview, initially observed with blanket covering face, however agrees to remove blanket for a few minutes), electronic medical record

## 2021-01-19 NOTE — PRE-ANESTHESIA EVALUATION ADULT - NSANTHOSAYNRD_GEN_A_CORE
No. YANN screening performed.  STOP BANG Legend: 0-2 = LOW Risk; 3-4 = INTERMEDIATE Risk; 5-8 = HIGH Risk

## 2021-01-19 NOTE — PROGRESS NOTE ADULT - PROBLEM SELECTOR PLAN 4
Pt. with ESRD (presumed diabetic nephropathy) on HD in [facility] (HD initiated 3/26/2020), minimal urine. Had HD in [facility] was on 1/14/20 via LUE AVF.   - Dialysis per nephrology inpatient. Will need establishment of dialysis as outpatient here.  - f/u renal duplex arteries/vein (son report told vessel ds in [facility])   --start phosplo 667 TID   - monitor electrolyte, strict I/O, daily weight, fluid restriction 1L, renally dose medication per HD, renal diet (low K/phos)   - social work consult placed to setup outpatient HD center Pt became hypothermic (93F), hypotensive (90s/60s) and tachypnic. No clear sourse of infection  --f/u B/C - NGTD  --f/u UA, U/C (patient makes some urine)  --CXR unremarkable  --Vancomycin 750mg x1 given at admission

## 2021-01-19 NOTE — RAPID RESPONSE TEAM SUMMARY - NSOTHERINTERVENTIONSRRT_GEN_ALL_CORE
Plan discuss with primary team at bedside. Primary team will follow all the labs, contact cardiology and nephrology.

## 2021-01-19 NOTE — PRE PROCEDURE NOTE - PRE PROCEDURE EVALUATION
Interventional Radiology Pre-Procedure Note    This is a 60y Male PMHx ESRD on HD (initiated in [facility] via LUE AVF), CAD s/p stent (on plavix/asa), DM2, HTN who present to ED for weakness after not having hemodialysis over 3 days. AVF non functioning, patient is currently receiving HD via RIJ shiley placed by vascular on 1/17. IR requested for tunnelled HD catheter placement.      NPO:  Antibiotics:  Anticoagulation:  Adverse events to anesthesia   Objection to blood products:     PAST MEDICAL & SURGICAL HISTORY:  HTN (hypertension)    CAD (coronary artery disease)  s/p sent in 06/2020    T2DM (type 2 diabetes mellitus)    ESRD on hemodialysis  M/W/Saturday at home in [facility]    AV fistula    Vital Signs Last 24 Hrs  T(C): 36.8 (19 Jan 2021 14:12), Max: 37.3 (19 Jan 2021 04:30)  T(F): 98.2 (19 Jan 2021 14:12), Max: 99.1 (19 Jan 2021 04:30)  HR: 69 (19 Jan 2021 14:12) (61 - 82)  BP: 190/95 (19 Jan 2021 14:12) (73/52 - 190/95)  BP(mean): --  RR: 17 (19 Jan 2021 14:12) (17 - 18)  SpO2: 99% (19 Jan 2021 14:12) (98% - 100%)    Allergies: No Known Allergies    Physical Exam: Gen:     Labs:                         8.9    5.82  )-----------( 168      ( 19 Jan 2021 06:01 )             27.0     01-19    136  |  97  |  40<H>  ----------------------------<  138<H>  3.7   |  21<L>  |  7.53<H>    Ca    8.7      19 Jan 2021 06:01  Phos  4.8     01-19  Mg     2.2     01-19      PT/INR - ( 17 Jan 2021 21:23 )   PT: 14.6 sec;   INR: 1.23 ratio         PTT - ( 18 Jan 2021 04:00 )  PTT:31.4 sec    Plan is for tunnelled HD catheter. Informed consent obtained. All questions and concerns have been addressed at this time.      Interventional Radiology Pre-Procedure Note    This is a 60y Male PMHx ESRD on HD (initiated in [facility] via LUE AVF), CAD s/p stent (on plavix/asa), DM2, HTN who present to ED for weakness after not having hemodialysis over 3 days. AVF non functioning, patient is currently receiving HD via RIJ shiley placed by vascular on 1/17. IR requested for tunnelled HD catheter placement.      NPO: 5pm yesterday  Antibiotics: n/a  Anticoagulation: asa, plavix  Adverse events to anesthesia: denies  Objection to blood products: none    PAST MEDICAL & SURGICAL HISTORY:  HTN (hypertension)    CAD (coronary artery disease)  s/p sent in 06/2020    T2DM (type 2 diabetes mellitus)    ESRD on hemodialysis  M/W/Saturday at home in [facility]    AV fistula    Vital Signs Last 24 Hrs  T(C): 36.8 (19 Jan 2021 14:12), Max: 37.3 (19 Jan 2021 04:30)  T(F): 98.2 (19 Jan 2021 14:12), Max: 99.1 (19 Jan 2021 04:30)  HR: 69 (19 Jan 2021 14:12) (61 - 82)  BP: 190/95 (19 Jan 2021 14:12) (73/52 - 190/95)  BP(mean): --  RR: 17 (19 Jan 2021 14:12) (17 - 18)  SpO2: 99% (19 Jan 2021 14:12) (98% - 100%)    Allergies: No Known Allergies    Physical Exam: Gen: NAD, A&Ox3    Labs:                         8.9    5.82  )-----------( 168      ( 19 Jan 2021 06:01 )             27.0     01-19    136  |  97  |  40<H>  ----------------------------<  138<H>  3.7   |  21<L>  |  7.53<H>    Ca    8.7      19 Jan 2021 06:01  Phos  4.8     01-19  Mg     2.2     01-19    PT/INR - ( 17 Jan 2021 21:23 )   PT: 14.6 sec;   INR: 1.23 ratio       PTT - ( 18 Jan 2021 04:00 )  PTT:31.4 sec    Plan is for tunnelled HD catheter. Informed consent obtained. All questions and concerns have been addressed at this time.

## 2021-01-19 NOTE — CONSULT NOTE ADULT - ASSESSMENT
60M PMHx ESRD on HD (initiated in [facility] via LUE AVF), CAD s/p stent (on plavix/asa), DM2, HTN, HLD who present to [facility] ED for weakness on 1/16 and was found to be in HTN emergency SBP >190, anemic hgb<7, s/p 1 prbc, with appropriate response, and acute on chronic kidney failure, BUN>100 and cr >16, since pt had 2 episodes of HD tx within 24 hrs (on 1/18) with total removal of 2.5L.  He had a RRT called for syncopal episode earlier this am.  Pt reported that he was walking to the bathroom to move his bowels.  Pt denies Chest pain, SOB, no LOC, palpitations, HA, dizziness, visual changes, chills, fever.

## 2021-01-19 NOTE — PHYSICAL THERAPY INITIAL EVALUATION ADULT - ADDITIONAL COMMENTS
Pt reports living in a private house with his spouse. There are +11 steps to enter with +BL handrails, none once inside. PTA, pt was independent with all functional mobility & ADL's without the use of an AD.

## 2021-01-20 DIAGNOSIS — R73.03 PREDIABETES: ICD-10-CM

## 2021-01-20 LAB
ANION GAP SERPL CALC-SCNC: 18 MMOL/L — HIGH (ref 5–17)
BUN SERPL-MCNC: 58 MG/DL — HIGH (ref 7–23)
CALCIUM SERPL-MCNC: 8.3 MG/DL — LOW (ref 8.4–10.5)
CHLORIDE SERPL-SCNC: 98 MMOL/L — SIGNIFICANT CHANGE UP (ref 96–108)
CO2 SERPL-SCNC: 22 MMOL/L — SIGNIFICANT CHANGE UP (ref 22–31)
CREAT SERPL-MCNC: 10.35 MG/DL — HIGH (ref 0.5–1.3)
GLUCOSE BLDC GLUCOMTR-MCNC: 139 MG/DL — HIGH (ref 70–99)
GLUCOSE BLDC GLUCOMTR-MCNC: 150 MG/DL — HIGH (ref 70–99)
GLUCOSE BLDC GLUCOMTR-MCNC: 154 MG/DL — HIGH (ref 70–99)
GLUCOSE BLDC GLUCOMTR-MCNC: 189 MG/DL — HIGH (ref 70–99)
GLUCOSE SERPL-MCNC: 163 MG/DL — HIGH (ref 70–99)
HCT VFR BLD CALC: 26.7 % — LOW (ref 39–50)
HGB BLD-MCNC: 8.5 G/DL — LOW (ref 13–17)
MAGNESIUM SERPL-MCNC: 2.3 MG/DL — SIGNIFICANT CHANGE UP (ref 1.6–2.6)
MCHC RBC-ENTMCNC: 28.1 PG — SIGNIFICANT CHANGE UP (ref 27–34)
MCHC RBC-ENTMCNC: 31.8 GM/DL — LOW (ref 32–36)
MCV RBC AUTO: 88.1 FL — SIGNIFICANT CHANGE UP (ref 80–100)
NRBC # BLD: 0 /100 WBCS — SIGNIFICANT CHANGE UP (ref 0–0)
PHOSPHATE SERPL-MCNC: 7.4 MG/DL — HIGH (ref 2.5–4.5)
PLATELET # BLD AUTO: 180 K/UL — SIGNIFICANT CHANGE UP (ref 150–400)
POTASSIUM SERPL-MCNC: 4.3 MMOL/L — SIGNIFICANT CHANGE UP (ref 3.5–5.3)
POTASSIUM SERPL-SCNC: 4.3 MMOL/L — SIGNIFICANT CHANGE UP (ref 3.5–5.3)
RBC # BLD: 3.03 M/UL — LOW (ref 4.2–5.8)
RBC # FLD: 14.9 % — HIGH (ref 10.3–14.5)
SODIUM SERPL-SCNC: 138 MMOL/L — SIGNIFICANT CHANGE UP (ref 135–145)
WBC # BLD: 6.18 K/UL — SIGNIFICANT CHANGE UP (ref 3.8–10.5)
WBC # FLD AUTO: 6.18 K/UL — SIGNIFICANT CHANGE UP (ref 3.8–10.5)

## 2021-01-20 PROCEDURE — 99233 SBSQ HOSP IP/OBS HIGH 50: CPT | Mod: GC

## 2021-01-20 PROCEDURE — 99232 SBSQ HOSP IP/OBS MODERATE 35: CPT | Mod: GC

## 2021-01-20 PROCEDURE — 93990 DOPPLER FLOW TESTING: CPT | Mod: 26

## 2021-01-20 PROCEDURE — 93975 VASCULAR STUDY: CPT | Mod: 26

## 2021-01-20 RX ORDER — METOPROLOL TARTRATE 50 MG
50 TABLET ORAL
Refills: 0 | Status: DISCONTINUED | OUTPATIENT
Start: 2021-01-20 | End: 2021-01-20

## 2021-01-20 RX ORDER — METOPROLOL TARTRATE 50 MG
25 TABLET ORAL
Refills: 0 | Status: DISCONTINUED | OUTPATIENT
Start: 2021-01-20 | End: 2021-01-27

## 2021-01-20 RX ORDER — ERYTHROPOIETIN 10000 [IU]/ML
4000 INJECTION, SOLUTION INTRAVENOUS; SUBCUTANEOUS
Refills: 0 | Status: DISCONTINUED | OUTPATIENT
Start: 2021-01-20 | End: 2021-01-22

## 2021-01-20 RX ADMIN — CARVEDILOL PHOSPHATE 12.5 MILLIGRAM(S): 80 CAPSULE, EXTENDED RELEASE ORAL at 06:06

## 2021-01-20 RX ADMIN — CLOPIDOGREL BISULFATE 75 MILLIGRAM(S): 75 TABLET, FILM COATED ORAL at 12:16

## 2021-01-20 RX ADMIN — Medication 667 MILLIGRAM(S): at 12:16

## 2021-01-20 RX ADMIN — ERYTHROPOIETIN 4000 UNIT(S): 10000 INJECTION, SOLUTION INTRAVENOUS; SUBCUTANEOUS at 14:14

## 2021-01-20 RX ADMIN — Medication 667 MILLIGRAM(S): at 17:34

## 2021-01-20 RX ADMIN — Medication 1: at 17:34

## 2021-01-20 RX ADMIN — CHLORHEXIDINE GLUCONATE 1 APPLICATION(S): 213 SOLUTION TOPICAL at 06:04

## 2021-01-20 RX ADMIN — Medication 1 TABLET(S): at 12:16

## 2021-01-20 RX ADMIN — Medication 25 MILLIGRAM(S): at 17:41

## 2021-01-20 RX ADMIN — Medication 81 MILLIGRAM(S): at 12:16

## 2021-01-20 NOTE — PROGRESS NOTE ADULT - PROBLEM SELECTOR PLAN 1
Pt. with ESRD (presumed diabetic nephropathy) on HD in [facility] (HD initiated 3/26/2020), minimal urine. Had HD in [facility] was on 1/14/20 via LUE AVF.   - Dialysis per nephrology inpatient. Will need establishment of dialysis as outpatient here. SW assisting  - f/u renal duplex arteries/vein (son report told vessel ds in [facility])   - phosplo 667 TID   - monitor electrolyte, strict I/O, daily weight, fluid restriction 1L, renally dose medication per HD, renal diet (low K/phos)   - social work consult placed to setup outpatient HD center Pt. with ESRD (presumed diabetic nephropathy) on HD in [facility] (HD initiated 3/26/2020), minimal urine. Had HD in [facility] was on 1/14/20 via LUE AVF.   - Dialysis per nephrology inpatient. Will need establishment of dialysis as outpatient here. SW assisting  - f/u renal duplex arteries/vein (son report told vessel ds in [facility])   - phosplo 667 TID   - monitor electrolyte, strict I/O, daily weight, fluid restriction 1L, renally dose medication per HD, renal diet (low K/phos)   - Vascular consult for non-functioning AVF  - social work consult placed to setup outpatient HD center

## 2021-01-20 NOTE — PROGRESS NOTE ADULT - SUBJECTIVE AND OBJECTIVE BOX
PROGRESS NOTE:   Authored by Alesia Rojo MD  Pager [phone] [facility]     Patient is a 60y old  Male who presents with a chief complaint of Weakness (19 Jan 2021 16:29)      SUBJECTIVE / OVERNIGHT EVENTS:    MEDICATIONS  (STANDING):  aspirin enteric coated 81 milliGRAM(s) Oral daily  calcium acetate 667 milliGRAM(s) Oral three times a day with meals  carvedilol 12.5 milliGRAM(s) Oral every 12 hours  chlorhexidine 4% Liquid 1 Application(s) Topical <User Schedule>  clopidogrel Tablet 75 milliGRAM(s) Oral daily  dextrose 40% Gel 15 Gram(s) Oral once  dextrose 5%. 1000 milliLiter(s) (50 mL/Hr) IV Continuous <Continuous>  dextrose 5%. 1000 milliLiter(s) (100 mL/Hr) IV Continuous <Continuous>  dextrose 50% Injectable 25 Gram(s) IV Push once  dextrose 50% Injectable 12.5 Gram(s) IV Push once  dextrose 50% Injectable 25 Gram(s) IV Push once  glucagon  Injectable 1 milliGRAM(s) IntraMuscular once  influenza   Vaccine 0.5 milliLiter(s) IntraMuscular once  insulin lispro (ADMELOG) corrective regimen sliding scale   SubCutaneous three times a day before meals  insulin lispro (ADMELOG) corrective regimen sliding scale   SubCutaneous at bedtime  Nephro-gabi 1 Tablet(s) Oral daily    MEDICATIONS  (PRN):  ondansetron Injectable 4 milliGRAM(s) IV Push every 6 hours PRN Nausea  sodium chloride 0.9% lock flush 10 milliLiter(s) IV Push every 1 hour PRN Pre/post blood products, medications, blood draw, and to maintain line patency      I&O's Summary    19 Jan 2021 07:01  -  20 Jan 2021 07:00  --------------------------------------------------------  IN: 450 mL / OUT: 0 mL / NET: 450 mL        PHYSICAL EXAM:  Vital Signs Last 24 Hrs  T(C): 37.3 (20 Jan 2021 04:48), Max: 37.3 (20 Jan 2021 04:48)  T(F): 99.1 (20 Jan 2021 04:48), Max: 99.1 (20 Jan 2021 04:48)  HR: 64 (20 Jan 2021 04:48) (63 - 79)  BP: 125/63 (20 Jan 2021 04:48) (97/58 - 190/95)  BP(mean): --  RR: 18 (20 Jan 2021 04:48) (12 - 23)  SpO2: 95% (20 Jan 2021 04:48) (95% - 100%)        LABS:                        8.9    5.82  )-----------( 168      ( 19 Jan 2021 06:01 )             27.0     01-19    136  |  97  |  40<H>  ----------------------------<  138<H>  3.7   |  21<L>  |  7.53<H>    Ca    8.7      19 Jan 2021 06:01  Phos  4.8     01-19  Mg     2.2     01-19        CARDIAC MARKERS ( 19 Jan 2021 00:52 )  x     / x     / 269 U/L / x     / 10.8 ng/mL          Culture - Blood (collected 17 Jan 2021 08:16)  Source: .Blood Blood-Venous  Preliminary Report (18 Jan 2021 09:01):    No growth to date.    Culture - Blood (collected 17 Jan 2021 08:16)  Source: .Blood Blood-Peripheral  Preliminary Report (18 Jan 2021 09:01):    No growth to date.       PROGRESS NOTE:   Authored by Alesia Rojo MD  Pager [phone] [facility]     Patient is a 60y old  Male who presents with a chief complaint of Weakness (19 Jan 2021 16:29)      SUBJECTIVE / OVERNIGHT EVENTS:  Afebrile. Orthostatic again this AM. Was constipated but now having loose BMs past day. On toilet seat this AM.    Later in AM reportedly very lightheaded on feet. No complaints while laying flat.    MEDICATIONS  (STANDING):  aspirin enteric coated 81 milliGRAM(s) Oral daily  calcium acetate 667 milliGRAM(s) Oral three times a day with meals  carvedilol 12.5 milliGRAM(s) Oral every 12 hours  chlorhexidine 4% Liquid 1 Application(s) Topical <User Schedule>  clopidogrel Tablet 75 milliGRAM(s) Oral daily  dextrose 40% Gel 15 Gram(s) Oral once  dextrose 5%. 1000 milliLiter(s) (50 mL/Hr) IV Continuous <Continuous>  dextrose 5%. 1000 milliLiter(s) (100 mL/Hr) IV Continuous <Continuous>  dextrose 50% Injectable 25 Gram(s) IV Push once  dextrose 50% Injectable 12.5 Gram(s) IV Push once  dextrose 50% Injectable 25 Gram(s) IV Push once  glucagon  Injectable 1 milliGRAM(s) IntraMuscular once  influenza   Vaccine 0.5 milliLiter(s) IntraMuscular once  insulin lispro (ADMELOG) corrective regimen sliding scale   SubCutaneous three times a day before meals  insulin lispro (ADMELOG) corrective regimen sliding scale   SubCutaneous at bedtime  Nephro-gabi 1 Tablet(s) Oral daily    MEDICATIONS  (PRN):  ondansetron Injectable 4 milliGRAM(s) IV Push every 6 hours PRN Nausea  sodium chloride 0.9% lock flush 10 milliLiter(s) IV Push every 1 hour PRN Pre/post blood products, medications, blood draw, and to maintain line patency      I&O's Summary    19 Jan 2021 07:01  -  20 Jan 2021 07:00  --------------------------------------------------------  IN: 450 mL / OUT: 0 mL / NET: 450 mL        PHYSICAL EXAM:  Vital Signs Last 24 Hrs  T(C): 37.3 (20 Jan 2021 04:48), Max: 37.3 (20 Jan 2021 04:48)  T(F): 99.1 (20 Jan 2021 04:48), Max: 99.1 (20 Jan 2021 04:48)  HR: 64 (20 Jan 2021 04:48) (63 - 79)  BP: 125/63 (20 Jan 2021 04:48) (97/58 - 190/95)  BP(mean): --  RR: 18 (20 Jan 2021 04:48) (12 - 23)  SpO2: 95% (20 Jan 2021 04:48) (95% - 100%)    General: Awake, alert, on toilet seat in no acute distress    LABS:                        8.9    5.82  )-----------( 168      ( 19 Jan 2021 06:01 )             27.0     01-19    136  |  97  |  40<H>  ----------------------------<  138<H>  3.7   |  21<L>  |  7.53<H>    Ca    8.7      19 Jan 2021 06:01  Phos  4.8     01-19  Mg     2.2     01-19        CARDIAC MARKERS ( 19 Jan 2021 00:52 )  x     / x     / 269 U/L / x     / 10.8 ng/mL          Culture - Blood (collected 17 Jan 2021 08:16)  Source: .Blood Blood-Venous  Preliminary Report (18 Jan 2021 09:01):    No growth to date.    Culture - Blood (collected 17 Jan 2021 08:16)  Source: .Blood Blood-Peripheral  Preliminary Report (18 Jan 2021 09:01):    No growth to date.

## 2021-01-20 NOTE — PROGRESS NOTE ADULT - ASSESSMENT
60M PMHx ESRD on HD (initiated on 3/2020 in [facility] via LUE AVF), CAD s/p stent (06/2020, on plavix/asa), DM2, HTN, HLD, anemia (unknown cause) who present to [facility] ED for weakness that started 2 days ago requiring emergent dialysis

## 2021-01-20 NOTE — PROGRESS NOTE ADULT - ASSESSMENT
60M PMHx ESRD on HD (initiated in [facility] via LUE AVF), CAD s/p stent (on plavix/asa), DM2, HTN present to ED for weakness after not having hemodialysis over 3 days.  Nephrology consulted for ESRD/HD management.       # ESRD  Pt. with ESRD (presumed diabetic nephropathy) on HD in [facility] (HD initiated 3/26/2020), minimal urine.    - plan for maintenance HD today with minimal UF due to diarrhea and low BP. UF: 0.5L  - Vascular evaluation for right AVF.. Pt s/p tunneled HD catheter on 1/19/21  - monitor electrolyte, strict I/O, daily weight, fluid restriction 1L, renally dose medication per HD, renal diet (low K/phos)  - will need / to setup outpatient HD center prior to discharge    # Hyperkalemia- resolved  Pt with hyperkalemia in the setting of ESRD not having HD few days  - plan for HD as outline above, monitor electrolytes, low potassium diet    # Hypertensive urgency  On admission patient's triage /121 likely 2/2 missing HD  - please check renal duplex arteries/vein r/o DAVONTE (son report told vessel ds in [facility])  - monitor BP, low salt diet    # Anemia  Pt with hx anemia likely multifactorial including ACD in the setting of ESRD. Hgb level below target at 8.5 today  - On epogen 4000U TIW on HD days   - Iron stores repleted  - check FOBT    # Renal bone disease  Pt with hyperphosphatemia in the setting of ESRD.   - continue phos binder phos-low 667mg TID w/ meals,    - Low phosphorus diet advised. Monitor serum phosphorus    If you have any questions, please feel free to contact me  Renetta Veronica  Nephrology Fellow  Pager: [phone] / [phone]  (After 5pm or on weekends please page the on-call fellow)   60M PMHx ESRD on HD (initiated in [facility] via LUE AVF), CAD s/p stent (on plavix/asa), DM2, HTN present to ED for weakness after not having hemodialysis over 3 days.  Nephrology consulted for ESRD/HD management.       # ESRD  Pt. with ESRD (presumed diabetic nephropathy) on HD in [facility] (HD initiated 3/26/2020), minimal urine.    - plan for maintenance HD today with minimal UF due to diarrhea and low BP. UF: 0.5L  - Vascular evaluation for right AVF.. Pt s/p tunneled HD catheter on 1/19/21  - monitor electrolyte, strict I/O, daily weight, fluid restriction 1L, renally dose medication per HD, renal diet (low K/phos)  - will need / to setup outpatient HD center prior to discharge    # Hyperkalemia- resolved  Pt with hyperkalemia in the setting of missing dialysis  - plan for HD as outline above, monitor electrolytes, low potassium diet    # Hypertensive urgency  On admission patient's triage /121 likely 2/2 missing HD  - please check renal duplex arteries/vein r/o DAVONTE (son report told vessel ds in [facility])  - monitor BP, low salt diet    # Anemia  Pt with hx anemia likely multifactorial including ACD in the setting of ESRD. Hgb level below target at 8.5 today  - On epogen 4000U TIW on HD days   - Iron stores repleted  - check FOBT    # Renal bone disease  Pt with hyperphosphatemia in the setting of ESRD.   - continue phos binder phos-low 667mg TID w/ meals,    - Low phosphorus diet advised. Monitor serum phosphorus    If you have any questions, please feel free to contact me  Renetta Veronica  Nephrology Fellow  Pager: [phone] / [phone]  (After 5pm or on weekends please page the on-call fellow)

## 2021-01-20 NOTE — PROGRESS NOTE ADULT - PROBLEM SELECTOR PLAN 5
Per nephro note, pt on nifedipine 20 TID, coreg 12.5 BID at home.  - Management of orthostatic hypotension as above  - Continue Coreg BID. Hold Nifedipine A1c 6.2  - Continue SSI

## 2021-01-20 NOTE — PROGRESS NOTE ADULT - SUBJECTIVE AND OBJECTIVE BOX
[facility] Division of Kidney Diseases & Hypertension  FOLLOW UP NOTE  [phone]--------------------------------------------------------------------------------  24 hour events/subjective:  - patient seen and examined this morning  - States that he had several episode of watery stools mixed with blood this morning 4x episodes  - s/p tunneled HD catheter placement 1/19/21  - No other subjective complaints  - Labs/meds/vitals reviewed    PAST HISTORY  --------------------------------------------------------------------------------  No significant changes to PMH, PSH, FHx, SHx, unless otherwise noted    ALLERGIES & MEDICATIONS  --------------------------------------------------------------------------------  Allergies    No Known Allergies    Intolerances      Standing Inpatient Medications  aspirin enteric coated 81 milliGRAM(s) Oral daily  calcium acetate 667 milliGRAM(s) Oral three times a day with meals  carvedilol 12.5 milliGRAM(s) Oral every 12 hours  chlorhexidine 4% Liquid 1 Application(s) Topical <User Schedule>  clopidogrel Tablet 75 milliGRAM(s) Oral daily  dextrose 40% Gel 15 Gram(s) Oral once  dextrose 5%. 1000 milliLiter(s) IV Continuous <Continuous>  dextrose 5%. 1000 milliLiter(s) IV Continuous <Continuous>  dextrose 50% Injectable 25 Gram(s) IV Push once  dextrose 50% Injectable 12.5 Gram(s) IV Push once  dextrose 50% Injectable 25 Gram(s) IV Push once  glucagon  Injectable 1 milliGRAM(s) IntraMuscular once  influenza   Vaccine 0.5 milliLiter(s) IntraMuscular once  insulin lispro (ADMELOG) corrective regimen sliding scale   SubCutaneous three times a day before meals  insulin lispro (ADMELOG) corrective regimen sliding scale   SubCutaneous at bedtime  Nephro-gabi 1 Tablet(s) Oral daily    PRN Inpatient Medications  ondansetron Injectable 4 milliGRAM(s) IV Push every 6 hours PRN  sodium chloride 0.9% lock flush 10 milliLiter(s) IV Push every 1 hour PRN      REVIEW OF SYSTEMS  --------------------------------------------------------------------------------  Gen: No  fevers/chills  Respiratory: No dyspnea, cough, wheezing, hemoptysis  CV: No chest pain, PND  GI: No abdominal pain,+ diarrhea  MSK: No joint pain/swelling  Neuro: No dizziness/lightheadedness    All other systems were reviewed and are negative, except as noted.    VITALS/PHYSICAL EXAM  --------------------------------------------------------------------------------  T(C): 37.3 (01-20-21 @ 04:48), Max: 37.3 (01-20-21 @ 04:48)  HR: 64 (01-20-21 @ 04:48) (63 - 79)  BP: 125/63 (01-20-21 @ 04:48) (97/58 - 190/95)  RR: 18 (01-20-21 @ 04:48) (12 - 23)  SpO2: 95% (01-20-21 @ 04:48) (95% - 100%)  Wt(kg): --  Height (cm): 162.6 (01-19-21 @ 14:47)  Weight (kg): 48 (01-19-21 @ 14:47)  BMI (kg/m2): 18.2 (01-19-21 @ 14:47)  BSA (m2): 1.49 (01-19-21 @ 14:47)      01-19-21 @ 07:01  -  01-20-21 @ 07:00  --------------------------------------------------------  IN: 450 mL / OUT: 0 mL / NET: 450 mL      Physical Exam:  	Gen: NAD, well-appearing on room air  	HEENT: moist mucous membrane  	Pulm: CTA B/L, no crackles  	CV: RRR, S1S2; no rub/murmur  	GI: +BS, soft, nontender/nondistended  	MSK: Warm, no edema              Neuro: AAOx3  	Skin: Warm  	Vascular access: LUE AVF +thrills/bruits, RIJ tunneled HD cathter    LABS/STUDIES  --------------------------------------------------------------------------------              8.5    6.18  >-----------<  180      [01-20-21 @ 06:54]              26.7     138  |  98  |  58  ----------------------------<  163      [01-20-21 @ 06:53]  4.3   |  22  |  10.35        Ca     8.3     [01-20-21 @ 06:53]      Mg     2.3     [01-20-21 @ 06:53]      Phos  7.4     [01-20-21 @ 06:53]          [01-19-21 @ 00:52]    Creatinine Trend:  SCr 10.35 [01-20 @ 06:53]  SCr 7.53 [01-19 @ 06:01]  SCr 7.14 [01-19 @ 00:52]  SCr 5.78 [01-18 @ 17:36]  SCr 9.19 [01-18 @ 04:00]    Iron 58, TIBC 204, %sat 28      [01-17-21 @ 10:08]  Ferritin 551      [01-17-21 @ 10:07]  TSH 1.46      [01-17-21 @ 10:07]    HBsAb <3.0      [01-17-21 @ 00:52]  HBsAg Nonreact      [01-17-21 @ 00:52]  HBcAb Nonreact      [01-17-21 @ 00:52]  HCV 0.09, Nonreact      [01-17-21 @ 00:52]

## 2021-01-20 NOTE — PROGRESS NOTE ADULT - PROBLEM SELECTOR PLAN 4
Pt became hypothermic (93F), hypotensive (90s/60s) and tachypnic. No clear sourse of infection  --f/u B/C - NGTD  --f/u UA, U/C (patient makes some urine)  --CXR unremarkable  --Vancomycin 750mg x1 given at admission Per nephro note, pt on nifedipine 20 TID, coreg 12.5 BID at home.  - Management of orthostatic hypotension as above  - Continue Coreg BID. Hold Nifedipine

## 2021-01-20 NOTE — PROGRESS NOTE ADULT - ASSESSMENT
60M PMHx ESRD on HD (initiated in [facility] via LUE AVF), CAD s/p stent (on plavix/asa), DM2, HTN, HLD who present to [facility] ED for weakness on 1/16 and was found to be in HTN emergency SBP >190, anemic hgb<7, s/p 1 prbc, with appropriate response, and acute on chronic kidney failure, BUN>100 and cr >16, since pt had 2 episodes of HD tx within 24 hrs (on 1/18) with total removal of 2.5L.  He had a RRT called for syncopal episode earlier this am.  Pt reported that he was walking to the bathroom to move his bowels.  Pt denies Chest pain, SOB, no LOC, palpitations, HA, dizziness, visual changes, chills, fever.

## 2021-01-20 NOTE — PROGRESS NOTE ADULT - PROBLEM SELECTOR PLAN 2
- Hold Nifedepine  - Continue Coreg BID with hold parameters  - Fall precautions - Hold Nifedepine  - Switch to Lopressor at low dose (from Coreg) to remove alpha blockade  - Fall precautions

## 2021-01-20 NOTE — PROGRESS NOTE ADULT - SUBJECTIVE AND OBJECTIVE BOX
Subjective: Patient seen and examined. No new events except as noted.   orthostatics   REVIEW OF SYSTEMS:    CONSTITUTIONAL:+ weakness, fevers or chills  EYES/ENT: No visual changes;  No vertigo or throat pain   NECK: No pain or stiffness  RESPIRATORY: No cough, wheezing, hemoptysis; No shortness of breath  CARDIOVASCULAR: No chest pain or palpitations  GASTROINTESTINAL: No abdominal or epigastric pain. No nausea, vomiting, or hematemesis; No diarrhea or constipation. No melena or hematochezia.  GENITOURINARY: No dysuria, frequency or hematuria  NEUROLOGICAL: No numbness or weakness  SKIN: No itching, burning, rashes, or lesions   All other review of systems is negative unless indicated above.    MEDICATIONS:  MEDICATIONS  (STANDING):  aspirin enteric coated 81 milliGRAM(s) Oral daily  calcium acetate 667 milliGRAM(s) Oral three times a day with meals  chlorhexidine 4% Liquid 1 Application(s) Topical <User Schedule>  clopidogrel Tablet 75 milliGRAM(s) Oral daily  dextrose 40% Gel 15 Gram(s) Oral once  dextrose 5%. 1000 milliLiter(s) (50 mL/Hr) IV Continuous <Continuous>  dextrose 5%. 1000 milliLiter(s) (100 mL/Hr) IV Continuous <Continuous>  dextrose 50% Injectable 25 Gram(s) IV Push once  dextrose 50% Injectable 12.5 Gram(s) IV Push once  dextrose 50% Injectable 25 Gram(s) IV Push once  epoetin junior-epbx (RETACRIT) Injectable 4000 Unit(s) IV Push <User Schedule>  glucagon  Injectable 1 milliGRAM(s) IntraMuscular once  influenza   Vaccine 0.5 milliLiter(s) IntraMuscular once  insulin lispro (ADMELOG) corrective regimen sliding scale   SubCutaneous three times a day before meals  insulin lispro (ADMELOG) corrective regimen sliding scale   SubCutaneous at bedtime  metoprolol tartrate 25 milliGRAM(s) Oral two times a day  Nephro-gabi 1 Tablet(s) Oral daily      PHYSICAL EXAM:  T(C): 36.5 (01-20-21 @ 13:30), Max: 37.3 (01-20-21 @ 04:48)  HR: 57 (01-20-21 @ 13:30) (52 - 79)  BP: 111/62 (01-20-21 @ 13:30) (97/58 - 179/82)  RR: 18 (01-20-21 @ 13:30) (12 - 23)  SpO2: 98% (01-20-21 @ 13:30) (95% - 100%)  Wt(kg): --  I&O's Summary    19 Jan 2021 07:01  -  20 Jan 2021 07:00  --------------------------------------------------------  IN: 450 mL / OUT: 0 mL / NET: 450 mL    20 Jan 2021 07:01  -  20 Jan 2021 16:34  --------------------------------------------------------  IN: 300 mL / OUT: 0 mL / NET: 300 mL          Appearance: NAD RIJ HD catheter 	  HEENT:   Normal oral mucosa, PERRL, EOMI	  Lymphatic: No lymphadenopathy , no edema  Cardiovascular: Normal S1 S2, No JVD, No murmurs , Peripheral pulses palpable 2+ bilaterally  Respiratory: Lungs clear to auscultation, normal effort 	  Gastrointestinal:  Soft, Non-tender, + BS	  Skin: No rashes, No ecchymoses, No cyanosis, warm to touch  Musculoskeletal: Normal range of motion, normal strength  Psychiatry:  Mood & affect appropriate  Ext: No edema      LABS:    CARDIAC MARKERS:  CARDIAC MARKERS ( 19 Jan 2021 00:52 )  x     / x     / 269 U/L / x     / 10.8 ng/mL                                8.5    6.18  )-----------( 180      ( 20 Jan 2021 06:54 )             26.7     01-20    138  |  98  |  58<H>  ----------------------------<  163<H>  4.3   |  22  |  10.35<H>    Ca    8.3<L>      20 Jan 2021 06:53  Phos  7.4     01-20  Mg     2.3     01-20      proBNP:   Lipid Profile:   HgA1c:   TSH:             TELEMETRY: 	 SR   ECG:  	  RADIOLOGY:   DIAGNOSTIC TESTING:  [ ] Echocardiogram:  [ ]  Catheterization:  [ ] Stress Test:    OTHER:

## 2021-01-20 NOTE — PROGRESS NOTE ADULT - PROBLEM SELECTOR PLAN 3
S/p stent in June 2020   -c/w home aspirin, plavix  - Troponins overnight 1/18-1/19 in ESRD with no significant trend.

## 2021-01-20 NOTE — PROVIDER CONTACT NOTE (OTHER) - ASSESSMENT
Patient with episode of near syncope post loose Bm x 2 during this shift. VSS. C/O dizzinesss with ambulation. + Ortho's. Patient completely responsive as soon as walked back to the bed. Denies dizziness at rest.

## 2021-01-20 NOTE — PROGRESS NOTE ADULT - PROBLEM SELECTOR PLAN 7
Likely 2/2 ESRD.   --per nephro, will hold off starting DEANA/epogen given uncontrolled BP initially --DVT: heparin subq  --Diet: DASH/TLC, Renal, CC

## 2021-01-21 LAB
ANION GAP SERPL CALC-SCNC: 15 MMOL/L — SIGNIFICANT CHANGE UP (ref 5–17)
BUN SERPL-MCNC: 37 MG/DL — HIGH (ref 7–23)
CALCIUM SERPL-MCNC: 8 MG/DL — LOW (ref 8.4–10.5)
CHLORIDE SERPL-SCNC: 97 MMOL/L — SIGNIFICANT CHANGE UP (ref 96–108)
CO2 SERPL-SCNC: 25 MMOL/L — SIGNIFICANT CHANGE UP (ref 22–31)
CREAT SERPL-MCNC: 6.93 MG/DL — HIGH (ref 0.5–1.3)
GLUCOSE BLDC GLUCOMTR-MCNC: 102 MG/DL — HIGH (ref 70–99)
GLUCOSE BLDC GLUCOMTR-MCNC: 183 MG/DL — HIGH (ref 70–99)
GLUCOSE BLDC GLUCOMTR-MCNC: 216 MG/DL — HIGH (ref 70–99)
GLUCOSE BLDC GLUCOMTR-MCNC: 232 MG/DL — HIGH (ref 70–99)
GLUCOSE SERPL-MCNC: 102 MG/DL — HIGH (ref 70–99)
HCT VFR BLD CALC: 26.9 % — LOW (ref 39–50)
HGB BLD-MCNC: 8.4 G/DL — LOW (ref 13–17)
MAGNESIUM SERPL-MCNC: 2 MG/DL — SIGNIFICANT CHANGE UP (ref 1.6–2.6)
MCHC RBC-ENTMCNC: 27.7 PG — SIGNIFICANT CHANGE UP (ref 27–34)
MCHC RBC-ENTMCNC: 31.2 GM/DL — LOW (ref 32–36)
MCV RBC AUTO: 88.8 FL — SIGNIFICANT CHANGE UP (ref 80–100)
NRBC # BLD: 0 /100 WBCS — SIGNIFICANT CHANGE UP (ref 0–0)
PHOSPHATE SERPL-MCNC: 4.8 MG/DL — HIGH (ref 2.5–4.5)
PLATELET # BLD AUTO: 178 K/UL — SIGNIFICANT CHANGE UP (ref 150–400)
POTASSIUM SERPL-MCNC: 3.6 MMOL/L — SIGNIFICANT CHANGE UP (ref 3.5–5.3)
POTASSIUM SERPL-SCNC: 3.6 MMOL/L — SIGNIFICANT CHANGE UP (ref 3.5–5.3)
RBC # BLD: 3.03 M/UL — LOW (ref 4.2–5.8)
RBC # FLD: 14.7 % — HIGH (ref 10.3–14.5)
SODIUM SERPL-SCNC: 137 MMOL/L — SIGNIFICANT CHANGE UP (ref 135–145)
WBC # BLD: 6.94 K/UL — SIGNIFICANT CHANGE UP (ref 3.8–10.5)
WBC # FLD AUTO: 6.94 K/UL — SIGNIFICANT CHANGE UP (ref 3.8–10.5)

## 2021-01-21 PROCEDURE — 99233 SBSQ HOSP IP/OBS HIGH 50: CPT | Mod: GC

## 2021-01-21 RX ORDER — MIDODRINE HYDROCHLORIDE 2.5 MG/1
5 TABLET ORAL
Refills: 0 | Status: DISCONTINUED | OUTPATIENT
Start: 2021-01-21 | End: 2021-01-24

## 2021-01-21 RX ADMIN — Medication 25 MILLIGRAM(S): at 06:02

## 2021-01-21 RX ADMIN — CHLORHEXIDINE GLUCONATE 1 APPLICATION(S): 213 SOLUTION TOPICAL at 06:02

## 2021-01-21 RX ADMIN — Medication 81 MILLIGRAM(S): at 11:50

## 2021-01-21 RX ADMIN — Medication 667 MILLIGRAM(S): at 17:43

## 2021-01-21 RX ADMIN — Medication 25 MILLIGRAM(S): at 17:43

## 2021-01-21 RX ADMIN — CLOPIDOGREL BISULFATE 75 MILLIGRAM(S): 75 TABLET, FILM COATED ORAL at 11:50

## 2021-01-21 RX ADMIN — Medication 667 MILLIGRAM(S): at 08:05

## 2021-01-21 RX ADMIN — MIDODRINE HYDROCHLORIDE 5 MILLIGRAM(S): 2.5 TABLET ORAL at 17:43

## 2021-01-21 RX ADMIN — Medication 2: at 17:07

## 2021-01-21 RX ADMIN — Medication 1 TABLET(S): at 11:50

## 2021-01-21 RX ADMIN — Medication 667 MILLIGRAM(S): at 11:50

## 2021-01-21 RX ADMIN — Medication 1: at 11:50

## 2021-01-21 NOTE — PROGRESS NOTE ADULT - PROBLEM SELECTOR PLAN 1
Pt. with ESRD (presumed diabetic nephropathy) on HD in [facility] (HD initiated 3/26/2020), minimal urine. Had HD in [facility] was on 1/14/20 via LUE AVF.   - Dialysis per nephrology inpatient. Will need establishment of dialysis as outpatient here. SW assisting  - f/u renal duplex arteries/vein (son report told vessel ds in [facility])   - phosplo 667 TID   - monitor electrolyte, strict I/O, daily weight, fluid restriction 1L, renally dose medication per HD, renal diet (low K/phos)   - Vascular consult for non-functioning AVF  - social work consult placed to setup outpatient HD center

## 2021-01-21 NOTE — PROGRESS NOTE ADULT - SUBJECTIVE AND OBJECTIVE BOX
PROGRESS NOTE:   Authored by Alesia Rojo MD  Pager [phone] [facility]     Patient is a 60y old  Male who presents with a chief complaint of Weakness (20 Jan 2021 16:33)      SUBJECTIVE / OVERNIGHT EVENTS:    MEDICATIONS  (STANDING):  aspirin enteric coated 81 milliGRAM(s) Oral daily  calcium acetate 667 milliGRAM(s) Oral three times a day with meals  chlorhexidine 4% Liquid 1 Application(s) Topical <User Schedule>  clopidogrel Tablet 75 milliGRAM(s) Oral daily  dextrose 40% Gel 15 Gram(s) Oral once  dextrose 5%. 1000 milliLiter(s) (50 mL/Hr) IV Continuous <Continuous>  dextrose 5%. 1000 milliLiter(s) (100 mL/Hr) IV Continuous <Continuous>  dextrose 50% Injectable 25 Gram(s) IV Push once  dextrose 50% Injectable 12.5 Gram(s) IV Push once  dextrose 50% Injectable 25 Gram(s) IV Push once  epoetin junior-epbx (RETACRIT) Injectable 4000 Unit(s) IV Push <User Schedule>  glucagon  Injectable 1 milliGRAM(s) IntraMuscular once  influenza   Vaccine 0.5 milliLiter(s) IntraMuscular once  insulin lispro (ADMELOG) corrective regimen sliding scale   SubCutaneous three times a day before meals  insulin lispro (ADMELOG) corrective regimen sliding scale   SubCutaneous at bedtime  metoprolol tartrate 25 milliGRAM(s) Oral two times a day  Nephro-gabi 1 Tablet(s) Oral daily    MEDICATIONS  (PRN):  ondansetron Injectable 4 milliGRAM(s) IV Push every 6 hours PRN Nausea  sodium chloride 0.9% lock flush 10 milliLiter(s) IV Push every 1 hour PRN Pre/post blood products, medications, blood draw, and to maintain line patency      I&O's Summary    20 Jan 2021 07:01  -  21 Jan 2021 07:00  --------------------------------------------------------  IN: 800 mL / OUT: 500 mL / NET: 300 mL        PHYSICAL EXAM:  Vital Signs Last 24 Hrs  T(C): 37.3 (21 Jan 2021 04:47), Max: 37.4 (20 Jan 2021 20:20)  T(F): 99.1 (21 Jan 2021 04:47), Max: 99.4 (20 Jan 2021 20:20)  HR: 61 (21 Jan 2021 05:59) (52 - 61)  BP: 174/77 (21 Jan 2021 05:59) (111/62 - 174/77)  BP(mean): --  RR: 18 (21 Jan 2021 04:47) (18 - 18)  SpO2: 99% (21 Jan 2021 04:47) (98% - 100%)        LABS:                        8.5    6.18  )-----------( 180      ( 20 Jan 2021 06:54 )             26.7     01-20    138  |  98  |  58<H>  ----------------------------<  163<H>  4.3   |  22  |  10.35<H>    Ca    8.3<L>      20 Jan 2021 06:53  Phos  7.4     01-20  Mg     2.3     01-20                 PROGRESS NOTE:   Authored by Alesia Rojo MD  Pager [phone] [facility]     Patient is a 60y old  Male who presents with a chief complaint of Weakness (20 Jan 2021 16:33)      SUBJECTIVE / OVERNIGHT EVENTS:  Afebrile. NAEON. Still orthostatic this AM.      MEDICATIONS  (STANDING):  aspirin enteric coated 81 milliGRAM(s) Oral daily  calcium acetate 667 milliGRAM(s) Oral three times a day with meals  chlorhexidine 4% Liquid 1 Application(s) Topical <User Schedule>  clopidogrel Tablet 75 milliGRAM(s) Oral daily  dextrose 40% Gel 15 Gram(s) Oral once  dextrose 5%. 1000 milliLiter(s) (50 mL/Hr) IV Continuous <Continuous>  dextrose 5%. 1000 milliLiter(s) (100 mL/Hr) IV Continuous <Continuous>  dextrose 50% Injectable 25 Gram(s) IV Push once  dextrose 50% Injectable 12.5 Gram(s) IV Push once  dextrose 50% Injectable 25 Gram(s) IV Push once  epoetin junior-epbx (RETACRIT) Injectable 4000 Unit(s) IV Push <User Schedule>  glucagon  Injectable 1 milliGRAM(s) IntraMuscular once  influenza   Vaccine 0.5 milliLiter(s) IntraMuscular once  insulin lispro (ADMELOG) corrective regimen sliding scale   SubCutaneous three times a day before meals  insulin lispro (ADMELOG) corrective regimen sliding scale   SubCutaneous at bedtime  metoprolol tartrate 25 milliGRAM(s) Oral two times a day  Nephro-gabi 1 Tablet(s) Oral daily    MEDICATIONS  (PRN):  ondansetron Injectable 4 milliGRAM(s) IV Push every 6 hours PRN Nausea  sodium chloride 0.9% lock flush 10 milliLiter(s) IV Push every 1 hour PRN Pre/post blood products, medications, blood draw, and to maintain line patency      I&O's Summary    20 Jan 2021 07:01  -  21 Jan 2021 07:00  --------------------------------------------------------  IN: 800 mL / OUT: 500 mL / NET: 300 mL        PHYSICAL EXAM:  Vital Signs Last 24 Hrs  T(C): 37.3 (21 Jan 2021 04:47), Max: 37.4 (20 Jan 2021 20:20)  T(F): 99.1 (21 Jan 2021 04:47), Max: 99.4 (20 Jan 2021 20:20)  HR: 61 (21 Jan 2021 05:59) (52 - 61)  BP: 174/77 (21 Jan 2021 05:59) (111/62 - 174/77)  BP(mean): --  RR: 18 (21 Jan 2021 04:47) (18 - 18)  SpO2: 99% (21 Jan 2021 04:47) (98% - 100%)    GENERAL: Laying comfortably, NAD  EYES: EOMI  NECK: No JVD  LUNG: Clear to auscultation bilaterally; No wheeze, crackles or rhonchi  HEART: Regular rate and rhythm; No murmurs, rubs, or gallops  ABDOMEN: Soft, Nontender, Nondistended  EXTREMITIES:  No LE edema, Peripheral Pulses intact, No clubbing, cyanosis, or edema. RUE no palpable thrill on fistula.  PSYCH: AAOx3  NEUROLOGY: non-focal, strength 5/5 in all extremities, sensation intact  SKIN: No rashes or lesions      LABS:                        8.5    6.18  )-----------( 180      ( 20 Jan 2021 06:54 )             26.7     01-20    138  |  98  |  58<H>  ----------------------------<  163<H>  4.3   |  22  |  10.35<H>    Ca    8.3<L>      20 Jan 2021 06:53  Phos  7.4     01-20  Mg     2.3     01-20

## 2021-01-21 NOTE — PROGRESS NOTE ADULT - PROBLEM SELECTOR PLAN 4
Per nephro note, pt on nifedipine 20 TID, coreg 12.5 BID at home.  - Management of orthostatic hypotension as above  - Continue Coreg BID. Hold Nifedipine

## 2021-01-21 NOTE — PROGRESS NOTE ADULT - SUBJECTIVE AND OBJECTIVE BOX
EP covering for Dr Navarro    Subjective: Patient seen and examined. No new events, resting comfortably.  No angina, orthopnea, PND or palpitations.  REVIEW OF SYSTEMS:  CONSTITUTIONAL:+ weakness, fevers or chills  EYES/ENT: No visual changes;  No vertigo or throat pain   NECK: No pain or stiffness  RESPIRATORY: No cough, wheezing, hemoptysis; No shortness of breath  CARDIOVASCULAR: No chest pain or palpitations  GASTROINTESTINAL: No abdominal or epigastric pain. No nausea, vomiting, or hematemesis; No diarrhea or constipation. No melena or hematochezia.  GENITOURINARY: No dysuria, frequency or hematuria  NEUROLOGICAL: No numbness or weakness  SKIN: No itching, burning, rashes, or lesions   All other review of systems is negative unless indicated above.    aspirin enteric coated 81 milliGRAM(s) Oral daily  calcium acetate 667 milliGRAM(s) Oral three times a day with meals  chlorhexidine 4% Liquid 1 Application(s) Topical <User Schedule>  clopidogrel Tablet 75 milliGRAM(s) Oral daily  dextrose 40% Gel 15 Gram(s) Oral once  dextrose 5%. 1000 milliLiter(s) IV Continuous <Continuous>  dextrose 5%. 1000 milliLiter(s) IV Continuous <Continuous>  dextrose 50% Injectable 25 Gram(s) IV Push once  dextrose 50% Injectable 12.5 Gram(s) IV Push once  dextrose 50% Injectable 25 Gram(s) IV Push once  epoetin junior-epbx (RETACRIT) Injectable 4000 Unit(s) IV Push <User Schedule>  glucagon  Injectable 1 milliGRAM(s) IntraMuscular once  influenza   Vaccine 0.5 milliLiter(s) IntraMuscular once  insulin lispro (ADMELOG) corrective regimen sliding scale   SubCutaneous three times a day before meals  insulin lispro (ADMELOG) corrective regimen sliding scale   SubCutaneous at bedtime  metoprolol tartrate 25 milliGRAM(s) Oral two times a day  midodrine. 5 milliGRAM(s) Oral <User Schedule>  Nephro-gabi 1 Tablet(s) Oral daily  ondansetron Injectable 4 milliGRAM(s) IV Push every 6 hours PRN  sodium chloride 0.9% lock flush 10 milliLiter(s) IV Push every 1 hour PRN                            8.4    6.94  )-----------( 178      ( 21 Jan 2021 07:01 )             26.9       01-21    137  |  97  |  37<H>  ----------------------------<  102<H>  3.6   |  25  |  6.93<H>    Ca    8.0<L>      21 Jan 2021 06:58  Phos  4.8     01-21  Mg     2.0     01-21      CARDIAC MARKERS ( 19 Jan 2021 00:52 )  x     / x     / 269 U/L / x     / 10.8 ng/mL      T(C): 36.7 (01-21-21 @ 11:29), Max: 37.4 (01-20-21 @ 20:20)  HR: 59 (01-21-21 @ 11:29) (55 - 61)  BP: 110/63 (01-21-21 @ 11:29) (110/63 - 174/77)  RR: 18 (01-21-21 @ 11:29) (18 - 18)  SpO2: 97% (01-21-21 @ 11:29) (97% - 99%)  Wt(kg): --    I&O's Summary    20 Jan 2021 07:01  -  21 Jan 2021 07:00  --------------------------------------------------------  IN: 1100 mL / OUT: 500 mL / NET: 600 mL    21 Jan 2021 07:01  -  21 Jan 2021 16:28  --------------------------------------------------------  IN: 575 mL / OUT: 0 mL / NET: 575 mL    Appearance: NAD [facility] HD catheter 	  HEENT:   Normal oral mucosa, PERRL, EOMI	  Lymphatic: No lymphadenopathy , no edema  Cardiovascular: Normal S1 S2, No JVD, No murmurs , Peripheral pulses palpable 2+ bilaterally  Respiratory: Lungs clear to auscultation, normal effort 	  Gastrointestinal:  Soft, Non-tender, + BS	  Skin: No rashes, No ecchymoses, No cyanosis, warm to touch  Musculoskeletal: Normal range of motion, normal strength  Psychiatry:  Mood & affect appropriate  Ext: No edema    Tele - NSR.

## 2021-01-21 NOTE — PROGRESS NOTE ADULT - ASSESSMENT
60M PMHx ESRD on HD (initiated on 3/2020 in [facility] via LUE AVF), CAD s/p stent (06/2020, on plavix/asa), DM2, HTN, HLD, anemia (unknown cause) who present to [facility] ED for weakness that started 2 days ago requiring emergent dialysis

## 2021-01-21 NOTE — PROGRESS NOTE ADULT - ASSESSMENT
60M PMHx ESRD on HD (initiated in [facility] via LUE AVF), CAD s/p stent (on plavix/asa), DM2, HTN, HLD who present to [facility] ED for weakness on 1/16 and was found to be in HTN emergency SBP >190, anemic hgb<7, s/p 1 prbc, with appropriate response, and acute on chronic kidney failure, BUN>100 and cr >16, since pt had 2 episodes of HD tx within 24 hrs (on 1/18) with total removal of 2.5L.  He had a RRT called for syncopal episode earlier this am.  Pt reported that he was walking to the bathroom to move his bowels.  Pt denies Chest pain, SOB, no LOC, palpitations, HA, dizziness, visual changes, chills, fever.

## 2021-01-21 NOTE — PROGRESS NOTE ADULT - PROBLEM SELECTOR PLAN 6
Likely 2/2 ESRD.   --per nephro, will hold off starting DEANA/epogen given uncontrolled BP initially

## 2021-01-21 NOTE — PROGRESS NOTE ADULT - PROBLEM SELECTOR PLAN 2
- Hold Nifedepine  - Switch to Lopressor at low dose (from Coreg) to remove alpha blockade  - Fall precautions - Hold Nifedepine  - Switched to Lopressor at low dose (from Coreg) to remove alpha blockade  - Will start midodrine 5 mg BID with hold parameters  - Fall precautions

## 2021-01-21 NOTE — PROGRESS NOTE ADULT - PROBLEM SELECTOR PLAN 3
S/p stent in June 2020   -c/w home aspirin, plavix  - Troponins overnight 1/18-1/19 in ESRD with no significant trend. S/p stent in June 2020   -c/w home aspirin, plavix

## 2021-01-22 DIAGNOSIS — Z02.9 ENCOUNTER FOR ADMINISTRATIVE EXAMINATIONS, UNSPECIFIED: ICD-10-CM

## 2021-01-22 LAB
ANION GAP SERPL CALC-SCNC: 18 MMOL/L — HIGH (ref 5–17)
BUN SERPL-MCNC: 73 MG/DL — HIGH (ref 7–23)
CALCIUM SERPL-MCNC: 7.6 MG/DL — LOW (ref 8.4–10.5)
CHLORIDE SERPL-SCNC: 97 MMOL/L — SIGNIFICANT CHANGE UP (ref 96–108)
CO2 SERPL-SCNC: 22 MMOL/L — SIGNIFICANT CHANGE UP (ref 22–31)
CREAT SERPL-MCNC: 9.2 MG/DL — HIGH (ref 0.5–1.3)
CULTURE RESULTS: SIGNIFICANT CHANGE UP
CULTURE RESULTS: SIGNIFICANT CHANGE UP
GLUCOSE BLDC GLUCOMTR-MCNC: 122 MG/DL — HIGH (ref 70–99)
GLUCOSE BLDC GLUCOMTR-MCNC: 203 MG/DL — HIGH (ref 70–99)
GLUCOSE BLDC GLUCOMTR-MCNC: 208 MG/DL — HIGH (ref 70–99)
GLUCOSE BLDC GLUCOMTR-MCNC: 246 MG/DL — HIGH (ref 70–99)
GLUCOSE SERPL-MCNC: 126 MG/DL — HIGH (ref 70–99)
HCT VFR BLD CALC: 26.1 % — LOW (ref 39–50)
HGB BLD-MCNC: 7.9 G/DL — LOW (ref 13–17)
MAGNESIUM SERPL-MCNC: 2.1 MG/DL — SIGNIFICANT CHANGE UP (ref 1.6–2.6)
MCHC RBC-ENTMCNC: 27.1 PG — SIGNIFICANT CHANGE UP (ref 27–34)
MCHC RBC-ENTMCNC: 30.3 GM/DL — LOW (ref 32–36)
MCV RBC AUTO: 89.4 FL — SIGNIFICANT CHANGE UP (ref 80–100)
NRBC # BLD: 0 /100 WBCS — SIGNIFICANT CHANGE UP (ref 0–0)
OB PNL STL: NEGATIVE — SIGNIFICANT CHANGE UP
PHOSPHATE SERPL-MCNC: 5.4 MG/DL — HIGH (ref 2.5–4.5)
PLATELET # BLD AUTO: 193 K/UL — SIGNIFICANT CHANGE UP (ref 150–400)
POTASSIUM SERPL-MCNC: 4.1 MMOL/L — SIGNIFICANT CHANGE UP (ref 3.5–5.3)
POTASSIUM SERPL-SCNC: 4.1 MMOL/L — SIGNIFICANT CHANGE UP (ref 3.5–5.3)
RBC # BLD: 2.92 M/UL — LOW (ref 4.2–5.8)
RBC # FLD: 14.7 % — HIGH (ref 10.3–14.5)
SARS-COV-2 RNA SPEC QL NAA+PROBE: SIGNIFICANT CHANGE UP
SODIUM SERPL-SCNC: 137 MMOL/L — SIGNIFICANT CHANGE UP (ref 135–145)
SPECIMEN SOURCE: SIGNIFICANT CHANGE UP
SPECIMEN SOURCE: SIGNIFICANT CHANGE UP
WBC # BLD: 8.22 K/UL — SIGNIFICANT CHANGE UP (ref 3.8–10.5)
WBC # FLD AUTO: 8.22 K/UL — SIGNIFICANT CHANGE UP (ref 3.8–10.5)

## 2021-01-22 PROCEDURE — 99233 SBSQ HOSP IP/OBS HIGH 50: CPT | Mod: GC

## 2021-01-22 RX ORDER — INSULIN GLARGINE 100 [IU]/ML
3 INJECTION, SOLUTION SUBCUTANEOUS AT BEDTIME
Refills: 0 | Status: DISCONTINUED | OUTPATIENT
Start: 2021-01-22 | End: 2021-01-27

## 2021-01-22 RX ORDER — ERYTHROPOIETIN 10000 [IU]/ML
6000 INJECTION, SOLUTION INTRAVENOUS; SUBCUTANEOUS
Refills: 0 | Status: DISCONTINUED | OUTPATIENT
Start: 2021-01-22 | End: 2021-01-27

## 2021-01-22 RX ADMIN — ERYTHROPOIETIN 6000 UNIT(S): 10000 INJECTION, SOLUTION INTRAVENOUS; SUBCUTANEOUS at 15:01

## 2021-01-22 RX ADMIN — INSULIN GLARGINE 3 UNIT(S): 100 INJECTION, SOLUTION SUBCUTANEOUS at 22:17

## 2021-01-22 RX ADMIN — CLOPIDOGREL BISULFATE 75 MILLIGRAM(S): 75 TABLET, FILM COATED ORAL at 12:01

## 2021-01-22 RX ADMIN — Medication 0: at 22:18

## 2021-01-22 RX ADMIN — CHLORHEXIDINE GLUCONATE 1 APPLICATION(S): 213 SOLUTION TOPICAL at 05:35

## 2021-01-22 RX ADMIN — Medication 2: at 12:01

## 2021-01-22 RX ADMIN — Medication 2: at 17:20

## 2021-01-22 RX ADMIN — Medication 25 MILLIGRAM(S): at 05:35

## 2021-01-22 RX ADMIN — Medication 667 MILLIGRAM(S): at 12:01

## 2021-01-22 RX ADMIN — Medication 667 MILLIGRAM(S): at 08:24

## 2021-01-22 RX ADMIN — Medication 667 MILLIGRAM(S): at 17:20

## 2021-01-22 RX ADMIN — Medication 25 MILLIGRAM(S): at 17:20

## 2021-01-22 RX ADMIN — Medication 1 TABLET(S): at 12:01

## 2021-01-22 RX ADMIN — Medication 81 MILLIGRAM(S): at 12:01

## 2021-01-22 NOTE — PROGRESS NOTE ADULT - PROBLEM SELECTOR PLAN 3
S/p stent in June 2020   -c/w home aspirin, plavix S/p stent in June 2020  -c/w home aspirin, plavix

## 2021-01-22 NOTE — PROGRESS NOTE ADULT - SUBJECTIVE AND OBJECTIVE BOX
PROGRESS NOTE:   Authored by Alesia Rojo MD  Pager [phone] [facility]     Patient is a 60y old  Male who presents with a chief complaint of Weakness (21 Jan 2021 16:27)      SUBJECTIVE / OVERNIGHT EVENTS:    MEDICATIONS  (STANDING):  aspirin enteric coated 81 milliGRAM(s) Oral daily  calcium acetate 667 milliGRAM(s) Oral three times a day with meals  chlorhexidine 4% Liquid 1 Application(s) Topical <User Schedule>  clopidogrel Tablet 75 milliGRAM(s) Oral daily  dextrose 40% Gel 15 Gram(s) Oral once  dextrose 5%. 1000 milliLiter(s) (50 mL/Hr) IV Continuous <Continuous>  dextrose 5%. 1000 milliLiter(s) (100 mL/Hr) IV Continuous <Continuous>  dextrose 50% Injectable 25 Gram(s) IV Push once  dextrose 50% Injectable 12.5 Gram(s) IV Push once  dextrose 50% Injectable 25 Gram(s) IV Push once  epoetin junior-epbx (RETACRIT) Injectable 4000 Unit(s) IV Push <User Schedule>  glucagon  Injectable 1 milliGRAM(s) IntraMuscular once  influenza   Vaccine 0.5 milliLiter(s) IntraMuscular once  insulin lispro (ADMELOG) corrective regimen sliding scale   SubCutaneous three times a day before meals  insulin lispro (ADMELOG) corrective regimen sliding scale   SubCutaneous at bedtime  metoprolol tartrate 25 milliGRAM(s) Oral two times a day  midodrine. 5 milliGRAM(s) Oral <User Schedule>  Nephro-gabi 1 Tablet(s) Oral daily    MEDICATIONS  (PRN):  ondansetron Injectable 4 milliGRAM(s) IV Push every 6 hours PRN Nausea  sodium chloride 0.9% lock flush 10 milliLiter(s) IV Push every 1 hour PRN Pre/post blood products, medications, blood draw, and to maintain line patency      I&O's Summary    21 Jan 2021 07:01  -  22 Jan 2021 07:00  --------------------------------------------------------  IN: 815 mL / OUT: 0 mL / NET: 815 mL        PHYSICAL EXAM:  Vital Signs Last 24 Hrs  T(C): 36.7 (22 Jan 2021 04:42), Max: 36.8 (21 Jan 2021 20:02)  T(F): 98.1 (22 Jan 2021 04:42), Max: 98.2 (21 Jan 2021 20:02)  HR: 66 (22 Jan 2021 04:42) (59 - 66)  BP: 179/86 (22 Jan 2021 04:42) (110/63 - 179/86)  BP(mean): --  RR: 18 (22 Jan 2021 04:42) (18 - 18)  SpO2: 99% (22 Jan 2021 04:42) (96% - 99%)        LABS:                        8.4    6.94  )-----------( 178      ( 21 Jan 2021 07:01 )             26.9     01-21    137  |  97  |  37<H>  ----------------------------<  102<H>  3.6   |  25  |  6.93<H>    Ca    8.0<L>      21 Jan 2021 06:58  Phos  4.8     01-21  Mg     2.0     01-21                 PROGRESS NOTE:   Authored by Alesia Rojo MD  Pager [phone] [facility]     Patient is a 60y old  Male who presents with a chief complaint of Weakness (21 Jan 2021 16:27)      SUBJECTIVE / OVERNIGHT EVENTS:  Afebrile. No complaints. Was able to ambulate, and less lightheaded upon standing. Still orthostatic positive this AM but not as significant drop. Started on midodrine yeseterday.    MEDICATIONS  (STANDING):  aspirin enteric coated 81 milliGRAM(s) Oral daily  calcium acetate 667 milliGRAM(s) Oral three times a day with meals  chlorhexidine 4% Liquid 1 Application(s) Topical <User Schedule>  clopidogrel Tablet 75 milliGRAM(s) Oral daily  dextrose 40% Gel 15 Gram(s) Oral once  dextrose 5%. 1000 milliLiter(s) (50 mL/Hr) IV Continuous <Continuous>  dextrose 5%. 1000 milliLiter(s) (100 mL/Hr) IV Continuous <Continuous>  dextrose 50% Injectable 25 Gram(s) IV Push once  dextrose 50% Injectable 12.5 Gram(s) IV Push once  dextrose 50% Injectable 25 Gram(s) IV Push once  epoetin junior-epbx (RETACRIT) Injectable 4000 Unit(s) IV Push <User Schedule>  glucagon  Injectable 1 milliGRAM(s) IntraMuscular once  influenza   Vaccine 0.5 milliLiter(s) IntraMuscular once  insulin lispro (ADMELOG) corrective regimen sliding scale   SubCutaneous three times a day before meals  insulin lispro (ADMELOG) corrective regimen sliding scale   SubCutaneous at bedtime  metoprolol tartrate 25 milliGRAM(s) Oral two times a day  midodrine. 5 milliGRAM(s) Oral <User Schedule>  Nephro-gabi 1 Tablet(s) Oral daily    MEDICATIONS  (PRN):  ondansetron Injectable 4 milliGRAM(s) IV Push every 6 hours PRN Nausea  sodium chloride 0.9% lock flush 10 milliLiter(s) IV Push every 1 hour PRN Pre/post blood products, medications, blood draw, and to maintain line patency      I&O's Summary    21 Jan 2021 07:01  -  22 Jan 2021 07:00  --------------------------------------------------------  IN: 815 mL / OUT: 0 mL / NET: 815 mL        PHYSICAL EXAM:  Vital Signs Last 24 Hrs  T(C): 36.7 (22 Jan 2021 04:42), Max: 36.8 (21 Jan 2021 20:02)  T(F): 98.1 (22 Jan 2021 04:42), Max: 98.2 (21 Jan 2021 20:02)  HR: 66 (22 Jan 2021 04:42) (59 - 66)  BP: 179/86 (22 Jan 2021 04:42) (110/63 - 179/86)  BP(mean): --  RR: 18 (22 Jan 2021 04:42) (18 - 18)  SpO2: 99% (22 Jan 2021 04:42) (96% - 99%)    GENERAL: Laying comfortably, NAD  EYES: EOMI  NECK: No JVD  LUNG: Clear to auscultation bilaterally; No wheeze, crackles or rhonchi  HEART: Regular rate and rhythm; No murmurs, rubs, or gallops  ABDOMEN: Soft, Nontender, Nondistended  EXTREMITIES:  No LE edema, Peripheral Pulses intact, No clubbing, cyanosis, or edema. RUE no palpable thrill on fistula.  PSYCH: AAOx3  NEUROLOGY: non-focal, strength 5/5 in all extremities, sensation intact  SKIN: No rashes or lesions      LABS:                        8.4    6.94  )-----------( 178      ( 21 Jan 2021 07:01 )             26.9     01-21    137  |  97  |  37<H>  ----------------------------<  102<H>  3.6   |  25  |  6.93<H>    Ca    8.0<L>      21 Jan 2021 06:58  Phos  4.8     01-21  Mg     2.0     01-21

## 2021-01-22 NOTE — PROGRESS NOTE ADULT - SUBJECTIVE AND OBJECTIVE BOX
[facility] Division of Kidney Diseases & Hypertension  FOLLOW UP NOTE  [phone]--------------------------------------------------------------------------------    24 hour events/subjective:  - Seen and examined this morning  - No acute events overnight. Pt reports diarrhea has improved  - No subjective complaints of chest pain/lightheadedness/nausea/vomiting/ SOB  - BP noted to be elevated at 173/80 however orthostatic and drops to 80/53 on standing     PAST HISTORY  --------------------------------------------------------------------------------  No significant changes to PMH, PSH, FHx, SHx, unless otherwise noted    ALLERGIES & MEDICATIONS  --------------------------------------------------------------------------------  Allergies    No Known Allergies    Intolerances      Standing Inpatient Medications  aspirin enteric coated 81 milliGRAM(s) Oral daily  calcium acetate 667 milliGRAM(s) Oral three times a day with meals  chlorhexidine 4% Liquid 1 Application(s) Topical <User Schedule>  clopidogrel Tablet 75 milliGRAM(s) Oral daily  dextrose 40% Gel 15 Gram(s) Oral once  dextrose 5%. 1000 milliLiter(s) IV Continuous <Continuous>  dextrose 5%. 1000 milliLiter(s) IV Continuous <Continuous>  dextrose 50% Injectable 25 Gram(s) IV Push once  dextrose 50% Injectable 12.5 Gram(s) IV Push once  dextrose 50% Injectable 25 Gram(s) IV Push once  epoetin junior-epbx (RETACRIT) Injectable 4000 Unit(s) IV Push <User Schedule>  glucagon  Injectable 1 milliGRAM(s) IntraMuscular once  influenza   Vaccine 0.5 milliLiter(s) IntraMuscular once  insulin glargine Injectable (LANTUS) 3 Unit(s) SubCutaneous at bedtime  insulin lispro (ADMELOG) corrective regimen sliding scale   SubCutaneous three times a day before meals  insulin lispro (ADMELOG) corrective regimen sliding scale   SubCutaneous at bedtime  metoprolol tartrate 25 milliGRAM(s) Oral two times a day  midodrine. 5 milliGRAM(s) Oral <User Schedule>  Nephro-gabi 1 Tablet(s) Oral daily    PRN Inpatient Medications  ondansetron Injectable 4 milliGRAM(s) IV Push every 6 hours PRN  sodium chloride 0.9% lock flush 10 milliLiter(s) IV Push every 1 hour PRN    REVIEW OF SYSTEMS  --------------------------------------------------------------------------------  Gen: No  fevers/chills  Respiratory: No dyspnea, cough, wheezing, hemoptysis  CV: No chest pain, PND  GI: No abdominal pain,no diarrhea  MSK: No joint pain/swelling  Neuro: No dizziness/lightheadedness    All other systems were reviewed and are negative, except as noted.    VITALS/PHYSICAL EXAM  --------------------------------------------------------------------------------  T(C): 36.7 (01-22-21 @ 04:42), Max: 36.8 (01-21-21 @ 20:02)  HR: 66 (01-22-21 @ 04:42) (59 - 66)  BP: 179/86 (01-22-21 @ 04:42) (110/63 - 179/86)  RR: 18 (01-22-21 @ 04:42) (18 - 18)  SpO2: 99% (01-22-21 @ 04:42) (96% - 99%)  Wt(kg): --        01-21-21 @ 07:01  -  01-22-21 @ 07:00  --------------------------------------------------------  IN: 815 mL / OUT: 0 mL / NET: 815 mL    Physical Exam:  	Gen: NAD, well-appearing on room air  	HEENT: moist mucous membrane  	Pulm: CTA B/L, no crackles  	CV: RRR, S1S2; no rub/murmur  	GI: +BS, soft, nontender/nondistended  	MSK: Warm, no edema              Neuro: AAOx3  	Skin: Warm  	Vascular access: LUE AVF +thrills/bruits, RIJ tunneled HD cathter    LABS/STUDIES  --------------------------------------------------------------------------------              7.9    8.22  >-----------<  193      [01-22-21 @ 06:52]              26.1     137  |  97  |  73  ----------------------------<  126      [01-22-21 @ 06:50]  4.1   |  22  |  9.20        Ca     7.6     [01-22-21 @ 06:50]      Mg     2.1     [01-22-21 @ 06:50]      Phos  5.4     [01-22-21 @ 06:50]    Creatinine Trend:  SCr 9.20 [01-22 @ 06:50]  SCr 6.93 [01-21 @ 06:58]  SCr 10.35 [01-20 @ 06:53]  SCr 7.53 [01-19 @ 06:01]  SCr 7.14 [01-19 @ 00:52]    Iron 58, TIBC 204, %sat 28      [01-17-21 @ 10:08]  Ferritin 551      [01-17-21 @ 10:07]  TSH 1.46      [01-17-21 @ 10:07]    HBsAb <3.0      [01-17-21 @ 00:52]  HBsAg Nonreact      [01-17-21 @ 00:52]  HBcAb Nonreact      [01-17-21 @ 00:52]  HCV 0.09, Nonreact      [01-17-21 @ 00:52]

## 2021-01-22 NOTE — PROGRESS NOTE ADULT - PROBLEM SELECTOR PLAN 6
Likely 2/2 ESRD.   --per nephro, will hold off starting DEANA/epogen given uncontrolled BP initially Likely 2/2 ESRD.   - DEANA/epogen per nephro

## 2021-01-22 NOTE — PROGRESS NOTE ADULT - ASSESSMENT
60M PMHx ESRD on HD (initiated on 3/2020 in [facility] via LUE AVF), CAD s/p stent (06/2020, on plavix/asa), DM2, HTN, HLD, anemia (unknown cause) who present to [facility] ED for weakness that started 2 days ago requiring emergent dialysis 60M PMHx ESRD on HD (initiated on 3/2020 in [facility] via LUE AVF), CAD s/p stent (06/2020, on plavix/asa), DM2, HTN, HLD, anemia (unknown cause) who present to [facility] ED for weakness that started 2 days prior to admission requiring emergent dialysis found to have non working AVF now with RIJ vascular catheter and course complicated by orthostatic hypotension.

## 2021-01-22 NOTE — PROGRESS NOTE ADULT - PROBLEM SELECTOR PLAN 2
- Hold Nifedepine  - Switched to Lopressor at low dose (from Coreg) to remove alpha blockade  - Will start midodrine 5 mg BID with hold parameters  - Fall precautions - Hold Nifedepine  - Switched to Lopressor at low dose (from Coreg) to remove alpha blockade  - Midodrine 5 mg BID (1/21) with hold parameters  - Fall precautions

## 2021-01-22 NOTE — PROGRESS NOTE ADULT - ASSESSMENT
60M PMHx ESRD on HD (initiated in [facility] via LUE AVF), CAD s/p stent (on plavix/asa), DM2, HTN present to ED for weakness after not having hemodialysis over 3 days.  Nephrology consulted for ESRD/HD management.       # ESRD  Pt. with ESRD (presumed diabetic nephropathy) on HD in [facility] (HD initiated 3/26/2020), minimal urine.    - plan for maintenance HD today with minimal UF due to orthostatic hypotension. UF: 0.5L  - Vascular evaluation for right AVF. Pt s/p tunneled HD catheter on 1/19/21  - monitor electrolyte, strict I/O, daily weight, fluid restriction 1L, renally dose medication per HD, renal diet (low K/phos)  - will need / to setup outpatient HD center prior to discharge    # Hyperkalemia- resolved  Pt with hyperkalemia in the setting of missing dialysis  - plan for HD as outline above, monitor electrolytes, low potassium diet    # Hypertensive urgency- resolved now with orthostatic hypotension  On admission patient's triage /121 likely 2/2 missing HD  - Pt started on Midodrine 5mg BID  - monitor BP, low salt diet    # Anemia  Pt with hx anemia likely multifactorial including ACD in the setting of ESRD. Hgb level below target at 7.9 today  - Will increase epogen 6000U TIW on HD days   - Iron stores repleted    # Renal bone disease  Pt with hyperphosphatemia in the setting of ESRD.   - continue phos binder phos-low 667mg TID w/ meals,    - Low phosphorus diet advised. Monitor serum phosphorus    If you have any questions, please feel free to contact me  Renetta Veronica  Nephrology Fellow  Pager: [phone] / [phone]  (After 5pm or on weekends please page the on-call fellow)   60M PMHx ESRD on HD (initiated in [facility] via LUE AVF), CAD s/p stent (on plavix/asa), DM2, HTN present to ED for weakness after not having hemodialysis over 3 days.  Nephrology consulted for ESRD/HD management.     # ESRD  Pt. with ESRD (presumed diabetic nephropathy) on HD in [facility] (HD initiated 3/26/2020), minimal urine.    - plan for maintenance HD today with minimal UF due to orthostatic hypotension. UF: 0.5L  - Vascular evaluation for right AVF. Pt s/p tunneled HD catheter on 1/19/21  - monitor electrolyte, strict I/O, daily weight, fluid restriction 1L, renally dose medication per HD, renal diet (low K/phos)  - Check Vit B12, Serum immunofixation/electrophoresis,  Free light chains  - will need / to setup outpatient HD center prior to discharge    # Hyperkalemia- resolved  Pt with hyperkalemia in the setting of missing dialysis  - plan for HD as outline above, monitor electrolytes, low potassium diet    # Hypertensive urgency- resolved now with orthostatic hypotension  On admission patient's triage /121 likely 2/2 missing HD  - Pt started on Midodrine 5mg BID  - monitor BP, low salt diet    # Anemia  Pt with hx anemia likely multifactorial including ACD in the setting of ESRD. Hgb level below target at 7.9 today  - Will increase epogen 6000U TIW on HD days   - Iron stores repleted    # Renal bone disease  Pt with hyperphosphatemia in the setting of ESRD.   - continue phos binder phos-low 667mg TID w/ meals,    - Low phosphorus diet advised. Monitor serum phosphorus    If you have any questions, please feel free to contact me  Renetta Veronica  Nephrology Fellow  Pager: [phone] / [phone]  (After 5pm or on weekends please page the on-call fellow)   60M PMHx ESRD on HD (initiated in [facility] via LUE AVF), CAD s/p stent (on plavix/asa), DM2, HTN present to ED for weakness after not having hemodialysis over 3 days.  Nephrology consulted for ESRD/HD management.     # ESRD  Pt. with ESRD (presumed diabetic nephropathy) on HD in [facility] (HD initiated 3/26/2020), minimal urine.    - plan for maintenance HD today with minimal UF due to orthostatic hypotension. UF: 0.5L  - Vascular evaluation for right AVF. Pt s/p tunneled HD catheter on 1/19/21  - monitor electrolyte, strict I/O, daily weight, fluid restriction 1L, renally dose medication per HD, renal diet (low K/phos)  - Check Vit B12, RPR, Serum immunofixation/electrophoresis,  Immunoglobulin Free light chains to r/o for other causes of autonomic dysfunction  -Compression stockings when walking  - will need / to setup outpatient HD center prior to discharge    # Hyperkalemia- resolved  Pt with hyperkalemia in the setting of missing dialysis  - plan for HD as outline above, monitor electrolytes, low potassium diet    # Hypertensive urgency- resolved now with orthostatic hypotension  On admission patient's triage /121 likely 2/2 missing HD  - Pt started on Midodrine 5mg BID  - monitor BP, low salt diet  -BP meds being adjusted    # Anemia  Pt with hx anemia likely multifactorial including ACD in the setting of ESRD. Hgb level below target at 7.9 today  - Will increase epogen 6000U TIW on HD days   - Iron stores replete     # Renal bone disease  Pt with hyperphosphatemia in the setting of ESRD.   - continue phos binder phos-low 667mg TID w/ meals,    - Low phosphorus diet advised. Monitor serum phosphorus    If you have any questions, please feel free to contact me  Renetta Veronica  Nephrology Fellow  Pager: [phone] / [phone]  (After 5pm or on weekends please page the on-call fellow)

## 2021-01-22 NOTE — PROGRESS NOTE ADULT - PROBLEM SELECTOR PLAN 1
Pt. with ESRD (presumed diabetic nephropathy) on HD in [facility] (HD initiated 3/26/2020), minimal urine. Had HD in [facility] was on 1/14/20 via LUE AVF.   - Dialysis per nephrology inpatient. Will need establishment of dialysis as outpatient here. SW assisting  - f/u renal duplex arteries/vein (son report told vessel ds in [facility])   - phosplo 667 TID   - monitor electrolyte, strict I/O, daily weight, fluid restriction 1L, renally dose medication per HD, renal diet (low K/phos)   - Vascular consult for non-functioning AVF  - social work consult placed to setup outpatient HD center Pt. with ESRD (presumed diabetic nephropathy) on HD in [facility] (HD initiated 3/26/2020), minimal urine. Had HD in [facility] was on 1/14/20 via LUE AVF.   - Dialysis per nephrology inpatient.  - SW close to finalizing dialysis chair outpatient. COVID swab 72 hours prior to discharge.  - f/u renal duplex arteries/vein (son report told vessel ds in [facility]) - notable only for renal parenchymal disease  - phosplo 667 TID   - monitor electrolyte, strict I/O, daily weight, fluid restriction 1L, renally dose medication per HD, renal diet (low K/phos)   - Vascular consult for non-functioning AVF - deciding between inpatient and outpatient intervention

## 2021-01-22 NOTE — CHART NOTE - NSCHARTNOTEFT_GEN_A_CORE
Nutrition Follow Up Note  Patient seen for: initial malnutrition follow up    Chart reviewed, events noted. Pt with non-functioning AVF, vascular following. Course c/b orthostatic hypotension.  Per chart, pt is "60M PMHx ESRD on HD (initiated on 3/2020 in [facility] via LUE AVF), CAD s/p stent (06/2020, on plavix/asa), DM2, HTN, HLD, anemia (unknown cause) who present to [facility] ED for weakness that started 2 days ago requiring emergent dialysis." SW following.     Source: pt, electronic medical record     Diet : Renal Restrictions + 2 Nepro daily    Patient reports: no acute GI distress, last BM reported yesterday 1/21 (non-bloody)     PO intake : good, pt endorses completing >75% of meals and drinking 2 nepro daily. Pt amenable to nutrition education at this time: discussed renal restrictions including low sodium intake, monitoring phosphorus and potassium content of foods and limiting/avoiding high potassium/high phosphorus foods, suggested alternative to common high potassium/high phosphorus foods, discussed increased protein needs and role of oral nutrition supplementation as well as fluid restriction. Also discussed avoiding concentrated sweets and appropriate carbohydrate intake- pt states he has been diagnosed with T2DM and not pre-diabetes. Pt able to teach back points. He accepts written materials, which he plans to share with his wife who does the cooking at home.     Source for PO intake: pt    Daily Weight in lbs: 118.6 (01-20 post HD)  119.2 (01-18 post HD)  105.8 (dosing wt)    Pertinent Medications: MEDICATIONS  (STANDING):  aspirin enteric coated 81 milliGRAM(s) Oral daily  calcium acetate 667 milliGRAM(s) Oral three times a day with meals  chlorhexidine 4% Liquid 1 Application(s) Topical <User Schedule>  clopidogrel Tablet 75 milliGRAM(s) Oral daily  dextrose 40% Gel 15 Gram(s) Oral once  dextrose 5%. 1000 milliLiter(s) (50 mL/Hr) IV Continuous <Continuous>  dextrose 5%. 1000 milliLiter(s) (100 mL/Hr) IV Continuous <Continuous>  dextrose 50% Injectable 25 Gram(s) IV Push once  dextrose 50% Injectable 12.5 Gram(s) IV Push once  dextrose 50% Injectable 25 Gram(s) IV Push once  epoetin junior-epbx (RETACRIT) Injectable 4000 Unit(s) IV Push <User Schedule>  glucagon  Injectable 1 milliGRAM(s) IntraMuscular once  influenza   Vaccine 0.5 milliLiter(s) IntraMuscular once  insulin glargine Injectable (LANTUS) 3 Unit(s) SubCutaneous at bedtime  insulin lispro (ADMELOG) corrective regimen sliding scale   SubCutaneous three times a day before meals  insulin lispro (ADMELOG) corrective regimen sliding scale   SubCutaneous at bedtime  metoprolol tartrate 25 milliGRAM(s) Oral two times a day  midodrine. 5 milliGRAM(s) Oral <User Schedule>  Nephro-gabi 1 Tablet(s) Oral daily    MEDICATIONS  (PRN):  ondansetron Injectable 4 milliGRAM(s) IV Push every 6 hours PRN Nausea  sodium chloride 0.9% lock flush 10 milliLiter(s) IV Push every 1 hour PRN Pre/post blood products, medications, blood draw, and to maintain line patency    Pertinent Labs: 01-22 @ 06:50: Na 137, BUN 73<H>, Cr 9.20<H>, <H>, K+ 4.1, Phos 5.4<H>, Mg 2.1  01-17: HbA1c 6.2%.     Finger Sticks:  POCT Blood Glucose.: 122 mg/dL (01-22 @ 07:49)  POCT Blood Glucose.: 232 mg/dL (01-21 @ 21:26)  POCT Blood Glucose.: 216 mg/dL (01-21 @ 16:25)  POCT Blood Glucose.: 183 mg/dL (01-21 @ 11:35)    Skin per nursing documentation: no pressure injuries noted  Edema: none noted    Estimated Needs:   [x] no change since previous assessment: based on  lbs/75.2 kG  3810-7179 kcal/day (25-30 kcal/kG)  75-90 g pro/day (1-1.2 g pro/kG)  [ ] recalculated:     Previous Nutrition Diagnoses:  1. Underweight Malnutrition, severe, chronic -> ongoing, being addressed with good po intake at this time and oral nutrition supplementation. Nutrition care plan achieved.  2. Increased Nutrient Needs (protein-energy)    New Nutrition Diagnosis: Food and Nutrition Related Knowledge Deficit  Related to: lack of prior exposure to nutrition-related information   As evidenced by: no previous nutrition education for renal restrictions reported, pt on HD for past ~10 months PTA    Interventions: nutrition education provided    Recommend  1) Continue Renal Restrictions as indicated, can continue to hold off Consistent Carbohydrate restriction as able to promote good po intake. Monitor POCT.  2) Continue to provide 2 nepro daily.  3) Continue renal multivitamin if no contraindications.  4) Provide/review nutrition education as indicated.    Monitoring and Evaluation:     Continue to monitor Nutritional intake, Tolerance to diet prescription, weights, labs, skin integrity    RD remains available upon request and will follow up per protocol. Quiana Pettit RD, CDN Pager: [phone]

## 2021-01-22 NOTE — PROGRESS NOTE ADULT - PROBLEM SELECTOR PLAN 8
Dispo - Pending resolution of orthostatic hypotension. Possibly intervention on non-functioning AVF as inpatient.

## 2021-01-22 NOTE — PROGRESS NOTE ADULT - SUBJECTIVE AND OBJECTIVE BOX
EP ATTENDING COVERING CARDIOLOGY FOR DR NAVARRO    tele: NSR, no events    he denies palpitations, syncope, nor angina, ROS otherwise -      Review of Systems:   Constitutional: [ ] fevers, [ ] chills.   Skin: [ ] dry skin. [ ] rashes.  Psychiatric: [ ] depression, [ ] anxiety.   Gastrointestinal: [ ] BRBPR, [ ] melena.   Neurological: [ ] confusion. [ ] seizures. [ ] shuffling gait.   Ears,Nose,Mouth and Throat: [ ] ear pain [ ] sore throat.   Eyes: [ ] diplopia.   Respiratory: [ ] hemoptysis. [ ] shortness of breath  Cardiovascular: See HPI above  Hematologic/Lymphatic: [ ] anemia. [ ] painful nodes. [ ] prolonged bleeding.   Genitourinary: [ ] hematuria. [ ] flank pain.   Endocrine: [ ] significant change in weight. [ ] intolerance to heat and cold.     Review of systems [ x] otherwise negative, [ ] otherwise unable to obtain    FH: no family history of sudden cardiac death in first degree relatives    SH: [ ] tobacco, [ ] alcohol, [ ] drugs    aspirin enteric coated 81 milliGRAM(s) Oral daily  calcium acetate 667 milliGRAM(s) Oral three times a day with meals  chlorhexidine 4% Liquid 1 Application(s) Topical <User Schedule>  clopidogrel Tablet 75 milliGRAM(s) Oral daily  dextrose 40% Gel 15 Gram(s) Oral once  dextrose 5%. 1000 milliLiter(s) IV Continuous <Continuous>  dextrose 5%. 1000 milliLiter(s) IV Continuous <Continuous>  dextrose 50% Injectable 25 Gram(s) IV Push once  dextrose 50% Injectable 12.5 Gram(s) IV Push once  dextrose 50% Injectable 25 Gram(s) IV Push once  epoetin junior-epbx (RETACRIT) Injectable 6000 Unit(s) IV Push <User Schedule>  glucagon  Injectable 1 milliGRAM(s) IntraMuscular once  influenza   Vaccine 0.5 milliLiter(s) IntraMuscular once  insulin glargine Injectable (LANTUS) 3 Unit(s) SubCutaneous at bedtime  insulin lispro (ADMELOG) corrective regimen sliding scale   SubCutaneous three times a day before meals  insulin lispro (ADMELOG) corrective regimen sliding scale   SubCutaneous at bedtime  metoprolol tartrate 25 milliGRAM(s) Oral two times a day  midodrine. 5 milliGRAM(s) Oral <User Schedule>  Nephro-gabi 1 Tablet(s) Oral daily  ondansetron Injectable 4 milliGRAM(s) IV Push every 6 hours PRN  sodium chloride 0.9% lock flush 10 milliLiter(s) IV Push every 1 hour PRN                            7.9    8.22  )-----------( 193      ( 22 Jan 2021 06:52 )             26.1       01-22    137  |  97  |  73<H>  ----------------------------<  126<H>  4.1   |  22  |  9.20<H>    Ca    7.6<L>      22 Jan 2021 06:50  Phos  5.4     01-22  Mg     2.1     01-22              T(C): 37.1 (01-22-21 @ 11:21), Max: 37.1 (01-22-21 @ 11:21)  HR: 66 (01-22-21 @ 11:21) (59 - 66)  BP: 165/76 (01-22-21 @ 11:21) (110/66 - 179/86)  RR: 18 (01-22-21 @ 11:21) (18 - 18)  SpO2: 99% (01-22-21 @ 11:21) (96% - 99%)  Wt(kg): --    I&O's Summary    21 Jan 2021 07:01  -  22 Jan 2021 07:00  --------------------------------------------------------  IN: 815 mL / OUT: 0 mL / NET: 815 mL    22 Jan 2021 07:01  -  22 Jan 2021 13:17  --------------------------------------------------------  IN: 480 mL / OUT: 0 mL / NET: 480 mL        General: Well nourished in no acute distress. Alert and Oriented * 3.   Head: Normocephalic and atraumatic.   Neck: No JVD. No bruits. Supple. Does not appear to be enlarged.   Cardiovascular: + S1,S2 ; RRR Soft systolic murmur at the left lower sternal border. No rubs noted.    Lungs: CTA b/l. No rhonchi, rales or wheezes.   Abdomen: + BS, soft. Non tender. Non distended. No rebound. No guarding.   Extremities: No clubbing/cyanosis/edema.   Neurologic: Moves all four extremities. Full range of motion.   Skin: Warm and moist. The patient's skin has normal elasticity and good skin turgor.   Psychiatric: Appropriate mood and affect.  Musculoskeletal: Normal range of motion, normal strength      echo: mild LV dysfunction with IW motion abnormality       A/P) 61 y/o male PMH ESRD on HD, CAD s/p PCI, HTN, DM and hyperlipidemia a/w hypertensive urgency. BP now better controlled    -continue asa-plavix-metoprolol for known CAD  -would also considering starting a statin as an outpatient given normal LFTs  -avoiding ACEi given labile orthostatics  -no further inpatient cardiac workup expected  -will f/u until Dr Navarro returns      Porter Batista M.D., [facility] [[email or web address]]  Cardiac Electrophysiology  [facility]  [address] [place] [address], [address] [place] [address]  [email or web address]    office [phone]  pager [phone]

## 2021-01-23 LAB
ANION GAP SERPL CALC-SCNC: 14 MMOL/L — SIGNIFICANT CHANGE UP (ref 5–17)
BUN SERPL-MCNC: 42 MG/DL — HIGH (ref 7–23)
CALCIUM SERPL-MCNC: 7.5 MG/DL — LOW (ref 8.4–10.5)
CHLORIDE SERPL-SCNC: 98 MMOL/L — SIGNIFICANT CHANGE UP (ref 96–108)
CO2 SERPL-SCNC: 24 MMOL/L — SIGNIFICANT CHANGE UP (ref 22–31)
CREAT SERPL-MCNC: 6.07 MG/DL — HIGH (ref 0.5–1.3)
GLUCOSE BLDC GLUCOMTR-MCNC: 137 MG/DL — HIGH (ref 70–99)
GLUCOSE BLDC GLUCOMTR-MCNC: 171 MG/DL — HIGH (ref 70–99)
GLUCOSE BLDC GLUCOMTR-MCNC: 255 MG/DL — HIGH (ref 70–99)
GLUCOSE BLDC GLUCOMTR-MCNC: 259 MG/DL — HIGH (ref 70–99)
GLUCOSE SERPL-MCNC: 230 MG/DL — HIGH (ref 70–99)
HCT VFR BLD CALC: 25.5 % — LOW (ref 39–50)
HGB BLD-MCNC: 7.8 G/DL — LOW (ref 13–17)
KAPPA LC SER QL IFE: 21.39 MG/DL — HIGH (ref 0.33–1.94)
KAPPA/LAMBDA FREE LIGHT CHAIN RATIO, SERUM: 1.06 RATIO — SIGNIFICANT CHANGE UP (ref 0.26–1.65)
LAMBDA LC SER QL IFE: 20.26 MG/DL — HIGH (ref 0.57–2.63)
MAGNESIUM SERPL-MCNC: 1.9 MG/DL — SIGNIFICANT CHANGE UP (ref 1.6–2.6)
MCHC RBC-ENTMCNC: 27.7 PG — SIGNIFICANT CHANGE UP (ref 27–34)
MCHC RBC-ENTMCNC: 30.6 GM/DL — LOW (ref 32–36)
MCV RBC AUTO: 90.4 FL — SIGNIFICANT CHANGE UP (ref 80–100)
NRBC # BLD: 0 /100 WBCS — SIGNIFICANT CHANGE UP (ref 0–0)
PHOSPHATE SERPL-MCNC: 3 MG/DL — SIGNIFICANT CHANGE UP (ref 2.5–4.5)
PLATELET # BLD AUTO: 196 K/UL — SIGNIFICANT CHANGE UP (ref 150–400)
POTASSIUM SERPL-MCNC: 3.6 MMOL/L — SIGNIFICANT CHANGE UP (ref 3.5–5.3)
POTASSIUM SERPL-SCNC: 3.6 MMOL/L — SIGNIFICANT CHANGE UP (ref 3.5–5.3)
PROT SERPL-MCNC: 6.5 G/DL — SIGNIFICANT CHANGE UP (ref 6–8.3)
PROT SERPL-MCNC: 6.5 G/DL — SIGNIFICANT CHANGE UP (ref 6–8.3)
RBC # BLD: 2.82 M/UL — LOW (ref 4.2–5.8)
RBC # FLD: 14.7 % — HIGH (ref 10.3–14.5)
SODIUM SERPL-SCNC: 136 MMOL/L — SIGNIFICANT CHANGE UP (ref 135–145)
WBC # BLD: 7.25 K/UL — SIGNIFICANT CHANGE UP (ref 3.8–10.5)
WBC # FLD AUTO: 7.25 K/UL — SIGNIFICANT CHANGE UP (ref 3.8–10.5)

## 2021-01-23 PROCEDURE — 99233 SBSQ HOSP IP/OBS HIGH 50: CPT | Mod: GC

## 2021-01-23 RX ADMIN — Medication 1 TABLET(S): at 11:38

## 2021-01-23 RX ADMIN — CHLORHEXIDINE GLUCONATE 1 APPLICATION(S): 213 SOLUTION TOPICAL at 05:12

## 2021-01-23 RX ADMIN — INSULIN GLARGINE 3 UNIT(S): 100 INJECTION, SOLUTION SUBCUTANEOUS at 21:43

## 2021-01-23 RX ADMIN — Medication 3: at 16:55

## 2021-01-23 RX ADMIN — Medication 25 MILLIGRAM(S): at 05:11

## 2021-01-23 RX ADMIN — Medication 667 MILLIGRAM(S): at 09:07

## 2021-01-23 RX ADMIN — Medication 667 MILLIGRAM(S): at 11:38

## 2021-01-23 RX ADMIN — Medication 81 MILLIGRAM(S): at 11:38

## 2021-01-23 RX ADMIN — MIDODRINE HYDROCHLORIDE 5 MILLIGRAM(S): 2.5 TABLET ORAL at 16:55

## 2021-01-23 RX ADMIN — Medication 1: at 21:43

## 2021-01-23 RX ADMIN — Medication 25 MILLIGRAM(S): at 16:55

## 2021-01-23 RX ADMIN — CLOPIDOGREL BISULFATE 75 MILLIGRAM(S): 75 TABLET, FILM COATED ORAL at 11:39

## 2021-01-23 RX ADMIN — Medication 667 MILLIGRAM(S): at 16:55

## 2021-01-23 RX ADMIN — MIDODRINE HYDROCHLORIDE 5 MILLIGRAM(S): 2.5 TABLET ORAL at 05:11

## 2021-01-23 RX ADMIN — Medication 1: at 11:38

## 2021-01-23 NOTE — PROGRESS NOTE ADULT - PROBLEM SELECTOR PLAN 1
Pt. with ESRD (presumed diabetic nephropathy) on HD in [facility] (HD initiated 3/26/2020), minimal urine. Had HD in [facility] was on 1/14/20 via LUE AVF.   - Dialysis per nephrology inpatient.  - SW close to finalizing dialysis chair outpatient. COVID swab 72 hours prior to discharge.  - f/u renal duplex arteries/vein (son report told vessel ds in [facility]) - notable only for renal parenchymal disease  - phosplo 667 TID   - monitor electrolyte, strict I/O, daily weight, fluid restriction 1L, renally dose medication per HD, renal diet (low K/phos)   - Vascular consult for non-functioning AVF - deciding between inpatient and outpatient intervention

## 2021-01-23 NOTE — PROGRESS NOTE ADULT - SUBJECTIVE AND OBJECTIVE BOX
Nagi Otero MD  Internal Medicine Resident  [phone]    PATIENT:  UNA CASIANO  [id number]    CHIEF COMPLAINT:  Patient is a 60y old  Male who presents with a chief complaint of Weakness (2021 13:16)      INTERVAL HISTORY/OVERNIGHT EVENTS:      REVIEW OF SYSTEMS:    Constitutional:     [ ] negative [ ] fevers [ ] chills [ ] weight loss [ ] weight gain  HEENT:                  [ ] negative [ ] dry eyes [ ] eye irritation [ ] postnasal drip [ ] nasal congestion  CV:                         [ ] negative  [ ] chest pain [ ] orthopnea [ ] palpitations [ ] murmur  Resp:                     [ ] negative [ ] cough [ ] shortness of breath [ ] dyspnea [ ] wheezing [ ] sputum [ ] hemoptysis  GI:                          [ ] negative [ ] nausea [ ] vomiting [ ] diarrhea [ ] constipation [ ] abd pain [ ] dysphagia   :                        [ ] negative [ ] dysuria [ ] nocturia [ ] hematuria [ ] increased urinary frequency  Musculoskeletal: [ ] negative [ ] back pain [ ] myalgias [ ] arthralgias [ ] fracture  Skin:                       [ ] negative [ ] rash [ ] itch  Neurological:        [ ] negative [ ] headache [ ] dizziness [ ] syncope [ ] weakness [ ] numbness  Psychiatric:           [ ] negative [ ] anxiety [ ] depression  Endocrine:            [ ] negative [ ] diabetes [ ] thyroid problem  Heme/Lymph:      [ ] negative [ ] anemia [ ] bleeding problem  Allergic/Immune: [ ] negative [ ] itchy eyes [ ] nasal discharge [ ] hives [ ] angioedema    [ ] All other systems negative  [ ] Unable to assess ROS because ________.    MEDICATIONS:  MEDICATIONS  (STANDING):  aspirin enteric coated 81 milliGRAM(s) Oral daily  calcium acetate 667 milliGRAM(s) Oral three times a day with meals  chlorhexidine 4% Liquid 1 Application(s) Topical <User Schedule>  clopidogrel Tablet 75 milliGRAM(s) Oral daily  dextrose 40% Gel 15 Gram(s) Oral once  dextrose 5%. 1000 milliLiter(s) (50 mL/Hr) IV Continuous <Continuous>  dextrose 5%. 1000 milliLiter(s) (100 mL/Hr) IV Continuous <Continuous>  dextrose 50% Injectable 25 Gram(s) IV Push once  dextrose 50% Injectable 12.5 Gram(s) IV Push once  dextrose 50% Injectable 25 Gram(s) IV Push once  epoetin junior-epbx (RETACRIT) Injectable 6000 Unit(s) IV Push <User Schedule>  glucagon  Injectable 1 milliGRAM(s) IntraMuscular once  influenza   Vaccine 0.5 milliLiter(s) IntraMuscular once  insulin glargine Injectable (LANTUS) 3 Unit(s) SubCutaneous at bedtime  insulin lispro (ADMELOG) corrective regimen sliding scale   SubCutaneous three times a day before meals  insulin lispro (ADMELOG) corrective regimen sliding scale   SubCutaneous at bedtime  metoprolol tartrate 25 milliGRAM(s) Oral two times a day  midodrine. 5 milliGRAM(s) Oral <User Schedule>  Nephro-gabi 1 Tablet(s) Oral daily    MEDICATIONS  (PRN):  ondansetron Injectable 4 milliGRAM(s) IV Push every 6 hours PRN Nausea  sodium chloride 0.9% lock flush 10 milliLiter(s) IV Push every 1 hour PRN Pre/post blood products, medications, blood draw, and to maintain line patency      ALLERGIES:  Allergies    No Known Allergies    Intolerances        OBJECTIVE:  ICU Vital Signs Last 24 Hrs  T(C): 37.1 (2021 05:08), Max: 37.1 (2021 11:21)  T(F): 98.8 (2021 05:08), Max: 98.8 (2021 11:21)  HR: 60 (2021 05:08) (57 - 66)  BP: 136/63 (2021 05:08) (108/59 - 168/70)  BP(mean): --  ABP: --  ABP(mean): --  RR: 18 (2021 05:08) (18 - 18)  SpO2: 100% (2021 05:08) (97% - 100%)      Adult Advanced Hemodynamics Last 24 Hrs  CVP(mm Hg): --  CVP(cm H2O): --  CO: --  CI: --  PA: --  PA(mean): --  PCWP: --  SVR: --  SVRI: --  PVR: --  PVRI: --  CAPILLARY BLOOD GLUCOSE      POCT Blood Glucose.: 137 mg/dL (2021 07:44)  POCT Blood Glucose.: 246 mg/dL (2021 22:12)  POCT Blood Glucose.: 208 mg/dL (2021 17:16)  POCT Blood Glucose.: 203 mg/dL (2021 11:43)    CAPILLARY BLOOD GLUCOSE      POCT Blood Glucose.: 137 mg/dL (2021 07:44)    I&O's Summary    2021 07:01  -  2021 07:00  --------------------------------------------------------  IN: 1495 mL / OUT: 1800 mL / NET: -305 mL      Daily     Daily Weight in k.3 (2021 16:25)    PHYSICAL EXAMINATION:  General: WN/WD NAD  HEENT: PERRLA, EOMI, moist mucous membranes  Neurology: A&Ox3, nonfocal, ASHER x 4  Respiratory: CTA B/L, normal respiratory effort, no wheezes, crackles, rales  CV: RRR, S1S2, no murmurs, rubs or gallops  Abdominal: Soft, NT, ND +BS, Last BM  Extremities: No edema, + peripheral pulses  Incisions:   Tubes:    LABS:                          7.8    7.25  )-----------( 196      ( 2021 04:57 )             25.5         136  |  98  |  42<H>  ----------------------------<  230<H>  3.6   |  24  |  6.07<H>    Ca    7.5<L>      2021 04:56  Phos  3.0       Mg     1.9         TPro  6.5  /  Alb  x   /  TBili  x   /  DBili  x   /  AST  x   /  ALT  x   /  AlkPhos  x       LIVER FUNCTIONS - ( 2021 20:37 )  Alb: x     / Pro: 6.5 g/dL / ALK PHOS: x     / ALT: x     / AST: x     / GGT: x                       TELEMETRY:     EKG:     IMAGING:       Nagi Otero MD  Internal Medicine Resident  [phone]    PATIENT:  UNA CASIANO  [id number]    CHIEF COMPLAINT:  Patient is a 60y old  Male who presents with a chief complaint of Weakness (2021 13:16)      INTERVAL HISTORY/OVERNIGHT EVENTS:  - no acute events  - midodrine held 2/2 hypertension  - overall feels well - pending discharge planning issues and outpatient HD    MEDICATIONS:  MEDICATIONS  (STANDING):  aspirin enteric coated 81 milliGRAM(s) Oral daily  calcium acetate 667 milliGRAM(s) Oral three times a day with meals  chlorhexidine 4% Liquid 1 Application(s) Topical <User Schedule>  clopidogrel Tablet 75 milliGRAM(s) Oral daily  dextrose 40% Gel 15 Gram(s) Oral once  dextrose 5%. 1000 milliLiter(s) (50 mL/Hr) IV Continuous <Continuous>  dextrose 5%. 1000 milliLiter(s) (100 mL/Hr) IV Continuous <Continuous>  dextrose 50% Injectable 25 Gram(s) IV Push once  dextrose 50% Injectable 12.5 Gram(s) IV Push once  dextrose 50% Injectable 25 Gram(s) IV Push once  epoetin junior-epbx (RETACRIT) Injectable 6000 Unit(s) IV Push <User Schedule>  glucagon  Injectable 1 milliGRAM(s) IntraMuscular once  influenza   Vaccine 0.5 milliLiter(s) IntraMuscular once  insulin glargine Injectable (LANTUS) 3 Unit(s) SubCutaneous at bedtime  insulin lispro (ADMELOG) corrective regimen sliding scale   SubCutaneous three times a day before meals  insulin lispro (ADMELOG) corrective regimen sliding scale   SubCutaneous at bedtime  metoprolol tartrate 25 milliGRAM(s) Oral two times a day  midodrine. 5 milliGRAM(s) Oral <User Schedule>  Nephro-gabi 1 Tablet(s) Oral daily    MEDICATIONS  (PRN):  ondansetron Injectable 4 milliGRAM(s) IV Push every 6 hours PRN Nausea  sodium chloride 0.9% lock flush 10 milliLiter(s) IV Push every 1 hour PRN Pre/post blood products, medications, blood draw, and to maintain line patency      ALLERGIES:  Allergies    No Known Allergies    Intolerances    OBJECTIVE:  T(C): 37.1 (2021 05:08), Max: 37.1 (2021 11:21)  T(F): 98.8 (2021 05:08), Max: 98.8 (2021 11:21)  HR: 60 (2021 05:08) (57 - 66)  BP: 136/63 (2021 05:08) (108/59 - 168/70)  RR: 18 (2021 05:08) (18 - 18)  SpO2: 100% (2021 05:08) (97% - 100%)    POCT Blood Glucose.: 137 mg/dL (2021 07:44)  POCT Blood Glucose.: 246 mg/dL (2021 22:12)  POCT Blood Glucose.: 208 mg/dL (2021 17:16)  POCT Blood Glucose.: 203 mg/dL (2021 11:43)  POCT Blood Glucose.: 137 mg/dL (2021 07:44)    I&O's Summary    2021 07:01  -  2021 07:00  --------------------------------------------------------  IN: 1495 mL / OUT: 1800 mL / NET: -305 mL      Daily     Daily Weight in k.3 (2021 16:25)    PHYSICAL EXAMINATION:  General: WN/WD NAD  HEENT: PERRLA, EOMI, moist mucous membranes  Neurology: A&Ox3, nonfocal, ASHER x 4  Respiratory: CTA B/L, normal respiratory effort, no wheezes, crackles, rales  CV: RRR, S1S2, no murmurs, rubs or gallops  Abdominal: Soft, NT, ND +BS, Last BM  Extremities: No edema, + peripheral pulses    LABS:                          7.8    7.25  )-----------( 196      ( 2021 04:57 )             25.5         136  |  98  |  42<H>  ----------------------------<  230<H>  3.6   |  24  |  6.07<H>    Ca    7.5<L>      2021 04:56  Phos  3.0       Mg     1.9         TPro  6.5  /  Alb  x   /  TBili  x   /  DBili  x   /  AST  x   /  ALT  x   /  AlkPhos  x       LIVER FUNCTIONS - ( 2021 20:37 )  Alb: x     / Pro: 6.5 g/dL / ALK PHOS: x     / ALT: x     / AST: x     / GGT: x

## 2021-01-23 NOTE — PROGRESS NOTE ADULT - ASSESSMENT
60M PMHx ESRD on HD (initiated on 3/2020 in [facility] via LUE AVF), CAD s/p stent (06/2020, on plavix/asa), DM2, HTN, HLD, anemia (unknown cause) who present to [facility] ED for weakness that started 2 days prior to admission requiring emergent dialysis found to have non working AVF now with RIJ vascular catheter and course complicated by orthostatic hypotension.

## 2021-01-23 NOTE — PROGRESS NOTE ADULT - PROBLEM SELECTOR PLAN 2
- Hold Nifedepine  - Switched to Lopressor at low dose (from Coreg) to remove alpha blockade  - Midodrine 5 mg BID (1/21) with hold parameters  - Fall precautions

## 2021-01-23 NOTE — PROGRESS NOTE ADULT - PROBLEM SELECTOR PLAN 3
Bed placed in low position, side rails up x 2, call light is within reach of patient or family, explanation of care provided to family and patient, alarms set and turned on for monitor, pulse ox and IV pump, plan of care: family to bedside, observe and reassure, position of comfort, respirations even and unlabored, patient offers no complaints at this time, awaiting additional orders, will continue to monitor.     S/p stent in June 2020  -c/w home aspirin, plavix

## 2021-01-23 NOTE — PROGRESS NOTE ADULT - SUBJECTIVE AND OBJECTIVE BOX
EP covering for Dr Navarro    Subjective: Patient seen and examined. No new events, resting comfortably.  No angina, orthopnea, PND or palpitations.    REVIEW OF SYSTEMS:  CONSTITUTIONAL:+ weakness, fevers or chills  EYES/ENT: No visual changes;  No vertigo or throat pain   NECK: No pain or stiffness  RESPIRATORY: No cough, wheezing, hemoptysis; No shortness of breath  CARDIOVASCULAR: No chest pain or palpitations  GASTROINTESTINAL: No abdominal or epigastric pain. No nausea, vomiting, or hematemesis; No diarrhea or constipation. No melena or hematochezia.  GENITOURINARY: No dysuria, frequency or hematuria  NEUROLOGICAL: No numbness or weakness  SKIN: No itching, burning, rashes, or lesions   All other review of systems is negative unless indicated above.    aspirin enteric coated 81 milliGRAM(s) Oral daily  calcium acetate 667 milliGRAM(s) Oral three times a day with meals  chlorhexidine 4% Liquid 1 Application(s) Topical <User Schedule>  clopidogrel Tablet 75 milliGRAM(s) Oral daily  dextrose 40% Gel 15 Gram(s) Oral once  dextrose 5%. 1000 milliLiter(s) IV Continuous <Continuous>  dextrose 5%. 1000 milliLiter(s) IV Continuous <Continuous>  dextrose 50% Injectable 25 Gram(s) IV Push once  dextrose 50% Injectable 12.5 Gram(s) IV Push once  dextrose 50% Injectable 25 Gram(s) IV Push once  epoetin junior-epbx (RETACRIT) Injectable 6000 Unit(s) IV Push <User Schedule>  glucagon  Injectable 1 milliGRAM(s) IntraMuscular once  influenza   Vaccine 0.5 milliLiter(s) IntraMuscular once  insulin glargine Injectable (LANTUS) 3 Unit(s) SubCutaneous at bedtime  insulin lispro (ADMELOG) corrective regimen sliding scale   SubCutaneous three times a day before meals  insulin lispro (ADMELOG) corrective regimen sliding scale   SubCutaneous at bedtime  metoprolol tartrate 25 milliGRAM(s) Oral two times a day  midodrine. 5 milliGRAM(s) Oral <User Schedule>  Nephro-gabi 1 Tablet(s) Oral daily  ondansetron Injectable 4 milliGRAM(s) IV Push every 6 hours PRN  sodium chloride 0.9% lock flush 10 milliLiter(s) IV Push every 1 hour PRN                            7.8    7.25  )-----------( 196      ( 23 Jan 2021 04:57 )             25.5       01-23    136  |  98  |  42<H>  ----------------------------<  230<H>  3.6   |  24  |  6.07<H>    Ca    7.5<L>      23 Jan 2021 04:56  Phos  3.0     01-23  Mg     1.9     01-23    TPro  6.5  /  Alb  x   /  TBili  x   /  DBili  x   /  AST  x   /  ALT  x   /  AlkPhos  x   01-22      T(C): 37.1 (01-23-21 @ 05:08), Max: 37.1 (01-22-21 @ 11:21)  HR: 60 (01-23-21 @ 05:08) (57 - 66)  BP: 136/63 (01-23-21 @ 05:08) (108/59 - 168/70)  RR: 18 (01-23-21 @ 05:08) (18 - 18)  SpO2: 100% (01-23-21 @ 05:08) (97% - 100%)  Wt(kg): --    I&O's Summary    22 Jan 2021 07:01  -  23 Jan 2021 07:00  --------------------------------------------------------  IN: 1495 mL / OUT: 1800 mL / NET: -305 mL      Appearance: NAD RIJ HD catheter 	  HEENT:   Normal oral mucosa, PERRL, EOMI	  Lymphatic: No lymphadenopathy , no edema  Cardiovascular: Normal S1 S2, No JVD, No murmurs , Peripheral pulses palpable 2+ bilaterally  Respiratory: Lungs clear to auscultation, normal effort 	  Gastrointestinal:  Soft, Non-tender, + BS	  Skin: No rashes, No ecchymoses, No cyanosis, warm to touch  Musculoskeletal: Normal range of motion, normal strength  Psychiatry:  Mood & affect appropriate  Ext: No edema    Tele - NSR.      A/P) 59 y/o male PMH ESRD on HD, CAD s/p PCI, HTN, DM and hyperlipidemia a/w hypertensive urgency. BP now better controlled    -continue asa-plavix-metoprolol for known CAD  -would also considering starting a statin as an outpatient given normal LFTs  -avoiding ACEi given labile orthostatics  -no further inpatient cardiac workup expected  -consider leg compression stockings, and physical therapy.  -will f/u until Dr Navarro returns    Ej Adler M.D.  Cardiac Electrophysiology  [phone]

## 2021-01-24 LAB
ALBUMIN SERPL ELPH-MCNC: 3.5 G/DL — SIGNIFICANT CHANGE UP (ref 3.3–5)
ALP SERPL-CCNC: 121 U/L — HIGH (ref 40–120)
ALT FLD-CCNC: 22 U/L — SIGNIFICANT CHANGE UP (ref 10–45)
ANION GAP SERPL CALC-SCNC: 19 MMOL/L — HIGH (ref 5–17)
AST SERPL-CCNC: 22 U/L — SIGNIFICANT CHANGE UP (ref 10–40)
BILIRUB DIRECT SERPL-MCNC: <0.1 MG/DL — SIGNIFICANT CHANGE UP (ref 0–0.2)
BILIRUB INDIRECT FLD-MCNC: >0.2 MG/DL — SIGNIFICANT CHANGE UP (ref 0.2–1)
BILIRUB SERPL-MCNC: 0.3 MG/DL — SIGNIFICANT CHANGE UP (ref 0.2–1.2)
BLD GP AB SCN SERPL QL: NEGATIVE — SIGNIFICANT CHANGE UP
BUN SERPL-MCNC: 85 MG/DL — HIGH (ref 7–23)
CALCIUM SERPL-MCNC: 7.8 MG/DL — LOW (ref 8.4–10.5)
CHLORIDE SERPL-SCNC: 97 MMOL/L — SIGNIFICANT CHANGE UP (ref 96–108)
CO2 SERPL-SCNC: 21 MMOL/L — LOW (ref 22–31)
CREAT SERPL-MCNC: 8.92 MG/DL — HIGH (ref 0.5–1.3)
GLUCOSE BLDC GLUCOMTR-MCNC: 140 MG/DL — HIGH (ref 70–99)
GLUCOSE BLDC GLUCOMTR-MCNC: 230 MG/DL — HIGH (ref 70–99)
GLUCOSE BLDC GLUCOMTR-MCNC: 235 MG/DL — HIGH (ref 70–99)
GLUCOSE BLDC GLUCOMTR-MCNC: 251 MG/DL — HIGH (ref 70–99)
GLUCOSE SERPL-MCNC: 128 MG/DL — HIGH (ref 70–99)
HCT VFR BLD CALC: 22.9 % — LOW (ref 39–50)
HCT VFR BLD CALC: 28.3 % — LOW (ref 39–50)
HGB BLD-MCNC: 7 G/DL — CRITICAL LOW (ref 13–17)
HGB BLD-MCNC: 8.9 G/DL — LOW (ref 13–17)
HIV 1+2 AB+HIV1 P24 AG SERPL QL IA: SIGNIFICANT CHANGE UP
MAGNESIUM SERPL-MCNC: 2.2 MG/DL — SIGNIFICANT CHANGE UP (ref 1.6–2.6)
MCHC RBC-ENTMCNC: 27.9 PG — SIGNIFICANT CHANGE UP (ref 27–34)
MCHC RBC-ENTMCNC: 28 PG — SIGNIFICANT CHANGE UP (ref 27–34)
MCHC RBC-ENTMCNC: 30.6 GM/DL — LOW (ref 32–36)
MCHC RBC-ENTMCNC: 31.4 GM/DL — LOW (ref 32–36)
MCV RBC AUTO: 89 FL — SIGNIFICANT CHANGE UP (ref 80–100)
MCV RBC AUTO: 91.2 FL — SIGNIFICANT CHANGE UP (ref 80–100)
NRBC # BLD: 0 /100 WBCS — SIGNIFICANT CHANGE UP (ref 0–0)
NRBC # BLD: 0 /100 WBCS — SIGNIFICANT CHANGE UP (ref 0–0)
PHOSPHATE SERPL-MCNC: 3.9 MG/DL — SIGNIFICANT CHANGE UP (ref 2.5–4.5)
PLATELET # BLD AUTO: 217 K/UL — SIGNIFICANT CHANGE UP (ref 150–400)
PLATELET # BLD AUTO: 235 K/UL — SIGNIFICANT CHANGE UP (ref 150–400)
POTASSIUM SERPL-MCNC: 3.7 MMOL/L — SIGNIFICANT CHANGE UP (ref 3.5–5.3)
POTASSIUM SERPL-SCNC: 3.7 MMOL/L — SIGNIFICANT CHANGE UP (ref 3.5–5.3)
PROT SERPL-MCNC: 7 G/DL — SIGNIFICANT CHANGE UP (ref 6–8.3)
RBC # BLD: 2.51 M/UL — LOW (ref 4.2–5.8)
RBC # BLD: 3.18 M/UL — LOW (ref 4.2–5.8)
RBC # FLD: 14.6 % — HIGH (ref 10.3–14.5)
RBC # FLD: 14.8 % — HIGH (ref 10.3–14.5)
RH IG SCN BLD-IMP: POSITIVE — SIGNIFICANT CHANGE UP
SODIUM SERPL-SCNC: 137 MMOL/L — SIGNIFICANT CHANGE UP (ref 135–145)
WBC # BLD: 6.88 K/UL — SIGNIFICANT CHANGE UP (ref 3.8–10.5)
WBC # BLD: 7.48 K/UL — SIGNIFICANT CHANGE UP (ref 3.8–10.5)
WBC # FLD AUTO: 6.88 K/UL — SIGNIFICANT CHANGE UP (ref 3.8–10.5)
WBC # FLD AUTO: 7.48 K/UL — SIGNIFICANT CHANGE UP (ref 3.8–10.5)

## 2021-01-24 PROCEDURE — 99233 SBSQ HOSP IP/OBS HIGH 50: CPT

## 2021-01-24 RX ORDER — CALCIUM GLUCONATE 100 MG/ML
1 VIAL (ML) INTRAVENOUS ONCE
Refills: 0 | Status: COMPLETED | OUTPATIENT
Start: 2021-01-24 | End: 2021-01-24

## 2021-01-24 RX ORDER — HEPARIN SODIUM 5000 [USP'U]/ML
5000 INJECTION INTRAVENOUS; SUBCUTANEOUS EVERY 8 HOURS
Refills: 0 | Status: DISCONTINUED | OUTPATIENT
Start: 2021-01-24 | End: 2021-01-27

## 2021-01-24 RX ORDER — DROXIDOPA 100 MG/1
100 CAPSULE ORAL THREE TIMES A DAY
Refills: 0 | Status: DISCONTINUED | OUTPATIENT
Start: 2021-01-24 | End: 2021-01-27

## 2021-01-24 RX ADMIN — Medication 25 MILLIGRAM(S): at 16:57

## 2021-01-24 RX ADMIN — Medication 100 GRAM(S): at 12:17

## 2021-01-24 RX ADMIN — HEPARIN SODIUM 5000 UNIT(S): 5000 INJECTION INTRAVENOUS; SUBCUTANEOUS at 22:17

## 2021-01-24 RX ADMIN — CLOPIDOGREL BISULFATE 75 MILLIGRAM(S): 75 TABLET, FILM COATED ORAL at 11:55

## 2021-01-24 RX ADMIN — Medication 1 TABLET(S): at 11:55

## 2021-01-24 RX ADMIN — INSULIN GLARGINE 3 UNIT(S): 100 INJECTION, SOLUTION SUBCUTANEOUS at 22:17

## 2021-01-24 RX ADMIN — Medication 667 MILLIGRAM(S): at 08:52

## 2021-01-24 RX ADMIN — CHLORHEXIDINE GLUCONATE 1 APPLICATION(S): 213 SOLUTION TOPICAL at 05:22

## 2021-01-24 RX ADMIN — Medication 81 MILLIGRAM(S): at 11:54

## 2021-01-24 RX ADMIN — Medication 2: at 11:55

## 2021-01-24 RX ADMIN — Medication 3: at 16:56

## 2021-01-24 RX ADMIN — Medication 667 MILLIGRAM(S): at 16:56

## 2021-01-24 RX ADMIN — MIDODRINE HYDROCHLORIDE 5 MILLIGRAM(S): 2.5 TABLET ORAL at 05:24

## 2021-01-24 RX ADMIN — Medication 25 MILLIGRAM(S): at 05:22

## 2021-01-24 RX ADMIN — Medication 667 MILLIGRAM(S): at 11:54

## 2021-01-24 RX ADMIN — DROXIDOPA 100 MILLIGRAM(S): 100 CAPSULE ORAL at 12:17

## 2021-01-24 NOTE — PROGRESS NOTE ADULT - PROBLEM SELECTOR PLAN 1
Pt. with ESRD (presumed diabetic nephropathy) on HD in [facility] (HD initiated 3/26/2020), minimal urine. Had HD in [facility] was on 1/14/20 via LUE AVF.   - Dialysis per nephrology inpatient.  - SW close to finalizing dialysis chair outpatient. COVID swab 72 hours prior to discharge.  - f/u renal duplex arteries/vein (son report told vessel ds in [facility]) - notable only for renal parenchymal disease  - phosplo 667 TID   - monitor electrolyte, strict I/O, daily weight, fluid restriction 1L, renally dose medication per HD, renal diet (low K/phos)   - Vascular consult for non-functioning AVF - deciding between inpatient and outpatient intervention

## 2021-01-24 NOTE — PROGRESS NOTE ADULT - SUBJECTIVE AND OBJECTIVE BOX
EP covering for Dr Navarro    Subjective: Patient seen and examined. No new events, resting comfortably.  No angina, orthopnea, PND or palpitations.  continues to dramatically fail orthostatic trials, nearly falling down yesterday.    REVIEW OF SYSTEMS:  CONSTITUTIONAL:+ weakness, fevers or chills  EYES/ENT: No visual changes;  No vertigo or throat pain   NECK: No pain or stiffness  RESPIRATORY: No cough, wheezing, hemoptysis; No shortness of breath  CARDIOVASCULAR: No chest pain or palpitations  GASTROINTESTINAL: No abdominal or epigastric pain. No nausea, vomiting, or hematemesis; No diarrhea or constipation. No melena or hematochezia.  GENITOURINARY: No dysuria, frequency or hematuria  NEUROLOGICAL: No numbness or weakness  SKIN: No itching, burning, rashes, or lesions   All other review of systems is negative unless indicated above.    aspirin enteric coated 81 milliGRAM(s) Oral daily  calcium acetate 667 milliGRAM(s) Oral three times a day with meals  chlorhexidine 4% Liquid 1 Application(s) Topical <User Schedule>  clopidogrel Tablet 75 milliGRAM(s) Oral daily  dextrose 40% Gel 15 Gram(s) Oral once  dextrose 5%. 1000 milliLiter(s) IV Continuous <Continuous>  dextrose 5%. 1000 milliLiter(s) IV Continuous <Continuous>  dextrose 50% Injectable 25 Gram(s) IV Push once  dextrose 50% Injectable 12.5 Gram(s) IV Push once  dextrose 50% Injectable 25 Gram(s) IV Push once  epoetin junior-epbx (RETACRIT) Injectable 6000 Unit(s) IV Push <User Schedule>  glucagon  Injectable 1 milliGRAM(s) IntraMuscular once  heparin   Injectable 5000 Unit(s) SubCutaneous every 8 hours  influenza   Vaccine 0.5 milliLiter(s) IntraMuscular once  insulin glargine Injectable (LANTUS) 3 Unit(s) SubCutaneous at bedtime  insulin lispro (ADMELOG) corrective regimen sliding scale   SubCutaneous three times a day before meals  insulin lispro (ADMELOG) corrective regimen sliding scale   SubCutaneous at bedtime  metoprolol tartrate 25 milliGRAM(s) Oral two times a day  midodrine. 5 milliGRAM(s) Oral <User Schedule>  Nephro-gabi 1 Tablet(s) Oral daily  ondansetron Injectable 4 milliGRAM(s) IV Push every 6 hours PRN  sodium chloride 0.9% lock flush 10 milliLiter(s) IV Push every 1 hour PRN                            7.0    7.48  )-----------( 235      ( 24 Jan 2021 06:43 )             22.9       01-24    137  |  97  |  85<H>  ----------------------------<  128<H>  3.7   |  21<L>  |  8.92<H>    Ca    7.8<L>      24 Jan 2021 06:43  Phos  3.9     01-24  Mg     2.2     01-24    TPro  6.5  /  Alb  x   /  TBili  x   /  DBili  x   /  AST  x   /  ALT  x   /  AlkPhos  x   01-22    T(C): 37 (01-24-21 @ 04:00), Max: 37 (01-24-21 @ 04:00)  HR: 65 (01-24-21 @ 05:16) (59 - 71)  BP: 148/72 (01-24-21 @ 05:16) (108/60 - 172/74)  RR: 18 (01-24-21 @ 04:00) (18 - 18)  SpO2: 100% (01-24-21 @ 04:00) (98% - 100%)  Wt(kg): --    I&O's Summary    23 Jan 2021 07:01  -  24 Jan 2021 07:00  --------------------------------------------------------  IN: 325 mL / OUT: 0 mL / NET: 325 mL    Appearance: NAD RIJ HD catheter 	  HEENT:   Normal oral mucosa, PERRL, EOMI	  Lymphatic: No lymphadenopathy , no edema  Cardiovascular: Normal S1 S2, No JVD, No murmurs , Peripheral pulses palpable 2+ bilaterally  Respiratory: Lungs clear to auscultation, normal effort 	  Gastrointestinal:  Soft, Non-tender, + BS	  Skin: No rashes, No ecchymoses, No cyanosis, warm to touch  Musculoskeletal: Normal range of motion, normal strength  Psychiatry:  Mood & affect appropriate  Ext: No edema    Tele - NSR.      A/P) 61 y/o male PMH ESRD on HD, CAD s/p PCI, HTN, DM and hyperlipidemia a/w hypertensive urgency. BP now better controlled    -continue asa-plavix-metoprolol for known CAD  -would also considering starting a statin as an outpatient given normal LFTs  -avoiding ACEi given labile orthostatics  -no further inpatient cardiac workup expected  -consider leg compression stockings, and physical therapy.  -consider trial of Droxidopa, 100mg TID (most patients wind up on ~600mg TID, provided they dont wind up with significant supine hypertension).  -at this stage he is not safe for home discharge alone since he cannot stand up safely.  -will f/u until Dr Navarro returns    Ej Adler M.D.  Cardiac Electrophysiology  [phone]

## 2021-01-24 NOTE — PROGRESS NOTE ADULT - PROBLEM SELECTOR PLAN 2
- Hold Nifedepine  - Switched to Lopressor at low dose (from Coreg) to remove alpha blockade  - Midodrine 5 mg BID (1/21) with hold parameters  - Fall precautions - Hold Nifedepine  - Switched to Lopressor at low dose (from Coreg) to remove alpha blockade  - Cardiology recs trial of Droxidopa - will start with 100 TID  - Will dc midodrine 5 mg BID started on 1/21  - Fall precautions

## 2021-01-24 NOTE — PROVIDER CONTACT NOTE (CRITICAL VALUE NOTIFICATION) - BACKGROUND
pt admitted with hyperkalmia , will have HD today
Pt admitted for hyperkalemia and increased weakness.

## 2021-01-24 NOTE — PROGRESS NOTE ADULT - SUBJECTIVE AND OBJECTIVE BOX
PROGRESS NOTE:   Authored by Alesia Rojo MD  Pager [phone] [facility]     Patient is a 60y old  Male who presents with a chief complaint of Weakness (23 Jan 2021 10:17)      SUBJECTIVE / OVERNIGHT EVENTS:    MEDICATIONS  (STANDING):  aspirin enteric coated 81 milliGRAM(s) Oral daily  calcium acetate 667 milliGRAM(s) Oral three times a day with meals  chlorhexidine 4% Liquid 1 Application(s) Topical <User Schedule>  clopidogrel Tablet 75 milliGRAM(s) Oral daily  dextrose 40% Gel 15 Gram(s) Oral once  dextrose 5%. 1000 milliLiter(s) (50 mL/Hr) IV Continuous <Continuous>  dextrose 5%. 1000 milliLiter(s) (100 mL/Hr) IV Continuous <Continuous>  dextrose 50% Injectable 25 Gram(s) IV Push once  dextrose 50% Injectable 12.5 Gram(s) IV Push once  dextrose 50% Injectable 25 Gram(s) IV Push once  epoetin junior-epbx (RETACRIT) Injectable 6000 Unit(s) IV Push <User Schedule>  glucagon  Injectable 1 milliGRAM(s) IntraMuscular once  influenza   Vaccine 0.5 milliLiter(s) IntraMuscular once  insulin glargine Injectable (LANTUS) 3 Unit(s) SubCutaneous at bedtime  insulin lispro (ADMELOG) corrective regimen sliding scale   SubCutaneous three times a day before meals  insulin lispro (ADMELOG) corrective regimen sliding scale   SubCutaneous at bedtime  metoprolol tartrate 25 milliGRAM(s) Oral two times a day  midodrine. 5 milliGRAM(s) Oral <User Schedule>  Nephro-gabi 1 Tablet(s) Oral daily    MEDICATIONS  (PRN):  ondansetron Injectable 4 milliGRAM(s) IV Push every 6 hours PRN Nausea  sodium chloride 0.9% lock flush 10 milliLiter(s) IV Push every 1 hour PRN Pre/post blood products, medications, blood draw, and to maintain line patency      I&O's Summary    23 Jan 2021 07:01  -  24 Jan 2021 07:00  --------------------------------------------------------  IN: 325 mL / OUT: 0 mL / NET: 325 mL        PHYSICAL EXAM:  Vital Signs Last 24 Hrs  T(C): 37 (24 Jan 2021 04:00), Max: 37 (24 Jan 2021 04:00)  T(F): 98.6 (24 Jan 2021 04:00), Max: 98.6 (24 Jan 2021 04:00)  HR: 65 (24 Jan 2021 05:16) (59 - 71)  BP: 148/72 (24 Jan 2021 05:16) (108/60 - 172/74)  BP(mean): --  RR: 18 (24 Jan 2021 04:00) (18 - 18)  SpO2: 100% (24 Jan 2021 04:00) (98% - 100%)        LABS:                        7.8    7.25  )-----------( 196      ( 23 Jan 2021 04:57 )             25.5     01-24    137  |  97  |  85<H>  ----------------------------<  128<H>  3.7   |  21<L>  |  8.92<H>    Ca    7.8<L>      24 Jan 2021 06:43  Phos  3.9     01-24  Mg     2.2     01-24    TPro  6.5  /  Alb  x   /  TBili  x   /  DBili  x   /  AST  x   /  ALT  x   /  AlkPhos  x   01-22               PROGRESS NOTE:   Authored by Alesia Rojo MD  Pager [phone] [facility]     Patient is a 60y old  Male who presents with a chief complaint of Weakness (23 Jan 2021 10:17)      SUBJECTIVE / OVERNIGHT EVENTS:    Afebrile. Orthostatic. Otherwise no complaints.    MEDICATIONS  (STANDING):  aspirin enteric coated 81 milliGRAM(s) Oral daily  calcium acetate 667 milliGRAM(s) Oral three times a day with meals  chlorhexidine 4% Liquid 1 Application(s) Topical <User Schedule>  clopidogrel Tablet 75 milliGRAM(s) Oral daily  dextrose 40% Gel 15 Gram(s) Oral once  dextrose 5%. 1000 milliLiter(s) (50 mL/Hr) IV Continuous <Continuous>  dextrose 5%. 1000 milliLiter(s) (100 mL/Hr) IV Continuous <Continuous>  dextrose 50% Injectable 25 Gram(s) IV Push once  dextrose 50% Injectable 12.5 Gram(s) IV Push once  dextrose 50% Injectable 25 Gram(s) IV Push once  epoetin junior-epbx (RETACRIT) Injectable 6000 Unit(s) IV Push <User Schedule>  glucagon  Injectable 1 milliGRAM(s) IntraMuscular once  influenza   Vaccine 0.5 milliLiter(s) IntraMuscular once  insulin glargine Injectable (LANTUS) 3 Unit(s) SubCutaneous at bedtime  insulin lispro (ADMELOG) corrective regimen sliding scale   SubCutaneous three times a day before meals  insulin lispro (ADMELOG) corrective regimen sliding scale   SubCutaneous at bedtime  metoprolol tartrate 25 milliGRAM(s) Oral two times a day  midodrine. 5 milliGRAM(s) Oral <User Schedule>  Nephro-gabi 1 Tablet(s) Oral daily    MEDICATIONS  (PRN):  ondansetron Injectable 4 milliGRAM(s) IV Push every 6 hours PRN Nausea  sodium chloride 0.9% lock flush 10 milliLiter(s) IV Push every 1 hour PRN Pre/post blood products, medications, blood draw, and to maintain line patency      I&O's Summary    23 Jan 2021 07:01  -  24 Jan 2021 07:00  --------------------------------------------------------  IN: 325 mL / OUT: 0 mL / NET: 325 mL        PHYSICAL EXAM:  Vital Signs Last 24 Hrs  T(C): 37 (24 Jan 2021 04:00), Max: 37 (24 Jan 2021 04:00)  T(F): 98.6 (24 Jan 2021 04:00), Max: 98.6 (24 Jan 2021 04:00)  HR: 65 (24 Jan 2021 05:16) (59 - 71)  BP: 148/72 (24 Jan 2021 05:16) (108/60 - 172/74)  BP(mean): --  RR: 18 (24 Jan 2021 04:00) (18 - 18)  SpO2: 100% (24 Jan 2021 04:00) (98% - 100%)    GENERAL: Laying comfortably, NAD  EYES: EOMI  NECK: No JVD  LUNG: Clear to auscultation bilaterally; No wheeze, crackles or rhonchi  HEART: Regular rate and rhythm; No murmurs, rubs, or gallops  ABDOMEN: Soft, Nontender, Nondistended  EXTREMITIES:  No LE edema, Peripheral Pulses intact, No clubbing, cyanosis, or edema. RUE no palpable thrill on fistula.  PSYCH: AAOx3  NEUROLOGY: non-focal, strength 5/5 in all extremities, sensation intact  SKIN: No rashes or lesions      LABS:                        7.8    7.25  )-----------( 196      ( 23 Jan 2021 04:57 )             25.5     01-24    137  |  97  |  85<H>  ----------------------------<  128<H>  3.7   |  21<L>  |  8.92<H>    Ca    7.8<L>      24 Jan 2021 06:43  Phos  3.9     01-24  Mg     2.2     01-24    TPro  6.5  /  Alb  x   /  TBili  x   /  DBili  x   /  AST  x   /  ALT  x   /  AlkPhos  x   01-22

## 2021-01-24 NOTE — PROGRESS NOTE ADULT - ASSESSMENT
60M PMHx ESRD on HD (initiated on 3/2020 in [facility] via LUE AVF), CAD s/p stent (06/2020, on plavix/asa), DM2, HTN, HLD, anemia (unknown cause) who present to [facility] ED for weakness that started 2 days prior to admission requiring emergent dialysis found to have non working AVF now with RIJ vascular catheter and course complicated by orthostatic hypotension.

## 2021-01-24 NOTE — PROGRESS NOTE ADULT - PROBLEM SELECTOR PLAN 8
Dispo - Pending resolution of orthostatic hypotension. Possibly intervention on non-functioning AVF as inpatient. Dispo - Pending resolution of orthostatic hypotension. Possibly intervention on non-functioning AVF as inpatient. Pending dialysis chair.

## 2021-01-24 NOTE — PROVIDER CONTACT NOTE (CRITICAL VALUE NOTIFICATION) - RECOMMENDATIONS
notified provider, will continue to monitor
Team notified. Shiley being put in at bedside. Pt will receive dialysis upon insertion of shiley. PRBC's to be ordered during dialysis.

## 2021-01-24 NOTE — PROGRESS NOTE ADULT - PROBLEM SELECTOR PLAN 6
Likely 2/2 ESRD.   - DEANA/epogen per nephro Likely 2/2 ESRD.   - DEANA/epogen per nephro  - Transfuse for Hgb of 7 in CAD patient today 1 unit PRBC (1/24).

## 2021-01-25 LAB
% ALBUMIN: 56.5 % — SIGNIFICANT CHANGE UP
% ALPHA 1: 4.6 % — SIGNIFICANT CHANGE UP
% ALPHA 2: 10.1 % — SIGNIFICANT CHANGE UP
% BETA: 10.5 % — SIGNIFICANT CHANGE UP
% GAMMA: 18.3 % — SIGNIFICANT CHANGE UP
% M SPIKE: 4.9 % — SIGNIFICANT CHANGE UP
ALBUMIN SERPL ELPH-MCNC: 3.3 G/DL — SIGNIFICANT CHANGE UP (ref 3.3–5)
ALBUMIN SERPL ELPH-MCNC: 3.7 G/DL — SIGNIFICANT CHANGE UP (ref 3.6–5.5)
ALBUMIN/GLOB SERPL ELPH: 1.3 RATIO — SIGNIFICANT CHANGE UP
ALP SERPL-CCNC: 121 U/L — HIGH (ref 40–120)
ALPHA1 GLOB SERPL ELPH-MCNC: 0.3 G/DL — SIGNIFICANT CHANGE UP (ref 0.1–0.4)
ALPHA2 GLOB SERPL ELPH-MCNC: 0.7 G/DL — SIGNIFICANT CHANGE UP (ref 0.5–1)
ALT FLD-CCNC: 27 U/L — SIGNIFICANT CHANGE UP (ref 10–45)
ANION GAP SERPL CALC-SCNC: 21 MMOL/L — HIGH (ref 5–17)
AST SERPL-CCNC: 23 U/L — SIGNIFICANT CHANGE UP (ref 10–40)
B-GLOBULIN SERPL ELPH-MCNC: 0.7 G/DL — SIGNIFICANT CHANGE UP (ref 0.5–1)
BILIRUB SERPL-MCNC: 0.3 MG/DL — SIGNIFICANT CHANGE UP (ref 0.2–1.2)
BUN SERPL-MCNC: 116 MG/DL — HIGH (ref 7–23)
CA-I BLD-SCNC: 1.01 MMOL/L — LOW (ref 1.12–1.3)
CALCIUM SERPL-MCNC: 7.7 MG/DL — LOW (ref 8.4–10.5)
CALCIUM SERPL-MCNC: 8 MG/DL — LOW (ref 8.4–10.5)
CHLORIDE SERPL-SCNC: 92 MMOL/L — LOW (ref 96–108)
CO2 SERPL-SCNC: 19 MMOL/L — LOW (ref 22–31)
CREAT SERPL-MCNC: 10.64 MG/DL — HIGH (ref 0.5–1.3)
GAMMA GLOBULIN: 1.2 G/DL — SIGNIFICANT CHANGE UP (ref 0.6–1.6)
GLUCOSE BLDC GLUCOMTR-MCNC: 132 MG/DL — HIGH (ref 70–99)
GLUCOSE BLDC GLUCOMTR-MCNC: 224 MG/DL — HIGH (ref 70–99)
GLUCOSE BLDC GLUCOMTR-MCNC: 243 MG/DL — HIGH (ref 70–99)
GLUCOSE BLDC GLUCOMTR-MCNC: 306 MG/DL — HIGH (ref 70–99)
GLUCOSE SERPL-MCNC: 210 MG/DL — HIGH (ref 70–99)
HCT VFR BLD CALC: 26.9 % — LOW (ref 39–50)
HGB BLD-MCNC: 8.4 G/DL — LOW (ref 13–17)
INTERPRETATION SERPL IFE-IMP: SIGNIFICANT CHANGE UP
M-SPIKE: 0.3 G/DL — HIGH (ref 0–0)
MAGNESIUM SERPL-MCNC: 2.3 MG/DL — SIGNIFICANT CHANGE UP (ref 1.6–2.6)
MCHC RBC-ENTMCNC: 27.8 PG — SIGNIFICANT CHANGE UP (ref 27–34)
MCHC RBC-ENTMCNC: 31.2 GM/DL — LOW (ref 32–36)
MCV RBC AUTO: 89.1 FL — SIGNIFICANT CHANGE UP (ref 80–100)
NRBC # BLD: 0 /100 WBCS — SIGNIFICANT CHANGE UP (ref 0–0)
PHOSPHATE SERPL-MCNC: 5 MG/DL — HIGH (ref 2.5–4.5)
PLATELET # BLD AUTO: 177 K/UL — SIGNIFICANT CHANGE UP (ref 150–400)
POTASSIUM SERPL-MCNC: 4.4 MMOL/L — SIGNIFICANT CHANGE UP (ref 3.5–5.3)
POTASSIUM SERPL-SCNC: 4.4 MMOL/L — SIGNIFICANT CHANGE UP (ref 3.5–5.3)
PROT PATTERN SERPL ELPH-IMP: SIGNIFICANT CHANGE UP
PROT SERPL-MCNC: 7 G/DL — SIGNIFICANT CHANGE UP (ref 6–8.3)
PTH-INTACT FLD-MCNC: 491 PG/ML — HIGH (ref 15–65)
RBC # BLD: 3.02 M/UL — LOW (ref 4.2–5.8)
RBC # FLD: 14.7 % — HIGH (ref 10.3–14.5)
SODIUM SERPL-SCNC: 132 MMOL/L — LOW (ref 135–145)
VIT D25+D1,25 OH+D1,25 PNL SERPL-MCNC: 6.5 PG/ML — LOW (ref 19.9–79.3)
WBC # BLD: 5.74 K/UL — SIGNIFICANT CHANGE UP (ref 3.8–10.5)
WBC # FLD AUTO: 5.74 K/UL — SIGNIFICANT CHANGE UP (ref 3.8–10.5)

## 2021-01-25 PROCEDURE — 99233 SBSQ HOSP IP/OBS HIGH 50: CPT | Mod: GC

## 2021-01-25 PROCEDURE — 99233 SBSQ HOSP IP/OBS HIGH 50: CPT

## 2021-01-25 RX ADMIN — Medication 1 TABLET(S): at 11:59

## 2021-01-25 RX ADMIN — HEPARIN SODIUM 5000 UNIT(S): 5000 INJECTION INTRAVENOUS; SUBCUTANEOUS at 05:02

## 2021-01-25 RX ADMIN — Medication 2: at 11:59

## 2021-01-25 RX ADMIN — Medication 667 MILLIGRAM(S): at 08:05

## 2021-01-25 RX ADMIN — Medication 2: at 08:06

## 2021-01-25 RX ADMIN — CLOPIDOGREL BISULFATE 75 MILLIGRAM(S): 75 TABLET, FILM COATED ORAL at 11:59

## 2021-01-25 RX ADMIN — Medication 25 MILLIGRAM(S): at 05:02

## 2021-01-25 RX ADMIN — INSULIN GLARGINE 3 UNIT(S): 100 INJECTION, SOLUTION SUBCUTANEOUS at 22:17

## 2021-01-25 RX ADMIN — ERYTHROPOIETIN 6000 UNIT(S): 10000 INJECTION, SOLUTION INTRAVENOUS; SUBCUTANEOUS at 15:06

## 2021-01-25 RX ADMIN — Medication 81 MILLIGRAM(S): at 11:59

## 2021-01-25 RX ADMIN — HEPARIN SODIUM 5000 UNIT(S): 5000 INJECTION INTRAVENOUS; SUBCUTANEOUS at 22:18

## 2021-01-25 RX ADMIN — HEPARIN SODIUM 5000 UNIT(S): 5000 INJECTION INTRAVENOUS; SUBCUTANEOUS at 12:59

## 2021-01-25 RX ADMIN — DROXIDOPA 100 MILLIGRAM(S): 100 CAPSULE ORAL at 11:59

## 2021-01-25 RX ADMIN — Medication 667 MILLIGRAM(S): at 11:59

## 2021-01-25 RX ADMIN — CHLORHEXIDINE GLUCONATE 1 APPLICATION(S): 213 SOLUTION TOPICAL at 05:03

## 2021-01-25 RX ADMIN — Medication 2: at 22:17

## 2021-01-25 RX ADMIN — Medication 25 MILLIGRAM(S): at 17:42

## 2021-01-25 NOTE — PROGRESS NOTE ADULT - SUBJECTIVE AND OBJECTIVE BOX
PROGRESS NOTE:   Authored by Alesia Rojo MD  Pager [phone] [facility]     Patient is a 60y old  Male who presents with a chief complaint of Weakness (24 Jan 2021 10:08)      SUBJECTIVE / OVERNIGHT EVENTS:    MEDICATIONS  (STANDING):  aspirin enteric coated 81 milliGRAM(s) Oral daily  calcium acetate 667 milliGRAM(s) Oral three times a day with meals  chlorhexidine 4% Liquid 1 Application(s) Topical <User Schedule>  clopidogrel Tablet 75 milliGRAM(s) Oral daily  dextrose 40% Gel 15 Gram(s) Oral once  dextrose 5%. 1000 milliLiter(s) (50 mL/Hr) IV Continuous <Continuous>  dextrose 5%. 1000 milliLiter(s) (100 mL/Hr) IV Continuous <Continuous>  dextrose 50% Injectable 25 Gram(s) IV Push once  dextrose 50% Injectable 12.5 Gram(s) IV Push once  dextrose 50% Injectable 25 Gram(s) IV Push once  droxidopa 100 milliGRAM(s) Oral three times a day  epoetin junior-epbx (RETACRIT) Injectable 6000 Unit(s) IV Push <User Schedule>  glucagon  Injectable 1 milliGRAM(s) IntraMuscular once  heparin   Injectable 5000 Unit(s) SubCutaneous every 8 hours  influenza   Vaccine 0.5 milliLiter(s) IntraMuscular once  insulin glargine Injectable (LANTUS) 3 Unit(s) SubCutaneous at bedtime  insulin lispro (ADMELOG) corrective regimen sliding scale   SubCutaneous three times a day before meals  insulin lispro (ADMELOG) corrective regimen sliding scale   SubCutaneous at bedtime  metoprolol tartrate 25 milliGRAM(s) Oral two times a day  Nephro-gabi 1 Tablet(s) Oral daily    MEDICATIONS  (PRN):  ondansetron Injectable 4 milliGRAM(s) IV Push every 6 hours PRN Nausea  sodium chloride 0.9% lock flush 10 milliLiter(s) IV Push every 1 hour PRN Pre/post blood products, medications, blood draw, and to maintain line patency      I&O's Summary    24 Jan 2021 07:01  -  25 Jan 2021 07:00  --------------------------------------------------------  IN: 780 mL / OUT: 0 mL / NET: 780 mL        PHYSICAL EXAM:  Vital Signs Last 24 Hrs  T(C): 36.9 (25 Jan 2021 04:00), Max: 37 (24 Jan 2021 20:20)  T(F): 98.5 (25 Jan 2021 04:00), Max: 98.6 (24 Jan 2021 20:20)  HR: 62 (25 Jan 2021 04:00) (62 - 74)  BP: 165/79 (25 Jan 2021 04:00) (138/68 - 170/82)  BP(mean): --  RR: 18 (25 Jan 2021 04:00) (18 - 18)  SpO2: 97% (25 Jan 2021 04:00) (95% - 100%)        LABS:                        8.4    5.74  )-----------( 177      ( 25 Jan 2021 06:23 )             26.9     01-25    132<L>  |  92<L>  |  116<H>  ----------------------------<  210<H>  4.4   |  19<L>  |  10.64<H>    Ca    8.0<L>      25 Jan 2021 06:23  Phos  5.0     01-25  Mg     2.3     01-25    TPro  7.0  /  Alb  3.3  /  TBili  0.3  /  DBili  x   /  AST  23  /  ALT  27  /  AlkPhos  121<H>  01-25               PROGRESS NOTE:   Authored by Alesia Rojo MD  Pager [phone] [facility]     Patient is a 60y old  Male who presents with a chief complaint of Weakness (24 Jan 2021 10:08)      SUBJECTIVE / OVERNIGHT EVENTS:  Afebrile. NAEON. No longer orthostatic this AM. Denies dizziness/lightheadedness. Eating, ambulating.    MEDICATIONS  (STANDING):  aspirin enteric coated 81 milliGRAM(s) Oral daily  calcium acetate 667 milliGRAM(s) Oral three times a day with meals  chlorhexidine 4% Liquid 1 Application(s) Topical <User Schedule>  clopidogrel Tablet 75 milliGRAM(s) Oral daily  dextrose 40% Gel 15 Gram(s) Oral once  dextrose 5%. 1000 milliLiter(s) (50 mL/Hr) IV Continuous <Continuous>  dextrose 5%. 1000 milliLiter(s) (100 mL/Hr) IV Continuous <Continuous>  dextrose 50% Injectable 25 Gram(s) IV Push once  dextrose 50% Injectable 12.5 Gram(s) IV Push once  dextrose 50% Injectable 25 Gram(s) IV Push once  droxidopa 100 milliGRAM(s) Oral three times a day  epoetin junior-epbx (RETACRIT) Injectable 6000 Unit(s) IV Push <User Schedule>  glucagon  Injectable 1 milliGRAM(s) IntraMuscular once  heparin   Injectable 5000 Unit(s) SubCutaneous every 8 hours  influenza   Vaccine 0.5 milliLiter(s) IntraMuscular once  insulin glargine Injectable (LANTUS) 3 Unit(s) SubCutaneous at bedtime  insulin lispro (ADMELOG) corrective regimen sliding scale   SubCutaneous three times a day before meals  insulin lispro (ADMELOG) corrective regimen sliding scale   SubCutaneous at bedtime  metoprolol tartrate 25 milliGRAM(s) Oral two times a day  Nephro-gabi 1 Tablet(s) Oral daily    MEDICATIONS  (PRN):  ondansetron Injectable 4 milliGRAM(s) IV Push every 6 hours PRN Nausea  sodium chloride 0.9% lock flush 10 milliLiter(s) IV Push every 1 hour PRN Pre/post blood products, medications, blood draw, and to maintain line patency      I&O's Summary    24 Jan 2021 07:01  -  25 Jan 2021 07:00  --------------------------------------------------------  IN: 780 mL / OUT: 0 mL / NET: 780 mL        PHYSICAL EXAM:  Vital Signs Last 24 Hrs  T(C): 36.9 (25 Jan 2021 04:00), Max: 37 (24 Jan 2021 20:20)  T(F): 98.5 (25 Jan 2021 04:00), Max: 98.6 (24 Jan 2021 20:20)  HR: 62 (25 Jan 2021 04:00) (62 - 74)  BP: 165/79 (25 Jan 2021 04:00) (138/68 - 170/82)  BP(mean): --  RR: 18 (25 Jan 2021 04:00) (18 - 18)  SpO2: 97% (25 Jan 2021 04:00) (95% - 100%)    GENERAL: Laying comfortably, NAD  EYES: EOMI  NECK: No JVD  LUNG: Clear to auscultation bilaterally; No wheeze, crackles or rhonchi  HEART: Regular rate and rhythm; No murmurs, rubs, or gallops  ABDOMEN: Soft, Nontender, Nondistended  EXTREMITIES:  No LE edema, Peripheral Pulses intact, No clubbing, cyanosis, or edema. RUE AV fistula.  PSYCH: AAOx3  NEUROLOGY: non-focal, strength 5/5 in all extremities, sensation intact  SKIN: No rashes or lesions    LABS:                        8.4    5.74  )-----------( 177      ( 25 Jan 2021 06:23 )             26.9     01-25    132<L>  |  92<L>  |  116<H>  ----------------------------<  210<H>  4.4   |  19<L>  |  10.64<H>    Ca    8.0<L>      25 Jan 2021 06:23  Phos  5.0     01-25  Mg     2.3     01-25    TPro  7.0  /  Alb  3.3  /  TBili  0.3  /  DBili  x   /  AST  23  /  ALT  27  /  AlkPhos  121<H>  01-25

## 2021-01-25 NOTE — PROGRESS NOTE ADULT - ASSESSMENT
60M PMHx ESRD on HD (initiated in [facility] via LUE AVF), CAD s/p stent (on plavix/asa), DM2, HTN present to ED for weakness after not having hemodialysis over 3 days.  Nephrology consulted for ESRD/HD management.     # ESRD  Pt. with ESRD (presumed diabetic nephropathy) on HD in [facility] (HD initiated 3/26/2020), minimal urine.    - plan for maintenance HD today with minimal UF due to orthostatic hypotension. UF: 0.5L  - Vascular evaluation for right AVF. Pt s/p tunneled HD catheter on 1/19/21  - monitor electrolyte, strict I/O, daily weight, fluid restriction 1L, renally dose medication per HD, renal diet (low K/phos)  - Immunofixation: IgG Lambda Band identified, K/L ratio unremarkable  - Compression stockings when walking  - will need / to setup outpatient HD center prior to discharge    # Hyperkalemia- resolved  Pt with hyperkalemia in the setting of missing dialysis  - plan for HD as outline above, monitor electrolytes, low potassium diet    # Hypertensive urgency- resolved now with orthostatic hypotension  On admission patient's triage /121 likely 2/2 missing HD  - Pt started on Midodrine 5mg BID  - monitor BP, low salt diet  -BP meds being adjusted    # Anemia  Pt with hx anemia likely multifactorial including ACD in the setting of ESRD. Hgb level below target at 7.9 today  - Will increase epogen 6000U TIW on HD days   - Iron stores replete     # Renal bone disease  Pt with hyperphosphatemia in the setting of ESRD.   - continue phos binder phos-lo 667mg TID w/ meals,    - Low phosphorus diet advised. Monitor serum phosphorus    If you have any questions, please feel free to contact me  Renetta Veronica  Nephrology Fellow  Pager: [phone] / [phone]  (After 5pm or on weekends please page the on-call fellow)   60M PMHx ESRD on HD (initiated in [facility] via LUE AVF), CAD s/p stent (on plavix/asa), DM2, HTN present to ED for weakness after not having hemodialysis over 3 days.  Nephrology consulted for ESRD/HD management.     # ESRD  Pt. with ESRD (presumed diabetic nephropathy) on HD in [facility] (HD initiated 3/26/2020), minimal urine.    - plan for maintenance HD today with minimal UF due to orthostatic hypotension. UF: 0.5L  - Vascular evaluation for right AVF. Pt s/p tunneled HD catheter on 1/19/21  - monitor electrolyte, strict I/O, daily weight, fluid restriction 1L, renally dose medication per HD, renal diet (low K/phos)  - Immunofixation:  M protein ( 0.3gm/dl)  IgG Lambda  Band identified, K/L ratio unremarkable. Likely MGUS. f/u per heme  - Compression stockings when walking  - will need / to setup outpatient HD center prior to discharge    # Hyperkalemia- resolved  Pt with hyperkalemia in the setting of missing dialysis  - plan for HD as outline above, monitor electrolytes, low potassium diet    # Hypertensive urgency- resolved now with orthostatic hypotension  On admission patient's triage /121 likely 2/2 missing HD  - Now on northera  - monitor BP, low salt diet  -BP meds being adjusted    # Anemia  Pt with hx anemia likely multifactorial including ACD in the setting of ESRD. Hgb level below target at 7.9 today  - Will increase epogen 6000U TIW on HD days   - Iron stores replete     # Renal bone disease  Pt with hyperphosphatemia in the setting of ESRD.   - continue phos binder phos-lo 667mg TID w/ meals,    -pth at goal for esrd  - Low phosphorus diet advised. Monitor serum phosphorus    If you have any questions, please feel free to contact me  Renetta Veronica  Nephrology Fellow  Pager: [phone] / [phone]  (After 5pm or on weekends please page the on-call fellow)

## 2021-01-25 NOTE — PROGRESS NOTE ADULT - PROBLEM SELECTOR PLAN 2
- Hold Nifedepine  - Switched to Lopressor at low dose (from Coreg) to remove alpha blockade  - Cardiology recs trial of Droxidopa - will start with 100 TID  - Will dc midodrine 5 mg BID started on 1/21  - Fall precautions - Hold Nifedepine  - Switched to Lopressor at low dose (from Coreg) to remove alpha blockade  - Cardiology recs trial of Droxidopa - started 100 TID on 1/24 - no longer orthostatic on 1/25 AM  - Will dc midodrine 5 mg BID started on 1/21  - Fall precautions

## 2021-01-25 NOTE — PROGRESS NOTE ADULT - SUBJECTIVE AND OBJECTIVE BOX
[facility] Division of Kidney Diseases & Hypertension  FOLLOW UP NOTE  [phone]--------------------------------------------------------------------------------  Chief Complaint:Hyperkalemia        24 hour events/subjective:        PAST HISTORY  --------------------------------------------------------------------------------  No significant changes to PMH, PSH, FHx, SHx, unless otherwise noted    ALLERGIES & MEDICATIONS  --------------------------------------------------------------------------------  Allergies    No Known Allergies    Intolerances      Standing Inpatient Medications  aspirin enteric coated 81 milliGRAM(s) Oral daily  calcium acetate 667 milliGRAM(s) Oral three times a day with meals  chlorhexidine 4% Liquid 1 Application(s) Topical <User Schedule>  clopidogrel Tablet 75 milliGRAM(s) Oral daily  dextrose 40% Gel 15 Gram(s) Oral once  dextrose 5%. 1000 milliLiter(s) IV Continuous <Continuous>  dextrose 5%. 1000 milliLiter(s) IV Continuous <Continuous>  dextrose 50% Injectable 25 Gram(s) IV Push once  dextrose 50% Injectable 12.5 Gram(s) IV Push once  dextrose 50% Injectable 25 Gram(s) IV Push once  droxidopa 100 milliGRAM(s) Oral three times a day  epoetin junior-epbx (RETACRIT) Injectable 6000 Unit(s) IV Push <User Schedule>  glucagon  Injectable 1 milliGRAM(s) IntraMuscular once  heparin   Injectable 5000 Unit(s) SubCutaneous every 8 hours  influenza   Vaccine 0.5 milliLiter(s) IntraMuscular once  insulin glargine Injectable (LANTUS) 3 Unit(s) SubCutaneous at bedtime  insulin lispro (ADMELOG) corrective regimen sliding scale   SubCutaneous three times a day before meals  insulin lispro (ADMELOG) corrective regimen sliding scale   SubCutaneous at bedtime  metoprolol tartrate 25 milliGRAM(s) Oral two times a day  Nephro-gabi 1 Tablet(s) Oral daily    PRN Inpatient Medications  ondansetron Injectable 4 milliGRAM(s) IV Push every 6 hours PRN  sodium chloride 0.9% lock flush 10 milliLiter(s) IV Push every 1 hour PRN      REVIEW OF SYSTEMS  --------------------------------------------------------------------------------  Gen: No  fevers/chills  Skin: No rashes  Head/Eyes/Ears/Mouth: No headache; Normal hearing; Normal vision w/o blurriness  Respiratory: No dyspnea, cough, wheezing, hemoptysis  CV: No chest pain, PND, orthopnea  GI: No abdominal pain, diarrhea, constipation, nausea, vomiting  : No increased frequency, dysuria, hematuria, nocturia  MSK: No joint pain/swelling; no back pain; no edema  Neuro: No dizziness/lightheadedness, weakness, seizures, numbness, tingling      All other systems were reviewed and are negative, except as noted.    VITALS/PHYSICAL EXAM  --------------------------------------------------------------------------------  T(C): 36.3 (01-25-21 @ 14:50), Max: 37 (01-24-21 @ 20:20)  HR: 66 (01-25-21 @ 14:50) (62 - 74)  BP: 167/85 (01-25-21 @ 14:50) (149/70 - 170/82)  RR: 20 (01-25-21 @ 14:50) (18 - 20)  SpO2: 99% (01-25-21 @ 14:50) (95% - 99%)  Wt(kg): --        01-24-21 @ 07:01  -  01-25-21 @ 07:00  --------------------------------------------------------  IN: 780 mL / OUT: 0 mL / NET: 780 mL    01-25-21 @ 07:01  -  01-25-21 @ 15:04  --------------------------------------------------------  IN: 400 mL / OUT: 0 mL / NET: 400 mL      Physical Exam:  	Gen: NAD, well-appearing  	HEENT: PERRL, supple neck, clear oropharynx  	Pulm: CTA B/L  	CV: RRR, S1S2;  	Back: No spinal or CVA tenderness  	Abd: +BS, soft, nontender/nondistended  	: No suprapubic tenderness                      Extremities: no bilateral LE edema noted.                       Neuro: No focal deficits, intact gait  	Skin: Warm, without rashes  	Vascular access:    LABS/STUDIES  --------------------------------------------------------------------------------              8.4    5.74  >-----------<  177      [01-25-21 @ 06:23]              26.9     132  |  92  |  116  ----------------------------<  210      [01-25-21 @ 06:23]  4.4   |  19  |  10.64        Ca     8.0     [01-25-21 @ 06:23]      iCa    1.01     [01-25 @ 06:50]      Mg     2.3     [01-25-21 @ 06:23]      Phos  5.0     [01-25-21 @ 06:23]    TPro  7.0  /  Alb  3.3  /  TBili  0.3  /  DBili  x   /  AST  23  /  ALT  27  /  AlkPhos  121  [01-25-21 @ 06:23]          Creatinine Trend:  SCr 10.64 [01-25 @ 06:23]  SCr 8.92 [01-24 @ 06:43]  SCr 6.07 [01-23 @ 04:56]  SCr 9.20 [01-22 @ 06:50]  SCr 6.93 [01-21 @ 06:58]        PTH -- (Ca 7.7)      [01-25-21 @ 08:11]   491    HIV Nonreact      [01-24-21 @ 09:30]    Free Light Chains: kappa 21.39, lambda 20.26, ratio = 1.06      [01-22 @ 20:37]  Immunofixation Serum:   IgG Lambda Band Identified    Reference Range: None Detected      [01-22-21 @ 20:37]  SPEP Interpretation: Gamma-Migrating Paraprotein Identified      [01-22-21 @ 20:37]   [facility] Division of Kidney Diseases & Hypertension  FOLLOW UP NOTE  [phone]--------------------------------------------------------------------------------  24 hour events/subjective:  Patient was seen and examined this morning. No signs of acute distress  No acute significant events overnight  Patient denies chest pain, SOB, nausea/vomiting, abdominal pain.  Labs/Meds/Vitals .  Started on Northera on 1/24/21  BP improving without orthostatic hypotension     PAST HISTORY  --------------------------------------------------------------------------------  No significant changes to PMH, PSH, FHx, SHx, unless otherwise noted    ALLERGIES & MEDICATIONS  --------------------------------------------------------------------------------  Allergies    No Known Allergies    Intolerances      Standing Inpatient Medications  aspirin enteric coated 81 milliGRAM(s) Oral daily  calcium acetate 667 milliGRAM(s) Oral three times a day with meals  chlorhexidine 4% Liquid 1 Application(s) Topical <User Schedule>  clopidogrel Tablet 75 milliGRAM(s) Oral daily  dextrose 40% Gel 15 Gram(s) Oral once  dextrose 5%. 1000 milliLiter(s) IV Continuous <Continuous>  dextrose 5%. 1000 milliLiter(s) IV Continuous <Continuous>  dextrose 50% Injectable 25 Gram(s) IV Push once  dextrose 50% Injectable 12.5 Gram(s) IV Push once  dextrose 50% Injectable 25 Gram(s) IV Push once  droxidopa 100 milliGRAM(s) Oral three times a day  epoetin junior-epbx (RETACRIT) Injectable 6000 Unit(s) IV Push <User Schedule>  glucagon  Injectable 1 milliGRAM(s) IntraMuscular once  heparin   Injectable 5000 Unit(s) SubCutaneous every 8 hours  influenza   Vaccine 0.5 milliLiter(s) IntraMuscular once  insulin glargine Injectable (LANTUS) 3 Unit(s) SubCutaneous at bedtime  insulin lispro (ADMELOG) corrective regimen sliding scale   SubCutaneous three times a day before meals  insulin lispro (ADMELOG) corrective regimen sliding scale   SubCutaneous at bedtime  metoprolol tartrate 25 milliGRAM(s) Oral two times a day  Nephro-gabi 1 Tablet(s) Oral daily    PRN Inpatient Medications  ondansetron Injectable 4 milliGRAM(s) IV Push every 6 hours PRN  sodium chloride 0.9% lock flush 10 milliLiter(s) IV Push every 1 hour PRN    REVIEW OF SYSTEMS  --------------------------------------------------------------------------------  Gen: No  fevers/chills  Respiratory: No dyspnea, cough, wheezing, hemoptysis  CV: No chest pain, PND  GI: No abdominal pain,no diarrhea  MSK: No joint pain/swelling  Neuro: No dizziness/lightheadedness    All other systems were reviewed and are negative, except as noted.    VITALS/PHYSICAL EXAM  --------------------------------------------------------------------------------  T(C): 36.3 (01-25-21 @ 14:50), Max: 37 (01-24-21 @ 20:20)  HR: 66 (01-25-21 @ 14:50) (62 - 74)  BP: 167/85 (01-25-21 @ 14:50) (149/70 - 170/82)  RR: 20 (01-25-21 @ 14:50) (18 - 20)  SpO2: 99% (01-25-21 @ 14:50) (95% - 99%)  Wt(kg): --        01-24-21 @ 07:01  -  01-25-21 @ 07:00  --------------------------------------------------------  IN: 780 mL / OUT: 0 mL / NET: 780 mL    01-25-21 @ 07:01  -  01-25-21 @ 15:04  --------------------------------------------------------  IN: 400 mL / OUT: 0 mL / NET: 400 mL  Physical Exam:  	Gen: NAD, well-appearing on room air  	HEENT: moist mucous membrane  	Pulm: CTA B/L, no crackles  	CV: RRR, S1S2; no rub/murmur  	GI: +BS, soft, nontender/nondistended  	MSK: Warm, no edema              Neuro: AAOx3  	Skin: Warm  	Vascular access: LUE AVF +thrills/bruits, RIJ tunneled HD cathter  LABS/STUDIES  --------------------------------------------------------------------------------              8.4    5.74  >-----------<  177      [01-25-21 @ 06:23]              26.9     132  |  92  |  116  ----------------------------<  210      [01-25-21 @ 06:23]  4.4   |  19  |  10.64        Ca     8.0     [01-25-21 @ 06:23]      iCa    1.01     [01-25 @ 06:50]      Mg     2.3     [01-25-21 @ 06:23]      Phos  5.0     [01-25-21 @ 06:23]    TPro  7.0  /  Alb  3.3  /  TBili  0.3  /  DBili  x   /  AST  23  /  ALT  27  /  AlkPhos  121  [01-25-21 @ 06:23]    Creatinine Trend:  SCr 10.64 [01-25 @ 06:23]  SCr 8.92 [01-24 @ 06:43]  SCr 6.07 [01-23 @ 04:56]  SCr 9.20 [01-22 @ 06:50]  SCr 6.93 [01-21 @ 06:58]    PTH -- (Ca 7.7)      [01-25-21 @ 08:11]   491    HIV Nonreact      [01-24-21 @ 09:30]    Free Light Chains: kappa 21.39, lambda 20.26, ratio = 1.06      [01-22 @ 20:37]  Immunofixation Serum:   IgG Lambda Band Identified    Reference Range: None Detected      [01-22-21 @ 20:37]  SPEP Interpretation: Gamma-Migrating Paraprotein Identified      [01-22-21 @ 20:37]

## 2021-01-25 NOTE — PROGRESS NOTE ADULT - PROBLEM SELECTOR PLAN 1
Pt. with ESRD (presumed diabetic nephropathy) on HD in [facility] (HD initiated 3/26/2020), minimal urine. Had HD in [facility] was on 1/14/20 via LUE AVF.   - Dialysis per nephrology inpatient.  - SW close to finalizing dialysis chair outpatient. COVID swab 72 hours prior to discharge.  - f/u renal duplex arteries/vein (son report told vessel ds in [facility]) - notable only for renal parenchymal disease  - phosplo 667 TID   - monitor electrolyte, strict I/O, daily weight, fluid restriction 1L, renally dose medication per HD, renal diet (low K/phos)   - Vascular consult for non-functioning AVF - deciding between inpatient and outpatient intervention Pt. with ESRD (presumed diabetic nephropathy) on HD in [facility] (HD initiated 3/26/2020), minimal urine. Had HD in [facility] was on 1/14/20 via LUE AVF.   - Dialysis per nephrology inpatient.  - SW close to finalizing dialysis chair outpatient. COVID swab 72 hours prior to discharge.  - Renal duplex arteries/vein (son report told vessel ds in [facility]) - notable only for renal parenchymal disease  - phosplo 667 TID   - monitor electrolyte, strict I/O, daily weight, fluid restriction 1L, renally dose medication per HD, renal diet (low K/phos)   - Vascular consult for non-functioning AVF - deciding between inpatient and outpatient intervention

## 2021-01-25 NOTE — PROVIDER CONTACT NOTE (OTHER) - ASSESSMENT
Pt A&Ox4 all other VSS. Pt denies s/s of hypertension. Denies headache. Pt stable on dialysis machine. Acute deep vein thrombosis (DVT) of iliac vein of both lower extremities

## 2021-01-25 NOTE — PROGRESS NOTE ADULT - SUBJECTIVE AND OBJECTIVE BOX
EP covering for Dr Navarro    Subjective: Patient seen and examined. No new events, resting comfortably.  No angina, orthopnea, PND or palpitations.  Today, he rapidly got up out of bed to walk around the room to show he is no longer symptomatically orthostatic  States he is ready to go home.      REVIEW OF SYSTEMS:  CONSTITUTIONAL:+ weakness, fevers or chills  EYES/ENT: No visual changes;  No vertigo or throat pain   NECK: No pain or stiffness  RESPIRATORY: No cough, wheezing, hemoptysis; No shortness of breath  CARDIOVASCULAR: No chest pain or palpitations  GASTROINTESTINAL: No abdominal or epigastric pain. No nausea, vomiting, or hematemesis; No diarrhea or constipation. No melena or hematochezia.  GENITOURINARY: No dysuria, frequency or hematuria  NEUROLOGICAL: No numbness or weakness  SKIN: No itching, burning, rashes, or lesions   All other review of systems is negative unless indicated above.    aspirin enteric coated 81 milliGRAM(s) Oral daily  calcium acetate 667 milliGRAM(s) Oral three times a day with meals  chlorhexidine 4% Liquid 1 Application(s) Topical <User Schedule>  clopidogrel Tablet 75 milliGRAM(s) Oral daily  dextrose 40% Gel 15 Gram(s) Oral once  dextrose 5%. 1000 milliLiter(s) IV Continuous <Continuous>  dextrose 5%. 1000 milliLiter(s) IV Continuous <Continuous>  dextrose 50% Injectable 25 Gram(s) IV Push once  dextrose 50% Injectable 12.5 Gram(s) IV Push once  dextrose 50% Injectable 25 Gram(s) IV Push once  droxidopa 100 milliGRAM(s) Oral three times a day  epoetin junior-epbx (RETACRIT) Injectable 6000 Unit(s) IV Push <User Schedule>  glucagon  Injectable 1 milliGRAM(s) IntraMuscular once  heparin   Injectable 5000 Unit(s) SubCutaneous every 8 hours  influenza   Vaccine 0.5 milliLiter(s) IntraMuscular once  insulin glargine Injectable (LANTUS) 3 Unit(s) SubCutaneous at bedtime  insulin lispro (ADMELOG) corrective regimen sliding scale   SubCutaneous three times a day before meals  insulin lispro (ADMELOG) corrective regimen sliding scale   SubCutaneous at bedtime  metoprolol tartrate 25 milliGRAM(s) Oral two times a day  Nephro-gabi 1 Tablet(s) Oral daily  ondansetron Injectable 4 milliGRAM(s) IV Push every 6 hours PRN  sodium chloride 0.9% lock flush 10 milliLiter(s) IV Push every 1 hour PRN                            8.4    5.74  )-----------( 177      ( 25 Jan 2021 06:23 )             26.9       01-25    132<L>  |  92<L>  |  116<H>  ----------------------------<  210<H>  4.4   |  19<L>  |  10.64<H>    Ca    8.0<L>      25 Jan 2021 06:23  Phos  5.0     01-25  Mg     2.3     01-25    TPro  7.0  /  Alb  3.3  /  TBili  0.3  /  DBili  x   /  AST  23  /  ALT  27  /  AlkPhos  121<H>  01-25    T(C): 36.3 (01-25-21 @ 14:50), Max: 37 (01-24-21 @ 20:20)  HR: 66 (01-25-21 @ 14:50) (62 - 74)  BP: 167/85 (01-25-21 @ 14:50) (149/70 - 170/82)  RR: 20 (01-25-21 @ 14:50) (18 - 20)  SpO2: 99% (01-25-21 @ 14:50) (95% - 99%)  Wt(kg): --    I&O's Summary    24 Jan 2021 07:01  -  25 Jan 2021 07:00  --------------------------------------------------------  IN: 780 mL / OUT: 0 mL / NET: 780 mL    25 Jan 2021 07:01  -  25 Jan 2021 16:41  --------------------------------------------------------  IN: 400 mL / OUT: 0 mL / NET: 400 mL    Appearance: NAD RIJ HD catheter 	  HEENT:   Normal oral mucosa, PERRL, EOMI	  Lymphatic: No lymphadenopathy , no edema  Cardiovascular: Normal S1 S2, No JVD, No murmurs , Peripheral pulses palpable 2+ bilaterally  Respiratory: Lungs clear to auscultation, normal effort 	  Gastrointestinal:  Soft, Non-tender, + BS	  Skin: No rashes, No ecchymoses, No cyanosis, warm to touch  Musculoskeletal: Normal range of motion, normal strength  Psychiatry:  Mood & affect appropriate  Ext: No edema    Tele - NSR.      A/P) 59 y/o male PMH ESRD on HD, CAD s/p PCI, HTN, DM and hyperlipidemia a/w hypertensive urgency. BP now better controlled    -continue asa-plavix-metoprolol for known CAD  -no further inpatient cardiac workup expected    -consider leg compression stockings, and physical therapy.  -on trial of Droxidopa 100mg TID.    (Max dose is 600mg TID and could be titrated by 100mg TID every 3 days to reach desired dose.  Stopping point is to prevent supine HTN)    -able to stand up without symptomatic orthostasis, so discharge planning per primary team.  -will f/u until Dr Navarro returns    Ej Adler M.D.  Cardiac Electrophysiology  [phone]

## 2021-01-25 NOTE — PROGRESS NOTE ADULT - ASSESSMENT
60M PMHx ESRD on HD (initiated on 3/2020 in [facility] via LUE AVF), CAD s/p stent (06/2020, on plavix/asa), DM2, HTN, HLD, anemia (unknown cause) who present to [facility] ED for weakness that started 2 days prior to admission requiring emergent dialysis found to have non working AVF now with RIJ vascular catheter and course complicated by orthostatic hypotension. 60M PMHx ESRD on HD (initiated on 3/2020 in [facility] via LUE AVF), CAD s/p stent (06/2020, on plavix/asa), DM2, HTN, HLD, anemia (unknown cause) who present to [facility] ED for weakness that started 2 days prior to admission requiring emergent dialysis found to have non working AVF now with RIJ vascular catheter and course complicated by orthostatic hypotension now resolved on droxidopa.

## 2021-01-25 NOTE — PROGRESS NOTE ADULT - PROBLEM SELECTOR PLAN 6
Likely 2/2 ESRD.   - DEANA/epogen per nephro  - Transfuse for Hgb of 7 in CAD patient today 1 unit PRBC (1/24).

## 2021-01-25 NOTE — PROGRESS NOTE ADULT - PROBLEM SELECTOR PLAN 8
Dispo - Pending resolution of orthostatic hypotension. Possibly intervention on non-functioning AVF as inpatient. Pending dialysis chair. Dispo - Pending dialysis chair.

## 2021-01-26 LAB
ANION GAP SERPL CALC-SCNC: 17 MMOL/L — SIGNIFICANT CHANGE UP (ref 5–17)
BUN SERPL-MCNC: 64 MG/DL — HIGH (ref 7–23)
CALCIUM SERPL-MCNC: 8.4 MG/DL — SIGNIFICANT CHANGE UP (ref 8.4–10.5)
CHLORIDE SERPL-SCNC: 97 MMOL/L — SIGNIFICANT CHANGE UP (ref 96–108)
CO2 SERPL-SCNC: 23 MMOL/L — SIGNIFICANT CHANGE UP (ref 22–31)
CREAT SERPL-MCNC: 6.54 MG/DL — HIGH (ref 0.5–1.3)
GAMMA INTERFERON BACKGROUND BLD IA-ACNC: 0.01 IU/ML — SIGNIFICANT CHANGE UP
GLUCOSE BLDC GLUCOMTR-MCNC: 151 MG/DL — HIGH (ref 70–99)
GLUCOSE BLDC GLUCOMTR-MCNC: 184 MG/DL — HIGH (ref 70–99)
GLUCOSE BLDC GLUCOMTR-MCNC: 187 MG/DL — HIGH (ref 70–99)
GLUCOSE BLDC GLUCOMTR-MCNC: 205 MG/DL — HIGH (ref 70–99)
GLUCOSE SERPL-MCNC: 131 MG/DL — HIGH (ref 70–99)
HCT VFR BLD CALC: 26.3 % — LOW (ref 39–50)
HGB BLD-MCNC: 8.2 G/DL — LOW (ref 13–17)
M TB IFN-G BLD-IMP: NEGATIVE — SIGNIFICANT CHANGE UP
M TB IFN-G CD4+ BCKGRND COR BLD-ACNC: 0.03 IU/ML — SIGNIFICANT CHANGE UP
M TB IFN-G CD4+CD8+ BCKGRND COR BLD-ACNC: 0.02 IU/ML — SIGNIFICANT CHANGE UP
MAGNESIUM SERPL-MCNC: 2.1 MG/DL — SIGNIFICANT CHANGE UP (ref 1.6–2.6)
MCHC RBC-ENTMCNC: 27.6 PG — SIGNIFICANT CHANGE UP (ref 27–34)
MCHC RBC-ENTMCNC: 31.2 GM/DL — LOW (ref 32–36)
MCV RBC AUTO: 88.6 FL — SIGNIFICANT CHANGE UP (ref 80–100)
NRBC # BLD: 0 /100 WBCS — SIGNIFICANT CHANGE UP (ref 0–0)
PHOSPHATE SERPL-MCNC: 3.8 MG/DL — SIGNIFICANT CHANGE UP (ref 2.5–4.5)
PLATELET # BLD AUTO: 200 K/UL — SIGNIFICANT CHANGE UP (ref 150–400)
POTASSIUM SERPL-MCNC: 4.4 MMOL/L — SIGNIFICANT CHANGE UP (ref 3.5–5.3)
POTASSIUM SERPL-SCNC: 4.4 MMOL/L — SIGNIFICANT CHANGE UP (ref 3.5–5.3)
QUANT TB PLUS MITOGEN MINUS NIL: 6.05 IU/ML — SIGNIFICANT CHANGE UP
RBC # BLD: 2.97 M/UL — LOW (ref 4.2–5.8)
RBC # FLD: 14.8 % — HIGH (ref 10.3–14.5)
SARS-COV-2 RNA SPEC QL NAA+PROBE: SIGNIFICANT CHANGE UP
SODIUM SERPL-SCNC: 137 MMOL/L — SIGNIFICANT CHANGE UP (ref 135–145)
WBC # BLD: 5.57 K/UL — SIGNIFICANT CHANGE UP (ref 3.8–10.5)
WBC # FLD AUTO: 5.57 K/UL — SIGNIFICANT CHANGE UP (ref 3.8–10.5)

## 2021-01-26 PROCEDURE — 99233 SBSQ HOSP IP/OBS HIGH 50: CPT | Mod: GC

## 2021-01-26 RX ADMIN — Medication 1: at 08:01

## 2021-01-26 RX ADMIN — Medication 667 MILLIGRAM(S): at 08:01

## 2021-01-26 RX ADMIN — INSULIN GLARGINE 3 UNIT(S): 100 INJECTION, SOLUTION SUBCUTANEOUS at 21:23

## 2021-01-26 RX ADMIN — HEPARIN SODIUM 5000 UNIT(S): 5000 INJECTION INTRAVENOUS; SUBCUTANEOUS at 13:31

## 2021-01-26 RX ADMIN — Medication 25 MILLIGRAM(S): at 06:19

## 2021-01-26 RX ADMIN — Medication 2: at 16:41

## 2021-01-26 RX ADMIN — Medication 667 MILLIGRAM(S): at 11:20

## 2021-01-26 RX ADMIN — HEPARIN SODIUM 5000 UNIT(S): 5000 INJECTION INTRAVENOUS; SUBCUTANEOUS at 06:19

## 2021-01-26 RX ADMIN — HEPARIN SODIUM 5000 UNIT(S): 5000 INJECTION INTRAVENOUS; SUBCUTANEOUS at 21:24

## 2021-01-26 RX ADMIN — Medication 25 MILLIGRAM(S): at 16:16

## 2021-01-26 RX ADMIN — CHLORHEXIDINE GLUCONATE 1 APPLICATION(S): 213 SOLUTION TOPICAL at 06:19

## 2021-01-26 RX ADMIN — Medication 81 MILLIGRAM(S): at 11:19

## 2021-01-26 RX ADMIN — DROXIDOPA 100 MILLIGRAM(S): 100 CAPSULE ORAL at 06:19

## 2021-01-26 RX ADMIN — DROXIDOPA 100 MILLIGRAM(S): 100 CAPSULE ORAL at 11:19

## 2021-01-26 RX ADMIN — Medication 1 TABLET(S): at 11:19

## 2021-01-26 RX ADMIN — Medication 1: at 12:00

## 2021-01-26 RX ADMIN — Medication 667 MILLIGRAM(S): at 16:16

## 2021-01-26 RX ADMIN — CLOPIDOGREL BISULFATE 75 MILLIGRAM(S): 75 TABLET, FILM COATED ORAL at 11:19

## 2021-01-26 NOTE — PROGRESS NOTE ADULT - PROBLEM SELECTOR PLAN 1
Pt. with ESRD (presumed diabetic nephropathy) on HD in [facility] (HD initiated 3/26/2020), minimal urine. Had HD in [facility] was on 1/14/20 via LUE AVF.   - Dialysis per nephrology inpatient.  - SW close to finalizing dialysis chair outpatient. COVID swab 72 hours prior to discharge.  - Renal duplex arteries/vein (son report told vessel ds in [facility]) - notable only for renal parenchymal disease  - phosplo 667 TID   - monitor electrolyte, strict I/O, daily weight, fluid restriction 1L, renally dose medication per HD, renal diet (low K/phos)   - Vascular consult for non-functioning AVF - deciding between inpatient and outpatient intervention Pt. with ESRD (presumed diabetic nephropathy) on HD in [facility] (HD initiated 3/26/2020), minimal urine. Had HD in [facility] was on 1/14/20 via LUE AVF.   - Dialysis per nephrology inpatient.  - SW close to finalizing dialysis chair outpatient. COVID swab 72 hours prior to discharge.  - Renal duplex arteries/vein (son report told vessel ds in [facility]) - notable only for renal parenchymal disease  - phosplo 667 TID   - monitor electrolyte, strict I/O, daily weight, fluid restriction 1L, renally dose medication per HD, renal diet (low K/phos)   - Vascular consult for non-functioning AVF - deciding between inpatient and outpatient intervention - recommend IR consult.  - Patient declining further inpatient work up of AVF at this time.

## 2021-01-26 NOTE — PROGRESS NOTE ADULT - SUBJECTIVE AND OBJECTIVE BOX
PROGRESS NOTE:   Authored by Alesia Rojo MD  Pager [phone] [facility]     Patient is a 60y old  Male who presents with a chief complaint of Weakness (25 Jan 2021 16:40)      SUBJECTIVE / OVERNIGHT EVENTS:    MEDICATIONS  (STANDING):  aspirin enteric coated 81 milliGRAM(s) Oral daily  calcium acetate 667 milliGRAM(s) Oral three times a day with meals  chlorhexidine 4% Liquid 1 Application(s) Topical <User Schedule>  clopidogrel Tablet 75 milliGRAM(s) Oral daily  dextrose 40% Gel 15 Gram(s) Oral once  dextrose 5%. 1000 milliLiter(s) (50 mL/Hr) IV Continuous <Continuous>  dextrose 5%. 1000 milliLiter(s) (100 mL/Hr) IV Continuous <Continuous>  dextrose 50% Injectable 25 Gram(s) IV Push once  dextrose 50% Injectable 12.5 Gram(s) IV Push once  dextrose 50% Injectable 25 Gram(s) IV Push once  droxidopa 100 milliGRAM(s) Oral three times a day  epoetin junior-epbx (RETACRIT) Injectable 6000 Unit(s) IV Push <User Schedule>  glucagon  Injectable 1 milliGRAM(s) IntraMuscular once  heparin   Injectable 5000 Unit(s) SubCutaneous every 8 hours  influenza   Vaccine 0.5 milliLiter(s) IntraMuscular once  insulin glargine Injectable (LANTUS) 3 Unit(s) SubCutaneous at bedtime  insulin lispro (ADMELOG) corrective regimen sliding scale   SubCutaneous three times a day before meals  insulin lispro (ADMELOG) corrective regimen sliding scale   SubCutaneous at bedtime  metoprolol tartrate 25 milliGRAM(s) Oral two times a day  Nephro-gabi 1 Tablet(s) Oral daily    MEDICATIONS  (PRN):  ondansetron Injectable 4 milliGRAM(s) IV Push every 6 hours PRN Nausea  sodium chloride 0.9% lock flush 10 milliLiter(s) IV Push every 1 hour PRN Pre/post blood products, medications, blood draw, and to maintain line patency      I&O's Summary    25 Jan 2021 07:01  -  26 Jan 2021 07:00  --------------------------------------------------------  IN: 400 mL / OUT: 500 mL / NET: -100 mL        PHYSICAL EXAM:  Vital Signs Last 24 Hrs  T(C): 37.3 (26 Jan 2021 04:03), Max: 37.3 (26 Jan 2021 04:03)  T(F): 99.2 (26 Jan 2021 04:03), Max: 99.2 (26 Jan 2021 04:03)  HR: 71 (26 Jan 2021 04:03) (62 - 73)  BP: 156/76 (26 Jan 2021 04:03) (149/70 - 190/95)  BP(mean): --  RR: 19 (26 Jan 2021 04:03) (18 - 20)  SpO2: 98% (26 Jan 2021 04:03) (98% - 99%)        LABS:                        8.2    5.57  )-----------( 200      ( 26 Jan 2021 06:54 )             26.3     01-25    132<L>  |  92<L>  |  116<H>  ----------------------------<  210<H>  4.4   |  19<L>  |  10.64<H>    Ca    8.0<L>      25 Jan 2021 06:23  Phos  5.0     01-25  Mg     2.3     01-25    TPro  7.0  /  Alb  3.3  /  TBili  0.3  /  DBili  x   /  AST  23  /  ALT  27  /  AlkPhos  121<H>  01-25               PROGRESS NOTE:   Authored by Alesia Rojo MD  Pager [phone] [facility]     Patient is a 60y old  Male who presents with a chief complaint of Weakness (25 Jan 2021 16:40)      SUBJECTIVE / OVERNIGHT EVENTS:  Afebrile. Negative orthostatics. Wants to go home today.    MEDICATIONS  (STANDING):  aspirin enteric coated 81 milliGRAM(s) Oral daily  calcium acetate 667 milliGRAM(s) Oral three times a day with meals  chlorhexidine 4% Liquid 1 Application(s) Topical <User Schedule>  clopidogrel Tablet 75 milliGRAM(s) Oral daily  dextrose 40% Gel 15 Gram(s) Oral once  dextrose 5%. 1000 milliLiter(s) (50 mL/Hr) IV Continuous <Continuous>  dextrose 5%. 1000 milliLiter(s) (100 mL/Hr) IV Continuous <Continuous>  dextrose 50% Injectable 25 Gram(s) IV Push once  dextrose 50% Injectable 12.5 Gram(s) IV Push once  dextrose 50% Injectable 25 Gram(s) IV Push once  droxidopa 100 milliGRAM(s) Oral three times a day  epoetin junior-epbx (RETACRIT) Injectable 6000 Unit(s) IV Push <User Schedule>  glucagon  Injectable 1 milliGRAM(s) IntraMuscular once  heparin   Injectable 5000 Unit(s) SubCutaneous every 8 hours  influenza   Vaccine 0.5 milliLiter(s) IntraMuscular once  insulin glargine Injectable (LANTUS) 3 Unit(s) SubCutaneous at bedtime  insulin lispro (ADMELOG) corrective regimen sliding scale   SubCutaneous three times a day before meals  insulin lispro (ADMELOG) corrective regimen sliding scale   SubCutaneous at bedtime  metoprolol tartrate 25 milliGRAM(s) Oral two times a day  Nephro-gabi 1 Tablet(s) Oral daily    MEDICATIONS  (PRN):  ondansetron Injectable 4 milliGRAM(s) IV Push every 6 hours PRN Nausea  sodium chloride 0.9% lock flush 10 milliLiter(s) IV Push every 1 hour PRN Pre/post blood products, medications, blood draw, and to maintain line patency      I&O's Summary    25 Jan 2021 07:01  -  26 Jan 2021 07:00  --------------------------------------------------------  IN: 400 mL / OUT: 500 mL / NET: -100 mL        PHYSICAL EXAM:  Vital Signs Last 24 Hrs  T(C): 37.3 (26 Jan 2021 04:03), Max: 37.3 (26 Jan 2021 04:03)  T(F): 99.2 (26 Jan 2021 04:03), Max: 99.2 (26 Jan 2021 04:03)  HR: 71 (26 Jan 2021 04:03) (62 - 73)  BP: 156/76 (26 Jan 2021 04:03) (149/70 - 190/95)  BP(mean): --  RR: 19 (26 Jan 2021 04:03) (18 - 20)  SpO2: 98% (26 Jan 2021 04:03) (98% - 99%)    GENERAL: Laying comfortably, NAD  EYES: EOMI  NECK: No JVD  LUNG: Clear to auscultation bilaterally; No wheeze, crackles or rhonchi  HEART: Regular rate and rhythm; No murmurs, rubs, or gallops  ABDOMEN: Soft, Nontender, Nondistended  EXTREMITIES:  No LE edema, Peripheral Pulses intact, No clubbing, cyanosis, or edema. RUE AV fistula.  PSYCH: AAOx3  NEUROLOGY: non-focal, strength 5/5 in all extremities, sensation intact  SKIN: No rashes or lesions    LABS:                        8.2    5.57  )-----------( 200      ( 26 Jan 2021 06:54 )             26.3     01-25    132<L>  |  92<L>  |  116<H>  ----------------------------<  210<H>  4.4   |  19<L>  |  10.64<H>    Ca    8.0<L>      25 Jan 2021 06:23  Phos  5.0     01-25  Mg     2.3     01-25    TPro  7.0  /  Alb  3.3  /  TBili  0.3  /  DBili  x   /  AST  23  /  ALT  27  /  AlkPhos  121<H>  01-25

## 2021-01-26 NOTE — CHART NOTE - NSCHARTNOTEFT_GEN_A_CORE
HPI:  60M PMHx ESRD on HD (initiated on 3/2020 in [facility] via LUE AVF), CAD s/p stent (06/2020, on plavix/asa), DM2, HTN, HLD, anemia (unknown cause) who present to [facility] ED for weakness that started 2 days ago. Patient report he recently came back from [facility] two days ago and for the past 2 days felt very weak, dizzy, and nausea that is progressive worsening, with 1 episode of NBNB emesis in the ED.  Per family (wife/son), patient was very lethargic, sleeping all day which prompted him to go to the ED. Pt usually gets dialysis M/W/Saturday in [facility], last got dialysis on Thursday before flight to [place]. Pt makes minimal urine at baseline. ROS negative for fever, chills, sore throat, chest pain, shortness of breath, cough, diarrhea. No history of kidney stones, recurrent UTI, family history kidney disease, hematuria.    Vascular consulted for non-working left forearm AVF. Attempted yesterday and today by HD nurse with no success on low-flow HD. Pt now s/p IR PC catheter placement 1/19    Plan  US showing distal stenosis of the cephalic vein at L AVF  - Recommend IR intervention for further management  - No vascular surgery interventions planned at this time  - Vascular will sign off, please call with any questions    Flavio Maharaj, PGY 3  Vascular surgery [phone] HPI:  60M PMHx ESRD on HD (initiated on 3/2020 in [facility] via LUE AVF), CAD s/p stent (06/2020, on plavix/asa), DM2, HTN, HLD, anemia (unknown cause) who present to [facility] ED for weakness. HD M/W/Saturday in [facility], last got dialysis on Thursday before flight to [place]. Pt makes minimal urine at baseline.  Vascular consulted for non-working left forearm AVF. Attempted with no success on low-flow HD. Pt now s/p IR PC catheter placement 1/19    Plan  US showing distal stenosis of the cephalic vein at L AVF  - Recommend IR intervention for further management  - No vascular surgery interventions planned at this time  - Vascular will sign off, please call with any questions    Flavio Maharaj, PGY 3  Vascular surgery [phone]

## 2021-01-26 NOTE — PROGRESS NOTE ADULT - ASSESSMENT
60M PMHx ESRD on HD (initiated on 3/2020 in [facility] via LUE AVF), CAD s/p stent (06/2020, on plavix/asa), DM2, HTN, HLD, anemia (unknown cause) who present to [facility] ED for weakness that started 2 days prior to admission requiring emergent dialysis found to have non working AVF now with RIJ vascular catheter and course complicated by orthostatic hypotension now resolved on droxidopa. 60M PMHx ESRD on HD (initiated on 3/2020 in [facility] via LUE AVF), CAD s/p stent (06/2020, on plavix/asa), DM2, HTN, HLD, anemia (unknown cause) who present to [facility] ED for weakness that started 2 days prior to admission requiring emergent dialysis found to have non working AVF now with RIJ vascular catheter and course complicated by orthostatic hypotension now resolved on droxidopa, pending dialysis chair.

## 2021-01-27 ENCOUNTER — TRANSCRIPTION ENCOUNTER (OUTPATIENT)
Age: 61
End: 2021-01-27

## 2021-01-27 VITALS
HEART RATE: 65 BPM | OXYGEN SATURATION: 99 % | RESPIRATION RATE: 18 BRPM | TEMPERATURE: 99 F | SYSTOLIC BLOOD PRESSURE: 168 MMHG | DIASTOLIC BLOOD PRESSURE: 84 MMHG

## 2021-01-27 LAB
ANION GAP SERPL CALC-SCNC: 18 MMOL/L — HIGH (ref 5–17)
BUN SERPL-MCNC: 100 MG/DL — HIGH (ref 7–23)
CALCIUM SERPL-MCNC: 7.9 MG/DL — LOW (ref 8.4–10.5)
CHLORIDE SERPL-SCNC: 95 MMOL/L — LOW (ref 96–108)
CO2 SERPL-SCNC: 21 MMOL/L — LOW (ref 22–31)
CREAT SERPL-MCNC: 9.07 MG/DL — HIGH (ref 0.5–1.3)
GLUCOSE BLDC GLUCOMTR-MCNC: 124 MG/DL — HIGH (ref 70–99)
GLUCOSE BLDC GLUCOMTR-MCNC: 185 MG/DL — HIGH (ref 70–99)
GLUCOSE BLDC GLUCOMTR-MCNC: 254 MG/DL — HIGH (ref 70–99)
GLUCOSE SERPL-MCNC: 205 MG/DL — HIGH (ref 70–99)
HCT VFR BLD CALC: 24.8 % — LOW (ref 39–50)
HGB BLD-MCNC: 7.6 G/DL — LOW (ref 13–17)
MAGNESIUM SERPL-MCNC: 2.2 MG/DL — SIGNIFICANT CHANGE UP (ref 1.6–2.6)
MCHC RBC-ENTMCNC: 27.3 PG — SIGNIFICANT CHANGE UP (ref 27–34)
MCHC RBC-ENTMCNC: 30.6 GM/DL — LOW (ref 32–36)
MCV RBC AUTO: 89.2 FL — SIGNIFICANT CHANGE UP (ref 80–100)
NRBC # BLD: 0 /100 WBCS — SIGNIFICANT CHANGE UP (ref 0–0)
PHOSPHATE SERPL-MCNC: 5.3 MG/DL — HIGH (ref 2.5–4.5)
PLATELET # BLD AUTO: 192 K/UL — SIGNIFICANT CHANGE UP (ref 150–400)
POTASSIUM SERPL-MCNC: 4.8 MMOL/L — SIGNIFICANT CHANGE UP (ref 3.5–5.3)
POTASSIUM SERPL-SCNC: 4.8 MMOL/L — SIGNIFICANT CHANGE UP (ref 3.5–5.3)
RBC # BLD: 2.78 M/UL — LOW (ref 4.2–5.8)
RBC # FLD: 14.4 % — SIGNIFICANT CHANGE UP (ref 10.3–14.5)
SODIUM SERPL-SCNC: 134 MMOL/L — LOW (ref 135–145)
WBC # BLD: 4.89 K/UL — SIGNIFICANT CHANGE UP (ref 3.8–10.5)
WBC # FLD AUTO: 4.89 K/UL — SIGNIFICANT CHANGE UP (ref 3.8–10.5)

## 2021-01-27 PROCEDURE — 99239 HOSP IP/OBS DSCHRG MGMT >30: CPT | Mod: GC

## 2021-01-27 PROCEDURE — 83036 HEMOGLOBIN GLYCOSYLATED A1C: CPT

## 2021-01-27 PROCEDURE — 93005 ELECTROCARDIOGRAM TRACING: CPT

## 2021-01-27 PROCEDURE — 87040 BLOOD CULTURE FOR BACTERIA: CPT

## 2021-01-27 PROCEDURE — 80053 COMPREHEN METABOLIC PANEL: CPT

## 2021-01-27 PROCEDURE — 83010 ASSAY OF HAPTOGLOBIN QUANT: CPT

## 2021-01-27 PROCEDURE — 82310 ASSAY OF CALCIUM: CPT

## 2021-01-27 PROCEDURE — 85025 COMPLETE CBC W/AUTO DIFF WBC: CPT

## 2021-01-27 PROCEDURE — 80048 BASIC METABOLIC PNL TOTAL CA: CPT

## 2021-01-27 PROCEDURE — 87340 HEPATITIS B SURFACE AG IA: CPT

## 2021-01-27 PROCEDURE — 84100 ASSAY OF PHOSPHORUS: CPT

## 2021-01-27 PROCEDURE — 84436 ASSAY OF TOTAL THYROXINE: CPT

## 2021-01-27 PROCEDURE — 86923 COMPATIBILITY TEST ELECTRIC: CPT

## 2021-01-27 PROCEDURE — 71045 X-RAY EXAM CHEST 1 VIEW: CPT

## 2021-01-27 PROCEDURE — 93975 VASCULAR STUDY: CPT

## 2021-01-27 PROCEDURE — 85018 HEMOGLOBIN: CPT

## 2021-01-27 PROCEDURE — 84165 PROTEIN E-PHORESIS SERUM: CPT

## 2021-01-27 PROCEDURE — 82435 ASSAY OF BLOOD CHLORIDE: CPT

## 2021-01-27 PROCEDURE — 99285 EMERGENCY DEPT VISIT HI MDM: CPT | Mod: 25

## 2021-01-27 PROCEDURE — 84295 ASSAY OF SERUM SODIUM: CPT

## 2021-01-27 PROCEDURE — 85027 COMPLETE CBC AUTOMATED: CPT

## 2021-01-27 PROCEDURE — 83540 ASSAY OF IRON: CPT

## 2021-01-27 PROCEDURE — 83605 ASSAY OF LACTIC ACID: CPT

## 2021-01-27 PROCEDURE — 80076 HEPATIC FUNCTION PANEL: CPT

## 2021-01-27 PROCEDURE — 83521 IG LIGHT CHAINS FREE EACH: CPT

## 2021-01-27 PROCEDURE — 82652 VIT D 1 25-DIHYDROXY: CPT

## 2021-01-27 PROCEDURE — 99261: CPT

## 2021-01-27 PROCEDURE — 76937 US GUIDE VASCULAR ACCESS: CPT

## 2021-01-27 PROCEDURE — U0003: CPT

## 2021-01-27 PROCEDURE — 86480 TB TEST CELL IMMUN MEASURE: CPT

## 2021-01-27 PROCEDURE — 86901 BLOOD TYPING SEROLOGIC RH(D): CPT

## 2021-01-27 PROCEDURE — 93990 DOPPLER FLOW TESTING: CPT

## 2021-01-27 PROCEDURE — 86803 HEPATITIS C AB TEST: CPT

## 2021-01-27 PROCEDURE — 86769 SARS-COV-2 COVID-19 ANTIBODY: CPT

## 2021-01-27 PROCEDURE — 82607 VITAMIN B-12: CPT

## 2021-01-27 PROCEDURE — 82962 GLUCOSE BLOOD TEST: CPT

## 2021-01-27 PROCEDURE — 83970 ASSAY OF PARATHORMONE: CPT

## 2021-01-27 PROCEDURE — 86706 HEP B SURFACE ANTIBODY: CPT

## 2021-01-27 PROCEDURE — 36558 INSERT TUNNELED CV CATH: CPT

## 2021-01-27 PROCEDURE — C1769: CPT

## 2021-01-27 PROCEDURE — 82728 ASSAY OF FERRITIN: CPT

## 2021-01-27 PROCEDURE — 84443 ASSAY THYROID STIM HORMONE: CPT

## 2021-01-27 PROCEDURE — 82330 ASSAY OF CALCIUM: CPT

## 2021-01-27 PROCEDURE — 97530 THERAPEUTIC ACTIVITIES: CPT

## 2021-01-27 PROCEDURE — 36430 TRANSFUSION BLD/BLD COMPNT: CPT

## 2021-01-27 PROCEDURE — 83615 LACTATE (LD) (LDH) ENZYME: CPT

## 2021-01-27 PROCEDURE — 83550 IRON BINDING TEST: CPT

## 2021-01-27 PROCEDURE — C1750: CPT

## 2021-01-27 PROCEDURE — 82272 OCCULT BLD FECES 1-3 TESTS: CPT

## 2021-01-27 PROCEDURE — 87389 HIV-1 AG W/HIV-1&-2 AB AG IA: CPT

## 2021-01-27 PROCEDURE — 86334 IMMUNOFIX E-PHORESIS SERUM: CPT

## 2021-01-27 PROCEDURE — 97161 PT EVAL LOW COMPLEX 20 MIN: CPT

## 2021-01-27 PROCEDURE — 86705 HEP B CORE ANTIBODY IGM: CPT

## 2021-01-27 PROCEDURE — 86900 BLOOD TYPING SEROLOGIC ABO: CPT

## 2021-01-27 PROCEDURE — 90935 HEMODIALYSIS ONE EVALUATION: CPT | Mod: GC

## 2021-01-27 PROCEDURE — 86850 RBC ANTIBODY SCREEN: CPT

## 2021-01-27 PROCEDURE — C1894: CPT

## 2021-01-27 PROCEDURE — 82803 BLOOD GASES ANY COMBINATION: CPT

## 2021-01-27 PROCEDURE — 83735 ASSAY OF MAGNESIUM: CPT

## 2021-01-27 PROCEDURE — U0005: CPT

## 2021-01-27 PROCEDURE — 84132 ASSAY OF SERUM POTASSIUM: CPT

## 2021-01-27 PROCEDURE — 85730 THROMBOPLASTIN TIME PARTIAL: CPT

## 2021-01-27 PROCEDURE — 82947 ASSAY GLUCOSE BLOOD QUANT: CPT

## 2021-01-27 PROCEDURE — 96374 THER/PROPH/DIAG INJ IV PUSH: CPT

## 2021-01-27 PROCEDURE — 84484 ASSAY OF TROPONIN QUANT: CPT

## 2021-01-27 PROCEDURE — P9016: CPT

## 2021-01-27 PROCEDURE — 85610 PROTHROMBIN TIME: CPT

## 2021-01-27 PROCEDURE — 82550 ASSAY OF CK (CPK): CPT

## 2021-01-27 PROCEDURE — 97116 GAIT TRAINING THERAPY: CPT

## 2021-01-27 PROCEDURE — 77001 FLUOROGUIDE FOR VEIN DEVICE: CPT

## 2021-01-27 PROCEDURE — 82746 ASSAY OF FOLIC ACID SERUM: CPT

## 2021-01-27 PROCEDURE — 93306 TTE W/DOPPLER COMPLETE: CPT

## 2021-01-27 PROCEDURE — 86704 HEP B CORE ANTIBODY TOTAL: CPT

## 2021-01-27 PROCEDURE — 94640 AIRWAY INHALATION TREATMENT: CPT

## 2021-01-27 PROCEDURE — 82553 CREATINE MB FRACTION: CPT

## 2021-01-27 PROCEDURE — 82533 TOTAL CORTISOL: CPT

## 2021-01-27 PROCEDURE — 84155 ASSAY OF PROTEIN SERUM: CPT

## 2021-01-27 PROCEDURE — 85014 HEMATOCRIT: CPT

## 2021-01-27 RX ORDER — METOPROLOL TARTRATE 50 MG
1 TABLET ORAL
Qty: 60 | Refills: 0
Start: 2021-01-27 | End: 2021-02-25

## 2021-01-27 RX ORDER — DROXIDOPA 100 MG/1
1 CAPSULE ORAL
Qty: 90 | Refills: 0
Start: 2021-01-27 | End: 2021-02-25

## 2021-01-27 RX ORDER — SITAGLIPTIN 50 MG/1
1 TABLET, FILM COATED ORAL
Qty: 30 | Refills: 0
Start: 2021-01-27 | End: 2021-02-25

## 2021-01-27 RX ORDER — CHLORHEXIDINE GLUCONATE 213 G/1000ML
1 SOLUTION TOPICAL DAILY
Refills: 0 | Status: DISCONTINUED | OUTPATIENT
Start: 2021-01-27 | End: 2021-01-27

## 2021-01-27 RX ORDER — CARVEDILOL PHOSPHATE 80 MG/1
1 CAPSULE, EXTENDED RELEASE ORAL
Qty: 0 | Refills: 0 | DISCHARGE

## 2021-01-27 RX ORDER — NIFEDIPINE 30 MG
1 TABLET, EXTENDED RELEASE 24 HR ORAL
Qty: 0 | Refills: 0 | DISCHARGE

## 2021-01-27 RX ORDER — ERYTHROPOIETIN 10000 [IU]/ML
8000 INJECTION, SOLUTION INTRAVENOUS; SUBCUTANEOUS ONCE
Refills: 0 | Status: COMPLETED | OUTPATIENT
Start: 2021-01-27 | End: 2021-01-27

## 2021-01-27 RX ADMIN — ERYTHROPOIETIN 8000 UNIT(S): 10000 INJECTION, SOLUTION INTRAVENOUS; SUBCUTANEOUS at 11:17

## 2021-01-27 RX ADMIN — Medication 3: at 16:41

## 2021-01-27 RX ADMIN — CLOPIDOGREL BISULFATE 75 MILLIGRAM(S): 75 TABLET, FILM COATED ORAL at 13:36

## 2021-01-27 RX ADMIN — Medication 25 MILLIGRAM(S): at 13:37

## 2021-01-27 RX ADMIN — HEPARIN SODIUM 5000 UNIT(S): 5000 INJECTION INTRAVENOUS; SUBCUTANEOUS at 13:36

## 2021-01-27 RX ADMIN — Medication 1 TABLET(S): at 13:35

## 2021-01-27 RX ADMIN — Medication 667 MILLIGRAM(S): at 13:35

## 2021-01-27 RX ADMIN — Medication 1: at 13:35

## 2021-01-27 RX ADMIN — Medication 81 MILLIGRAM(S): at 13:35

## 2021-01-27 RX ADMIN — Medication 667 MILLIGRAM(S): at 08:08

## 2021-01-27 RX ADMIN — HEPARIN SODIUM 5000 UNIT(S): 5000 INJECTION INTRAVENOUS; SUBCUTANEOUS at 06:43

## 2021-01-27 RX ADMIN — CHLORHEXIDINE GLUCONATE 1 APPLICATION(S): 213 SOLUTION TOPICAL at 06:41

## 2021-01-27 RX ADMIN — Medication 667 MILLIGRAM(S): at 16:42

## 2021-01-27 NOTE — PROVIDER CONTACT NOTE (OTHER) - SITUATION
midodrine held for /68.
Pt HR in 50's, asymptomatic.
Patient with episodes of 2 loose BM during this shift.
Pt blood pressure was 201/95 and manual was 196/91
Pt hypertensive (192/93)
Pt hypertensive in HD
Pt.s blood sugar at 13.09 was 61

## 2021-01-27 NOTE — PROGRESS NOTE ADULT - PROBLEM SELECTOR PLAN 4
Per nephro note, pt on nifedipine 20 TID, coreg 12.5 BID at home.  - Management of orthostatic hypotension as above  - Continue Coreg BID. Hold Nifedipine Per nephro note, pt on nifedipine 20 TID, coreg 12.5 BID at home.  - Management of orthostatic hypotension as above  - Continue Coreg BID. Management of orthostatic hypotension as above.

## 2021-01-27 NOTE — PROGRESS NOTE ADULT - PROBLEM SELECTOR PLAN 8
Dispo - Pending dialysis chair. Dispo - Pending dialysis chair. Outpatient follow up for AV fistula.

## 2021-01-27 NOTE — PROGRESS NOTE ADULT - SUBJECTIVE AND OBJECTIVE BOX
HPI/ROS  Patient complains of bilateral ear pressure/pain. Symptoms include congestion, headache described as frontal, lightheadedness, low grade fever, post nasal drip, productive cough with  yellow colored sputum, sinus pressure, tooth pain and vertigo. Onset of symptoms was 5 days ago, gradually worsening since that time. Patient is drinking plenty of fluids. .  Past history is significant for no history of pneumonia or bronchitis. Patient is non-smoker. She is having some nausea from the drainage. Mucinex otc for congestion but not helping. Visit Vitals    /74    Pulse 66    Temp 98.1 °F (36.7 °C) (Oral)    Resp 18    Ht 5' 5\" (1.651 m)    Wt 183 lb 8 oz (83.2 kg)    SpO2 94%    BMI 30.54 kg/m2     Physical Examination:   GENERAL ASSESSMENT: well developed and well nourished  SKIN: normal color, no lesions  HEAD: normocephalic  EYES: normal eyes  EARS: external auditory canal: clear and tympanic membrane: yellow, bulging  NOSE: normal external appearance and nares patent  MOUTH: yellow exudates of OP  NECK: normal  CHEST: normal air exchange, no rales, no rhonchi, no wheezes, respiratory effort normal with no retractions  HEART: regular rate and rhythm, normal S1/S2, no murmurs  ABDOMEN:  not examined  EXTREMITY: not examined  NEURO: not examined    Jessica Riggs was seen today for cold symptoms. Diagnoses and all orders for this visit:    Acute maxillary sinusitis, recurrence not specified    Other orders  -     cefdinir (OMNICEF) 300 mg capsule; Take 1 Cap by mouth two (2) times a day for 10 days. -     ondansetron (ZOFRAN ODT) 4 mg disintegrating tablet; Take 1 Tab by mouth every eight (8) hours as needed for Nausea. Reveiwed adr/se of medication  Push fluids, rest, suggested mucinex for congestion and drainage  Recheck 5-7 days if sx not improved. I have discussed the diagnosis with the patient and the intended plan as seen in the above orders.  The patient has received an after-visit summary and questions were answered concerning future plans. Patient conveyed understanding of the plan at the time of the visit.     Tram Massey, MSN, ANP  3/22/2017 [facility] Division of Kidney Diseases & Hypertension  FOLLOW UP NOTE  [phone]--------------------------------------------------------------------------------  24 hour events/subjective:  Patient was seen and examined this morning. No signs of acute distress  No acute significant events overnight  Patient denies chest pain, SOB, nausea/vomiting, abdominal pain.  Labs/Meds/Vitals reviewed  Will plan for HD today      PAST HISTORY  --------------------------------------------------------------------------------  No significant changes to PMH, PSH, FHx, SHx, unless otherwise noted    ALLERGIES & MEDICATIONS  --------------------------------------------------------------------------------  Allergies    No Known Allergies    Intolerances      Standing Inpatient Medications  aspirin enteric coated 81 milliGRAM(s) Oral daily  calcium acetate 667 milliGRAM(s) Oral three times a day with meals  chlorhexidine 2% Cloths 1 Application(s) Topical daily  chlorhexidine 4% Liquid 1 Application(s) Topical <User Schedule>  clopidogrel Tablet 75 milliGRAM(s) Oral daily  dextrose 40% Gel 15 Gram(s) Oral once  dextrose 5%. 1000 milliLiter(s) IV Continuous <Continuous>  dextrose 5%. 1000 milliLiter(s) IV Continuous <Continuous>  dextrose 50% Injectable 25 Gram(s) IV Push once  dextrose 50% Injectable 12.5 Gram(s) IV Push once  dextrose 50% Injectable 25 Gram(s) IV Push once  droxidopa 100 milliGRAM(s) Oral three times a day  epoetin junior-epbx (RETACRIT) Injectable 6000 Unit(s) IV Push <User Schedule>  glucagon  Injectable 1 milliGRAM(s) IntraMuscular once  heparin   Injectable 5000 Unit(s) SubCutaneous every 8 hours  influenza   Vaccine 0.5 milliLiter(s) IntraMuscular once  insulin glargine Injectable (LANTUS) 3 Unit(s) SubCutaneous at bedtime  insulin lispro (ADMELOG) corrective regimen sliding scale   SubCutaneous three times a day before meals  insulin lispro (ADMELOG) corrective regimen sliding scale   SubCutaneous at bedtime  metoprolol tartrate 25 milliGRAM(s) Oral two times a day  Nephro-gabi 1 Tablet(s) Oral daily    PRN Inpatient Medications  ondansetron Injectable 4 milliGRAM(s) IV Push every 6 hours PRN  sodium chloride 0.9% lock flush 10 milliLiter(s) IV Push every 1 hour PRN    REVIEW OF SYSTEMS  --------------------------------------------------------------------------------  Gen: No  fevers/chills  Respiratory: No dyspnea, cough, wheezing, hemoptysis  CV: No chest pain, PND  GI: No abdominal pain,no diarrhea  MSK: No joint pain/swelling  Neuro: No dizziness/lightheadedness    All other systems were reviewed and are negative, except as noted.  VITALS/PHYSICAL EXAM  --------------------------------------------------------------------------------  T(C): 36.8 (01-27-21 @ 04:30), Max: 37.3 (01-26-21 @ 20:03)  HR: 65 (01-27-21 @ 08:07) (65 - 74)  BP: 159/85 (01-27-21 @ 08:07) (138/62 - 170/87)  RR: 18 (01-27-21 @ 08:07) (18 - 18)  SpO2: 97% (01-27-21 @ 08:07) (94% - 100%)  Wt(kg): --        01-26-21 @ 07:01  -  01-27-21 @ 07:00  --------------------------------------------------------  IN: 400 mL / OUT: 0 mL / NET: 400 mL    01-27-21 @ 07:01  -  01-27-21 @ 09:09  --------------------------------------------------------  IN: 200 mL / OUT: 0 mL / NET: 200 mL  Physical Exam:  	Gen: NAD, well-appearing on room air  	HEENT: moist mucous membrane  	Pulm: CTA B/L, no crackles  	CV: RRR, S1S2; no rub/murmur  	GI: +BS, soft, nontender/nondistended  	MSK: Warm, no edema              Neuro: AAOx3  	Skin: Warm  	Vascular access: LUE AVF +thrills/bruits, RIJ tunneled HD cathter  LABS/STUDIES  --------------------------------------------------------------------------------              8.2    5.57  >-----------<  200      [01-26-21 @ 06:54]              26.3     137  |  97  |  64  ----------------------------<  131      [01-26-21 @ 06:53]  4.4   |  23  |  6.54        Ca     8.4     [01-26-21 @ 06:53]      Mg     2.1     [01-26-21 @ 06:53]      Phos  3.8     [01-26-21 @ 06:53]            Creatinine Trend:  SCr 6.54 [01-26 @ 06:53]  SCr 10.64 [01-25 @ 06:23]  SCr 8.92 [01-24 @ 06:43]  SCr 6.07 [01-23 @ 04:56]  SCr 9.20 [01-22 @ 06:50]        PTH -- (Ca 7.7)      [01-25-21 @ 08:11]   491    HIV Nonreact      [01-24-21 @ 09:30]    Free Light Chains: kappa 21.39, lambda 20.26, ratio = 1.06      [01-22 @ 20:37]  Immunofixation Serum:   IgG Lambda Band Identified    Reference Range: None Detected      [01-22-21 @ 20:37]  SPEP Interpretation: Gamma-Migrating Paraprotein Identified      [01-22-21 @ 20:37]

## 2021-01-27 NOTE — PROVIDER CONTACT NOTE (OTHER) - BACKGROUND
Patient 60 years old male with h/o ESRD on HD (initiated on 3/2020 in [facility] via LUE AVF), CAD s/p stent (06/2020, on plavix/asa.  Admitted for weakness from past 2 days.
Pt admitted for hyperkalemia
Patient 60 years old male with h/o ESRD on HD (initiated on 3/2020 in [facility] via LUE AVF), CAD s/p stent (06/2020, on plavix/asa.  Admitted for weakness from past 2 days.
Pt admitted for hyperkalemia
Pt is admitted for weakness. PMH of DMT2, ESRD on dialysis
Pt is admitted for weakness. PMH of DMT2, ESRD on dialysis
pt admitted with weakness, ESRD, orthostatic +

## 2021-01-27 NOTE — PROVIDER CONTACT NOTE (OTHER) - DATE AND TIME:
17-Jan-2021 14:00
18-Jan-2021 12:00
20-Jan-2021 09:50
22-Jan-2021 17:30
25-Jan-2021 18:10
27-Jan-2021 13:05

## 2021-01-27 NOTE — PROGRESS NOTE ADULT - SUBJECTIVE AND OBJECTIVE BOX
CARDIOLOGY ATTENDING    appears comfortable getting HD    he denies palpitations, syncope, nor angina, ROS otherwise -        Review of Systems:   Constitutional: [ ] fevers, [ ] chills.   Skin: [ ] dry skin. [ ] rashes.  Psychiatric: [ ] depression, [ ] anxiety.   Gastrointestinal: [ ] BRBPR, [ ] melena.   Neurological: [ ] confusion. [ ] seizures. [ ] shuffling gait.   Ears,Nose,Mouth and Throat: [ ] ear pain [ ] sore throat.   Eyes: [ ] diplopia.   Respiratory: [ ] hemoptysis. [ ] shortness of breath  Cardiovascular: See HPI above  Hematologic/Lymphatic: [ ] anemia. [ ] painful nodes. [ ] prolonged bleeding.   Genitourinary: [ ] hematuria. [ ] flank pain.   Endocrine: [ ] significant change in weight. [ ] intolerance to heat and cold.     Review of systems [ x] otherwise negative, [ ] otherwise unable to obtain    FH: no family history of sudden cardiac death in first degree relatives    SH: [ ] tobacco, [ ] alcohol, [ ] drugs    aspirin enteric coated 81 milliGRAM(s) Oral daily  calcium acetate 667 milliGRAM(s) Oral three times a day with meals  chlorhexidine 2% Cloths 1 Application(s) Topical daily  chlorhexidine 4% Liquid 1 Application(s) Topical <User Schedule>  clopidogrel Tablet 75 milliGRAM(s) Oral daily  dextrose 40% Gel 15 Gram(s) Oral once  dextrose 5%. 1000 milliLiter(s) IV Continuous <Continuous>  dextrose 5%. 1000 milliLiter(s) IV Continuous <Continuous>  dextrose 50% Injectable 25 Gram(s) IV Push once  dextrose 50% Injectable 12.5 Gram(s) IV Push once  dextrose 50% Injectable 25 Gram(s) IV Push once  droxidopa 100 milliGRAM(s) Oral three times a day  epoetin junior-epbx (RETACRIT) Injectable 6000 Unit(s) IV Push <User Schedule>  glucagon  Injectable 1 milliGRAM(s) IntraMuscular once  heparin   Injectable 5000 Unit(s) SubCutaneous every 8 hours  influenza   Vaccine 0.5 milliLiter(s) IntraMuscular once  insulin glargine Injectable (LANTUS) 3 Unit(s) SubCutaneous at bedtime  insulin lispro (ADMELOG) corrective regimen sliding scale   SubCutaneous three times a day before meals  insulin lispro (ADMELOG) corrective regimen sliding scale   SubCutaneous at bedtime  metoprolol tartrate 25 milliGRAM(s) Oral two times a day  Nephro-gabi 1 Tablet(s) Oral daily  ondansetron Injectable 4 milliGRAM(s) IV Push every 6 hours PRN  sodium chloride 0.9% lock flush 10 milliLiter(s) IV Push every 1 hour PRN                            7.6    4.89  )-----------( 192      ( 27 Jan 2021 11:12 )             24.8       01-27    134<L>  |  95<L>  |  100<H>  ----------------------------<  205<H>  4.8   |  21<L>  |  9.07<H>    Ca    7.9<L>      27 Jan 2021 11:12  Phos  5.3     01-27  Mg     2.2     01-27              T(C): 36.5 (01-27-21 @ 12:47), Max: 37.3 (01-26-21 @ 20:03)  HR: 73 (01-27-21 @ 12:47) (65 - 74)  BP: 192/93 (01-27-21 @ 12:47) (138/62 - 192/93)  RR: 20 (01-27-21 @ 12:47) (18 - 20)  SpO2: 99% (01-27-21 @ 12:47) (95% - 100%)  Wt(kg): --    I&O's Summary    26 Jan 2021 07:01  -  27 Jan 2021 07:00  --------------------------------------------------------  IN: 400 mL / OUT: 0 mL / NET: 400 mL    27 Jan 2021 07:01  -  27 Jan 2021 13:11  --------------------------------------------------------  IN: 200 mL / OUT: 1000 mL / NET: -800 mL        General: Well nourished in no acute distress. Alert and Oriented * 3.   Head: Normocephalic and atraumatic.   Neck: No JVD. No bruits. Supple. Does not appear to be enlarged.   Cardiovascular: + S1,S2 ; RRR Soft systolic murmur at the left lower sternal border. No rubs noted.    Lungs: CTA b/l. No rhonchi, rales or wheezes.   Abdomen: + BS, soft. Non tender. Non distended. No rebound. No guarding.   Extremities: No clubbing/cyanosis/edema.   Neurologic: Moves all four extremities. Full range of motion.   Skin: Warm and moist. The patient's skin has normal elasticity and good skin turgor.   Psychiatric: Appropriate mood and affect.  Musculoskeletal: Normal range of motion, normal strength        echo: mild LV dysfunction with IW motion abnormality       A/P) 61 y/o male PMH ESRD on HD, CAD s/p PCI, HTN, DM and hyperlipidemia a/w hypertensive urgency and orthostasis.     -continue asa-plavix-metoprolol for known CAD  -would also considering starting a statin as an outpatient given normal LFTs  -avoiding ACEi given labile orthostatics  -no further inpatient cardiac workup expected  -will follow until Dr Navarro returns  -d/c planning as per primary team      Porter Batista M.D., [facility]  Cardiac Electrophysiology  [facility]  [address] [place] [address], [address] [place] [address]  [email or web address]    office [phone]  pager [phone]

## 2021-01-27 NOTE — PROVIDER CONTACT NOTE (OTHER) - NAME OF MD/NP/PA/DO NOTIFIED:
Alesia Rojo
Dr. ZHANG Mammirish/team 4
Debbie York MD
Dr. ZEKE Otero
Familia Conner MD
MD Darrel - Team 4
Sonia Garcia, Team 4

## 2021-01-27 NOTE — PROGRESS NOTE ADULT - ATTENDING COMMENTS
Advanced care planning was discussed with patient and family.  Advanced care planning forms were reviewed and discussed as appropriate.  Differential diagnosis and plan of care discussed with patient after the evaluation.   Pain assessed and judicious use of narcotics when appropriate was discussed.  Importance of Fall prevention discussed.  Counseling on Smoking and Alcohol cessation was offered when appropriate.  Counseling on Diet, exercise, and medication compliance was done.
Elda Grossman MD  O:   C: [phone]
Elda Grossman MD  O:   C: [phone]
Elda Grossman MD  O:   C: [phone]
ESRD:   HD today  No UF, with sodium profile  Rest per Dr Veronica's note      Elda Grossman MD  O:   C: [phone]
ESRD: seen at HD  Tolerating well  Increase UF and DEANA     Elda Grossman MD  O: [phone]  C: [phone]
Elda Grossman MD  O:   C: [phone]
+orthostatics  asymptomatic at rest  consider midodrine in d/w renal/cardio  await outpt HD set up
demand ischemia without evidence of active MI  f/up cards/renal recs  Midodrine  outpt HD set up
Pt seen and examined. Agree with above with additional findings:    # ESRD on HD  # orthostatic hypotension  # CAD    - pt remains orthostatic but minimally symptomatic; start compression stockings  - awaiting outpatient HD setup  - continue asa, plavix, BB    Silvia Evans MD  Division of Hospital Medicine  Cell: [phone]  Office: [phone]
labile BP  some orthostatic dizziness  no midodrine currently advised  d/w renal  avoid fluid removal during HD
feels well  medically clear pending outpt HD set up
Pt seen and examined. Agree with above with additional findings:    # ESRD on HD  # orthostatic hypotension  # CAD  # anemia    - pt remains orthostatic but minimally symptomatic; start compression stockings  - awaiting outpatient HD setup  - continue epogen for anemia  - continue asa, plavix, BB  - dc planning    Silvia Evans MD  Division of Hospital Medicine  Cell: [phone]  Office: [phone]
comfortable when seen eating his meal  plan for HD per renal  K improving
seen during HD  procardia 30mg given for hypertensive urgency  no headache or vision changes  f/up renal recs
sitting in chair  feels a lot better  denies dizziness  good response to Northera  await outpt HD set up  d/w 
await outpt HD set up  orthostatic dizziness resolved  HD underway today
orthostatic syncope overnight s/p fluid removal/HD  asymptomatic when seen  monitor hemodynamics  demand ischemia  appreciate cards input  prior CAD  needs outpt HD set up

## 2021-01-27 NOTE — PROGRESS NOTE ADULT - PROBLEM SELECTOR PLAN 2
- Hold Nifedepine  - Switched to Lopressor at low dose (from Coreg) to remove alpha blockade  - Cardiology recs trial of Droxidopa - started 100 TID on 1/24 - no longer orthostatic on 1/25 AM  - Fall precautions - Switched to Lopressor at low dose (from Coreg) to remove alpha blockade  - Cardiology recs trial of Droxidopa - started 100 TID on 1/24 - no longer orthostatic on 1/25 AM  - Will discharge on metoprolol and droxidopa  - Fall precautions

## 2021-01-27 NOTE — PROVIDER CONTACT NOTE (OTHER) - ASSESSMENT
Pt A&Ox4, all other VSS. Pt denies s/s of hypertension. Pt w/o symptoms of stroke. Pt stable. Denies chest pain, pressure, or palpitations.

## 2021-01-27 NOTE — PROVIDER CONTACT NOTE (OTHER) - RECOMMENDATIONS
MD notified.
BP meds
Hypoglicemic protocol implemented. Notify Team 4
MD aware and will look into medication  Awaiting dialysis to help lower BP
MD notified.
Metoprolol 25mg PO as per regularly scheduled medication
notify MD

## 2021-01-27 NOTE — DISCHARGE NOTE NURSING/CASE MANAGEMENT/SOCIAL WORK - PATIENT PORTAL LINK FT
You can access the FollowMyHealth Patient Portal offered by [facility] by registering at the following website: [email or web address]. By joining [email or web address]’s FollowMyHealth portal, you will also be able to view your health information using other applications (apps) compatible with our system.

## 2021-01-27 NOTE — PROGRESS NOTE ADULT - NUTRITIONAL ASSESSMENT
This patient has been assessed with a concern for Malnutrition and has been determined to have a diagnosis/diagnoses of Severe protein-calorie malnutrition and Underweight (BMI < 19).    This patient is being managed with:   Diet Renal Restrictions-  For patients receiving Renal Replacement - No Protein Restr No Conc K No Conc Phos Low Sodium  Supplement Feeding Modality:  Oral  Nepro Cans or Servings Per Day:  2       Frequency:  Daily  Entered: Jan 19 2021  6:42PM    

## 2021-01-27 NOTE — PROGRESS NOTE ADULT - PROBLEM SELECTOR PLAN 1
Pt. with ESRD (presumed diabetic nephropathy) on HD in [facility] (HD initiated 3/26/2020), minimal urine. Had HD in [facility] was on 1/14/20 via LUE AVF.   - Dialysis per nephrology inpatient.  - SW close to finalizing dialysis chair outpatient. COVID swab 72 hours prior to discharge.  - Renal duplex arteries/vein (son report told vessel ds in [facility]) - notable only for renal parenchymal disease  - phosplo 667 TID   - monitor electrolyte, strict I/O, daily weight, fluid restriction 1L, renally dose medication per HD, renal diet (low K/phos)   - Vascular consult for non-functioning AVF - deciding between inpatient and outpatient intervention - recommend IR consult.  - Patient declining further inpatient work up of AVF at this time. Pt. with ESRD (presumed diabetic nephropathy) on HD in [facility] (HD initiated 3/26/2020), minimal urine. Had HD in [facility] was on 1/14/20 via LUE AVF.   - Dialysis per nephrology inpatient.  - SW close to finalizing dialysis chair outpatient. COVID swab 72 hours prior to discharge.  - Renal duplex arteries/vein (son report told vessel ds in [facility]) - notable only for renal parenchymal disease  - phosplo 667 TID   - monitor electrolyte, strict I/O, daily weight, fluid restriction 1L, renally dose medication per HD, renal diet (low K/phos)   - Vascular consult for non-functioning AVF - deciding between inpatient and outpatient intervention - recommend IR consult - But patient declining further inpatient work up of AVF at this time.

## 2021-01-27 NOTE — PROVIDER CONTACT NOTE (OTHER) - REASON
Pt hypertensive
Patient with episodes of 2 loose BM during this shift.
Pt HR in 50's, asymptomatic.
midodrine held for 
Pt blood pressure was 201/95 and manual was 196/91
Pt hypertensive in HD
Pt.s blood sugar 61

## 2021-01-27 NOTE — PROGRESS NOTE ADULT - ASSESSMENT
60M PMHx ESRD on HD (initiated in [facility] via LUE AVF), CAD s/p stent (on plavix/asa), DM2, HTN present to ED for weakness after not having hemodialysis over 3 days.  Nephrology consulted for ESRD/HD management.     # ESRD  Pt. with ESRD (presumed diabetic nephropathy) on HD in [facility] (HD initiated 3/26/2020), minimal urine.    - plan for maintenance HD today with UF as tolerated to BP  - Vascular evaluation for left AVF. Pt s/p tunneled HD catheter on 1/19/21. LUE Duplex with distal stenosis of the cephalic vein at L AVF  - Will need IR evaluation and management for RONAN AVF can be done as outpatient   - monitor electrolyte, strict I/O, daily weight, fluid restriction 1L, renally dose medication per HD, renal diet (low K/phos)  - Immunofixation:  M protein ( 0.3gm/dl)  IgG Lambda  Band identified, K/L ratio unremarkable. Likely MGUS. Will need Heme follow-up and evaluation   - Compression stockings when walking  - will need / to setup outpatient HD center prior to discharge    # Hyperkalemia- resolved  Pt with hyperkalemia in the setting of missing dialysis  - plan for HD as outline above, monitor electrolytes, low potassium diet    # Hypertensive urgency- resolved now with orthostatic hypotension  On admission patient's triage /121 likely 2/2 missing HD  - Now on northera for orthostatic hypotension  - monitor BP, low salt diet  - BP meds being adjusted    # Anemia  Pt with hx anemia likely multifactorial including ACD in the setting of ESRD. Hgb level below target at 7.9 today  - On epogen 6000U TIW on HD days   - Iron stores replete     # Renal bone disease  Pt with hyperphosphatemia in the setting of ESRD.   - continue phos binder phos-lo 667mg TID w/ meals,    -pth at goal for esrd  - Low phosphorus diet advised. Monitor serum phosphorus    If you have any questions, please feel free to contact me  Renetta Veronica  Nephrology Fellow  Pager: [phone] / [phone]  (After 5pm or on weekends please page the on-call fellow)   60M PMHx ESRD on HD (initiated in [facility] via LUE AVF), CAD s/p stent (on plavix/asa), DM2, HTN present to ED for weakness after not having hemodialysis over 3 days.  Nephrology consulted for ESRD/HD management.     # ESRD  Pt. with ESRD (presumed diabetic nephropathy) on HD in [facility] (HD initiated 3/26/2020), minimal urine.    - plan for maintenance HD today with UF as tolerated to BP  - Vascular evaluation for left AVF. Pt s/p tunneled HD catheter on 1/19/21. LUE Duplex with distal stenosis of the cephalic vein at L AVF  - Will need IR evaluation and management for RONAN AVF can be done as outpatient   - monitor electrolyte, strict I/O, daily weight, fluid restriction 1L, renally dose medication per HD, renal diet (low K/phos)  - Immunofixation:  M protein ( 0.3gm/dl)  IgG Lambda  Band identified, K/L ratio unremarkable. Likely MGUS. Will need Heme follow-up and evaluation   - Compression stockings when walking  - will need / to setup outpatient HD center prior to discharge    # Hyperkalemia- resolved  Pt with hyperkalemia in the setting of missing dialysis  - plan for HD as outline above, monitor electrolytes, low potassium diet    # Hypertensive urgency- resolved now with orthostatic hypotension which has also improved  On admission patient's triage /121 likely 2/2 missing HD  - Now on northera for orthostatic hypotension  - monitor BP, low salt diet  - BP meds being adjusted  -UF with HD    # Anemia  Pt with hx anemia likely multifactorial including ACD in the setting of ESRD. Hgb level below target at 7.9 today  - On epogen 6000U TIW on HD days. Increase dose of DEANA   - Iron stores replete     # Renal bone disease  Pt with hyperphosphatemia in the setting of ESRD.   - continue phos binder phos-lo 667mg TID w/ meals,    -pth at goal for esrd  - Low phosphorus diet advised. Monitor serum phosphorus    If you have any questions, please feel free to contact me  Renetta Veronica  Nephrology Fellow  Pager: [phone] / [phone]  (After 5pm or on weekends please page the on-call fellow)

## 2021-01-27 NOTE — PROVIDER CONTACT NOTE (OTHER) - ACTION/TREATMENT ORDERED:
Coreg d/c'd. Metoprolol started with parameters.  Ok to be off tele for HD at this time. patient remain asymptomatic while in bed at this time.
Apple Juice 4 oz and lunch tray given. Blood sugar repeated after 15 minutes 63. Pt finished all his lunch blood sugar repeated 158 at 13.34
As per MD Conner give Metoprolol 25mg PO as per regularly scheduled medication. Continue to monitor.
MD aware and will look into medication  Awaiting dialysis to help lower BP
MD states for metoprolol to be given once patient arrives to floor
Orders to continue to monitor cardiac monitoring. No intervention needed.  Safety maintained.
held midodrine based on parameters

## 2021-01-27 NOTE — PROGRESS NOTE ADULT - REASON FOR ADMISSION
Weakness

## 2021-01-27 NOTE — DISCHARGE NOTE NURSING/CASE MANAGEMENT/SOCIAL WORK - NSDCFUADDAPPT_GEN_ALL_CORE_FT
Friday 1/29/21 9:00 AM OUT PATIENT DIALYSIS (NEW) please arrive early to this first appointment.     [facility]  Address: [address] [place] [address]  Phone: [phone] [place] [phone]  Scheduled Dialysis Days M/W/F 10 AM

## 2021-01-27 NOTE — PROGRESS NOTE ADULT - PROBLEM SELECTOR PLAN 6
Likely 2/2 ESRD.   - DEANA/epogen per nephro  - Transfuse for Hgb of 7 in CAD patient today 1 unit PRBC (1/24). Likely 2/2 ESRD.   - DEANA/epogen per nephro

## 2021-01-27 NOTE — CHART NOTE - NSCHARTNOTEFT_GEN_A_CORE
Consult received for Assessment/Education 1/25. Attempted to see patient, patient at HD. Will attempt to visit after HD.    Jasmyne Lee, Dietetic Intern pager #[phone] Nutrition Follow Up Note  Patient seen for: follow up, consult received for assessment/education on 1/25.     Patient is a 60M PMHx ESRD on HD (initiated on 3/2020 in [facility] via LUE AVF), CAD s/p stent (06/2020, on plavix/asa), DM2, HTN, HLD, anemia (unknown cause) who present to [facility] ED for weakness that started 2 days prior to admission requiring emergent dialysis found to have non working AVF now with RIJ vascular catheter and course complicated by orthostatic hypotension now resolved on droxidopa. Patient cleared for discharge.    Source: RN, Medical record. Patient refused interview    Diet: Diet, Renal Restrictions:   For patients receiving Renal Replacement - No Protein Restr, No Conc K, No Conc Phos, Low Sodium  Supplement Feeding Modality:  Oral  Nepro Cans or Servings Per Day:  2       Frequency:  Daily (01-19-21 @ 18:42) [Active]    RN reports patient with good PO intake. Observed 100% of lunch tray complete. Per last RD note, patient taking 2 Nepro per day; observed 1 unopened Nepro at bedside. Per RN, patient's last BM on 1/26, no report of GI distress (nausea/vomiting/diarrhea/constipation). No known difficulty chewing swallowing. Patient not participating in interview and not a candidate for reinforcement of education at this time. RN states that patient's wife is involved in the patient's care; per discussion with RN, education materials delivered at previous RD visit will be given to wife upon discharge.    PO intake: 100%     Source for PO intake: RN, meal tray observation    Weights:  (01-27) 127.4lbs post HD  (01-25) 129.4lbs post HD  (01-22) 117.5lbs post HD  (01-20) 118.6lbs post HD  (01-18) 119.2lbs post HD  (01-17) 120.8lbs  % Weight Change: 7.4% weight gain x7 days clinically significant. Patient on HD, weight variability possibly in setting of fluid shifts vs. scale error vs. increased PO intake    Pertinent Medications: lantus, admelog, retacrit, lopressor, nephro-gabi, phoslo, zofran  Pertinent Labs: (01-27)  <H>, Cr 9.07 <H>, serum glucose 205 <H>, Na 134 <L>, Phos 5.3 <H>  (01-26) Vitamin D 6.5 <L> Ca 7.7 <L>    Skin per nursing documentation: no pressure injuries noted  Edema: none noted per chart    Estimated Needs:   [X] recalculated: based on IBW of based on  lbs/75.2 kG  Estimated Energy Needs: [27-32 kcal/kg] 2030 - 2406 kcal/day   Estimated Protein Needs: [1.0-1.2 g/kg] 75-90 g pro/day  Estimated Fluid Needs: deferred to team    Previous Nutrition Diagnosis:   1. Underweight Malnutrition, severe, chronic; Increased Nutrient Needs (protein-energy)   -> ongoing, being addressed with good po intake at this time and oral nutrition supplementation. Nutrition care plan achieved.  2. Food and Nutrition Related Knowledge Deficit   -> ongoing, being addressed with written education materials for wife upon discharge      Recommend  1) Continue current diet: Renal Restrictions  2) Continue Nepro 2x daily (provides 840kcal, 38g protein)   3) Continue Nephro-gabi as medically feasible  4) Reinforce renal and diabetes education as feasible  5) Encourage and assist with PO intake as needed   6) Continue to monitor nutritional intake, tolerance to diet prescription, weights, labs, skin integrity    RD remains available upon request and will follow up per protocol: Jasmyne Lee, Dietetic Intern pager #[phone] Nutrition Follow Up Note  Patient seen for: follow up, consult received for assessment/education on 1/25.     Patient is a 60M PMHx ESRD on HD (initiated on 3/2020 in [facility] via LUE AVF), CAD s/p stent (06/2020, on plavix/asa), DM2, HTN, HLD, anemia (unknown cause) who present to [facility] ED for weakness that started 2 days prior to admission requiring emergent dialysis found to have non working AVF now with RIJ vascular catheter and course complicated by orthostatic hypotension now resolved on droxidopa. Patient cleared for discharge.    Source: RN, Medical record. Patient refused interview    Diet: Diet, Renal Restrictions:   For patients receiving Renal Replacement - No Protein Restr, No Conc K, No Conc Phos, Low Sodium  Supplement Feeding Modality:  Oral  Nepro Cans or Servings Per Day:  2       Frequency:  Daily (01-19-21 @ 18:42) [Active]    RN reports patient with good PO intake. Observed 100% of lunch tray complete. Per last RD note, patient taking 2 Nepro per day; observed 1 unopened Nepro at bedside. Per RN, patient's last BM on 1/26, no report of GI distress (nausea/vomiting/diarrhea/constipation). No known difficulty chewing swallowing. Patient not participating in interview and not a candidate for reinforcement of education at this time. RN states that patient's wife is involved in the patient's care; per discussion with RN, education materials delivered at previous RD visit will be given to wife upon discharge.    PO intake: 100%     Source for PO intake: RN, meal tray observation    Weights:  (01-27) 127.4lbs post HD  (01-25) 129.4lbs post HD  (01-22) 117.5lbs post HD  (01-20) 118.6lbs post HD  (01-18) 119.2lbs post HD  (01-17) 120.8lbs  % Weight Change: 7.4% weight gain x7 days clinically significant. Patient on HD, weight variability possibly in setting of fluid shifts vs. scale error vs. increased PO intake    Pertinent Medications: lantus, admelog, retacrit, lopressor, nephro-gabi, phoslo, zofran  Pertinent Labs: (01-27)  <H>, Cr 9.07 <H>, serum glucose 205 <H>, Na 134 <L>, Phos 5.3 <H>  (01-26) Vitamin D 6.5 <L> Ca 7.7 <L>    Skin per nursing documentation: no pressure injuries noted  Edema: none noted per chart    Estimated Needs:   [X] no change since last assessment: based on IBW of based on  lbs/75.2 kG  Estimated Energy Needs: [25-30 kcal/kg] 9264-9946 kcal/day  Estimated Protein Needs: [1.0-1.2 g/kg] 75-90 g pro/day  Estimated Fluid Needs: deferred to team    Previous Nutrition Diagnosis:   1. Underweight Malnutrition, severe, chronic; Increased Nutrient Needs (protein-energy)   -> ongoing, being addressed with good po intake at this time and oral nutrition supplementation. Nutrition care plan achieved.  2. Food and Nutrition Related Knowledge Deficit   -> ongoing, being addressed with written education materials for wife upon discharge      Recommend  1) Continue current diet: Renal Restrictions  2) Continue Nepro 2x daily (provides 840kcal, 38g protein)   3) Continue Nephro-gabi as medically feasible  4) Reinforce renal and diabetes education as feasible  5) Encourage and assist with PO intake as needed   6) Continue to monitor nutritional intake, tolerance to diet prescription, weights, labs, skin integrity    RD remains available upon request and will follow up per protocol: Jasmyne Lee, Dietetic Intern pager #[phone]

## 2021-01-27 NOTE — PROGRESS NOTE ADULT - ASSESSMENT
60M PMHx ESRD on HD (initiated on 3/2020 in [facility] via LUE AVF), CAD s/p stent (06/2020, on plavix/asa), DM2, HTN, HLD, anemia (unknown cause) who present to [facility] ED for weakness that started 2 days prior to admission requiring emergent dialysis found to have non working AVF now with RIJ vascular catheter and course complicated by orthostatic hypotension now resolved on droxidopa, pending dialysis chair.

## 2021-01-28 PROBLEM — I10 ESSENTIAL (PRIMARY) HYPERTENSION: Chronic | Status: ACTIVE | Noted: 2021-01-16

## 2021-01-28 PROBLEM — I25.10 ATHEROSCLEROTIC HEART DISEASE OF NATIVE CORONARY ARTERY WITHOUT ANGINA PECTORIS: Chronic | Status: ACTIVE | Noted: 2021-01-16

## 2021-01-28 PROBLEM — N18.6 END STAGE RENAL DISEASE: Chronic | Status: ACTIVE | Noted: 2021-01-16

## 2021-01-28 PROBLEM — E11.9 TYPE 2 DIABETES MELLITUS WITHOUT COMPLICATIONS: Chronic | Status: ACTIVE | Noted: 2021-01-16

## 2021-02-01 ENCOUNTER — APPOINTMENT (OUTPATIENT)
Dept: INTERNAL MEDICINE | Facility: CLINIC | Age: 61
End: 2021-02-01

## 2021-02-01 VITALS
SYSTOLIC BLOOD PRESSURE: 175 MMHG | HEIGHT: 64.02 IN | BODY MASS INDEX: 18.07 KG/M2 | DIASTOLIC BLOOD PRESSURE: 90 MMHG | HEART RATE: 76 BPM | WEIGHT: 105.82 LBS

## 2021-02-01 DIAGNOSIS — Z86.79 PERSONAL HISTORY OF OTHER DISEASES OF THE CIRCULATORY SYSTEM: ICD-10-CM

## 2021-02-01 DIAGNOSIS — L29.9 PRURITUS, UNSPECIFIED: ICD-10-CM

## 2021-02-08 NOTE — HISTORY OF PRESENT ILLNESS
[Post-hospitalization from ___ Hospital] : Post-hospitalization from [unfilled] Hospital [Discharge Summary] : discharge summary [Pertinent Labs] : pertinent labs [Discharge Med List] : discharge medication list [Med Reconciliation] : medication reconciliation has been completed [Home] : at home, [unfilled] , at the time of the visit. [Other Location: e.g. Home (Enter Location, City,State)___] : at [unfilled] [Other:____] : [unfilled] [Verbal consent obtained from patient] : the patient, [unfilled] [FreeTextEntry2] : Patient is a 60 year old man with PMH ESRD on HD (since March 2020 via L AVF now R IJ vascular catheter), CAD s/p PCI, T2DM, HTN, HLD who presents via telephone visit for post-discharge follow up after hospitalization for lethargy in setting of missed dialysis session. Briefly, patient was admitted with lethargy and found to have electrolyte abnormalities, hypertensive urgency, and missed dialysis sessions (patient from [facility] with no established dialysis center in [place]) and admitted for urgent dialysis. Patient's hospital course complicated by labile blood pressure and orthostatic hypotension. Patient had several episodes of near syncope secondary to orthostatic hypotension during hospital course. Patient switched from Coreg to metoprolol and initially trialled on midodrine 5 mg TID and then switched to droxidopa TID with resolution of orthostatic hypotension. Patient's course also complicated by non-functioning R radiocephalic fistula requiring R IJ dialysis catheter placement.\par \par Since discharge from hospital, patient unable to fill droxidopa prescription since discharge. Symptoms of orthostatic hypotension resolved and son has been checking BP at home at varying positions and with no significant drop in BP.\par \par Patient has also had itching that he is taking Claritin once every to two days with resolution.\par \par Patient had been taking Gabapentin 300 PRN in [facility] and had a few tabs left since moving to the . Since no longer taking, reports having "pins" in feet and calf, more frequent in right than left. Patient ambulating without assistance.\par \par He has follow up scheduled with ophthalmologist. Still pending follow up with Cardiology and Nephrology since discharge from hospital.

## 2021-02-08 NOTE — ASSESSMENT
[FreeTextEntry1] : 60 year old man with PMH ESRD on HD, CAD s/p PCI, HTN, T2DM who presents after hospital discharge for urgent dialysis now on scheduled dialysis and with resolution of orthostatic hypotension.

## 2021-02-08 NOTE — PLAN
[FreeTextEntry1] : #Orthostatic hypotension\par Now resolved.\par - Patient with BP cuff at home and family aware of measuring at home\par \par #Hypertension\par BP elevated.\par - Encourage nephrology and cardiology follow up for possible initiation of new blood pressure medications\par - Continue with Toprol 25 mg daily\par \par #Coronary artery disease s/p PCI\par - Continue ASA, Plavix\par - Lipid panel at follow up and will plan to start statin. AST/ALT at recent hospitalization WNL\par - Encourage cardiology follow up\par \par #T2DM\par A1C 6.2 at recent hospitalization\par - Continue Januvia 25 mg\par - Repeat A1c at follow up\par \par #Diabetic neuropathy\par Previously on Gabapentin 300 mg PRN in [facility] with improvement\par - Start 100 mg Gabapentin TID\par \par #Healthcare maintenance\par Patient will need new patient comprehensive visit. Patient planning to stay in [place] per son.\par - Plan for in-person visit in 5 weeks\par - Will obtain labs as mentioned above at visit\par \par Patient discussed with Dr. Rodriguez (Attending). Approximately 30 minutes spent on encounter.\par \par Alesia Rojo MD\par PGY1 Medicine

## 2021-02-08 NOTE — REVIEW OF SYSTEMS
[Itching] : itching [Negative] : Heme/Lymph [FreeTextEntry5] : A [FreeTextEntry8] : ESRD [de-identified] : LE paraesthesias

## 2021-02-08 NOTE — END OF VISIT
[FreeTextEntry3] : I personally discussed this patient with the Resident via audio technology at the time of the visit. I agree with the assessment and plan as written, unless noted below.\par needs nephrology f/up to help with bp management given on dialysis and recent orthostatic hypotension (now resolved)\par  [Time Spent: ___ minutes] : I have spent [unfilled] minutes of time on the encounter.

## 2021-02-08 NOTE — PHYSICAL EXAM
[de-identified] : Orthostatic Vitals: Supine /90 HR 90, Sitting /68 HR 77, Standing 175/92 HR 76 No Acute Distress

## 2021-02-12 ENCOUNTER — NON-APPOINTMENT (OUTPATIENT)
Age: 61
End: 2021-02-12

## 2021-02-13 ENCOUNTER — APPOINTMENT (OUTPATIENT)
Dept: DISASTER EMERGENCY | Facility: CLINIC | Age: 61
End: 2021-02-13

## 2021-02-14 LAB — SARS-COV-2 N GENE NPH QL NAA+PROBE: NOT DETECTED

## 2021-02-16 ENCOUNTER — RESULT REVIEW (OUTPATIENT)
Age: 61
End: 2021-02-16

## 2021-02-16 ENCOUNTER — APPOINTMENT (OUTPATIENT)
Dept: ENDOVASCULAR SURGERY | Facility: CLINIC | Age: 61
End: 2021-02-16
Payer: MEDICAID

## 2021-02-16 VITALS
WEIGHT: 123.46 LBS | HEART RATE: 64 BPM | DIASTOLIC BLOOD PRESSURE: 67 MMHG | TEMPERATURE: 97.9 F | SYSTOLIC BLOOD PRESSURE: 132 MMHG | RESPIRATION RATE: 20 BRPM | OXYGEN SATURATION: 98 % | HEIGHT: 64 IN | BODY MASS INDEX: 21.08 KG/M2

## 2021-02-16 PROCEDURE — 36215Z: CUSTOM | Mod: 59

## 2021-02-16 PROCEDURE — 99072 ADDL SUPL MATRL&STAF TM PHE: CPT

## 2021-02-16 PROCEDURE — 76937 US GUIDE VASCULAR ACCESS: CPT

## 2021-02-16 PROCEDURE — 36902Z: CUSTOM

## 2021-02-16 NOTE — PAST MEDICAL HISTORY
[Increasing age ( >40 years old)] : Increasing age ( >40 years old) [No therapy indicated for cases scheduled for less than one hour] : No therapy indicated for cases scheduled for less than one hour. [FreeTextEntry1] : Malignant Hyperthermia Screening Tool and Risk of Bleeding Assessment \par \par Mr. UNA CASIANO denies family of unexpected death following Anesthesia or Exercise.\par Denies Family history of Malignant Hyperthermia, Muscle or Neuromuscular disorder and High Temperature  following exercise.\par \par Mr. UNA CASIANO denies history of Muscle Spasm, Dark or Chocolate- Colored and Unanticipated fever immediately following anaesthesia or serious exercise.\par Mr. UNA CASIANO also denies bleeding tendencies/Risks of Bleeding.\par

## 2021-02-16 NOTE — HISTORY OF PRESENT ILLNESS
[] : left radiocephalic fistula [FreeTextEntry1] : made in [facility] March 2020 [FreeTextEntry4] : yesterday via right tunneled catheter [FreeTextEntry5] : yesterday at 8:30 [FreeTextEntry6] : Dr Martell

## 2021-02-16 NOTE — REASON FOR VISIT
[Other ___] : a [unfilled] visit for [Difficult Cannulation] : difficult cannulation [Inadequate Flow within AVF] : inadequate flow within AVF [Spouse] : spouse

## 2021-02-16 NOTE — PROCEDURE
[D/C IV on discharge] : D/C IV on discharge [Resume diet] : resume diet [Site check for bleeding/hematoma/thrill/bruit] : Site check for bleeding/hematoma/thrill/bruit [Vital signs on admission the q 15 mins x2] : Vital signs on admission the q 15 mins x2 [FreeTextEntry1] : left arm fistula/fistulogram/ angioplasty

## 2021-02-17 ENCOUNTER — TRANSCRIPTION ENCOUNTER (OUTPATIENT)
Age: 61
End: 2021-02-17

## 2021-02-27 ENCOUNTER — APPOINTMENT (OUTPATIENT)
Dept: DISASTER EMERGENCY | Facility: CLINIC | Age: 61
End: 2021-02-27

## 2021-02-28 LAB — SARS-COV-2 N GENE NPH QL NAA+PROBE: NOT DETECTED

## 2021-03-01 PROBLEM — T82.898A INADEQUATE FLOW OF DIALYSIS ARTERIOVENOUS FISTULA: Status: ACTIVE | Noted: 2021-03-01

## 2021-03-02 ENCOUNTER — APPOINTMENT (OUTPATIENT)
Dept: ENDOVASCULAR SURGERY | Facility: CLINIC | Age: 61
End: 2021-03-02
Payer: MEDICAID

## 2021-03-02 ENCOUNTER — RESULT REVIEW (OUTPATIENT)
Age: 61
End: 2021-03-02

## 2021-03-02 VITALS
OXYGEN SATURATION: 65 % | DIASTOLIC BLOOD PRESSURE: 89 MMHG | BODY MASS INDEX: 21 KG/M2 | TEMPERATURE: 98.2 F | HEIGHT: 64 IN | HEART RATE: 65 BPM | WEIGHT: 123 LBS | RESPIRATION RATE: 15 BRPM | SYSTOLIC BLOOD PRESSURE: 192 MMHG

## 2021-03-02 DIAGNOSIS — T82.898A OTHER SPECIFIED COMPLICATION OF VASCULAR PROSTHETIC DEVICES, IMPLANTS AND GRAFTS, INITIAL ENCOUNTER: ICD-10-CM

## 2021-03-02 PROCEDURE — 36215Z: CUSTOM | Mod: 59

## 2021-03-02 PROCEDURE — 76937 US GUIDE VASCULAR ACCESS: CPT

## 2021-03-02 PROCEDURE — 99072 ADDL SUPL MATRL&STAF TM PHE: CPT

## 2021-03-02 PROCEDURE — 36902Z: CUSTOM

## 2021-03-02 NOTE — HISTORY OF PRESENT ILLNESS
[] : left radiocephalic fistula [FreeTextEntry1] : made in [facility] March 2020 [FreeTextEntry4] : yesterday via right tunneled catheter [FreeTextEntry5] : yesterday at 8:30 [FreeTextEntry6] : Dr Martell

## 2021-03-03 RX ORDER — METOPROLOL SUCCINATE 25 MG/1
25 TABLET, EXTENDED RELEASE ORAL
Refills: 0 | Status: DISCONTINUED | COMMUNITY
End: 2021-03-03

## 2021-03-09 ENCOUNTER — NON-APPOINTMENT (OUTPATIENT)
Age: 61
End: 2021-03-09

## 2021-03-09 ENCOUNTER — APPOINTMENT (OUTPATIENT)
Dept: INTERNAL MEDICINE | Facility: CLINIC | Age: 61
End: 2021-03-09
Payer: MEDICAID

## 2021-03-09 ENCOUNTER — OUTPATIENT (OUTPATIENT)
Dept: OUTPATIENT SERVICES | Facility: HOSPITAL | Age: 61
LOS: 1 days | End: 2021-03-09
Payer: MEDICAID

## 2021-03-09 VITALS
HEIGHT: 64 IN | SYSTOLIC BLOOD PRESSURE: 112 MMHG | WEIGHT: 121 LBS | HEART RATE: 68 BPM | OXYGEN SATURATION: 98 % | DIASTOLIC BLOOD PRESSURE: 70 MMHG | BODY MASS INDEX: 20.66 KG/M2

## 2021-03-09 DIAGNOSIS — Z99.2 END STAGE RENAL DISEASE: ICD-10-CM

## 2021-03-09 DIAGNOSIS — Z83.3 FAMILY HISTORY OF DIABETES MELLITUS: ICD-10-CM

## 2021-03-09 DIAGNOSIS — Z82.49 FAMILY HISTORY OF ISCHEMIC HEART DISEASE AND OTHER DISEASES OF THE CIRCULATORY SYSTEM: ICD-10-CM

## 2021-03-09 DIAGNOSIS — Z78.9 OTHER SPECIFIED HEALTH STATUS: ICD-10-CM

## 2021-03-09 DIAGNOSIS — E11.9 TYPE 2 DIABETES MELLITUS W/OUT COMPLICATIONS: ICD-10-CM

## 2021-03-09 DIAGNOSIS — I10 ESSENTIAL (PRIMARY) HYPERTENSION: ICD-10-CM

## 2021-03-09 DIAGNOSIS — E11.42 TYPE 2 DIABETES MELLITUS WITH DIABETIC POLYNEUROPATHY: ICD-10-CM

## 2021-03-09 DIAGNOSIS — N18.6 END STAGE RENAL DISEASE: ICD-10-CM

## 2021-03-09 DIAGNOSIS — I77.0 ARTERIOVENOUS FISTULA, ACQUIRED: Chronic | ICD-10-CM

## 2021-03-09 DIAGNOSIS — I25.10 ATHEROSCLEROTIC HEART DISEASE OF NATIVE CORONARY ARTERY W/OUT ANGINA PECTORIS: ICD-10-CM

## 2021-03-09 PROCEDURE — 99396 PREV VISIT EST AGE 40-64: CPT

## 2021-03-09 PROCEDURE — G0463: CPT

## 2021-03-09 PROCEDURE — 99386 PREV VISIT NEW AGE 40-64: CPT | Mod: GC

## 2021-03-12 DIAGNOSIS — Z01.818 ENCOUNTER FOR OTHER PREPROCEDURAL EXAMINATION: ICD-10-CM

## 2021-03-12 DIAGNOSIS — Z78.9 OTHER SPECIFIED HEALTH STATUS: ICD-10-CM

## 2021-03-12 DIAGNOSIS — E11.42 TYPE 2 DIABETES MELLITUS WITH DIABETIC POLYNEUROPATHY: ICD-10-CM

## 2021-03-12 DIAGNOSIS — Z83.3 FAMILY HISTORY OF DIABETES MELLITUS: ICD-10-CM

## 2021-03-12 DIAGNOSIS — I25.10 ATHEROSCLEROTIC HEART DISEASE OF NATIVE CORONARY ARTERY WITHOUT ANGINA PECTORIS: ICD-10-CM

## 2021-03-12 DIAGNOSIS — N18.6 END STAGE RENAL DISEASE: ICD-10-CM

## 2021-03-12 DIAGNOSIS — E11.9 TYPE 2 DIABETES MELLITUS WITHOUT COMPLICATIONS: ICD-10-CM

## 2021-03-12 DIAGNOSIS — Z00.00 ENCOUNTER FOR GENERAL ADULT MEDICAL EXAMINATION WITHOUT ABNORMAL FINDINGS: ICD-10-CM

## 2021-03-12 DIAGNOSIS — Z82.49 FAMILY HISTORY OF ISCHEMIC HEART DISEASE AND OTHER DISEASES OF THE CIRCULATORY SYSTEM: ICD-10-CM

## 2021-03-12 LAB
ALBUMIN SERPL ELPH-MCNC: 3.7 G/DL
ALP BLD-CCNC: 145 U/L
ALT SERPL-CCNC: 14 U/L
ANION GAP SERPL CALC-SCNC: 19 MMOL/L
AST SERPL-CCNC: 12 U/L
BASOPHILS # BLD AUTO: 0.03 K/UL
BASOPHILS NFR BLD AUTO: 0.4 %
BILIRUB SERPL-MCNC: 0.4 MG/DL
BUN SERPL-MCNC: 38 MG/DL
CALCIUM SERPL-MCNC: 8.5 MG/DL
CHLORIDE SERPL-SCNC: 92 MMOL/L
CHOLEST SERPL-MCNC: 170 MG/DL
CO2 SERPL-SCNC: 26 MMOL/L
CREAT SERPL-MCNC: 7.04 MG/DL
EOSINOPHIL # BLD AUTO: 0.26 K/UL
EOSINOPHIL NFR BLD AUTO: 3.6 %
ESTIMATED AVERAGE GLUCOSE: 160 MG/DL
GLUCOSE SERPL-MCNC: 220 MG/DL
HBA1C MFR BLD HPLC: 7.2 %
HCT VFR BLD CALC: 29.1 %
HDLC SERPL-MCNC: 38 MG/DL
HGB BLD-MCNC: 8.7 G/DL
IMM GRANULOCYTES NFR BLD AUTO: 0.6 %
LDLC SERPL CALC-MCNC: 92 MG/DL
LYMPHOCYTES # BLD AUTO: 1.27 K/UL
LYMPHOCYTES NFR BLD AUTO: 17.5 %
MAN DIFF?: NORMAL
MCHC RBC-ENTMCNC: 27.2 PG
MCHC RBC-ENTMCNC: 29.9 GM/DL
MCV RBC AUTO: 90.9 FL
MONOCYTES # BLD AUTO: 0.73 K/UL
MONOCYTES NFR BLD AUTO: 10.1 %
NEUTROPHILS # BLD AUTO: 4.92 K/UL
NEUTROPHILS NFR BLD AUTO: 67.8 %
NONHDLC SERPL-MCNC: 132 MG/DL
PLATELET # BLD AUTO: 253 K/UL
POTASSIUM SERPL-SCNC: 4.5 MMOL/L
PROT SERPL-MCNC: 7.8 G/DL
RBC # BLD: 3.2 M/UL
RBC # FLD: 15.1 %
SODIUM SERPL-SCNC: 138 MMOL/L
TRIGL SERPL-MCNC: 197 MG/DL
WBC # FLD AUTO: 7.25 K/UL

## 2021-03-12 NOTE — HEALTH RISK ASSESSMENT
[Good] : ~his/her~  mood as  good [0-5] : 0-5 [No] : No [1 or 2 (0 pts)] : 1 or 2 (0 points) [Never (0 pts)] : Never (0 points) [No falls in past year] : Patient reported no falls in the past year [0] : 2) Feeling down, depressed, or hopeless: Not at all (0) [None] : None [With Family] : lives with family [Unemployed] : unemployed [] :  [Feels Safe at Home] : Feels safe at home [Fully functional (bathing, dressing, toileting, transferring, walking, feeding)] : Fully functional (bathing, dressing, toileting, transferring, walking, feeding) [Fully functional (using the telephone, shopping, preparing meals, housekeeping, doing laundry, using] : Fully functional and needs no help or supervision to perform IADLs (using the telephone, shopping, preparing meals, housekeeping, doing laundry, using transportation, managing medications and managing finances) [] : No [de-identified] : Dialysis, Vacular surgery [[id number]] : 0 [Change in mental status noted] : No change in mental status noted

## 2021-03-12 NOTE — REVIEW OF SYSTEMS
[Vision Problems] : vision problems [Fever] : no fever [Chills] : no chills [Discharge] : no discharge [Hearing Loss] : no hearing loss [Sore Throat] : no sore throat [Chest Pain] : no chest pain [Palpitations] : no palpitations [Lower Ext Edema] : no lower extremity edema [Orthopena] : no orthopnea [Shortness Of Breath] : no shortness of breath [Cough] : no cough [Abdominal Pain] : no abdominal pain [Nausea] : no nausea [Diarrhea] : no diarrhea [Dysuria] : no dysuria [Hematuria] : no hematuria [Joint Pain] : no joint pain [Itching] : no itching [Skin Rash] : no skin rash [Headache] : no headache [Unsteady Walk] : no ataxia [Memory Loss] : no memory loss [Depression] : no depression [FreeTextEntry8] : Oliguria

## 2021-03-12 NOTE — HISTORY OF PRESENT ILLNESS
[FreeTextEntry1] : Presurgical testing [de-identified] : 59yo male with history of ESRD on HD MWF via L. AVF, CAD s/p PCIx2, HTN and T2DM presents to clinic for physical exam for medical clearance for cataract surgery. He is accompanied by spouse. He has no acute complaints today. He would like to establish care here in the [place] with a Cardiologist and a Nephrologist. He had a flow impediment in his AVF and underwent fistulogram with angioplasty 3/2/2021 with correction of flow and received HD through AVF yesterday. Permacath to be removed in 2 weeks. Cataract surgery is scheduled for Saturday 3/13. He reports impaired vision. Denies fevers, chills, SOB, diarrhea, abdominal pain, palpitations and chest pain. He is oliguric.

## 2021-03-12 NOTE — PLAN
[FreeTextEntry1] : #Preoperative testing:\par This patient is at elevated risk for a very low risk surgery. The revised cardiac risk index is 2, and is associated with a 10.1% risk of major cardiac events. He has risk factors of end stage renal disease, and coronary artery disease s/p 2 percutaneous stents. He may proceed with surgery with the following recommendations. Recommend continued use of beta blocker throughout the surgery, and continued aspirin and plavix unless contraindicated by Ophthalmology.\par \par #ESRD with oliguria\par -Continue HD at dialysis center MWF.\par -Nephrology referral provided to establish care\par -Continue phos binder TID\par -F/U with vascular surgery for permacath removal\par \par #T2DM without long-term use of insulin\par -A1c check today\par -Continue januvia 25mg qd\par -Impaired sensation on monofilament testing\par -Continue gabapentin 100mg TID for diabetic neuropathy\par -Podiatry referral\par -Ophtho referral for diabetic retinopathy exam\par \par #CAD s/p PCI x2 in 2019\par -Continue ASA/Plavix\par -Cardiology referral to establish care in [place]\par -EKG performed today, NSR\par \par #HTN:\par -Well controlled on metoprolol tartrate 25mg BID\par \par #HCM:\par -FOBi test given today\par -GI Referral for colon CA\par -Reports immunizations given at HD center, records request\par -PHQ2: 0\par -RTC in 1 year with records or sooner PRN\par \par Plan d/w Dr. Granados\par \par \par

## 2021-03-12 NOTE — ASSESSMENT
[FreeTextEntry1] : 59yo with history of ESRD on HD, CAD s/p PCI, HTN and T2DM presents to clinic for preoperative testing for cataract surgery.

## 2021-03-12 NOTE — PHYSICAL EXAM
[No Acute Distress] : no acute distress [Normal Sclera/Conjunctiva] : normal sclera/conjunctiva [PERRL] : pupils equal round and reactive to light [EOMI] : extraocular movements intact [Normal Oropharynx] : the oropharynx was normal [No Lymphadenopathy] : no lymphadenopathy [Supple] : supple [No Respiratory Distress] : no respiratory distress  [Clear to Auscultation] : lungs were clear to auscultation bilaterally [Normal Rate] : normal rate  [Regular Rhythm] : with a regular rhythm [Normal S1, S2] : normal S1 and S2 [No Edema] : there was no peripheral edema [No Extremity Clubbing/Cyanosis] : no extremity clubbing/cyanosis [Soft] : abdomen soft [Non Tender] : non-tender [Non-distended] : non-distended [Normal Bowel Sounds] : normal bowel sounds [No CVA Tenderness] : no CVA  tenderness [No Rash] : no rash [No Focal Deficits] : no focal deficits [Normal Gait] : normal gait [Normal Affect] : the affect was normal [Right Foot Was Examined] : Right foot ~C was examined [Left Foot Was Examined] : left foot ~C was examined [None] : no ulcers in either foot were found [___] : left foot points [unfilled] [de-identified] : /70; HR 68; SpO2 98% [de-identified] : R. IJ HD catheter present [de-identified] : L. AVF with palpable thrill

## 2021-03-23 ENCOUNTER — APPOINTMENT (OUTPATIENT)
Dept: ENDOVASCULAR SURGERY | Facility: CLINIC | Age: 61
End: 2021-03-23
Payer: MEDICAID

## 2021-03-23 PROCEDURE — 99072 ADDL SUPL MATRL&STAF TM PHE: CPT

## 2021-03-23 PROCEDURE — 36589 REMOVAL TUNNELED CV CATH: CPT

## 2021-04-27 ENCOUNTER — APPOINTMENT (OUTPATIENT)
Dept: INTERNAL MEDICINE | Facility: CLINIC | Age: 61
End: 2021-04-27

## 2021-05-14 ENCOUNTER — RESULT REVIEW (OUTPATIENT)
Age: 61
End: 2021-05-14

## 2021-05-14 ENCOUNTER — APPOINTMENT (OUTPATIENT)
Dept: ENDOVASCULAR SURGERY | Facility: CLINIC | Age: 61
End: 2021-05-14
Payer: MEDICAID

## 2021-05-14 VITALS
RESPIRATION RATE: 20 BRPM | OXYGEN SATURATION: 99 % | BODY MASS INDEX: 22.21 KG/M2 | HEART RATE: 61 BPM | WEIGHT: 130.07 LBS | HEIGHT: 64 IN | TEMPERATURE: 98.1 F | SYSTOLIC BLOOD PRESSURE: 188 MMHG | DIASTOLIC BLOOD PRESSURE: 80 MMHG

## 2021-05-14 PROCEDURE — 36215Z: CUSTOM | Mod: 59

## 2021-05-14 PROCEDURE — 36011Z: CUSTOM | Mod: 59

## 2021-05-14 PROCEDURE — 36902Z: CUSTOM | Mod: 59

## 2021-05-14 PROCEDURE — 99072 ADDL SUPL MATRL&STAF TM PHE: CPT

## 2021-05-14 PROCEDURE — 36909Z: CUSTOM

## 2021-05-14 NOTE — REASON FOR VISIT
[Other ___] : a [unfilled] visit for [Difficult Cannulation] : difficult cannulation [Inadequate Flow within AVF] : inadequate flow within AVF [Spouse] : spouse [FreeTextEntry2] : referral for high arterial pressure

## 2021-05-14 NOTE — HISTORY OF PRESENT ILLNESS
[] : left radiocephalic fistula [FreeTextEntry1] : made in [facility] March 2020 [FreeTextEntry5] : yesterday at 8:30 [FreeTextEntry4] : wednesdaytunneled catheter [FreeTextEntry6] : Dr Martell

## 2021-05-14 NOTE — PROCEDURE
[D/C IV on discharge] : D/C IV on discharge [Resume diet] : resume diet [Site check for bleeding/hematoma/thrill/bruit] : Site check for bleeding/hematoma/thrill/bruit [Vital signs on admission the q 15 mins x2] : Vital signs on admission the q 15 mins x2 [FreeTextEntry1] : left arm fistula/fistulogram/ angioplasty/coil

## 2021-06-10 ENCOUNTER — OUTPATIENT (OUTPATIENT)
Dept: OUTPATIENT SERVICES | Facility: HOSPITAL | Age: 61
LOS: 1 days | End: 2021-06-10
Payer: MEDICAID

## 2021-06-10 ENCOUNTER — NON-APPOINTMENT (OUTPATIENT)
Age: 61
End: 2021-06-10

## 2021-06-10 ENCOUNTER — LABORATORY RESULT (OUTPATIENT)
Age: 61
End: 2021-06-10

## 2021-06-10 ENCOUNTER — APPOINTMENT (OUTPATIENT)
Dept: INTERNAL MEDICINE | Facility: CLINIC | Age: 61
End: 2021-06-10
Payer: MEDICAID

## 2021-06-10 VITALS
HEIGHT: 64 IN | HEART RATE: 66 BPM | SYSTOLIC BLOOD PRESSURE: 148 MMHG | WEIGHT: 125 LBS | DIASTOLIC BLOOD PRESSURE: 70 MMHG | BODY MASS INDEX: 21.34 KG/M2 | OXYGEN SATURATION: 98 %

## 2021-06-10 DIAGNOSIS — I10 ESSENTIAL (PRIMARY) HYPERTENSION: ICD-10-CM

## 2021-06-10 DIAGNOSIS — I77.0 ARTERIOVENOUS FISTULA, ACQUIRED: Chronic | ICD-10-CM

## 2021-06-10 PROCEDURE — G0463: CPT

## 2021-06-10 PROCEDURE — 99213 OFFICE O/P EST LOW 20 MIN: CPT | Mod: GE

## 2021-06-10 RX ORDER — ROSUVASTATIN CALCIUM 10 MG/1
10 TABLET, FILM COATED ORAL
Qty: 90 | Refills: 0 | Status: DISCONTINUED | COMMUNITY
Start: 2021-03-12 | End: 2021-06-10

## 2021-06-11 LAB
ALBUMIN SERPL ELPH-MCNC: 4.2 G/DL
ALP BLD-CCNC: 171 U/L
ALT SERPL-CCNC: 12 U/L
ANION GAP SERPL CALC-SCNC: 18 MMOL/L
AST SERPL-CCNC: 14 U/L
BILIRUB SERPL-MCNC: 0.6 MG/DL
BUN SERPL-MCNC: 46 MG/DL
CALCIUM SERPL-MCNC: 8 MG/DL
CHLORIDE SERPL-SCNC: 93 MMOL/L
CO2 SERPL-SCNC: 27 MMOL/L
CREAT SERPL-MCNC: 8.1 MG/DL
GLUCOSE SERPL-MCNC: 168 MG/DL
POTASSIUM SERPL-SCNC: 4 MMOL/L
PROT SERPL-MCNC: 7.7 G/DL
SODIUM SERPL-SCNC: 138 MMOL/L

## 2021-06-11 NOTE — ASSESSMENT
[FreeTextEntry4] : Patient is at an elevated risk for a very low risk surgery. The revised cardiac risk index is 2, and is associated with a 10.1% risk of major cardiac events. He has risk factors of end stage renal disease, and coronary artery disease s/p 2 percutaneous stents. He may proceed with surgery with the following recommendations. Recommend continued use of beta blocker throughout the surgery, and continued aspirin and Plavix unless contraindicated by Ophthalmology.

## 2021-06-11 NOTE — PLAN
[FreeTextEntry1] : Recommend routine cardiology followup for further management of his CAD and DAPT.\par \par Case discussed with Dr. Adam

## 2021-06-11 NOTE — HISTORY OF PRESENT ILLNESS
[Aortic Stenosis] : no aortic stenosis [Atrial Fibrillation] : no atrial fibrillation [Recent Myocardial Infarction] : no recent myocardial infarction [Implantable Device/Pacemaker] : no implantable device/pacemaker [Chronic Anticoagulation] : no chronic anticoagulation [FreeTextEntry1] : R eye vitrectomy with endolaser [FreeTextEntry2] : 6/17/21 [FreeTextEntry3] : Dr. Amos Rivero  [FreeTextEntry4] : Mr. UNA CASIANO is a 60 year old male with history of  ESRD on HD MWF via LUE AVF, CAD s/p PCIx2, HTN and T2DM presents to clinic for physical exam for medical clearance for R eye vitrectomy w/ endolaser. He has no acute complaints today, no new medical issues, and no recent illness.  He previously underwent cataract surgery 3/13 with improvement in his vision, but reports bleeding noted on followup (?diabetic retinopathy with vitreous hemorrhage).  [FreeTextEntry7] : TTE 3/2/21 showed normal LV dimensions and function. Mild MR noted.

## 2021-06-11 NOTE — END OF VISIT
[FreeTextEntry3] : Having low risk ocular procedure.  Labs/ECG done at request of ambulatory center; has hx CAD/renal dz, but activity tolerance good, card/pulm rosyx negative; ECG w chronic changes in lateral wall; has no actively anginal syx ; may proceed.

## 2021-06-16 ENCOUNTER — TRANSCRIPTION ENCOUNTER (OUTPATIENT)
Age: 61
End: 2021-06-16

## 2021-06-17 ENCOUNTER — OUTPATIENT (OUTPATIENT)
Dept: OUTPATIENT SERVICES | Facility: HOSPITAL | Age: 61
LOS: 1 days | Discharge: HOME | End: 2021-06-17

## 2021-06-17 DIAGNOSIS — I77.0 ARTERIOVENOUS FISTULA, ACQUIRED: Chronic | ICD-10-CM

## 2021-06-17 LAB
ALBUMIN SERPL ELPH-MCNC: 4.2 G/DL — SIGNIFICANT CHANGE UP (ref 3.3–5)
ALP SERPL-CCNC: 171 U/L — HIGH (ref 40–120)
ALT FLD-CCNC: 9 U/L — LOW (ref 10–45)
ANION GAP SERPL CALC-SCNC: 15 MMOL/L — SIGNIFICANT CHANGE UP (ref 5–17)
AST SERPL-CCNC: 16 U/L — SIGNIFICANT CHANGE UP (ref 10–40)
BILIRUB SERPL-MCNC: 0.7 MG/DL — SIGNIFICANT CHANGE UP (ref 0.2–1.2)
BUN SERPL-MCNC: 45 MG/DL — HIGH (ref 7–23)
CALCIUM SERPL-MCNC: 8.8 MG/DL — SIGNIFICANT CHANGE UP (ref 8.4–10.5)
CHLORIDE SERPL-SCNC: 98 MMOL/L — SIGNIFICANT CHANGE UP (ref 96–108)
CO2 SERPL-SCNC: 29 MMOL/L — SIGNIFICANT CHANGE UP (ref 22–31)
CREAT SERPL-MCNC: 8.67 MG/DL — HIGH (ref 0.5–1.3)
GLUCOSE SERPL-MCNC: 169 MG/DL — HIGH (ref 70–99)
POTASSIUM SERPL-MCNC: 5.6 MMOL/L — HIGH (ref 3.5–5.3)
POTASSIUM SERPL-SCNC: 5.6 MMOL/L — HIGH (ref 3.5–5.3)
PROT SERPL-MCNC: 8.8 G/DL — HIGH (ref 6–8.3)
SODIUM SERPL-SCNC: 142 MMOL/L — SIGNIFICANT CHANGE UP (ref 135–145)

## 2021-06-22 DIAGNOSIS — Z01.818 ENCOUNTER FOR OTHER PREPROCEDURAL EXAMINATION: ICD-10-CM

## 2021-07-07 ENCOUNTER — INPATIENT (INPATIENT)
Facility: HOSPITAL | Age: 61
LOS: 2 days | Discharge: HOME | DRG: 682 | End: 2021-07-10
Attending: INTERNAL MEDICINE | Admitting: HOSPITALIST
Payer: MEDICAID

## 2021-07-07 VITALS
RESPIRATION RATE: 18 BRPM | TEMPERATURE: 99 F | HEART RATE: 65 BPM | WEIGHT: 123.46 LBS | OXYGEN SATURATION: 96 % | DIASTOLIC BLOOD PRESSURE: 92 MMHG | HEIGHT: 64 IN | SYSTOLIC BLOOD PRESSURE: 192 MMHG

## 2021-07-07 DIAGNOSIS — I77.0 ARTERIOVENOUS FISTULA, ACQUIRED: Chronic | ICD-10-CM

## 2021-07-07 LAB
ALBUMIN SERPL ELPH-MCNC: 3.9 G/DL — SIGNIFICANT CHANGE UP (ref 3.3–5)
ALP SERPL-CCNC: 154 U/L — HIGH (ref 40–120)
ALT FLD-CCNC: 10 U/L — SIGNIFICANT CHANGE UP (ref 10–45)
ANION GAP SERPL CALC-SCNC: 28 MMOL/L — HIGH (ref 5–17)
AST SERPL-CCNC: 28 U/L — SIGNIFICANT CHANGE UP (ref 10–40)
BASE EXCESS BLDV CALC-SCNC: 1.7 MMOL/L — SIGNIFICANT CHANGE UP (ref -2–2)
BILIRUB SERPL-MCNC: 0.8 MG/DL — SIGNIFICANT CHANGE UP (ref 0.2–1.2)
BUN SERPL-MCNC: 78 MG/DL — HIGH (ref 7–23)
CA-I SERPL-SCNC: 0.9 MMOL/L — LOW (ref 1.12–1.3)
CALCIUM SERPL-MCNC: 8 MG/DL — LOW (ref 8.4–10.5)
CHLORIDE BLDV-SCNC: 92 MMOL/L — LOW (ref 96–108)
CHLORIDE SERPL-SCNC: 86 MMOL/L — LOW (ref 96–108)
CO2 BLDV-SCNC: 30 MMOL/L — SIGNIFICANT CHANGE UP (ref 22–30)
CO2 SERPL-SCNC: 20 MMOL/L — LOW (ref 22–31)
CREAT SERPL-MCNC: 11.02 MG/DL — HIGH (ref 0.5–1.3)
GAS PNL BLDV: 131 MMOL/L — LOW (ref 135–145)
GAS PNL BLDV: SIGNIFICANT CHANGE UP
GAS PNL BLDV: SIGNIFICANT CHANGE UP
GLUCOSE BLDV-MCNC: 71 MG/DL — SIGNIFICANT CHANGE UP (ref 70–99)
GLUCOSE SERPL-MCNC: 78 MG/DL — SIGNIFICANT CHANGE UP (ref 70–99)
HCO3 BLDV-SCNC: 28 MMOL/L — SIGNIFICANT CHANGE UP (ref 21–29)
HCT VFR BLD CALC: 39 % — SIGNIFICANT CHANGE UP (ref 39–50)
HCT VFR BLDA CALC: 40 % — SIGNIFICANT CHANGE UP (ref 39–50)
HGB BLD CALC-MCNC: 13.2 G/DL — SIGNIFICANT CHANGE UP (ref 13–17)
HGB BLD-MCNC: 12.5 G/DL — LOW (ref 13–17)
LACTATE BLDV-MCNC: 2.2 MMOL/L — HIGH (ref 0.7–2)
MCHC RBC-ENTMCNC: 28.3 PG — SIGNIFICANT CHANGE UP (ref 27–34)
MCHC RBC-ENTMCNC: 32.1 GM/DL — SIGNIFICANT CHANGE UP (ref 32–36)
MCV RBC AUTO: 88.4 FL — SIGNIFICANT CHANGE UP (ref 80–100)
NRBC # BLD: 0 /100 WBCS — SIGNIFICANT CHANGE UP (ref 0–0)
PCO2 BLDV: 57 MMHG — HIGH (ref 35–50)
PH BLDV: 7.32 — LOW (ref 7.35–7.45)
PLATELET # BLD AUTO: 132 K/UL — LOW (ref 150–400)
PO2 BLDV: 30 MMHG — SIGNIFICANT CHANGE UP (ref 25–45)
POTASSIUM BLDV-SCNC: 5.9 MMOL/L — HIGH (ref 3.5–5.3)
POTASSIUM SERPL-MCNC: 5.9 MMOL/L — HIGH (ref 3.5–5.3)
POTASSIUM SERPL-SCNC: 5.9 MMOL/L — HIGH (ref 3.5–5.3)
PROT SERPL-MCNC: 8.9 G/DL — HIGH (ref 6–8.3)
RBC # BLD: 4.41 M/UL — SIGNIFICANT CHANGE UP (ref 4.2–5.8)
RBC # FLD: 14.8 % — HIGH (ref 10.3–14.5)
SAO2 % BLDV: 45 % — LOW (ref 67–88)
SODIUM SERPL-SCNC: 134 MMOL/L — LOW (ref 135–145)
WBC # BLD: 10.32 K/UL — SIGNIFICANT CHANGE UP (ref 3.8–10.5)
WBC # FLD AUTO: 10.32 K/UL — SIGNIFICANT CHANGE UP (ref 3.8–10.5)

## 2021-07-07 PROCEDURE — 99291 CRITICAL CARE FIRST HOUR: CPT

## 2021-07-07 PROCEDURE — 93010 ELECTROCARDIOGRAM REPORT: CPT

## 2021-07-07 PROCEDURE — 71045 X-RAY EXAM CHEST 1 VIEW: CPT | Mod: 26

## 2021-07-07 RX ORDER — INSULIN HUMAN 100 [IU]/ML
5 INJECTION, SOLUTION SUBCUTANEOUS ONCE
Refills: 0 | Status: COMPLETED | OUTPATIENT
Start: 2021-07-07 | End: 2021-07-07

## 2021-07-07 RX ORDER — CALCIUM GLUCONATE 100 MG/ML
2 VIAL (ML) INTRAVENOUS ONCE
Refills: 0 | Status: COMPLETED | OUTPATIENT
Start: 2021-07-07 | End: 2021-07-07

## 2021-07-07 RX ORDER — DEXTROSE 50 % IN WATER 50 %
25 SYRINGE (ML) INTRAVENOUS ONCE
Refills: 0 | Status: COMPLETED | OUTPATIENT
Start: 2021-07-07 | End: 2021-07-07

## 2021-07-07 RX ORDER — ALBUTEROL 90 UG/1
2.5 AEROSOL, METERED ORAL ONCE
Refills: 0 | Status: COMPLETED | OUTPATIENT
Start: 2021-07-07 | End: 2021-07-07

## 2021-07-07 RX ORDER — DEXTROSE 50 % IN WATER 50 %
50 SYRINGE (ML) INTRAVENOUS ONCE
Refills: 0 | Status: COMPLETED | OUTPATIENT
Start: 2021-07-07 | End: 2021-07-07

## 2021-07-07 RX ORDER — METOPROLOL TARTRATE 50 MG
25 TABLET ORAL ONCE
Refills: 0 | Status: COMPLETED | OUTPATIENT
Start: 2021-07-07 | End: 2021-07-07

## 2021-07-07 RX ADMIN — Medication 50 MILLILITER(S): at 22:33

## 2021-07-07 RX ADMIN — ALBUTEROL 2.5 MILLIGRAM(S): 90 AEROSOL, METERED ORAL at 23:26

## 2021-07-07 RX ADMIN — Medication 25 MILLIGRAM(S): at 22:35

## 2021-07-07 RX ADMIN — INSULIN HUMAN 5 UNIT(S): 100 INJECTION, SOLUTION SUBCUTANEOUS at 22:34

## 2021-07-07 RX ADMIN — Medication 200 GRAM(S): at 22:35

## 2021-07-07 RX ADMIN — Medication 25 MILLILITER(S): at 22:34

## 2021-07-07 NOTE — ED PROVIDER NOTE - PROGRESS NOTE DETAILS
Home metoprolol for elevated BP  K 5.6: calcium gluconate/ insulin/ D50. Repeat CMP pending  Nephrology made aware: medical management **ATTENDING ADDENDUM (Dr. South Araya): agree with labetalol for management of patient's persistent hypertension. NO evidence of hypertensive crisis, malignant hypertension, vascular sequela e.g. dissection or intracerebral hemorrhage, congestive heart failure, acute coronary syndrome, or other worrisome findings. Anticipatory guidance provided. Overnight ED team Will continue to observe and monitor closely until definitive disposition and placement are made. Repeat CMP pending  Nephrology made aware: medical management  **ATTENDING ADDENDUM (Dr. South Araya): patient serially evaluated throughout ED course by ED team. NO acute deterioration up to this time in the ED. Agree with notation by IM resident Belia. Personally reassessed. Agree with admission for dialysis in AM and therapeutic intensity. Will continue to observe and monitor closely. Home metoprolol for elevated BP  K 5.6: calcium gluconate/ insulin/ D50.  **ATTENDING ADDENDUM (Dr. South Araya): patient serially evaluated throughout ED course by ED team. NO acute deterioration up to this time in the ED. Agree with above notation by IM resident Belia. Agree with interventions for hyperkalemia in context of ESRD and missed HD. Will continue to observe and monitor closely. Will check serial ECGs. Anticipatory guidance provided by ED team. Repeat CMP pending  Nephrology made aware: medical management  Likely Admit for AM HD and BP management  **ATTENDING ADDENDUM (Dr. South Araya): patient serially evaluated throughout ED course by ED team. NO acute deterioration up to this time in the ED. Agree with notation by IM resident Belia. Personally reassessed. Agree with admission for dialysis in AM and therapeutic intensity. Will continue to observe and monitor closely. Repeat CMP pending  Nephrology made aware: medical management for now. Potential HD in AM.   Positive for Metapneumovirus  Likely Admit for AM HD and BP management  **ATTENDING ADDENDUM (Dr. South Araya): patient serially evaluated throughout ED course by ED team. NO acute deterioration up to this time in the ED. Agree with notation by IM resident Belia. Personally reassessed. Agree with admission for dialysis in AM and therapeutic intensity. Will continue to observe and monitor closely.

## 2021-07-07 NOTE — ED ADULT NURSE NOTE - PMH
CAD (coronary artery disease)  s/p sent in 06/2020  ESRD on hemodialysis  M/W/Saturday at home in [facility]  HTN (hypertension)    T2DM (type 2 diabetes mellitus)

## 2021-07-07 NOTE — ED PROVIDER NOTE - CHPI ED SYMPTOMS POS
**ATTENDING ADDENDUM (Dr. South Araya): POSITIVE generalized weakness, malaise, and fatigue/COUGH/DECREASED EATING/DRINKING

## 2021-07-07 NOTE — ED PROVIDER NOTE - NS ED ROS FT
CONSTITUTIONAL:  No weight loss, fever, chills, weakness or fatigue.  HEENT:  Eyes:  No visual loss, blurred vision, double vision or yellow sclerae.   SKIN:  No rash or itching.  CARDIOVASCULAR:  No chest pain, chest pressure or chest discomfort. No palpitations.  RESPIRATORY:  No shortness of breath. +cough  GASTROINTESTINAL:  No anorexia, nausea, vomiting or diarrhea. No abdominal pain or blood.  GENITOURINARY:  Denies hematuria, dysuria.   NEUROLOGICAL:  No headache, dizziness, syncope, paralysis, ataxia, numbness or tingling in the extremities. No change in bowel or bladder control.  MUSCULOSKELETAL:  No muscle, back pain, joint pain or stiffness.  HEMATOLOGIC:  No anemia, bleeding or bruising. CONSTITUTIONAL:  No weight loss, fever, chills, weakness or fatigue.  HEENT:  Eyes:  No visual loss, blurred vision, double vision or yellow sclerae.   SKIN:  No rash or itching.  CARDIOVASCULAR:  No chest pain, chest pressure or chest discomfort. No palpitations.  RESPIRATORY:  No shortness of breath. +cough  GASTROINTESTINAL:  No anorexia, nausea, vomiting or diarrhea. No abdominal pain or blood.  GENITOURINARY:  Denies hematuria, dysuria.   NEUROLOGICAL:  No headache, dizziness, syncope, paralysis, ataxia, numbness or tingling in the extremities. No change in bowel or bladder control.  MUSCULOSKELETAL:  No muscle, back pain, joint pain or stiffness.  HEMATOLOGIC:  No anemia, bleeding or bruising.  **ATTENDING ADDENDUM (Dr. Suoth Araya): During my interview with the patient, I have personally obtained and/or have directly verified the elements in the past medical/surgical history and other histories as noted earlier in the EMR, in conjunction with the other members (EM resident/PA/NP) of the patient care team. I have also personally obtained and/or have directly verified/reviewed the review of systems as documented below, in conjunction with the other members (EM resident/PA/NP) of the patient care team. CONSTITUTIONAL:  No weight loss, fever, chills, weakness or fatigue.  HEENT:  Eyes:  No visual loss, blurred vision, double vision or yellow sclerae.   SKIN:  No rash or itching.  CARDIOVASCULAR:  No chest pain, chest pressure or chest discomfort. No palpitations.  RESPIRATORY:  No shortness of breath. +cough  GASTROINTESTINAL:  No anorexia, nausea, vomiting or diarrhea. No abdominal pain or blood.  GENITOURINARY:  Denies hematuria, dysuria.   NEUROLOGICAL:  No headache, dizziness, syncope, paralysis, ataxia, numbness or tingling in the extremities. No change in bowel or bladder control.  MUSCULOSKELETAL:  No muscle, back pain, joint pain or stiffness.  HEMATOLOGIC:  No anemia, bleeding or bruising.  **ATTENDING ADDENDUM (Dr. South Araya): During my interview with the patient, I have personally obtained and/or have directly verified the elements in the past medical/surgical history and other histories as noted earlier in the EMR, in conjunction with the other members (EM resident/PA/NP) of the patient care team. I have also personally obtained and/or have directly verified/reviewed the review of systems as documented below, in conjunction with the other members (EM resident/PA/NP) of the patient care team. In contrast to the above, the patient does have generalized weakness and fatigue as noted in the HPI.

## 2021-07-07 NOTE — ED PROVIDER NOTE - CLINICAL SUMMARY MEDICAL DECISION MAKING FREE TEXT BOX
#Weakness/ Cough #Weakness/ Cough  positive for metapneumovirus  febrile to 101  blood cx x2 sent    #Missed HD session  K 5.3 after shift.   No signs of volume overload on exam.   Nephro made aware overnight: medically manage for now. #Weakness/ Cough  positive for metapneumovirus; contact precautions  febrile to 101  blood cx x2 sent    #Missed HD session  K 5.3 after shift.   No signs of volume overload on exam.   Nephro made aware overnight: medically manage for now. #Weakness/ Cough  positive for metapneumovirus; contact precautions  febrile to 101  blood cx x2 sent  #Missed HD session  K 5.3 after shift.   No signs of volume overload on exam.   Nephro made aware overnight: medically manage for now.  **ATTENDING MEDICAL DECISION MAKING/SYNTHESIS (Dr. South Araya): I have reviewed the Chief Concern(s), the HPI, the ROS, and have directly performed and confirmed the findings on the Physical Examination. I have reviewed the medical decision making with all providers, as applicable. The PROBLEM REPRESENTATION at this time is: 60-year-old man with past medical/surgical history including ESRD, on HD (M/W/F, missed today's treatment), Coronary Artery Disease s/p catheterization and stent placement, orthostatic hypotension, Diabetes Mellitus, Hypertension, hyperlipidemia, and anemia, now presenting with concern for generalized weakness, malaise and decreased oral intake with associated non-productive cough for two days. Missed today's hemodialysis. The MOST LIKELY DIAGNOSIS, and the LIST OF DIFFERENTIAL DIAGNOSES, includes (but is not limited to) the following: viral syndrome e.g. acute COVID infection (SARS-CoV-2) or other viral upper/lower respiratory tract infection e.g. RSV, hMPV, influenza A/B, or equivalent (may exist as co-infection), ARDS, serious bacterial infection e.g. CAP (typical or atypical pneumonia), tuberculosis (unlikely), empyema, UTI, other  infection, intraabdominal infection, or equivalent, worsening anemia (e.g. anemia of chronic disease with aplastic component, sequestration, GI hemorrhage, or other etiology considered), pulmonary embolism/DVT, shock, dehydration, electrolyte-metabolic-endocrine derangements (hyperkalemia or other serious electrolyte derangements considered re: patient missed HD), acute coronary syndrome, arrhythmia, cerebrovascular accident, transient ischemic attack, or equivalent. The likelihood of each of these diagnoses has been appropriately considered in the context of this patient's presentation and my evaluation. PLAN: as described in EMR, including diagnostics, therapeutics and consultation as clinically warranted. I will continue to reevaluate the patient, including the results of all testing, and monitor response to therapy throughout the patient's course in the ED.

## 2021-07-07 NOTE — ED PROVIDER NOTE - OBJECTIVE STATEMENT
60M PMHx ESRD on HD (initiated on 3/2020 in [facility] via LUE AVF), CAD s/p stent (06/2020, on plavix/asa), DM2, HTN, HLD, anemia (unknown cause) who presents  for weakness that started 2 days ago.     Pt missed his HD today.     Pt hypertensive to  in ED. 60M PMHx ESRD on HD (initiated on 3/2020 in [facility] via LUE AVF), orthostatic hypotension, CAD s/p stent (06/2020, on plavix/asa), DM2, HTN, HLD, anemia who presents for cough and decreased PO intake that began on Monday. Pt states cough is non-productive and intermittent. He states that he has been I    Pt missed his HD today and did not take any medications today due to feeling poorly. Pt hypertensive to  in ED. 60M PMHx ESRD on HD (initiated on 3/2020 in [facility] via LUE AVF) MWF, orthostatic hypotension, CAD s/p stent (06/2020, on plavix/asa), DM2, HTN, HLD, anemia who presents for cough and decreased PO intake that began on Monday. Pt states cough is non-productive and intermittent. He states that he has been I    Pt missed his HD today and did not take any medications today due to feeling poorly. Pt hypertensive to  in ED. 60M PMHx ESRD on HD (initiated on 3/2020 in [facility] via LUE AVF) MWF, orthostatic hypotension, CAD s/p stent (06/2020, on plavix/asa), DM2, HTN, HLD, anemia who presents for cough and decreased PO intake that began on Monday. Pt states cough is non-productive and intermittent. He states that he has been I  Pt missed his HD today and did not take any medications today due to feeling poorly. Pt hypertensive to  in ED.  **ATTENDING ADDENDUM (Dr. South Araya): I attest that I have directly and personally interviewed and examined this patient and elicited a comparable history of present illness and review of systems as documented, along with my IM resident on rotation in the ED. I attest that I have made significant contributions to the documentation where necessary and as noted in the EMR. 60M PMHx ESRD on HD (initiated on 3/2020 in [facility] via LUE AVF) MWF, orthostatic hypotension, CAD s/p stent (06/2020, on plavix/asa), DM2, HTN, HLD, anemia who presents for cough and decreased PO intake that began on Monday. Pt states cough is non-productive and intermittent. Denies rhinorrhea, postnasal drip. Pt has not taken the COVID vaccine.   Pt missed his HD today and did not take any medications today due to feeling poorly. Pt hypertensive to  in ED.  **ATTENDING ADDENDUM (Dr. South Araya): I attest that I have directly and personally interviewed and examined this patient and elicited a comparable history of present illness and review of systems as documented, along with my IM resident on rotation in the ED. I attest that I have made significant contributions to the documentation where necessary and as noted in the EMR. 60M PMHx ESRD on HD (initiated on 3/2020 in [facility] via LUE AVF) MWF, orthostatic hypotension, CAD s/p stent (06/2020, on plavix/asa), DM2, HTN, HLD, anemia who presents for cough and decreased PO intake that began on Monday. Pt states cough is non-productive and intermittent. Denies rhinorrhea, postnasal drip. Pt has not taken the COVID vaccine.     PCP: [phone]  Pt missed his HD today and did not take any medications today due to feeling poorly. Pt hypertensive to  in ED.  **ATTENDING ADDENDUM (Dr. South Araya): I attest that I have directly and personally interviewed and examined this patient and elicited a comparable history of present illness and review of systems as documented, along with my IM resident on rotation in the ED. I attest that I have made significant contributions to the documentation where necessary and as noted in the EMR. 60M PMHx ESRD on HD (initiated on 3/2020 in [facility] via LUE AVF) MWF, orthostatic hypotension, CAD s/p stent (06/2020, on plavix/asa), DM2, HTN, HLD, anemia who presents for cough and decreased PO intake that began on Monday. Pt states cough is non-productive and intermittent. Denies rhinorrhea, postnasal drip. Pt has not taken the COVID vaccine.   PCP: [phone]  Pt missed his HD today and did not take any medications today due to feeling poorly. Pt hypertensive to  in ED.  **ATTENDING ADDENDUM (Dr. South Araya): I attest that I have directly and personally interviewed and examined this patient and elicited a comparable history of present illness and review of systems as documented, along with my IM resident on rotation in the ED. I attest that I have made significant contributions to the documentation where necessary and as noted in the EMR.

## 2021-07-07 NOTE — ED PROVIDER NOTE - CHIEF COMPLAINT
The patient is a 60y Male complaining of weakness/ decreased PO intake The patient is a 60-year-old man with past medical/surgical history including ESRD, on HD (M/W/F, missed today's treatment), Coronary Artery Disease s/p catheterization and stent placement, orthostatic hypotension, Diabetes Mellitus, Hypertension, hyperlipidemia, and anemia, now presenting with concern for generalized weakness, malaise and decreased oral intake. Missed today's hemodialysis.

## 2021-07-07 NOTE — ED PROVIDER NOTE - INTERPRETATION
Order random vancomycin lab.
**ATTENDING ADDENDUM (Dr. South Araya): ECG reviewed personally by me./abnormal

## 2021-07-07 NOTE — ED PROVIDER NOTE - CARDIOVASCULAR [+], MLM
**ATTENDING ADDENDUM (Dr. South Araya): POSITIVE history of Hypertension, hyperlipidemia, and Coronary Artery Disease s/p catheterization.

## 2021-07-07 NOTE — ED PROVIDER NOTE - PLAN OF CARE
Possible in s/o viral cold vs missed HD session **ATTENDING ADDENDUM (Dr. South Araya): Goals of care include resolution of emergent/urgent symptoms and concerns, and restoration to baseline level of homeostasis.

## 2021-07-07 NOTE — ED PROVIDER NOTE - ENMT, MLM
Airway patent, Nasal mucosa clear. Mouth with normal mucosa. Throat has no vesicles, no oropharyngeal exudates and uvula is midline. **ATTENDING ADDENDUM (Dr. South Araya): NO droop.

## 2021-07-07 NOTE — ED PROVIDER NOTE - CARE PLAN
Principal Discharge DX:	Weakness  Assessment and plan of treatment:	Possible in s/o viral cold vs missed HD session   Principal Discharge DX:	Malaise and fatigue  Goal:	**ATTENDING ADDENDUM (Dr. South Araya): Goals of care include resolution of emergent/urgent symptoms and concerns, and restoration to baseline level of homeostasis.  Assessment and plan of treatment:	Possible in s/o viral cold vs missed HD session  Secondary Diagnosis:	ESRD on hemodialysis  Secondary Diagnosis:	HTN (hypertension)  Secondary Diagnosis:	T2DM (type 2 diabetes mellitus)  Secondary Diagnosis:	Anemia  Secondary Diagnosis:	Hyperkalemia

## 2021-07-07 NOTE — ED PROVIDER NOTE - CONSTITUTIONAL MOOD
**ATTENDING ADDENDUM (Dr. South Araya): tired and fatigued but awake, cooperative, consolable, and interactive.

## 2021-07-07 NOTE — ED ADULT NURSE NOTE - OBJECTIVE STATEMENT
patient is a 61 y/o M with hx of ESRD on HD M,W,F at home who presents to the ED c/o increased weakness and decreased po intake x "a few days". patient states that he has been unable to tolerate any of his po meds today because "I just don't feel good and don't think id be able to keep anything down". patient states that he is normally ambulatory independently however he "just feels to weak".   patient is a&ox4, PERRL. strong peripheral pulses, strong extremities x4. respirations even and unlabored with symmetrical chest rise. abdomen soft, nondistended, nontender. skin intact  patient denies CP, SOB, h/a, dizziness, numbness/tingling, vision changes, urinary symptoms

## 2021-07-07 NOTE — ED PROVIDER NOTE - PHYSICAL EXAMINATION
PHYSICAL EXAM:  GENERAL: NAD, Resting in bed  HEENT:  Head atraumatic, EOMI, PERRLA, conjunctiva and sclera clear; Moist mucous membranes, normal oropharynx  NECK: Supple  CHEST/LUNG: Clear to auscultation bilaterally; Unlabored respirations on room air  HEART: Regular rate and rhythm; No murmurs, rubs, or gallops  ABDOMEN: Bowel sounds present; Soft, Nontender, Nondistended.   EXTREMITIES:  2+ Peripheral Pulses, brisk capillary refill. left AVF  NERVOUS SYSTEM:  Alert & Oriented X3, non-focal and spontaneous movements of all extremities  SKIN: No rashes or lesions PHYSICAL EXAM:  GENERAL: NAD, Resting in bed  HEENT:  Head atraumatic, EOMI, PERRLA, conjunctiva and sclera clear; Moist mucous membranes, normal oropharynx  NECK: Supple  CHEST/LUNG: Clear to auscultation bilaterally; Unlabored respirations on room air  HEART: Regular rate and rhythm; No murmurs, rubs, or gallops  ABDOMEN: Bowel sounds present; Soft, Nontender, Nondistended.   EXTREMITIES:  2+ Peripheral Pulses, brisk capillary refill. left AVF  NERVOUS SYSTEM:  Alert & Oriented X3, non-focal and spontaneous movements of all extremities  SKIN: No rashes or lesions  **ATTENDING ADDENDUM (Dr. South Araya): I have reviewed and substantially contributed to the elements of the PE as documented above. I have directly performed an examination of this patient in conjunction with the other members (EM resident/PA/NP) of the patient care team. I have personally reviewed the patient's vital signs at the time of the patient's initial presentation to the ED and repeatedly throughout the ED course. PHYSICAL EXAM:  GENERAL: NAD, Resting in bed  HEENT:  Head atraumatic, EOMI, PERRLA, conjunctiva and sclera clear; Moist mucous membranes, normal oropharynx  NECK: Supple  CHEST/LUNG: Clear to auscultation bilaterally; Unlabored respirations on room air  HEART: Regular rate and rhythm; No murmurs, rubs, or gallops  ABDOMEN: Bowel sounds present; Soft, Nontender, Nondistended.   EXTREMITIES:  2+ Peripheral Pulses, brisk capillary refill. left AVF  NERVOUS SYSTEM:  Alert & Oriented X3, non-focal and spontaneous movements of all extremities  SKIN: No rashes or lesions  **ATTENDING ADDENDUM (Dr. South Araya): I have reviewed and substantially contributed to the elements of the PE as documented above. I have directly performed an examination of this patient in conjunction with the other members (EM resident/PA/NP) of the patient care team. I have personally reviewed the patient's vital signs at the time of the patient's initial presentation to the ED and repeatedly throughout the ED course. Of note, and in addition to the above, there is a dialysis AV fistula that appears functional, with thrill and bruit, and without overlying erythema or tenderness.

## 2021-07-07 NOTE — ED PROVIDER NOTE - EYES, MLM
Clear bilaterally, pupils equal, round and reactive to light. **ATTENDING ADDENDUM (Dr. South Araya): Extraocular muscle movements intact. Clear corneas bilaterally, pupils equal and round. NO nystagmus. NO photophobia. NO scleral icterus bilaterally.

## 2021-07-07 NOTE — ED PROVIDER NOTE - CONTEXT
**ATTENDING ADDENDUM (Dr. South Araya): DENIES recent travel. NO sick contacts. NO history of trauma. POSITIVE ESRD on HD (missed today's session)/known (describe)

## 2021-07-07 NOTE — ED PROVIDER NOTE - SKIN, MLM
Skin normal color for race, warm, dry and intact. No evidence of rash. **ATTENDING ADDENDUM (Dr. South Araya): NO rashes, lesions, ulcers, vesicles, erythema, streaking, lymphangitic spread, crepitus, cellulitis, petechiae, purpurae, track marks or ecchymoses.

## 2021-07-07 NOTE — ED PROVIDER NOTE - MUSCULOSKELETAL, MLM
Spine appears normal, range of motion is not limited, no muscle or joint tenderness **ATTENDING ADDENDUM (Dr. South Araya): Symmetrically equal strength, 5/5, in the bilateral upper and lower extremities. NO cords, soft-tissue swelling, or calf tenderness in the bilateral lower extremities.

## 2021-07-07 NOTE — ED PROVIDER NOTE - NEUROLOGICAL, MLM
Alert and oriented, no focal deficits, no motor or sensory deficits. **ATTENDING ADDENDUM (Dr. South Araya): NO cerebellar findings. NO posturing. NO focal motor weakness. NO sensory changes. Awake, alert, coherent, interactive, and oriented to person, place, and time.

## 2021-07-07 NOTE — ED ADULT NURSE NOTE - NSIMPLEMENTINTERV_GEN_ALL_ED
Implemented All Fall Risk Interventions:  [place] to call system. Call bell, personal items and telephone within reach. Instruct patient to call for assistance. Room bathroom lighting operational. Non-slip footwear when patient is off stretcher. Physically safe environment: no spills, clutter or unnecessary equipment. Stretcher in lowest position, wheels locked, appropriate side rails in place. Provide visual cue, wrist band, yellow gown, etc. Monitor gait and stability. Monitor for mental status changes and reorient to person, place, and time. Review medications for side effects contributing to fall risk. Reinforce activity limits and safety measures with patient and family.

## 2021-07-07 NOTE — ED PROVIDER NOTE - SHIFT CHANGE DETAILS
**ATTENDING ADDENDUM - HANDOFF (from Dr. South Araya): (1) Follow up remainder of pending ED diagnostics (2) serial reevaluation / observation (3) disposition (admission) pending completion of ED diagnostics, reassessments and placement to telemetry unit (recommended given ECG and hyperkalemia as noted on serology).

## 2021-07-07 NOTE — ED PROVIDER NOTE - GASTROINTESTINAL, MLM
Abdomen soft, non-tender **ATTENDING ADDENDUM (Dr. South Araya): non-distended. NO guarding, rebound, or rigidity. NO pulsatile or non-pulsatile masses. NO hernias. NO obvious hepatosplenomegaly.

## 2021-07-07 NOTE — ED PROVIDER NOTE - NS ED MD EKG STATUS PRIOR 1
**ATTENDING ADDENDUM (Dr. South Araya): compared with prior ECG from earlier in ED course, there are NO new or dynamic changes./the EKG is unchanged from prior EKG

## 2021-07-07 NOTE — ED PROVIDER NOTE - ATTENDING CONTRIBUTION TO CARE
**ATTENDING ADDENDUM (Dr. South Araya): I attest that I have directly examined this patient and reviewed and formulated the diagnostic and therapeutic management plan in collaboration with the IM resident on rotation in the ED. Please see MDM note and remainder of EMR for findings from CC, HPI, ROS, and PE. (Belia)

## 2021-07-07 NOTE — ED PROVIDER NOTE - PMH
CAD (coronary artery disease)  s/p sent in 06/2020  ESRD on hemodialysis  M/W/Saturday at home in [facility]  HTN (hypertension)    T2DM (type 2 diabetes mellitus)

## 2021-07-08 ENCOUNTER — NON-APPOINTMENT (OUTPATIENT)
Age: 61
End: 2021-07-08

## 2021-07-08 DIAGNOSIS — I25.10 ATHEROSCLEROTIC HEART DISEASE OF NATIVE CORONARY ARTERY WITHOUT ANGINA PECTORIS: ICD-10-CM

## 2021-07-08 DIAGNOSIS — N18.6 END STAGE RENAL DISEASE: ICD-10-CM

## 2021-07-08 DIAGNOSIS — R53.1 WEAKNESS: ICD-10-CM

## 2021-07-08 DIAGNOSIS — Z29.9 ENCOUNTER FOR PROPHYLACTIC MEASURES, UNSPECIFIED: ICD-10-CM

## 2021-07-08 DIAGNOSIS — D64.9 ANEMIA, UNSPECIFIED: ICD-10-CM

## 2021-07-08 DIAGNOSIS — I10 ESSENTIAL (PRIMARY) HYPERTENSION: ICD-10-CM

## 2021-07-08 DIAGNOSIS — E11.9 TYPE 2 DIABETES MELLITUS WITHOUT COMPLICATIONS: ICD-10-CM

## 2021-07-08 DIAGNOSIS — J06.9 ACUTE UPPER RESPIRATORY INFECTION, UNSPECIFIED: ICD-10-CM

## 2021-07-08 DIAGNOSIS — N25.0 RENAL OSTEODYSTROPHY: ICD-10-CM

## 2021-07-08 LAB
ALBUMIN SERPL ELPH-MCNC: 3.4 G/DL — SIGNIFICANT CHANGE UP (ref 3.3–5)
ALBUMIN SERPL ELPH-MCNC: 3.9 G/DL — SIGNIFICANT CHANGE UP (ref 3.3–5)
ALP SERPL-CCNC: 130 U/L — HIGH (ref 40–120)
ALP SERPL-CCNC: 147 U/L — HIGH (ref 40–120)
ALT FLD-CCNC: 10 U/L — SIGNIFICANT CHANGE UP (ref 10–45)
ALT FLD-CCNC: 14 U/L — SIGNIFICANT CHANGE UP (ref 10–45)
ANION GAP SERPL CALC-SCNC: 23 MMOL/L — HIGH (ref 5–17)
ANION GAP SERPL CALC-SCNC: 25 MMOL/L — HIGH (ref 5–17)
AST SERPL-CCNC: 26 U/L — SIGNIFICANT CHANGE UP (ref 10–40)
AST SERPL-CCNC: 52 U/L — HIGH (ref 10–40)
BILIRUB SERPL-MCNC: 0.8 MG/DL — SIGNIFICANT CHANGE UP (ref 0.2–1.2)
BILIRUB SERPL-MCNC: 0.9 MG/DL — SIGNIFICANT CHANGE UP (ref 0.2–1.2)
BUN SERPL-MCNC: 81 MG/DL — HIGH (ref 7–23)
BUN SERPL-MCNC: 81 MG/DL — HIGH (ref 7–23)
CALCIUM SERPL-MCNC: 7.8 MG/DL — LOW (ref 8.4–10.5)
CALCIUM SERPL-MCNC: 8.2 MG/DL — LOW (ref 8.4–10.5)
CHLORIDE SERPL-SCNC: 84 MMOL/L — LOW (ref 96–108)
CHLORIDE SERPL-SCNC: 85 MMOL/L — LOW (ref 96–108)
CO2 SERPL-SCNC: 20 MMOL/L — LOW (ref 22–31)
CO2 SERPL-SCNC: 22 MMOL/L — SIGNIFICANT CHANGE UP (ref 22–31)
CREAT SERPL-MCNC: 10.58 MG/DL — HIGH (ref 0.5–1.3)
CREAT SERPL-MCNC: 11.41 MG/DL — HIGH (ref 0.5–1.3)
GLUCOSE SERPL-MCNC: 152 MG/DL — HIGH (ref 70–99)
GLUCOSE SERPL-MCNC: 179 MG/DL — HIGH (ref 70–99)
HBV SURFACE AB SER-ACNC: 83 MIU/ML — SIGNIFICANT CHANGE UP
HBV SURFACE AG SER-ACNC: SIGNIFICANT CHANGE UP
HCV AB S/CO SERPL IA: 0.15 S/CO — SIGNIFICANT CHANGE UP (ref 0–0.99)
HCV AB SERPL-IMP: SIGNIFICANT CHANGE UP
HMPV RNA SPEC QL NAA+PROBE: DETECTED
POTASSIUM SERPL-MCNC: 5.3 MMOL/L — SIGNIFICANT CHANGE UP (ref 3.5–5.3)
POTASSIUM SERPL-MCNC: 7.3 MMOL/L — CRITICAL HIGH (ref 3.5–5.3)
POTASSIUM SERPL-SCNC: 5.3 MMOL/L — SIGNIFICANT CHANGE UP (ref 3.5–5.3)
POTASSIUM SERPL-SCNC: 7.3 MMOL/L — CRITICAL HIGH (ref 3.5–5.3)
PROT SERPL-MCNC: 8.3 G/DL — SIGNIFICANT CHANGE UP (ref 6–8.3)
PROT SERPL-MCNC: 8.6 G/DL — HIGH (ref 6–8.3)
RAPID RVP RESULT: DETECTED
SARS-COV-2 RNA SPEC QL NAA+PROBE: SIGNIFICANT CHANGE UP
SODIUM SERPL-SCNC: 128 MMOL/L — LOW (ref 135–145)
SODIUM SERPL-SCNC: 131 MMOL/L — LOW (ref 135–145)

## 2021-07-08 PROCEDURE — 99223 1ST HOSP IP/OBS HIGH 75: CPT | Mod: GC

## 2021-07-08 PROCEDURE — 99233 SBSQ HOSP IP/OBS HIGH 50: CPT | Mod: GC

## 2021-07-08 RX ORDER — ACETAMINOPHEN 500 MG
650 TABLET ORAL EVERY 6 HOURS
Refills: 0 | Status: DISCONTINUED | OUTPATIENT
Start: 2021-07-08 | End: 2021-07-10

## 2021-07-08 RX ORDER — GABAPENTIN 400 MG/1
100 CAPSULE ORAL THREE TIMES A DAY
Refills: 0 | Status: DISCONTINUED | OUTPATIENT
Start: 2021-07-08 | End: 2021-07-08

## 2021-07-08 RX ORDER — CEFTRIAXONE 500 MG/1
1000 INJECTION, POWDER, FOR SOLUTION INTRAMUSCULAR; INTRAVENOUS ONCE
Refills: 0 | Status: COMPLETED | OUTPATIENT
Start: 2021-07-08 | End: 2021-07-08

## 2021-07-08 RX ORDER — CLOPIDOGREL BISULFATE 75 MG/1
75 TABLET, FILM COATED ORAL DAILY
Refills: 0 | Status: DISCONTINUED | OUTPATIENT
Start: 2021-07-08 | End: 2021-07-10

## 2021-07-08 RX ORDER — SODIUM ZIRCONIUM CYCLOSILICATE 10 G/10G
5 POWDER, FOR SUSPENSION ORAL ONCE
Refills: 0 | Status: COMPLETED | OUTPATIENT
Start: 2021-07-08 | End: 2021-07-08

## 2021-07-08 RX ORDER — METOPROLOL TARTRATE 50 MG
25 TABLET ORAL
Refills: 0 | Status: DISCONTINUED | OUTPATIENT
Start: 2021-07-08 | End: 2021-07-09

## 2021-07-08 RX ORDER — LABETALOL HCL 100 MG
10 TABLET ORAL ONCE
Refills: 0 | Status: COMPLETED | OUTPATIENT
Start: 2021-07-08 | End: 2021-07-08

## 2021-07-08 RX ORDER — GABAPENTIN 400 MG/1
300 CAPSULE ORAL DAILY
Refills: 0 | Status: DISCONTINUED | OUTPATIENT
Start: 2021-07-08 | End: 2021-07-10

## 2021-07-08 RX ORDER — HEPARIN SODIUM 5000 [USP'U]/ML
5000 INJECTION INTRAVENOUS; SUBCUTANEOUS EVERY 8 HOURS
Refills: 0 | Status: DISCONTINUED | OUTPATIENT
Start: 2021-07-08 | End: 2021-07-10

## 2021-07-08 RX ORDER — ASPIRIN/CALCIUM CARB/MAGNESIUM 324 MG
81 TABLET ORAL DAILY
Refills: 0 | Status: DISCONTINUED | OUTPATIENT
Start: 2021-07-08 | End: 2021-07-10

## 2021-07-08 RX ORDER — GUAIFENESIN/DEXTROMETHORPHAN 600MG-30MG
5 TABLET, EXTENDED RELEASE 12 HR ORAL
Refills: 0 | Status: DISCONTINUED | OUTPATIENT
Start: 2021-07-08 | End: 2021-07-10

## 2021-07-08 RX ORDER — LISINOPRIL 2.5 MG/1
2.5 TABLET ORAL DAILY
Refills: 0 | Status: DISCONTINUED | OUTPATIENT
Start: 2021-07-08 | End: 2021-07-10

## 2021-07-08 RX ORDER — ACETAMINOPHEN 500 MG
650 TABLET ORAL ONCE
Refills: 0 | Status: COMPLETED | OUTPATIENT
Start: 2021-07-08 | End: 2021-07-08

## 2021-07-08 RX ADMIN — Medication 81 MILLIGRAM(S): at 13:50

## 2021-07-08 RX ADMIN — CLOPIDOGREL BISULFATE 75 MILLIGRAM(S): 75 TABLET, FILM COATED ORAL at 13:50

## 2021-07-08 RX ADMIN — Medication 10 MILLIGRAM(S): at 01:01

## 2021-07-08 RX ADMIN — Medication 25 MILLIGRAM(S): at 17:38

## 2021-07-08 RX ADMIN — HEPARIN SODIUM 5000 UNIT(S): 5000 INJECTION INTRAVENOUS; SUBCUTANEOUS at 21:34

## 2021-07-08 RX ADMIN — GABAPENTIN 300 MILLIGRAM(S): 400 CAPSULE ORAL at 13:50

## 2021-07-08 RX ADMIN — LISINOPRIL 2.5 MILLIGRAM(S): 2.5 TABLET ORAL at 17:37

## 2021-07-08 RX ADMIN — Medication 650 MILLIGRAM(S): at 02:47

## 2021-07-08 RX ADMIN — CEFTRIAXONE 100 MILLIGRAM(S): 500 INJECTION, POWDER, FOR SOLUTION INTRAMUSCULAR; INTRAVENOUS at 03:54

## 2021-07-08 NOTE — H&P ADULT - PROBLEM SELECTOR PLAN 3
Began non productive on monday that increased in frequency throughout the day. Felt fatigue as well but improving. No fevers, n/v, congestion, or sob.      - CXR is clear  -PRN Robutussin and cough drops  - PRN Tylenol no

## 2021-07-08 NOTE — ED ADULT NURSE REASSESSMENT NOTE - NS ED NURSE REASSESS COMMENT FT1
Report received from Mason AKHTAR at  07.00 AM. Pt AAOx4, resp nonlabored, skin warm/dry, Strong upper and lower extremities steady gait, resting comfortably in bed at this time. Pt found to be hypertensive. MD notified . Pt denies headache, dizziness, chest pain, palpitations, SOB, abd pain, n/v/d, urinary symptoms, fevers, chills, weakness at this time.  Pt awaiting bed assignment.  Comfort care & safety measures continued  IV site looks clean & dry no signs of infiltration noted pt denies  pain IV site . Pt is advised to call for help  call bell with in the reach pt verbalized the understanding.  Continue to monitor. Report received from Mason AKHTAR at  07.00 AM. Pt AAOx4, resp nonlabored, skin warm/dry, Strong upper and lower extremities steady gait, resting comfortably in bed at this time.  Pt denies headache, dizziness, chest pain, palpitations, SOB, abd pain, n/v/d, urinary symptoms, fevers, chills, weakness at this time.  Pt awaiting bed assignment.  Comfort care & safety measures continued  IV site looks clean & dry no signs of infiltration noted pt denies  pain IV site . Pt is advised to call for help  call bell with in the reach pt verbalized the understanding.  Continue to monitor.

## 2021-07-08 NOTE — CONSULT NOTE ADULT - PROBLEM SELECTOR RECOMMENDATION 9
Pt. with ESRD on HD three times a week (MWF).He goes to [facility]. Last HD was on Monday 7/5/21 via left AVF. Pt. clinically stable. Labs reviewed. Will arrange for maintenance HD today. Monitor labs. Adjust meds to HD. -- AT ED found with hyperkalemia to 5.9 s/p lokelma/Ca gluc/Insulin.

## 2021-07-08 NOTE — H&P ADULT - NSICDXPASTMEDICALHX_GEN_ALL_CORE_FT
PAST MEDICAL HISTORY:  CAD (coronary artery disease) s/p sent in 06/2020    ESRD on hemodialysis M/W/Saturday at home in [facility]    HTN (hypertension)     T2DM (type 2 diabetes mellitus)

## 2021-07-08 NOTE — H&P ADULT - NSHPLABSRESULTS_GEN_ALL_CORE
12.5   10.32 )-----------( 132      ( 07 Jul 2021 22:05 )             39.0       07-08    131<L>  |  84<L>  |  81<H>  ----------------------------<  152<H>  5.3   |  22  |  11.41<H>    Ca    8.2<L>      08 Jul 2021 02:24    TPro  8.6<H>  /  Alb  3.9  /  TBili  0.9  /  DBili  x   /  AST  26  /  ALT  10  /  AlkPhos  147<H>  07-08 12.5   10.32 )-----------( 132      ( 07 Jul 2021 22:05 )             39.0       07-08    131<L>  |  84<L>  |  81<H>  ----------------------------<  152<H>  5.3   |  22  |  11.41<H>    Ca    8.2<L>      08 Jul 2021 02:24    TPro  8.6<H>  /  Alb  3.9  /  TBili  0.9  /  DBili  x   /  AST  26  /  ALT  10  /  AlkPhos  147<H>  07-08      rRapid RVP Result: Detected   SARS-CoV-2: NotDetec: This Respiratory Panel uses polymerase chain reaction (PCR) to detect for   adenovirus; coronavirus (HKU1, NL63, 229E, OC43); human metapneumovirus   (hMPV); human enterovirus/rhinovirus (Entero/RV); influenza A; influenza   A/H1; influenza A/H3; influenza A/H1-2009; influenza B; parainfluenza   viruses 1, 2, 3, 4; respiratory syncytial virus; Mycoplasma pneumoniae;   Chlamydophila pneumoniae; and SARS-CoV-2.   hMPV (RapRVP): Detected     c< from: Xray Chest 1 View- PORTABLE-Urgent (Xray Chest 1 View- PORTABLE-Urgent .) (07.07.21 @ 23:12) >  IMPRESSION:  Clear lungs.    ******PRELIMINARY REPORT******    ******PRELIMINARY REPORT******          CHIN CONDE MD; Resident Radiology    < end of copied text >

## 2021-07-08 NOTE — H&P ADULT - HISTORY OF PRESENT ILLNESS
60 y.o. Male with PMHx ESRD on HD (initiated in [facility] via LUE AVF), CAD s/p stent (on plavix/asa), DM2, HTN presented to for cough and decreased po intake. He reports that has been having intermittent non productive cough since Monday. His cough has been increasing in frequency since it started. He states nightquil has helped and he last took a dose on Tuesday morning. He was feeling tired and weak and did not go to dialysis treatment on 7/7. Last HD was on Monday 7/5. He goes to [facility]. Otherwise he does not endorse any fevers/chills, headaches, vision changes, n/v, cp, sob, or abd pain. No known sick contacts, and no recent travel.    At ED he was found with /90 s/p IV labetalol. Also found hyperkalemic to 5.9 s/p lokelma/Ca gluc/Insulin. Nephrology consulted for ESRD/HD management.  60 y.o. Male with PMHx ESRD on HD (initiated in [facility] via LUE AVF), CAD s/p stent (on plavix/asa), DM2, HTN presented to for cough and decreased po intake. He reports that has been having intermittent non productive cough since Monday. His cough has been increasing in frequency since it started. He states nightquil has helped and he last took a dose on Tuesday morning. He was feeling tired and weak and did not go to dialysis treatment on 7/7. Last HD was on Monday 7/5. He goes to [facility]. Otherwise he does not endorse any fevers/chills, headaches, vision changes, n/v, cp, sob, or abd pain. No known sick contacts, and no recent travel.    At ED he was found with /90 s/p IV labetalol. Also found hyperkalemic to 5.9 s/p lokelma 5g/Ca 2g gluc/Insulin 5U. s/p Ceftriaxone 1g. Nephrology consulted for ESRD/HD management.

## 2021-07-08 NOTE — H&P ADULT - ASSESSMENT
60 y.o. Male with PMHx ESRD on HD (initiated in [facility] via LUE AVF), CAD s/p stent (on plavix/asa), DM2, HTN presented to for cough and decreased po intake 2/2 viral URI c/b missed HD. 60 y.o. Male with PMHx ESRD on HD (initiated in [facility] via LUE AVF), CAD s/p stent (on plavix/asa), DM2, HTN presented to for cough and decreased po intake 2/2 viral URI c/b missed HD with elevated potassium and SBP.

## 2021-07-08 NOTE — H&P ADULT - NSHPPHYSICALEXAM_GEN_ALL_CORE
Physical Exam:  Gen: Alert, well-developed, NAD  HEENT: NCAT, PERRL, EOMI, conjunctival hemorrhage b/l and eyes appear icteric  Neck: Supple, no JVD, no LAD  CV: RRR, S1S2, no m/r/g  Resp: CTAB, normal respiratory effort  Abd: Soft, NT, ND, normal bowel sounds  Ext: Left forearm bruit   Neuro: AOx3, CN2-12 grossly intact  Skin: warm, perfused

## 2021-07-08 NOTE — H&P ADULT - NSHPREVIEWOFSYSTEMS_GEN_ALL_CORE
REVIEW OF SYSTEMS:  CONSTITUTIONAL: (-) weakness, (-) fevers (-) chills  EYES/ENT: (- ) visual changes;  (-) vertigo (-) throat pain   NECK: (- )pain (-) stiffness  RESPIRATORY: (+)cough,  (-) wheezing, (-) hemoptysis; (-) shortness of breath  CARDIOVASCULAR: (-) chest pain (-) palpitations  GASTROINTESTINAL: (-) abdominal (-) epigastric pain. (-) nausea, vomiting, or hematemesis; (-) diarrhea or constipation.   GENITOURINARY: (-) dysuria, frequency or hematuria  NEUROLOGICAL: (-) numbness or weakness  SKIN: (-) itching, rashes REVIEW OF SYSTEMS:  CONSTITUTIONAL: (-) weakness, (-) fevers (-) chills, +fatigue  EYES/ENT: (- ) visual changes;  (-) vertigo (-) throat pain   NECK: (- )pain (-) stiffness  RESPIRATORY: (+)cough,  (-) wheezing, (-) hemoptysis; (-) shortness of breath  CARDIOVASCULAR: (-) chest pain (-) palpitations  GASTROINTESTINAL: (-) abdominal (-) epigastric pain. (-) nausea, vomiting, or hematemesis; (-) diarrhea or constipation.   GENITOURINARY: (-) dysuria, frequency or hematuria  NEUROLOGICAL: (-) numbness or weakness  SKIN: (-) itching, rashes

## 2021-07-08 NOTE — H&P ADULT - NSHPSOCIALHISTORY_GEN_ALL_CORE
Marital Status:  (  x )    (   ) Single    (   )    (  )   Lives with: (  ) alone  ( x ) children   (x  ) spouse   (  ) parents  (  ) other  Recent Travel: No recent travel  Occupation: , immigrated from [place]    Substance Use (street drugs): ( x ) never used  (  ) other:  Tobacco Usage:  ( x  ) never smoked   (   ) former smoker   (   ) current smoker  (     ) pack year  Alcohol Usage: None

## 2021-07-08 NOTE — H&P ADULT - ATTENDING COMMENTS
#ESRD on HD MWF -missed outpatient HD, last 7/5. HD per nephro via L AVF. Appreciate nephro recs  #HTN, uncontrolled - continue metoprolol, start lisinopril. monitor closely  #viral URI - hMPV+ .continue robitussin, cepacol, tylenol prn

## 2021-07-08 NOTE — CONSULT NOTE ADULT - SUBJECTIVE AND OBJECTIVE BOX
[facility] DIVISION OF KIDNEY DISEASES AND HYPERTENSION -- [phone]  -- INITIAL CONSULT NOTE  --------------------------------------------------------------------------------  If any questions, please feel free to contact me  NS pager: [phone], [facility]: [phone]  Dennis Ramírez M.D.  Nephrology Fellow    (After 5 pm or on weekends please page the on-call fellow)  --------------------------------------------------------------------------------    HPI:  60 y.o. Male with PMHx ESRD on HD (initiated in [facility] via LUE AVF), CAD s/p stent (on plavix/asa), DM2, HTN presented to for cough and decreased po intake. He reports that has been having intermittent non productive cough since monday. He was feeling tired and weak and did not go to dialysis treatment on 7/7. Last HD was on Monday 7/5. He goes to [facility]. At ED he was found with /90 s/p IV labetalol. Also found hyperkalemic to 5.9 s/p lokelma/Ca gluc/Insulin. Nephrology consulted for ESRD/HD management.     PAST HISTORY  --------------------------------------------------------------------------------  PAST MEDICAL & SURGICAL HISTORY:  ESRD on hemodialysis  M/W/Saturday at home in [facility]    T2DM (type 2 diabetes mellitus)    CAD (coronary artery disease)  s/p sent in 06/2020    HTN (hypertension)    AV fistula      FAMILY HISTORY:  no HTn, no DM    PAST SOCIAL HISTORY:  denies smoking, alcohol use   ALLERGIES & MEDICATIONS  --------------------------------------------------------------------------------  Allergies    No Known Allergies    Intolerances      Standing Inpatient Medications    PRN Inpatient Medications      REVIEW OF SYSTEMS  --------------------------------------------------------------------------------  Gen: +malaise, +weakness. No fevers/chills  Skin: No rashes  Head/Eyes/Ears: Normal hearing,   Respiratory: No dyspnea, cough  CV: No chest pain  GI: No abdominal pain, diarrhea  : No dysuria, hematuria  MSK: No  edema    All other systems were reviewed and are negative, except as noted.    VITALS/PHYSICAL EXAM  --------------------------------------------------------------------------------  T(C): 37.1 (07-08-21 @ 07:20), Max: 38.4 (07-08-21 @ 02:02)  HR: 63 (07-08-21 @ 07:20) (61 - 80)  BP: 179/80 (07-08-21 @ 07:20) (149/66 - 210/92)  RR: 24 (07-08-21 @ 07:20) (16 - 24)  SpO2: 94% (07-08-21 @ 07:20) (94% - 98%)  Wt(kg): --  Height (cm): 162.6 (07-07-21 @ 19:21)  Weight (kg): 56 (07-07-21 @ 19:21)  BMI (kg/m2): 21.2 (07-07-21 @ 19:21)  BSA (m2): 1.59 (07-07-21 @ 19:21)      Physical Exam:  	Gen: NAD  	HEENT: MMM  	Pulm: CTA B/L  	CV: S1S2  	Abd: Soft, +BS   	Ext: No LE edema B/L  	Neuro: Awake  	Skin: Warm and dry  	Vascular access: left AVF +thrill and +bruit.     LABS/STUDIES  --------------------------------------------------------------------------------              12.5   10.32 >-----------<  132      [07-07-21 @ 22:05]              39.0     131  |  84  |  81  ----------------------------<  152      [07-08-21 @ 02:24]  5.3   |  22  |  11.41        Ca     8.2     [07-08-21 @ 02:24]    TPro  8.6  /  Alb  3.9  /  TBili  0.9  /  DBili  x   /  AST  26  /  ALT  10  /  AlkPhos  147  [07-08-21 @ 02:24]          Creatinine Trend:  SCr 11.41 [07-08 @ 02:24]  SCr 10.58 [07-08 @ 00:45]  SCr 11.02 [07-07 @ 22:05]  SCr 8.67 [06-17 @ 14:12]        Iron 58, TIBC 204, %sat 28      [01-17-21 @ 10:08]  Ferritin 551      [01-17-21 @ 10:07]  PTH -- (Ca 7.7)      [01-25-21 @ 08:11]   491  TSH 1.46      [01-17-21 @ 10:07]    HBsAb <3.0      [01-17-21 @ 00:52]  HBsAg Nonreact      [01-17-21 @ 00:52]  HBcAb Nonreact      [01-17-21 @ 00:52]  HCV 0.09, Nonreact      [01-17-21 @ 00:52]  HIV Nonreact      [01-24-21 @ 09:30]    Free Light Chains: kappa 21.39, lambda 20.26, ratio = 1.06      [01-22 @ 20:37]  Immunofixation Serum:   IgG Lambda Band Identified    Reference Range: None Detected      [01-22-21 @ 20:37]  SPEP Interpretation: Gamma-Migrating Paraprotein Identified      [01-22-21 @ 20:37]

## 2021-07-08 NOTE — H&P ADULT - PROBLEM SELECTOR PLAN 1
Pt. with ESRD on HD three times a week (MWF).He goes to [facility]. Last HD was on Monday 7/5/21 via left AVF.     - AT ED found with hyperkalemia to 5.9 s/p lokelma/Ca gluc/Insulin.  - maintenance HD today.     - Monitor Labs Pt. with ESRD on HD three times a week (MWF).He goes to [facility]. Last HD was on Monday 7/5/21 via left AVF.     - AT ED found with hyperkalemia to 5.9 s/p lokelma/Ca gluc/Insulin.  - maintenance HD today.     -f/u nephro recs  - Monitor Labs

## 2021-07-08 NOTE — CONSULT NOTE ADULT - PROBLEM SELECTOR RECOMMENDATION 2
Initially /90 s/p IV labetalol -- no improved. will do UF goal 2L- Monitor BP on current BP medication. Low salt diet.

## 2021-07-08 NOTE — CONSULT NOTE ADULT - ASSESSMENT
60 y.o. Male with PMHx ESRD on HD (initiated in [facility] via LUE AVF), CAD s/p stent (on plavix/asa), DM2, HTN presented to for cough and decreased po intake.

## 2021-07-08 NOTE — H&P ADULT - PROBLEM SELECTOR PLAN 2
Initially /90 at ED. Per son patient's BP is usually in 140s/80s at home     - s/p IV labetalol 10mg  - Nephro will  UF goal 2L  - Monitor BP  - c/w metoprolol 25mg qD  - Low salt diet. Initially /90 at ED. Per son patient's BP is usually in 140s/80s at home     - s/p IV labetalol 10mg  - Nephro will  UF goal 2L  - Monitor BP  - c/w metoprolol 25mg qD  - Trend SBP and will consider beginning Lisinopril 2.5mg qD  - Low salt diet. Initially /90 at ED. Per son patient's BP is usually in 140s/80s at home     - s/p IV labetalol 10mg  - Nephro will  UF goal 2L  - Monitor BP  - c/w metoprolol 25mg qD  - begin Lisinopril 2.5mg qD  - Low salt diet.

## 2021-07-09 ENCOUNTER — TRANSCRIPTION ENCOUNTER (OUTPATIENT)
Age: 61
End: 2021-07-09

## 2021-07-09 LAB
ALBUMIN SERPL ELPH-MCNC: 3.3 G/DL — SIGNIFICANT CHANGE UP (ref 3.3–5)
ALP SERPL-CCNC: 121 U/L — HIGH (ref 40–120)
ALT FLD-CCNC: 10 U/L — SIGNIFICANT CHANGE UP (ref 10–45)
ANION GAP SERPL CALC-SCNC: 23 MMOL/L — HIGH (ref 5–17)
AST SERPL-CCNC: 30 U/L — SIGNIFICANT CHANGE UP (ref 10–40)
BILIRUB SERPL-MCNC: 0.6 MG/DL — SIGNIFICANT CHANGE UP (ref 0.2–1.2)
BUN SERPL-MCNC: 52 MG/DL — HIGH (ref 7–23)
CALCIUM SERPL-MCNC: 7.5 MG/DL — LOW (ref 8.4–10.5)
CHLORIDE SERPL-SCNC: 91 MMOL/L — LOW (ref 96–108)
CO2 SERPL-SCNC: 21 MMOL/L — LOW (ref 22–31)
COVID-19 SPIKE DOMAIN AB INTERP: NEGATIVE — SIGNIFICANT CHANGE UP
COVID-19 SPIKE DOMAIN ANTIBODY RESULT: 0.4 U/ML — SIGNIFICANT CHANGE UP
CREAT SERPL-MCNC: 8.18 MG/DL — HIGH (ref 0.5–1.3)
GLUCOSE BLDC GLUCOMTR-MCNC: 152 MG/DL — HIGH (ref 70–99)
GLUCOSE BLDC GLUCOMTR-MCNC: 157 MG/DL — HIGH (ref 70–99)
GLUCOSE SERPL-MCNC: 113 MG/DL — HIGH (ref 70–99)
HCT VFR BLD CALC: 36.1 % — LOW (ref 39–50)
HGB BLD-MCNC: 11.3 G/DL — LOW (ref 13–17)
MAGNESIUM SERPL-MCNC: 2.3 MG/DL — SIGNIFICANT CHANGE UP (ref 1.6–2.6)
MCHC RBC-ENTMCNC: 28 PG — SIGNIFICANT CHANGE UP (ref 27–34)
MCHC RBC-ENTMCNC: 31.3 GM/DL — LOW (ref 32–36)
MCV RBC AUTO: 89.6 FL — SIGNIFICANT CHANGE UP (ref 80–100)
NRBC # BLD: 0 /100 WBCS — SIGNIFICANT CHANGE UP (ref 0–0)
PHOSPHATE SERPL-MCNC: 5.8 MG/DL — HIGH (ref 2.5–4.5)
PLATELET # BLD AUTO: 161 K/UL — SIGNIFICANT CHANGE UP (ref 150–400)
POTASSIUM SERPL-MCNC: 4.2 MMOL/L — SIGNIFICANT CHANGE UP (ref 3.5–5.3)
POTASSIUM SERPL-SCNC: 4.2 MMOL/L — SIGNIFICANT CHANGE UP (ref 3.5–5.3)
PROT SERPL-MCNC: 7.7 G/DL — SIGNIFICANT CHANGE UP (ref 6–8.3)
RBC # BLD: 4.03 M/UL — LOW (ref 4.2–5.8)
RBC # FLD: 14.7 % — HIGH (ref 10.3–14.5)
SARS-COV-2 IGG+IGM SERPL QL IA: 0.4 U/ML — SIGNIFICANT CHANGE UP
SARS-COV-2 IGG+IGM SERPL QL IA: NEGATIVE — SIGNIFICANT CHANGE UP
SODIUM SERPL-SCNC: 135 MMOL/L — SIGNIFICANT CHANGE UP (ref 135–145)
WBC # BLD: 6.92 K/UL — SIGNIFICANT CHANGE UP (ref 3.8–10.5)
WBC # FLD AUTO: 6.92 K/UL — SIGNIFICANT CHANGE UP (ref 3.8–10.5)

## 2021-07-09 PROCEDURE — 99233 SBSQ HOSP IP/OBS HIGH 50: CPT | Mod: GC

## 2021-07-09 RX ORDER — DEXTROSE 50 % IN WATER 50 %
15 SYRINGE (ML) INTRAVENOUS ONCE
Refills: 0 | Status: DISCONTINUED | OUTPATIENT
Start: 2021-07-09 | End: 2021-07-10

## 2021-07-09 RX ORDER — DEXTROSE 50 % IN WATER 50 %
12.5 SYRINGE (ML) INTRAVENOUS ONCE
Refills: 0 | Status: DISCONTINUED | OUTPATIENT
Start: 2021-07-09 | End: 2021-07-10

## 2021-07-09 RX ORDER — GLUCAGON INJECTION, SOLUTION 0.5 MG/.1ML
1 INJECTION, SOLUTION SUBCUTANEOUS ONCE
Refills: 0 | Status: DISCONTINUED | OUTPATIENT
Start: 2021-07-09 | End: 2021-07-10

## 2021-07-09 RX ORDER — INSULIN LISPRO 100/ML
VIAL (ML) SUBCUTANEOUS AT BEDTIME
Refills: 0 | Status: DISCONTINUED | OUTPATIENT
Start: 2021-07-09 | End: 2021-07-10

## 2021-07-09 RX ORDER — SODIUM CHLORIDE 9 MG/ML
1000 INJECTION, SOLUTION INTRAVENOUS
Refills: 0 | Status: DISCONTINUED | OUTPATIENT
Start: 2021-07-09 | End: 2021-07-10

## 2021-07-09 RX ORDER — DEXTROSE 50 % IN WATER 50 %
25 SYRINGE (ML) INTRAVENOUS ONCE
Refills: 0 | Status: DISCONTINUED | OUTPATIENT
Start: 2021-07-09 | End: 2021-07-10

## 2021-07-09 RX ORDER — INSULIN LISPRO 100/ML
VIAL (ML) SUBCUTANEOUS
Refills: 0 | Status: DISCONTINUED | OUTPATIENT
Start: 2021-07-09 | End: 2021-07-10

## 2021-07-09 RX ADMIN — Medication 5 MILLILITER(S): at 11:39

## 2021-07-09 RX ADMIN — Medication 81 MILLIGRAM(S): at 11:35

## 2021-07-09 RX ADMIN — CLOPIDOGREL BISULFATE 75 MILLIGRAM(S): 75 TABLET, FILM COATED ORAL at 11:35

## 2021-07-09 RX ADMIN — HEPARIN SODIUM 5000 UNIT(S): 5000 INJECTION INTRAVENOUS; SUBCUTANEOUS at 06:26

## 2021-07-09 RX ADMIN — HEPARIN SODIUM 5000 UNIT(S): 5000 INJECTION INTRAVENOUS; SUBCUTANEOUS at 21:26

## 2021-07-09 RX ADMIN — LISINOPRIL 2.5 MILLIGRAM(S): 2.5 TABLET ORAL at 06:27

## 2021-07-09 RX ADMIN — Medication 5 MILLILITER(S): at 21:23

## 2021-07-09 RX ADMIN — Medication 5 MILLILITER(S): at 04:36

## 2021-07-09 RX ADMIN — Medication 100 MILLIGRAM(S): at 11:39

## 2021-07-09 RX ADMIN — HEPARIN SODIUM 5000 UNIT(S): 5000 INJECTION INTRAVENOUS; SUBCUTANEOUS at 13:24

## 2021-07-09 NOTE — PROGRESS NOTE ADULT - ATTENDING COMMENTS
#ESRD on HD MWF -missed outpatient HD, last 7/5. s/p HD yesterday via L AVF. Appreciate nephro recs. Plan for discharge if no further inpatient HD  #HTN, uncontrolled - discontinue metoprolol, continue lisinopril. monitor closely  #viral URI - hMPV+ .continue robitussin, cepacol, tylenol prn. symptoms improving   Discharge planning >40minutes. #ESRD on HD MWF -missed outpatient HD, last 7/5. s/p HD yesterday via L AVF. Appreciate nephro recs. Plan for discharge if no further inpatient HD  #HTN, uncontrolled - discontinue metoprolol, continue lisinopril. monitor closely  #viral URI - hMPV+ .continue robitussin, cepacol, tylenol prn. symptoms improving   PT eval  Discharge planning >40minutes.

## 2021-07-09 NOTE — DISCHARGE NOTE PROVIDER - NSDCMRMEDTOKEN_GEN_ALL_CORE_FT
Aspir 81 oral delayed release tablet: 1 tab(s) orally once a day  Claritin 10 mg oral tablet: 1 tab(s) orally once a day  gabapentin 100 mg oral capsule: 1 cap(s) orally 3 times a day  Januvia 25 mg oral tablet: 1 tab(s) orally once a day   metoprolol tartrate 25 mg oral tablet: 1 tab(s) orally 2 times a day  Plavix 75 mg oral tablet: 1 tab(s) orally once a day   Aspir 81 oral delayed release tablet: 1 tab(s) orally once a day  Claritin 10 mg oral tablet: 1 tab(s) orally once a day  gabapentin 100 mg oral capsule: 1 cap(s) orally 3 times a day  Januvia 25 mg oral tablet: 1 tab(s) orally once a day   Plavix 75 mg oral tablet: 1 tab(s) orally once a day   Aspir 81 oral delayed release tablet: 1 tab(s) orally once a day  benzonatate 100 mg oral capsule: 1 cap(s) orally every 8 hours, As needed, Cough  Claritin 10 mg oral tablet: 1 tab(s) orally once a day  gabapentin 100 mg oral capsule: 1 cap(s) orally 3 times a day  guaifenesin-dextromethorphan 100 mg-10 mg/5 mL oral liquid: 5 milliliter(s) orally 4 times a day, As Needed -Cough   Januvia 25 mg oral tablet: 1 tab(s) orally once a day   lisinopril 2.5 mg oral tablet: 1 tab(s) orally once a day  metoprolol tartrate 25 mg oral tablet: 1 tab(s) orally 2 times a day  Plavix 75 mg oral tablet: 1 tab(s) orally once a day

## 2021-07-09 NOTE — PHYSICAL THERAPY INITIAL EVALUATION ADULT - ADDITIONAL COMMENTS
As per pt, pt lives in an apartment w/ family and 12 steps to enter w/ UHR. PTA pt was independent w/ all mobility and ADLs.

## 2021-07-09 NOTE — PHYSICAL THERAPY INITIAL EVALUATION ADULT - PLANNED THERAPY INTERVENTIONS, PT EVAL
GOAL: Pt will perform 12 stairs with or without U HR as needed within 3-4weeks./balance training/gait training

## 2021-07-09 NOTE — DISCHARGE NOTE PROVIDER - NSDCCPCAREPLAN_GEN_ALL_CORE_FT
PRINCIPAL DISCHARGE DIAGNOSIS  Diagnosis: ESRD on dialysis  Assessment and Plan of Treatment:       SECONDARY DISCHARGE DIAGNOSES  Diagnosis: Viral URI with cough  Assessment and Plan of Treatment:      PRINCIPAL DISCHARGE DIAGNOSIS  Diagnosis: ESRD on dialysis  Assessment and Plan of Treatment: You were admitted because you missed two of your hemodialysis sessions as you were feeling unwell. Since you have been admitted you received hemodialysis on 7/8 and 7/10.  Manage ESRD:   •Protect your dialysis access site. Do not let anyone take blood or blood pressure readings on the arm where you have your arteriovenous fistula or graft. Cover your peritoneal catheter with a bandage. Do not touch the catheter.  •Limit fluids to 1 liter a day (about 34 ounces), or as directed by your healthcare provider. This can help you manage swelling between dialysis appointments.  •Weigh yourself at the same time every day. Use the same scale, and wear the same amount of clothing. Record your weight and bring it with you to follow-up appointments.   •Do not use NSAIDs or aspirin. They can increase the risk of bleeding in your stomach.  Seek care immediately if:   •You have shortness of breath or chest pain.  •You have a rash, or a new wound that is very painful.   •You have severe muscle cramps or pain.  •Your heart is beating faster than normal for you.  Please follow up with your primary care provider within 2 weeks. Please go to your dialysis center on 7/12.        SECONDARY DISCHARGE DIAGNOSES  Diagnosis: Viral URI with cough  Assessment and Plan of Treatment: You presented to the hospital with a productive cough. An x-ray of the chest revealed your lungs were clear. We performed a test to look for viruses, and it came back positive for the human metanepneumovirus that is known to cause upper respiratory infections.  Self-care:   •Rest as much as possible. Slowly start to do more each day.  •Drink more liquids as directed. Liquids will help thin and loosen mucus so you can cough it up. Liquids will also help prevent dehydration. Liquids that help prevent dehydration include water, fruit juice, and broth. Do not drink liquids that contain caffeine. Caffeine can increase your risk for dehydration. Ask your healthcare provider how much liquid to drink each day.  •Soothe a sore throat. Gargle with warm salt water. Make salt water by dissolving ¼ teaspoon salt in 1 cup warm water. You may also suck on hard candy or throat lozenges. You may use a sore throat spray.  •Use a humidifier or vaporizer. Use a cool mist humidifier or a vaporizer to increase air moisture in your home. This may make it easier for you to breathe and help decrease your cough.  •Use saline nasal drops as directed. These help relieve congestion.  Please follow up with your primary care provider after discharge

## 2021-07-09 NOTE — DISCHARGE NOTE PROVIDER - NSFOLLOWUPCLINICS_GEN_ALL_ED_FT
[facility] - Primary Care  Primary Care  [address] [place] [address]  Phone: [phone]  Fax:   Follow Up Time: 2 weeks

## 2021-07-09 NOTE — PROGRESS NOTE ADULT - PROBLEM SELECTOR PLAN 1
Pt. with ESRD on HD three times a week (MWF).He goes to [facility]. Last HD was on Monday 7/5/21 via left AVF.     - maintenance HD on 7/8    -f/u nephro recs  - Monitor Labs Pt. with ESRD on HD three times a week (MWF).He goes to [facility]. Last HD was on Monday 7/5/21 via left AVF.     - maintenance HD on 7/8    -f/u nephro recs  - nephro plans for HD on 7/10 and then dc patient  - Monitor Labs

## 2021-07-09 NOTE — PROGRESS NOTE ADULT - PROBLEM SELECTOR PLAN 7
Diet: Renal   DVT: subQ heparin   Dispo: home, son updated on 7/9 Diet: Renal   DVT: subQ heparin   Dispo: pending PT reccs, son updated on 7/9

## 2021-07-09 NOTE — PHYSICAL THERAPY INITIAL EVALUATION ADULT - PERTINENT HX OF CURRENT PROBLEM, REHAB EVAL
Pt is a 60 y.o. Male with PMHx ESRD on HD, CAD s/p stent (on plavix/asa), DM2, HTN presented to for cough and decreased po intake 2/2 viral URI c/b missed HD with elevated potassium and SBP.

## 2021-07-09 NOTE — DISCHARGE NOTE PROVIDER - NSDCFUSCHEDAPPT_GEN_ALL_CORE_FT
UNA CASIANO ; 08/16/2021 ; [facility] [address] ; 08/16/2021 ; [facility] [address]

## 2021-07-09 NOTE — DISCHARGE NOTE PROVIDER - NSDCCPTREATMENT_GEN_ALL_CORE_FT
PRINCIPAL PROCEDURE  Procedure: CXR, single AP view  Findings and Treatment:   < end of copied text >  IMPRESSION:  Clear lungs.< from: Xray Chest 1 View- PORTABLE-Urgent (Xray Chest 1 View- PORTABLE-Urgent .) (07.07.21 @ 23:12) >

## 2021-07-09 NOTE — PROGRESS NOTE ADULT - SUBJECTIVE AND OBJECTIVE BOX
Alberto Collazo, PGY1  Pager [phone]    INCOMPLETE NOTE - IN PROGRESS    Patient is a 60y old  Male who presents with a chief complaint of cough (08 Jul 2021 08:19)      SUBJECTIVE/INTERVAL EVENTS: Patient seen and examined at bedside.    MEDICATIONS  (STANDING):  aspirin enteric coated 81 milliGRAM(s) Oral daily  clopidogrel Tablet 75 milliGRAM(s) Oral daily  gabapentin 300 milliGRAM(s) Oral daily  heparin   Injectable 5000 Unit(s) SubCutaneous every 8 hours  lisinopril 2.5 milliGRAM(s) Oral daily  metoprolol tartrate 25 milliGRAM(s) Oral two times a day    MEDICATIONS  (PRN):  acetaminophen   Tablet .. 650 milliGRAM(s) Oral every 6 hours PRN Temp greater or equal to 38C (100.4F), Mild Pain (1 - 3), Moderate Pain (4 - 6)  benzonatate 100 milliGRAM(s) Oral every 8 hours PRN Cough  guaifenesin/dextromethorphan Oral Liquid 5 milliLiter(s) Oral four times a day PRN Cough      VITAL SIGNS:  T(F): 98.6 (07-09-21 @ 04:44), Max: 99.4 (07-08-21 @ 12:10)  HR: 57 (07-09-21 @ 06:21) (57 - 88)  BP: 119/72 (07-09-21 @ 06:21) (106/59 - 182/89)  RR: 18 (07-09-21 @ 04:44) (15 - 18)  SpO2: 93% (07-09-21 @ 04:44) (93% - 100%)    I&O's Summary    08 Jul 2021 07:01  -  09 Jul 2021 06:51  --------------------------------------------------------  IN: 1040 mL / OUT: 2600 mL / NET: -1560 mL      Daily     Daily     PHYSICAL EXAM:  Gen: Alert, NAD  HEENT: NCAT, conjunctiva clear, sclera anicteric, no erythema or exudates in the oropharynx, mmm  Neck: Supple, no JVD  CV: RRR, S1S2, no m/r/g  Resp: CTAB, normal respiratory effort  Abd: Soft, nontender, nondistended, normal bowel sounds  Ext: no edema, no clubbing or cyanosis  Neuro: AOx3, CN2-12 grossly intact, ASHER  SKIN: warm, perfused    LABS:                        12.5   10.32 )-----------( 132      ( 07 Jul 2021 22:05 )             39.0     Hgb Trend: 12.5<--  07-08    131<L>  |  84<L>  |  81<H>  ----------------------------<  152<H>  5.3   |  22  |  11.41<H>    Ca    8.2<L>      08 Jul 2021 02:24    TPro  8.6<H>  /  Alb  3.9  /  TBili  0.9  /  DBili  x   /  AST  26  /  ALT  10  /  AlkPhos  147<H>  07-08    Creatinine Trend: 11.41<--, 10.58<--, 11.02<--, 8.67<--  LIVER FUNCTIONS - ( 08 Jul 2021 02:24 )  Alb: 3.9 g/dL / Pro: 8.6 g/dL / ALK PHOS: 147 U/L / ALT: 10 U/L / AST: 26 U/L / GGT: x                     CAPILLARY BLOOD GLUCOSE      POCT Blood Glucose.: 155 mg/dL (08 Jul 2021 21:40)  POCT Blood Glucose.: 107 mg/dL (08 Jul 2021 18:08)  POCT Blood Glucose.: 124 mg/dL (08 Jul 2021 13:40)  POCT Blood Glucose.: 90 mg/dL (08 Jul 2021 11:22)      RADIOLOGY & ADDITIONAL TESTS: Reviewed    Imaging Personally Reviewed:    Consultant(s) Notes Reviewed:      Care Discussed with Consultants/Other Providers:   Alberto Collazo, PGY1  Pager [phone]    INCOMPLETE NOTE - IN PROGRESS    Patient is a 60y old  Male who presents with a chief complaint of cough (08 Jul 2021 08:19)      SUBJECTIVE/INTERVAL EVENTS: Patient seen and examined at bedside.  No overnight events per house staff. Cough improved with use of cough drops and Robitussin, but he still endorses production of yellow phelgm.  Denies any nausea/vomiting/diarrhea, headache, shortness of breath, abdominal pain or chest pain/palpitations. Patient responding appropriately to questions and able to make needs known.       MEDICATIONS  (STANDING):  aspirin enteric coated 81 milliGRAM(s) Oral daily  clopidogrel Tablet 75 milliGRAM(s) Oral daily  gabapentin 300 milliGRAM(s) Oral daily  heparin   Injectable 5000 Unit(s) SubCutaneous every 8 hours  lisinopril 2.5 milliGRAM(s) Oral daily  metoprolol tartrate 25 milliGRAM(s) Oral two times a day    MEDICATIONS  (PRN):  acetaminophen   Tablet .. 650 milliGRAM(s) Oral every 6 hours PRN Temp greater or equal to 38C (100.4F), Mild Pain (1 - 3), Moderate Pain (4 - 6)  benzonatate 100 milliGRAM(s) Oral every 8 hours PRN Cough  guaifenesin/dextromethorphan Oral Liquid 5 milliLiter(s) Oral four times a day PRN Cough      VITAL SIGNS:  T(F): 98.6 (07-09-21 @ 04:44), Max: 99.4 (07-08-21 @ 12:10)  HR: 57 (07-09-21 @ 06:21) (57 - 88)  BP: 119/72 (07-09-21 @ 06:21) (106/59 - 182/89)  RR: 18 (07-09-21 @ 04:44) (15 - 18)  SpO2: 93% (07-09-21 @ 04:44) (93% - 100%)    I&O's Summary    08 Jul 2021 07:01  -  09 Jul 2021 06:51  --------------------------------------------------------  IN: 1040 mL / OUT: 2600 mL / NET: -1560 mL      Daily     Daily     PHYSICAL EXAM:  Gen: Alert, NAD  HEENT: NCAT, mild subconjunctival hemorrhage b/l, sclera anicteric, no erythema or exudates in the oropharynx, mmm  Neck: Supple, no LAD  CV: RRR, S1S2, no m/r/g  Resp: CTAB, normal respiratory effort  Abd: Soft, nontender, nondistended, normal bowel sounds  Ext: +bruit at left forearm  Neuro: AOx3  SKIN: warm, perfused    LABS:                        12.5   10.32 )-----------( 132      ( 07 Jul 2021 22:05 )             39.0     Hgb Trend: 12.5<--  07-08    131<L>  |  84<L>  |  81<H>  ----------------------------<  152<H>  5.3   |  22  |  11.41<H>    Ca    8.2<L>      08 Jul 2021 02:24    TPro  8.6<H>  /  Alb  3.9  /  TBili  0.9  /  DBili  x   /  AST  26  /  ALT  10  /  AlkPhos  147<H>  07-08    Creatinine Trend: 11.41<--, 10.58<--, 11.02<--, 8.67<--  LIVER FUNCTIONS - ( 08 Jul 2021 02:24 )  Alb: 3.9 g/dL / Pro: 8.6 g/dL / ALK PHOS: 147 U/L / ALT: 10 U/L / AST: 26 U/L / GGT: x                     CAPILLARY BLOOD GLUCOSE      POCT Blood Glucose.: 155 mg/dL (08 Jul 2021 21:40)  POCT Blood Glucose.: 107 mg/dL (08 Jul 2021 18:08)  POCT Blood Glucose.: 124 mg/dL (08 Jul 2021 13:40)  POCT Blood Glucose.: 90 mg/dL (08 Jul 2021 11:22)      RADIOLOGY & ADDITIONAL TESTS: Reviewed    Imaging Personally Reviewed:    Consultant(s) Notes Reviewed:      Care Discussed with Consultants/Other Providers:

## 2021-07-09 NOTE — DISCHARGE NOTE PROVIDER - HOSPITAL COURSE
HPI:  60 y.o. Male with PMHx ESRD on HD (initiated in [facility] via [facility] AVF), CAD s/p stent (on plavix/asa), DM2, HTN presented to for cough and decreased po intake. He reports that has been having intermittent non productive cough since Monday. His cough has been increasing in frequency since it started. He states nightquil has helped and he last took a dose on Tuesday morning. He was feeling tired and weak and did not go to dialysis treatment on 7/7. Last HD was on Monday 7/5. He goes to [facility]. Otherwise he does not endorse any fevers/chills, headaches, vision changes, n/v, cp, sob, or abd pain. No known sick contacts, and no recent travel.    At ED he was found with /90 s/p IV labetalol. Also found hyperkalemic to 5.9 s/p lokelma 5g/Ca 2g gluc/Insulin 5U. s/p Ceftriaxone 1g. Nephrology consulted for ESRD/HD management.  (08 Jul 2021 08:19)      Hospital course:  Patient received HPI:  60 y.o. Male with PMHx ESRD on HD (initiated in [facility] via [facility] AVF), CAD s/p stent (on plavix/asa), DM2, HTN presented to for cough and decreased po intake. He reports that has been having intermittent non productive cough since Monday. His cough has been increasing in frequency since it started. He states nightquil has helped and he last took a dose on Tuesday morning. He was feeling tired and weak and did not go to dialysis treatment on 7/7. Last HD was on Monday 7/5. He goes to [facility]. Otherwise he does not endorse any fevers/chills, headaches, vision changes, n/v, cp, sob, or abd pain. No known sick contacts, and no recent travel.    At ED he was found with /90 s/p IV labetalol. Also found hyperkalemic to 5.9 s/p lokelma 5g/Ca 2g gluc/Insulin 5U. s/p Ceftriaxone 1g. Nephrology consulted for ESRD/HD management.  (08 Jul 2021 08:19)      Hospital course:  Patient found to have BP of 192/90 s/p 10mg IV labetalol and BP went to 149/66 2hrs later. Additionally patient was found to be febrile to 101.1F, CXR was negative for pneumonia, but RVP positive for human metapneumovirus. Additionally patient was found to be hyperkalemic to 5.9 that went to 5.3 s/p 5g Lokelma. On 7/8 patient received hemodialysis, and patient received Robitussin-DM and Tessalon Perle for his cough and he stated it helped decrease the frequency of coughing. Patient was started on lisinopril 2.5mg qD based on history and BP. On 7/9 patient was found to be bradycardiac and the primary team decided to discontinue the Lopressor as BP is controlled with lisinopril to SBP to 110s. On 7/10 patient received his second hemodialysis. Patient is now medically stable for discharge.

## 2021-07-09 NOTE — PROGRESS NOTE ADULT - PROBLEM SELECTOR PLAN 2
Initially /90 at ED. Per son patient's BP is usually in 140s/80s at home     - Monitor BP  - held metoprolol 25mg qD on morning of 7/9 due to bradycardia  - begin Lisinopril 2.5mg qD Initially /90 at ED. Per son patient's BP is usually in 140s/80s at home     - Monitor BP  - d/c metoprolol  - c/w Lisinopril 2.5mg qD

## 2021-07-10 ENCOUNTER — TRANSCRIPTION ENCOUNTER (OUTPATIENT)
Age: 61
End: 2021-07-10

## 2021-07-10 VITALS — SYSTOLIC BLOOD PRESSURE: 170 MMHG | DIASTOLIC BLOOD PRESSURE: 88 MMHG | HEART RATE: 68 BPM

## 2021-07-10 LAB
A1C WITH ESTIMATED AVERAGE GLUCOSE RESULT: 8.1 % — HIGH (ref 4–5.6)
ALBUMIN SERPL ELPH-MCNC: 3.5 G/DL — SIGNIFICANT CHANGE UP (ref 3.3–5)
ALP SERPL-CCNC: 115 U/L — SIGNIFICANT CHANGE UP (ref 40–120)
ALT FLD-CCNC: 18 U/L — SIGNIFICANT CHANGE UP (ref 10–45)
ANION GAP SERPL CALC-SCNC: 23 MMOL/L — HIGH (ref 5–17)
AST SERPL-CCNC: 24 U/L — SIGNIFICANT CHANGE UP (ref 10–40)
BILIRUB SERPL-MCNC: 0.3 MG/DL — SIGNIFICANT CHANGE UP (ref 0.2–1.2)
BUN SERPL-MCNC: 79 MG/DL — HIGH (ref 7–23)
CALCIUM SERPL-MCNC: 7.3 MG/DL — LOW (ref 8.4–10.5)
CHLORIDE SERPL-SCNC: 93 MMOL/L — LOW (ref 96–108)
CO2 SERPL-SCNC: 23 MMOL/L — SIGNIFICANT CHANGE UP (ref 22–31)
CREAT SERPL-MCNC: 10.54 MG/DL — HIGH (ref 0.5–1.3)
ESTIMATED AVERAGE GLUCOSE: 186 MG/DL — HIGH (ref 68–114)
GLUCOSE BLDC GLUCOMTR-MCNC: 136 MG/DL — HIGH (ref 70–99)
GLUCOSE BLDC GLUCOMTR-MCNC: 166 MG/DL — HIGH (ref 70–99)
GLUCOSE SERPL-MCNC: 170 MG/DL — HIGH (ref 70–99)
HCT VFR BLD CALC: 36.3 % — LOW (ref 39–50)
HGB BLD-MCNC: 11.6 G/DL — LOW (ref 13–17)
MAGNESIUM SERPL-MCNC: 2.4 MG/DL — SIGNIFICANT CHANGE UP (ref 1.6–2.6)
MCHC RBC-ENTMCNC: 28.2 PG — SIGNIFICANT CHANGE UP (ref 27–34)
MCHC RBC-ENTMCNC: 32 GM/DL — SIGNIFICANT CHANGE UP (ref 32–36)
MCV RBC AUTO: 88.3 FL — SIGNIFICANT CHANGE UP (ref 80–100)
NRBC # BLD: 0 /100 WBCS — SIGNIFICANT CHANGE UP (ref 0–0)
PHOSPHATE SERPL-MCNC: 6.4 MG/DL — HIGH (ref 2.5–4.5)
PLATELET # BLD AUTO: 178 K/UL — SIGNIFICANT CHANGE UP (ref 150–400)
POTASSIUM SERPL-MCNC: 4.1 MMOL/L — SIGNIFICANT CHANGE UP (ref 3.5–5.3)
POTASSIUM SERPL-SCNC: 4.1 MMOL/L — SIGNIFICANT CHANGE UP (ref 3.5–5.3)
PROT SERPL-MCNC: 7.9 G/DL — SIGNIFICANT CHANGE UP (ref 6–8.3)
RBC # BLD: 4.11 M/UL — LOW (ref 4.2–5.8)
RBC # FLD: 14.6 % — HIGH (ref 10.3–14.5)
SODIUM SERPL-SCNC: 139 MMOL/L — SIGNIFICANT CHANGE UP (ref 135–145)
WBC # BLD: 5.81 K/UL — SIGNIFICANT CHANGE UP (ref 3.8–10.5)
WBC # FLD AUTO: 5.81 K/UL — SIGNIFICANT CHANGE UP (ref 3.8–10.5)

## 2021-07-10 PROCEDURE — 86705 HEP B CORE ANTIBODY IGM: CPT

## 2021-07-10 PROCEDURE — 99233 SBSQ HOSP IP/OBS HIGH 50: CPT | Mod: GC

## 2021-07-10 PROCEDURE — 84132 ASSAY OF SERUM POTASSIUM: CPT

## 2021-07-10 PROCEDURE — 93005 ELECTROCARDIOGRAM TRACING: CPT

## 2021-07-10 PROCEDURE — 85018 HEMOGLOBIN: CPT

## 2021-07-10 PROCEDURE — 85014 HEMATOCRIT: CPT

## 2021-07-10 PROCEDURE — 36415 COLL VENOUS BLD VENIPUNCTURE: CPT

## 2021-07-10 PROCEDURE — 84100 ASSAY OF PHOSPHORUS: CPT

## 2021-07-10 PROCEDURE — 84295 ASSAY OF SERUM SODIUM: CPT

## 2021-07-10 PROCEDURE — 82330 ASSAY OF CALCIUM: CPT

## 2021-07-10 PROCEDURE — 80053 COMPREHEN METABOLIC PANEL: CPT

## 2021-07-10 PROCEDURE — 87340 HEPATITIS B SURFACE AG IA: CPT

## 2021-07-10 PROCEDURE — 86706 HEP B SURFACE ANTIBODY: CPT

## 2021-07-10 PROCEDURE — 82947 ASSAY GLUCOSE BLOOD QUANT: CPT

## 2021-07-10 PROCEDURE — 99285 EMERGENCY DEPT VISIT HI MDM: CPT

## 2021-07-10 PROCEDURE — 97161 PT EVAL LOW COMPLEX 20 MIN: CPT

## 2021-07-10 PROCEDURE — 85027 COMPLETE CBC AUTOMATED: CPT

## 2021-07-10 PROCEDURE — 71045 X-RAY EXAM CHEST 1 VIEW: CPT

## 2021-07-10 PROCEDURE — 82962 GLUCOSE BLOOD TEST: CPT

## 2021-07-10 PROCEDURE — 83605 ASSAY OF LACTIC ACID: CPT

## 2021-07-10 PROCEDURE — 0225U NFCT DS DNA&RNA 21 SARSCOV2: CPT

## 2021-07-10 PROCEDURE — 99239 HOSP IP/OBS DSCHRG MGMT >30: CPT

## 2021-07-10 PROCEDURE — 86803 HEPATITIS C AB TEST: CPT

## 2021-07-10 PROCEDURE — 99261: CPT

## 2021-07-10 PROCEDURE — 94640 AIRWAY INHALATION TREATMENT: CPT

## 2021-07-10 PROCEDURE — 83735 ASSAY OF MAGNESIUM: CPT

## 2021-07-10 PROCEDURE — 86704 HEP B CORE ANTIBODY TOTAL: CPT

## 2021-07-10 PROCEDURE — 83036 HEMOGLOBIN GLYCOSYLATED A1C: CPT

## 2021-07-10 PROCEDURE — 86769 SARS-COV-2 COVID-19 ANTIBODY: CPT

## 2021-07-10 PROCEDURE — 82435 ASSAY OF BLOOD CHLORIDE: CPT

## 2021-07-10 PROCEDURE — 82803 BLOOD GASES ANY COMBINATION: CPT

## 2021-07-10 PROCEDURE — 87040 BLOOD CULTURE FOR BACTERIA: CPT

## 2021-07-10 RX ORDER — LISINOPRIL 2.5 MG/1
1 TABLET ORAL
Qty: 14 | Refills: 0
Start: 2021-07-10 | End: 2021-07-23

## 2021-07-10 RX ORDER — CALCIUM ACETATE 667 MG
667 TABLET ORAL
Refills: 0 | Status: DISCONTINUED | OUTPATIENT
Start: 2021-07-10 | End: 2021-07-10

## 2021-07-10 RX ORDER — GUAIFENESIN/DEXTROMETHORPHAN 600MG-30MG
5 TABLET, EXTENDED RELEASE 12 HR ORAL
Qty: 80 | Refills: 0
Start: 2021-07-10 | End: 2021-07-13

## 2021-07-10 RX ORDER — METOPROLOL TARTRATE 50 MG
1 TABLET ORAL
Qty: 0 | Refills: 0 | DISCHARGE
Start: 2021-07-10

## 2021-07-10 RX ADMIN — Medication 1: at 12:42

## 2021-07-10 RX ADMIN — Medication 5 MILLILITER(S): at 09:19

## 2021-07-10 RX ADMIN — Medication 81 MILLIGRAM(S): at 11:10

## 2021-07-10 RX ADMIN — HEPARIN SODIUM 5000 UNIT(S): 5000 INJECTION INTRAVENOUS; SUBCUTANEOUS at 05:40

## 2021-07-10 RX ADMIN — Medication 100 MILLIGRAM(S): at 09:19

## 2021-07-10 RX ADMIN — HEPARIN SODIUM 5000 UNIT(S): 5000 INJECTION INTRAVENOUS; SUBCUTANEOUS at 13:41

## 2021-07-10 RX ADMIN — LISINOPRIL 2.5 MILLIGRAM(S): 2.5 TABLET ORAL at 05:40

## 2021-07-10 RX ADMIN — GABAPENTIN 300 MILLIGRAM(S): 400 CAPSULE ORAL at 18:11

## 2021-07-10 RX ADMIN — CLOPIDOGREL BISULFATE 75 MILLIGRAM(S): 75 TABLET, FILM COATED ORAL at 11:10

## 2021-07-10 NOTE — PROGRESS NOTE ADULT - ASSESSMENT
60 y.o. Male with PMHx ESRD on HD (initiated in [facility] via LUE AVF), CAD s/p stent (on plavix/asa), DM2, HTN presented to for cough and decreased po intake.     ESRD on hemodialysis- Pt. with ESRD on HD three times a week (MWF).He goes to [facility]. Last HD outpatient was on Monday 7/5/21 via left AVF. Pt. clinically stable. Labs reviewed. Will arrange for maintenance HD today. Monitor labs. Adjust meds to HD.       Hyperkalemia - now resolved s/p lokelma/Ca gluc/Insulin. On HD    HTN-  uncontrolled. Consider resuming home dose metoprolol. Continue lisinopril 2.5mg. Will do UF goal 2L. Monitor BP.  Low salt diet.     Anemia- Patient with anemia in the setting of ESRD. Hemoglobin at target range. Monitor hemoglobin.     Renal bone disease-  phosphorus level 6.4 with low calcium. Start calcium acetate 667 tid with meals.  Low phosphorus diet.  Monitor serum phosphorus.         If you have any questions, please feel free to contact me  Raeann Quiroz  Nephrology Fellow  Pager NS: [phone]/ [facility]: [phone]    (After 5 pm or on weekends please page the on-call fellow, can check [email or web address] for schedule. Login is eileen hauser, schedule under [facility] medicine, psych, derm)      
60 y.o. Male with PMHx ESRD on HD (initiated in [facility] via LUE AVF), CAD s/p stent (on plavix/asa), DM2, HTN presented to for cough and decreased po intake 2/2 viral URI c/b missed HD with elevated potassium and SBP.
60 y.o. Male with PMHx ESRD on HD (initiated in [facility] via LUE AVF), CAD s/p stent (on plavix/asa), DM2, HTN presented to for cough and decreased po intake 2/2 viral URI c/b missed HD with elevated potassium and SBP.

## 2021-07-10 NOTE — PROGRESS NOTE ADULT - SUBJECTIVE AND OBJECTIVE BOX
Alberto Collazo, PGY1  Pager [phone]    INCOMPLETE NOTE - IN PROGRESS    Patient is a 60y old  Male who presents with a chief complaint of cough (09 Jul 2021 15:34)      SUBJECTIVE/INTERVAL EVENTS: Patient seen and examined at bedside.    MEDICATIONS  (STANDING):  aspirin enteric coated 81 milliGRAM(s) Oral daily  clopidogrel Tablet 75 milliGRAM(s) Oral daily  dextrose 40% Gel 15 Gram(s) Oral once  dextrose 5%. 1000 milliLiter(s) (50 mL/Hr) IV Continuous <Continuous>  dextrose 5%. 1000 milliLiter(s) (100 mL/Hr) IV Continuous <Continuous>  dextrose 50% Injectable 25 Gram(s) IV Push once  dextrose 50% Injectable 12.5 Gram(s) IV Push once  dextrose 50% Injectable 25 Gram(s) IV Push once  gabapentin 300 milliGRAM(s) Oral daily  glucagon  Injectable 1 milliGRAM(s) IntraMuscular once  heparin   Injectable 5000 Unit(s) SubCutaneous every 8 hours  insulin lispro (ADMELOG) corrective regimen sliding scale   SubCutaneous three times a day before meals  insulin lispro (ADMELOG) corrective regimen sliding scale   SubCutaneous at bedtime  lisinopril 2.5 milliGRAM(s) Oral daily    MEDICATIONS  (PRN):  acetaminophen   Tablet .. 650 milliGRAM(s) Oral every 6 hours PRN Temp greater or equal to 38C (100.4F), Mild Pain (1 - 3), Moderate Pain (4 - 6)  benzonatate 100 milliGRAM(s) Oral every 8 hours PRN Cough  guaifenesin/dextromethorphan Oral Liquid 5 milliLiter(s) Oral four times a day PRN Cough      VITAL SIGNS:  T(F): 98.3 (07-10-21 @ 04:47), Max: 98.9 (07-09-21 @ 11:00)  HR: 59 (07-10-21 @ 04:47) (59 - 65)  BP: 153/76 (07-10-21 @ 04:47) (130/72 - 153/76)  RR: 18 (07-10-21 @ 04:47) (17 - 18)  SpO2: 93% (07-10-21 @ 04:47) (85% - 94%)    I&O's Summary    08 Jul 2021 07:01  -  09 Jul 2021 07:00  --------------------------------------------------------  IN: 1040 mL / OUT: 2600 mL / NET: -1560 mL    09 Jul 2021 07:01  -  10 Jul 2021 06:53  --------------------------------------------------------  IN: 770 mL / OUT: 1 mL / NET: 769 mL      Daily     Daily     PHYSICAL EXAM:  Gen: Alert, NAD  HEENT: NCAT, conjunctiva clear, sclera anicteric, no erythema or exudates in the oropharynx, mmm  Neck: Supple, no JVD  CV: RRR, S1S2, no m/r/g  Resp: CTAB, normal respiratory effort  Abd: Soft, nontender, nondistended, normal bowel sounds  Ext: no edema, no clubbing or cyanosis  Neuro: AOx3, CN2-12 grossly intact, ASHER  SKIN: warm, perfused    LABS:                        11.3   6.92  )-----------( 161      ( 09 Jul 2021 07:32 )             36.1     Hgb Trend: 11.3<--, 12.5<--  07-09    135  |  91<L>  |  52<H>  ----------------------------<  113<H>  4.2   |  21<L>  |  8.18<H>    Ca    7.5<L>      09 Jul 2021 07:29  Phos  5.8     07-09  Mg     2.3     07-09    TPro  7.7  /  Alb  3.3  /  TBili  0.6  /  DBili  x   /  AST  30  /  ALT  10  /  AlkPhos  121<H>  07-09    Creatinine Trend: 8.18<--, 11.41<--, 10.58<--, 11.02<--, 8.67<--  LIVER FUNCTIONS - ( 09 Jul 2021 07:29 )  Alb: 3.3 g/dL / Pro: 7.7 g/dL / ALK PHOS: 121 U/L / ALT: 10 U/L / AST: 30 U/L / GGT: x                     CAPILLARY BLOOD GLUCOSE      POCT Blood Glucose.: 157 mg/dL (09 Jul 2021 22:02)  POCT Blood Glucose.: 152 mg/dL (09 Jul 2021 12:30)  POCT Blood Glucose.: 104 mg/dL (09 Jul 2021 08:27)      RADIOLOGY & ADDITIONAL TESTS: Reviewed    Imaging Personally Reviewed:    Consultant(s) Notes Reviewed:      Care Discussed with Consultants/Other Providers:   Alberto Collazo, PGY1  Pager [phone]    INCOMPLETE NOTE - IN PROGRESS    Patient is a 60y old  Male who presents with a chief complaint of cough (09 Jul 2021 15:34)      SUBJECTIVE/INTERVAL EVENTS: Patient seen and examined at bedside.  No overnight events per house staff. Cough is still present but frequency reduced with robutussin and cough drops.  Denies any nausea/vomiting/diarrhea, headache, shortness of breath, abdominal pain or chest pain/palpitations. Patient responding appropriately to questions and able to make needs known. Vital signs/imaging/telemetry events reviewed.      MEDICATIONS  (STANDING):  aspirin enteric coated 81 milliGRAM(s) Oral daily  clopidogrel Tablet 75 milliGRAM(s) Oral daily  dextrose 40% Gel 15 Gram(s) Oral once  dextrose 5%. 1000 milliLiter(s) (50 mL/Hr) IV Continuous <Continuous>  dextrose 5%. 1000 milliLiter(s) (100 mL/Hr) IV Continuous <Continuous>  dextrose 50% Injectable 25 Gram(s) IV Push once  dextrose 50% Injectable 12.5 Gram(s) IV Push once  dextrose 50% Injectable 25 Gram(s) IV Push once  gabapentin 300 milliGRAM(s) Oral daily  glucagon  Injectable 1 milliGRAM(s) IntraMuscular once  heparin   Injectable 5000 Unit(s) SubCutaneous every 8 hours  insulin lispro (ADMELOG) corrective regimen sliding scale   SubCutaneous three times a day before meals  insulin lispro (ADMELOG) corrective regimen sliding scale   SubCutaneous at bedtime  lisinopril 2.5 milliGRAM(s) Oral daily    MEDICATIONS  (PRN):  acetaminophen   Tablet .. 650 milliGRAM(s) Oral every 6 hours PRN Temp greater or equal to 38C (100.4F), Mild Pain (1 - 3), Moderate Pain (4 - 6)  benzonatate 100 milliGRAM(s) Oral every 8 hours PRN Cough  guaifenesin/dextromethorphan Oral Liquid 5 milliLiter(s) Oral four times a day PRN Cough      VITAL SIGNS:  T(F): 98.3 (07-10-21 @ 04:47), Max: 98.9 (07-09-21 @ 11:00)  HR: 59 (07-10-21 @ 04:47) (59 - 65)  BP: 153/76 (07-10-21 @ 04:47) (130/72 - 153/76)  RR: 18 (07-10-21 @ 04:47) (17 - 18)  SpO2: 93% (07-10-21 @ 04:47) (85% - 94%)    I&O's Summary    08 Jul 2021 07:01  -  09 Jul 2021 07:00  --------------------------------------------------------  IN: 1040 mL / OUT: 2600 mL / NET: -1560 mL    09 Jul 2021 07:01  -  10 Jul 2021 06:53  --------------------------------------------------------  IN: 770 mL / OUT: 1 mL / NET: 769 mL      Daily     Daily     PHYSICAL EXAM:  Gen: Alert, NAD  HEENT: NCAT, subconjunctival hemorrhage b/l, sclera anicteric,   CV: RRR, S1S2, no m/r/g  Resp: CTAB, normal respiratory effort  Abd: Soft, nontender, nondistended, normal bowel sounds  Ext: no edema, no clubbing or cyanosis  Neuro: AOx3,   SKIN: warm, perfused. Bruit on L forearm, av fistula    LABS:                        11.3   6.92  )-----------( 161      ( 09 Jul 2021 07:32 )             36.1     Hgb Trend: 11.3<--, 12.5<--  07-09    135  |  91<L>  |  52<H>  ----------------------------<  113<H>  4.2   |  21<L>  |  8.18<H>    Ca    7.5<L>      09 Jul 2021 07:29  Phos  5.8     07-09  Mg     2.3     07-09    TPro  7.7  /  Alb  3.3  /  TBili  0.6  /  DBili  x   /  AST  30  /  ALT  10  /  AlkPhos  121<H>  07-09    Creatinine Trend: 8.18<--, 11.41<--, 10.58<--, 11.02<--, 8.67<--  LIVER FUNCTIONS - ( 09 Jul 2021 07:29 )  Alb: 3.3 g/dL / Pro: 7.7 g/dL / ALK PHOS: 121 U/L / ALT: 10 U/L / AST: 30 U/L / GGT: x                     CAPILLARY BLOOD GLUCOSE      POCT Blood Glucose.: 157 mg/dL (09 Jul 2021 22:02)  POCT Blood Glucose.: 152 mg/dL (09 Jul 2021 12:30)  POCT Blood Glucose.: 104 mg/dL (09 Jul 2021 08:27)      RADIOLOGY & ADDITIONAL TESTS: Reviewed    Imaging Personally Reviewed:    Consultant(s) Notes Reviewed:      Care Discussed with Consultants/Other Providers:

## 2021-07-10 NOTE — DISCHARGE NOTE NURSING/CASE MANAGEMENT/SOCIAL WORK - PATIENT PORTAL LINK FT
You can access the FollowMyHealth Patient Portal offered by [facility] by registering at the following website: [email or web address]. By joining [facility]’s FollowMyHealth portal, you will also be able to view your health information using other applications (apps) compatible with our system.

## 2021-07-10 NOTE — PROGRESS NOTE ADULT - SUBJECTIVE AND OBJECTIVE BOX
[facility] Division of Kidney Diseases & Hypertension  FOLLOW UP NOTE  [phone]--------------------------------------------------------------------------------  Chief Complaint:Weakness        24 hour events/subjective: Patient seen & examined. Labs & vitals reviewed. Pt feels weak but no other complains. Denies chest pain, SOB, leg edema, N/V         PAST HISTORY  --------------------------------------------------------------------------------  No significant changes to PMH, PSH, FHx, SHx, unless otherwise noted    ALLERGIES & MEDICATIONS  --------------------------------------------------------------------------------  Allergies    No Known Allergies    Intolerances      Standing Inpatient Medications  aspirin enteric coated 81 milliGRAM(s) Oral daily  clopidogrel Tablet 75 milliGRAM(s) Oral daily  dextrose 40% Gel 15 Gram(s) Oral once  dextrose 5%. 1000 milliLiter(s) IV Continuous <Continuous>  dextrose 5%. 1000 milliLiter(s) IV Continuous <Continuous>  dextrose 50% Injectable 25 Gram(s) IV Push once  dextrose 50% Injectable 12.5 Gram(s) IV Push once  dextrose 50% Injectable 25 Gram(s) IV Push once  gabapentin 300 milliGRAM(s) Oral daily  glucagon  Injectable 1 milliGRAM(s) IntraMuscular once  heparin   Injectable 5000 Unit(s) SubCutaneous every 8 hours  insulin lispro (ADMELOG) corrective regimen sliding scale   SubCutaneous three times a day before meals  insulin lispro (ADMELOG) corrective regimen sliding scale   SubCutaneous at bedtime  lisinopril 2.5 milliGRAM(s) Oral daily    PRN Inpatient Medications  acetaminophen   Tablet .. 650 milliGRAM(s) Oral every 6 hours PRN  benzonatate 100 milliGRAM(s) Oral every 8 hours PRN  guaifenesin/dextromethorphan Oral Liquid 5 milliLiter(s) Oral four times a day PRN      REVIEW OF SYSTEMS  --------------------------------------------------------------------------------  Gen: No  fevers/chills, +weakness  Respiratory: No dyspnea, cough  CV: No chest pain  GI: No abdominal pain, diarrhea,  nausea, vomiting  : No increased frequency, dysuria, hematuria  MSK:  no edema  Neuro: No dizziness/lightheadedness      All other systems were reviewed and are negative, except as noted.    VITALS/PHYSICAL EXAM  --------------------------------------------------------------------------------  T(C): 36.8 (07-10-21 @ 11:26), Max: 37.1 (07-09-21 @ 19:55)  HR: 61 (07-10-21 @ 11:26) (59 - 61)  BP: 169/82 (07-10-21 @ 11:26) (130/72 - 169/82)  RR: 18 (07-10-21 @ 11:26) (17 - 18)  SpO2: 93% (07-10-21 @ 11:26) (93% - 94%)  Wt(kg): --        07-09-21 @ 07:01  -  07-10-21 @ 07:00  --------------------------------------------------------  IN: 770 mL / OUT: 1 mL / NET: 769 mL    07-10-21 @ 07:01  -  07-10-21 @ 14:03  --------------------------------------------------------  IN: 240 mL / OUT: 0 mL / NET: 240 mL      Physical Exam:  	Gen: NAD  	HEENT: MMM  	Pulm: CTA B/L  	CV: S1S2  	Abd: Soft, +BS   	Ext: No LE edema B/L  	Neuro: Awake  	Skin: Warm and dry  	Vascular access: left AVF +thrill and +bruit.       LABS/STUDIES  --------------------------------------------------------------------------------              11.6   5.81  >-----------<  178      [07-10-21 @ 07:24]              36.3     139  |  93  |  79  ----------------------------<  170      [07-10-21 @ 07:27]  4.1   |  23  |  10.54        Ca     7.3     [07-10-21 @ 07:27]      Mg     2.4     [07-10-21 @ 07:27]      Phos  6.4     [07-10-21 @ 07:27]    TPro  7.9  /  Alb  3.5  /  TBili  0.3  /  DBili  x   /  AST  24  /  ALT  18  /  AlkPhos  115  [07-10-21 @ 07:27]          Creatinine Trend:  SCr 10.54 [07-10 @ 07:27]  SCr 8.18 [07-09 @ 07:29]  SCr 11.41 [07-08 @ 02:24]  SCr 10.58 [07-08 @ 00:45]  SCr 11.02 [07-07 @ 22:05]          HBsAb 83.0      [07-08-21 @ 10:31]  HBsAg Nonreact      [07-08-21 @ 10:31]  HCV 0.15, Nonreact      [07-08-21 @ 10:31]

## 2021-07-10 NOTE — PROGRESS NOTE ADULT - PROBLEM SELECTOR PLAN 3
Began productive cough on monday that increased in frequency throughout the day. Felt fatigue as well but improving. No fevers, n/v, congestion, or sob.      - CXR is clear  -PRN Robutussin and cough drops  - PRN Tylenol  -+productive cough w/yellow phelgm
Began productive cough on monday that increased in frequency throughout the day. Felt fatigue as well but improving. No fevers, n/v, congestion, or sob.      - CXR is clear  -PRN Robutussin and cough drops  - PRN Tylenol  -+productive cough w/yellow phelgm

## 2021-07-10 NOTE — PROGRESS NOTE ADULT - PROBLEM SELECTOR PLAN 7
Diet: Renal   DVT: subQ heparin   Dispo: PT reccs outpatient PT, son updated on 7/9 Diet: Renal   DVT: subQ heparin   Dispo: PT reccs outpatient PT, son updated on 7/10

## 2021-07-10 NOTE — PROVIDER CONTACT NOTE (OTHER) - ACTION/TREATMENT ORDERED:
AS per MD no new order given, pt ,s blood pressure is changing, it is ok to discharge.
as per Md she will find out.

## 2021-07-10 NOTE — PROGRESS NOTE ADULT - PROBLEM SELECTOR PLAN 2
Initially /90 at ED. Per son patient's BP is usually in 140s/80s at home     - Monitor BP  - SBP at 140s-150s throughout 7/9 s/p d/c BB  - c/w Lisinopril 2.5mg qD

## 2021-07-10 NOTE — PROGRESS NOTE ADULT - PROBLEM SELECTOR PLAN 1
Pt. with ESRD on HD three times a week (MWF).He goes to [facility]. Last HD was on Monday 7/5/21 via left AVF.     -f/u nephro recs  - nephro plans for HD on 7/10 at 5-6pm - will take 3hrs total and afterwards patient ready to be discharged  - Monitor Labs Pt. with ESRD on HD three times a week (MWF).He goes to [facility]. Last HD was on Monday 7/5/21 via left AVF.     -f/u nephro recs  - nephro plans for HD on 7/10 at 1pm - will take 3hrs total and afterwards patient ready to be discharged  - Monitor Labs

## 2021-07-10 NOTE — PROGRESS NOTE ADULT - PROBLEM SELECTOR PLAN 5
In the setting of ESRD. Hemoglobin at target range    -trend h/h
In the setting of ESRD. Hemoglobin at target range    -trend h/h

## 2021-07-10 NOTE — PROVIDER CONTACT NOTE (OTHER) - REASON
pt is on medication phaslo, but unable to see this med on discharge  medications list,
pt ,s blood pressure is 199/91 Hr 73

## 2021-07-12 ENCOUNTER — NON-APPOINTMENT (OUTPATIENT)
Age: 61
End: 2021-07-12

## 2021-07-12 LAB
GLUCOSE BLDC GLUCOMTR-MCNC: 123 MG/DL — HIGH (ref 70–99)
GLUCOSE BLDC GLUCOMTR-MCNC: 123 MG/DL — HIGH (ref 70–99)

## 2021-07-13 LAB
CULTURE RESULTS: SIGNIFICANT CHANGE UP
CULTURE RESULTS: SIGNIFICANT CHANGE UP
SPECIMEN SOURCE: SIGNIFICANT CHANGE UP
SPECIMEN SOURCE: SIGNIFICANT CHANGE UP

## 2021-07-14 ENCOUNTER — NON-APPOINTMENT (OUTPATIENT)
Age: 61
End: 2021-07-14

## 2021-08-16 ENCOUNTER — APPOINTMENT (OUTPATIENT)
Dept: VASCULAR SURGERY | Facility: CLINIC | Age: 61
End: 2021-08-16
Payer: MEDICAID

## 2021-08-16 PROCEDURE — 93990 DOPPLER FLOW TESTING: CPT

## 2021-08-16 PROCEDURE — 99213 OFFICE O/P EST LOW 20 MIN: CPT

## 2021-08-18 ENCOUNTER — APPOINTMENT (OUTPATIENT)
Dept: ENDOVASCULAR SURGERY | Facility: CLINIC | Age: 61
End: 2021-08-18
Payer: MEDICAID

## 2021-08-18 ENCOUNTER — RESULT REVIEW (OUTPATIENT)
Age: 61
End: 2021-08-18

## 2021-08-18 VITALS
TEMPERATURE: 97.4 F | RESPIRATION RATE: 18 BRPM | SYSTOLIC BLOOD PRESSURE: 215 MMHG | DIASTOLIC BLOOD PRESSURE: 90 MMHG | HEIGHT: 64 IN | OXYGEN SATURATION: 99 % | BODY MASS INDEX: 20.32 KG/M2 | HEART RATE: 59 BPM | WEIGHT: 119.05 LBS

## 2021-08-18 PROCEDURE — 36902Z: CUSTOM

## 2021-08-18 NOTE — HISTORY OF PRESENT ILLNESS
[] : left radiocephalic fistula [FreeTextEntry1] : 59 yo male with history of esrd on hd via left upper extremity radial cephalic avf \par pt without any complaints at this time.  pt denies any history of difficulty with avf during hd

## 2021-08-18 NOTE — DISCUSSION/SUMMARY
[FreeTextEntry1] : 61 yo male with history of esrd on hd via left upper extremity radial cephalic avf \par \par duplex shows patent avf with 50--75% stenosis of the mid forearm and flow rate of 1053\par \par will continue to monitor given no issues with hd \par pt to follow up in 3-4 months with repeat duplex\par \par

## 2021-08-18 NOTE — PHYSICAL EXAM
[Thrill] : thrill [Hand well perfused] : hand well perfused [2+] : left 2+ [Normal] : coordination grossly intact, no focal deficits [Pulsatile Thrill] : no pulsatile thrill [Aneurysm] : no aneurysm [Ulcer] : no ulcer [de-identified] :  strength 5/5 b/l upper extremities [de-identified] : intact

## 2021-09-01 NOTE — PROCEDURE
[Resume diet] : resume diet [Site check for bleeding/hematoma] : Site check for bleeding/hematoma [Vital signs on admission the q 15 mins x2] : Vital signs on admission the q 15 mins x2 [FreeTextEntry1] : Left fistula, fistulagram, angioplasty

## 2021-09-01 NOTE — HISTORY OF PRESENT ILLNESS
[FreeTextEntry1] : Alert and orientated x 3 \par Covid not detected 8/18/21 \par Not covid vaccinated \par Accompanied by wife \par No medications taken this morning

## 2021-09-01 NOTE — ASSESSMENT
[FreeTextEntry1] : Referral from dialysis center for difficult cannulation, plan for left fistula fistulagram

## 2021-09-01 NOTE — REASON FOR VISIT
[Other ___] : a [unfilled] visit for [Difficult Cannulation] : difficult cannulation [FreeTextEntry2] : referral for dialysis

## 2021-09-09 ENCOUNTER — APPOINTMENT (OUTPATIENT)
Dept: INTERNAL MEDICINE | Facility: CLINIC | Age: 61
End: 2021-09-09
Payer: MEDICAID

## 2021-09-09 ENCOUNTER — OUTPATIENT (OUTPATIENT)
Dept: OUTPATIENT SERVICES | Facility: HOSPITAL | Age: 61
LOS: 1 days | End: 2021-09-09
Payer: MEDICAID

## 2021-09-09 ENCOUNTER — NON-APPOINTMENT (OUTPATIENT)
Age: 61
End: 2021-09-09

## 2021-09-09 VITALS
HEART RATE: 67 BPM | BODY MASS INDEX: 19.63 KG/M2 | HEIGHT: 64 IN | WEIGHT: 115 LBS | SYSTOLIC BLOOD PRESSURE: 112 MMHG | DIASTOLIC BLOOD PRESSURE: 78 MMHG | OXYGEN SATURATION: 98 %

## 2021-09-09 DIAGNOSIS — Z00.00 ENCOUNTER FOR GENERAL ADULT MEDICAL EXAMINATION W/OUT ABNORMAL FINDINGS: ICD-10-CM

## 2021-09-09 DIAGNOSIS — I77.0 ARTERIOVENOUS FISTULA, ACQUIRED: Chronic | ICD-10-CM

## 2021-09-09 DIAGNOSIS — Z01.818 ENCOUNTER FOR OTHER PREPROCEDURAL EXAMINATION: ICD-10-CM

## 2021-09-09 DIAGNOSIS — I10 ESSENTIAL (PRIMARY) HYPERTENSION: ICD-10-CM

## 2021-09-09 PROCEDURE — 93005 ELECTROCARDIOGRAM TRACING: CPT

## 2021-09-09 PROCEDURE — G0463: CPT | Mod: 25

## 2021-09-09 PROCEDURE — 99213 OFFICE O/P EST LOW 20 MIN: CPT | Mod: GE

## 2021-09-09 NOTE — PHYSICAL EXAM
[Normal] : normal rate, regular rhythm, normal S1 and S2 and no murmur heard [No Acute Distress] : no acute distress [Well Nourished] : well nourished [Well Developed] : well developed [Well-Appearing] : well-appearing [Normal Sclera/Conjunctiva] : normal sclera/conjunctiva [PERRL] : pupils equal round and reactive to light [EOMI] : extraocular movements intact [Normal Outer Ear/Nose] : the outer ears and nose were normal in appearance [Normal Oropharynx] : the oropharynx was normal [No JVD] : no jugular venous distention [No Respiratory Distress] : no respiratory distress  [No Accessory Muscle Use] : no accessory muscle use [Clear to Auscultation] : lungs were clear to auscultation bilaterally [Normal Rate] : normal rate  [Regular Rhythm] : with a regular rhythm [Normal S1, S2] : normal S1 and S2 [No Murmur] : no murmur heard [No Edema] : there was no peripheral edema [Soft] : abdomen soft [Non Tender] : non-tender [Non-distended] : non-distended [No HSM] : no HSM [Normal Bowel Sounds] : normal bowel sounds [No CVA Tenderness] : no CVA  tenderness [No Spinal Tenderness] : no spinal tenderness [No Joint Swelling] : no joint swelling [Grossly Normal Strength/Tone] : grossly normal strength/tone

## 2021-09-10 LAB
BASOPHILS # BLD AUTO: 0.04 K/UL
BASOPHILS NFR BLD AUTO: 0.9 %
EOSINOPHIL # BLD AUTO: 0.18 K/UL
EOSINOPHIL NFR BLD AUTO: 3.9 %
HCT VFR BLD CALC: 48.3 %
HGB BLD-MCNC: 14.7 G/DL
IMM GRANULOCYTES NFR BLD AUTO: 0.4 %
LYMPHOCYTES # BLD AUTO: 1.48 K/UL
LYMPHOCYTES NFR BLD AUTO: 32.4 %
MAN DIFF?: NORMAL
MCHC RBC-ENTMCNC: 29.8 PG
MCHC RBC-ENTMCNC: 30.4 GM/DL
MCV RBC AUTO: 97.8 FL
MONOCYTES # BLD AUTO: 0.54 K/UL
MONOCYTES NFR BLD AUTO: 11.8 %
NEUTROPHILS # BLD AUTO: 2.31 K/UL
NEUTROPHILS NFR BLD AUTO: 50.6 %
PLATELET # BLD AUTO: 132 K/UL
RBC # BLD: 4.94 M/UL
RBC # FLD: 18.3 %
WBC # FLD AUTO: 4.57 K/UL

## 2021-09-10 NOTE — REVIEW OF SYSTEMS
[Negative] : Musculoskeletal [Chest Pain] : no chest pain [Palpitations] : no palpitations [Claudication] : no  leg claudication [Lower Ext Edema] : no lower extremity edema [Orthopena] : no orthopnea [Paroxysmal Nocturnal Dyspnea] : no paroxysmal nocturnal dyspnea [Shortness Of Breath] : no shortness of breath [Wheezing] : no wheezing [Cough] : no cough [Dyspnea on Exertion] : not dyspnea on exertion

## 2021-09-10 NOTE — ASSESSMENT
[High Risk Surgery - Intraperitoneal, Intrathoracic or Supringuinal Vascular Procedures] : High Risk Surgery - Intraperitoneal, Intrathoracic or Supringuinal Vascular Procedures - No (0) [Ischemic Heart Disease] : Ischemic Heart Disease  - Yes (1) [Congestive Heart Failure] : Congestive Heart Failure - No (0) [Prior Cerebrovascular Accident or TIA] : Prior Cerebrovascular Accident or TIA - No (0) [Creatinine >= 2mg/dL (1 Point)] : Creatinine >= 2mg/dL - Yes (1) [Insulin-dependent Diabetic (1 Point)] : Insulin-dependent Diabetic - No (0) [Patient Optimized for Surgery] : Patient optimized for surgery [No Further Testing Recommended] : no further testing recommended [Continue anticoagulant treatment as is] : Continue current anticoagulant treatment [Continue anti-platelet treatment as is] : Continue current anti-platelet treatment [Continue medications as is] : Continue current medications [As per surgery] : as per surgery [FreeTextEntry4] : 59 y/o male with PMH of ESRD, CAD s/p PClx2, HTN, and T2DM presents to clinic for pre-op clearance for L eye pars planta vitrectomy with membrane peel and endolaser. Patient is at an elevated risk for a very low risk surgery. The revised cardiac risk index is 2, and is associated with a 10.1% risk of major cardiac events. He has risk factors of end stage renal disease, and coronary artery disease s/p 2 percutaneous stents. He may proceed with surgery with the following recommendations. Recommend continued use of beta blocker throughout the surgery, and continued aspirin and Plavix unless contraindicated by Ophthalmology.

## 2021-09-10 NOTE — HISTORY OF PRESENT ILLNESS
[Coronary Artery Disease] : coronary artery disease [No Pertinent Pulmonary History] : no history of asthma, COPD, sleep apnea, or smoking [No Adverse Anesthesia Reaction] : no adverse anesthesia reaction in self or family member [Diabetes] : diabetes [(Patient denies any chest pain, claudication, dyspnea on exertion, orthopnea, palpitations or syncope)] : Patient denies any chest pain, claudication, dyspnea on exertion, orthopnea, palpitations or syncope [Good (7-10 METs)] : Good (7-10 METs) [Anti-Platelet Agents: _____] : Anti-Platelet Agents: [unfilled] [Spouse] : spouse [Aortic Stenosis] : no aortic stenosis [Atrial Fibrillation] : no atrial fibrillation [Recent Myocardial Infarction] : no recent myocardial infarction [Implantable Device/Pacemaker] : no implantable device/pacemaker [Asthma] : no asthma [COPD] : no COPD [Sleep Apnea] : no sleep apnea [Smoker] : not a smoker [Family Member] : no family member with adverse anesthesia reaction/sudden death [Self] : no previous adverse anesthesia reaction [Chronic Anticoagulation] : no chronic anticoagulation [FreeTextEntry1] : pars planta vitrectomy with membrane peel and endolaser L eye [FreeTextEntry2] : 9/16/2021 [FreeTextEntry3] : Dr. Sharma [FreeTextEntry4] : 59 y/o male with PMH of ESRD, CAD s/p PClx2, HTN, and T2DM presents to clinic for pre-op clearance for L eye pars planta vitrectomy with membrane peel and endolaser. He has no acute complaints, no new medications, no recent illness. Went for L eye surgery in [place] back in July (Dr. Sharma). Pt F/U day after surgery, bleeding from L eye noticed.\par \par Pt is at elevated risk for a low risk surgery. The RCRI  1 and is associated with 6.0 %risk of major cardiac events. Pt RF: ESRD and CAD. [FreeTextEntry7] : TTE 3/2/21: normal LV dimensions and function. Mild MR.\par EK/10/21: NSR, LAE, ST depression, negative T waves, lateral ischemia.\par CAD: 2 percutaneous stents (2019).

## 2021-09-13 ENCOUNTER — LABORATORY RESULT (OUTPATIENT)
Age: 61
End: 2021-09-13

## 2021-09-14 DIAGNOSIS — Z01.818 ENCOUNTER FOR OTHER PREPROCEDURAL EXAMINATION: ICD-10-CM

## 2021-09-15 ENCOUNTER — TRANSCRIPTION ENCOUNTER (OUTPATIENT)
Age: 61
End: 2021-09-15

## 2021-09-16 ENCOUNTER — OUTPATIENT (OUTPATIENT)
Dept: OUTPATIENT SERVICES | Facility: HOSPITAL | Age: 61
LOS: 1 days | Discharge: HOME | End: 2021-09-16

## 2021-09-16 DIAGNOSIS — I77.0 ARTERIOVENOUS FISTULA, ACQUIRED: Chronic | ICD-10-CM

## 2021-09-16 LAB
GLUCOSE BLDC GLUCOMTR-MCNC: 150 MG/DL — HIGH (ref 70–99)
SARS-COV-2 RNA SPEC QL NAA+PROBE: NEGATIVE — SIGNIFICANT CHANGE UP

## 2021-11-30 ENCOUNTER — NON-APPOINTMENT (OUTPATIENT)
Age: 61
End: 2021-11-30

## 2021-11-30 ENCOUNTER — OUTPATIENT (OUTPATIENT)
Dept: OUTPATIENT SERVICES | Facility: HOSPITAL | Age: 61
LOS: 1 days | End: 2021-11-30
Payer: MEDICAID

## 2021-11-30 ENCOUNTER — APPOINTMENT (OUTPATIENT)
Dept: INTERNAL MEDICINE | Facility: CLINIC | Age: 61
End: 2021-11-30
Payer: MEDICAID

## 2021-11-30 VITALS
TEMPERATURE: 98.6 F | HEART RATE: 78 BPM | SYSTOLIC BLOOD PRESSURE: 136 MMHG | DIASTOLIC BLOOD PRESSURE: 82 MMHG | OXYGEN SATURATION: 97 %

## 2021-11-30 DIAGNOSIS — I77.0 ARTERIOVENOUS FISTULA, ACQUIRED: Chronic | ICD-10-CM

## 2021-11-30 DIAGNOSIS — I10 ESSENTIAL (PRIMARY) HYPERTENSION: ICD-10-CM

## 2021-11-30 PROCEDURE — 99214 OFFICE O/P EST MOD 30 MIN: CPT | Mod: GC

## 2021-11-30 PROCEDURE — G0463: CPT

## 2021-11-30 RX ORDER — METOPROLOL TARTRATE 25 MG/1
25 TABLET, FILM COATED ORAL TWICE DAILY
Qty: 60 | Refills: 3 | Status: DISCONTINUED | COMMUNITY
Start: 2021-03-03 | End: 2021-11-30

## 2021-11-30 RX ORDER — LORATADINE 5 MG/5 ML
10 SOLUTION, ORAL ORAL
Qty: 30 | Refills: 0 | Status: DISCONTINUED | COMMUNITY
Start: 2021-02-01 | End: 2021-11-30

## 2021-11-30 NOTE — RESULTS/DATA
[] : results reviewed [de-identified] : EKG at baseline. NSR, L atrial enlargement, negative T waves. Old anteroseptal infarct.

## 2021-11-30 NOTE — ASSESSMENT
[High Risk Surgery - Intraperitoneal, Intrathoracic or Supringuinal Vascular Procedures] : High Risk Surgery - Intraperitoneal, Intrathoracic or Supringuinal Vascular Procedures - No (0) [Ischemic Heart Disease] : Ischemic Heart Disease  - Yes (1) [Congestive Heart Failure] : Congestive Heart Failure - No (0) [Prior Cerebrovascular Accident or TIA] : Prior Cerebrovascular Accident or TIA - No (0) [Creatinine >= 2mg/dL (1 Point)] : Creatinine >= 2mg/dL - Yes (1) [Insulin-dependent Diabetic (1 Point)] : Insulin-dependent Diabetic - No (0) [2] : 2 , RCRI Class: III, Risk of Post-Op Cardiac Complications: 10.1%, 95% CI for Risk Estimate: 8.1% - 12.6% [Patient Optimized for Surgery] : Patient optimized for surgery [No Further Testing Recommended] : no further testing recommended [As per surgery] : as per surgery [Continue anti-platelet treatment as is] : Continue current anti-platelet treatment [FreeTextEntry4] : 62 y/o M with ESRD on HD (M/W/F), DM (last A1c 7.7), CAD s/p stent (2019), HTN here for preop evaluation prior to L eye surgery on 12/2/21\par Patient HIGH RISK (RCRI=2) for LOW RISK procedure.\par \par Preop evaluation to be faxed to Shae (Surgical Coordinator) [phone] [FreeTextEntry6] : Discussed with surgical coordinator.

## 2021-11-30 NOTE — PHYSICAL EXAM
[Normal] : no carotid or abdominal bruits heard, no varicosities, pedal pulses are present, no peripheral edema, no extremity clubbing or cyanosis and no palpable aorta [de-identified] : AVF in L arm

## 2021-11-30 NOTE — HISTORY OF PRESENT ILLNESS
[Coronary Artery Disease] : coronary artery disease [No Pertinent Pulmonary History] : no history of asthma, COPD, sleep apnea, or smoking [No Adverse Anesthesia Reaction] : no adverse anesthesia reaction in self or family member [Chronic Kidney Disease] : chronic kidney disease [(Patient denies any chest pain, claudication, dyspnea on exertion, orthopnea, palpitations or syncope)] : Patient denies any chest pain, claudication, dyspnea on exertion, orthopnea, palpitations or syncope [Good (7-10 METs)] : Good (7-10 METs) [Diabetes] : diabetes [Anti-Platelet Agents: _____] : Anti-Platelet Agents: [unfilled] [Spouse] : spouse [Aortic Stenosis] : no aortic stenosis [Atrial Fibrillation] : no atrial fibrillation [Recent Myocardial Infarction] : no recent myocardial infarction [Implantable Device/Pacemaker] : no implantable device/pacemaker [Smoker] : not a smoker [Family Member] : no family member with adverse anesthesia reaction/sudden death [Self] : no previous adverse anesthesia reaction [Chronic Anticoagulation] : no chronic anticoagulation [FreeTextEntry1] : Pars plana vitrectomy and membrane L eye [FreeTextEntry2] : 12/2/2021 [FreeTextEntry3] : [facility] ([phone]) [FreeTextEntry4] : H/O ESRD (M/W/F), CAD s/p PCI x 2, HTN, DM\par \par Patient reports ED stopped metoprolol and start lisinopril in July\par \par Will be getting COVID pcr later today.\par Received COVID vaccine (J&J) in 9/2021 [FreeTextEntry7] : Echo (3/2021): Normal LV dimensions and function. Discrete calficiation of aortic valve without hemodynamically significant tenosis. Mild mitral regurgitation. Bi-atrial dilatation seen.

## 2021-12-01 ENCOUNTER — TRANSCRIPTION ENCOUNTER (OUTPATIENT)
Age: 61
End: 2021-12-01

## 2021-12-02 ENCOUNTER — OUTPATIENT (OUTPATIENT)
Dept: OUTPATIENT SERVICES | Facility: HOSPITAL | Age: 61
LOS: 1 days | Discharge: HOME | End: 2021-12-02

## 2021-12-02 DIAGNOSIS — Z01.818 ENCOUNTER FOR OTHER PREPROCEDURAL EXAMINATION: ICD-10-CM

## 2021-12-02 DIAGNOSIS — I77.0 ARTERIOVENOUS FISTULA, ACQUIRED: Chronic | ICD-10-CM

## 2021-12-02 LAB
ALBUMIN SERPL ELPH-MCNC: 3.6 G/DL — SIGNIFICANT CHANGE UP (ref 3.3–5)
ALP SERPL-CCNC: 131 U/L — HIGH (ref 40–120)
ALT FLD-CCNC: 16 U/L — SIGNIFICANT CHANGE UP (ref 10–45)
ANION GAP SERPL CALC-SCNC: 9 MMOL/L — SIGNIFICANT CHANGE UP (ref 5–17)
AST SERPL-CCNC: 21 U/L — SIGNIFICANT CHANGE UP (ref 10–40)
BILIRUB SERPL-MCNC: 0.8 MG/DL — SIGNIFICANT CHANGE UP (ref 0.2–1.2)
BUN SERPL-MCNC: 46 MG/DL — HIGH (ref 7–23)
CALCIUM SERPL-MCNC: 8.7 MG/DL — SIGNIFICANT CHANGE UP (ref 8.4–10.5)
CHLORIDE SERPL-SCNC: 98 MMOL/L — SIGNIFICANT CHANGE UP (ref 96–108)
CO2 SERPL-SCNC: 35 MMOL/L — HIGH (ref 22–31)
CREAT SERPL-MCNC: 7.7 MG/DL — HIGH (ref 0.5–1.3)
GLUCOSE BLDC GLUCOMTR-MCNC: 206 MG/DL — HIGH (ref 70–99)
GLUCOSE SERPL-MCNC: 279 MG/DL — HIGH (ref 70–99)
POTASSIUM SERPL-MCNC: 5.7 MMOL/L — HIGH (ref 3.5–5.3)
POTASSIUM SERPL-SCNC: 5.7 MMOL/L — HIGH (ref 3.5–5.3)
PROT SERPL-MCNC: 7.9 G/DL — SIGNIFICANT CHANGE UP (ref 6–8.3)
SODIUM SERPL-SCNC: 142 MMOL/L — SIGNIFICANT CHANGE UP (ref 135–145)

## 2021-12-03 ENCOUNTER — NON-APPOINTMENT (OUTPATIENT)
Age: 61
End: 2021-12-03

## 2021-12-03 LAB
ALBUMIN SERPL ELPH-MCNC: 4.6 G/DL
ALP BLD-CCNC: 166 U/L
ALT SERPL-CCNC: 10 U/L
ANION GAP SERPL CALC-SCNC: 21 MMOL/L
AST SERPL-CCNC: 12 U/L
BASOPHILS # BLD AUTO: 0.05 K/UL
BASOPHILS NFR BLD AUTO: 0.8 %
BILIRUB SERPL-MCNC: 0.6 MG/DL
BUN SERPL-MCNC: 59 MG/DL
CALCIUM SERPL-MCNC: 8.1 MG/DL
CHLORIDE SERPL-SCNC: 91 MMOL/L
CO2 SERPL-SCNC: 26 MMOL/L
CREAT SERPL-MCNC: 8.48 MG/DL
EOSINOPHIL # BLD AUTO: 0.26 K/UL
EOSINOPHIL NFR BLD AUTO: 4.4 %
ESTIMATED AVERAGE GLUCOSE: 183 MG/DL
GLUCOSE SERPL-MCNC: 205 MG/DL
HBA1C MFR BLD HPLC: 8 %
HCT VFR BLD CALC: 42.3 %
HGB BLD-MCNC: 13.1 G/DL
IMM GRANULOCYTES NFR BLD AUTO: 0.2 %
LYMPHOCYTES # BLD AUTO: 1.64 K/UL
LYMPHOCYTES NFR BLD AUTO: 27.5 %
MAN DIFF?: NORMAL
MCHC RBC-ENTMCNC: 30.2 PG
MCHC RBC-ENTMCNC: 31 GM/DL
MCV RBC AUTO: 97.5 FL
MONOCYTES # BLD AUTO: 0.83 K/UL
MONOCYTES NFR BLD AUTO: 13.9 %
NEUTROPHILS # BLD AUTO: 3.17 K/UL
NEUTROPHILS NFR BLD AUTO: 53.2 %
PLATELET # BLD AUTO: 170 K/UL
POTASSIUM SERPL-SCNC: 5.7 MMOL/L
PROT SERPL-MCNC: 8.2 G/DL
RBC # BLD: 4.34 M/UL
RBC # FLD: 14.4 %
SODIUM SERPL-SCNC: 138 MMOL/L
WBC # FLD AUTO: 5.96 K/UL

## 2021-12-06 ENCOUNTER — APPOINTMENT (OUTPATIENT)
Dept: VASCULAR SURGERY | Facility: CLINIC | Age: 61
End: 2021-12-06

## 2022-01-22 NOTE — H&P ADULT - SEXUAL ORIENTATION
Straight, Heterosexual Withdrawal symptoms include negative mood, urges to smoke, and difficulty concentrating

## 2022-02-04 RX ORDER — CLOPIDOGREL BISULFATE 75 MG/1
75 TABLET, FILM COATED ORAL DAILY
Qty: 30 | Refills: 1 | Status: ACTIVE | COMMUNITY
Start: 2021-01-28 | End: 1900-01-01

## 2022-02-04 RX ORDER — ASPIRIN ENTERIC COATED TABLETS 81 MG 81 MG/1
81 TABLET, DELAYED RELEASE ORAL
Qty: 90 | Refills: 2 | Status: ACTIVE | COMMUNITY

## 2022-02-04 RX ORDER — ROSUVASTATIN CALCIUM 10 MG/1
10 TABLET, FILM COATED ORAL
Qty: 90 | Refills: 2 | Status: ACTIVE | COMMUNITY
Start: 2022-02-04

## 2022-02-04 RX ORDER — LISINOPRIL 2.5 MG/1
2.5 TABLET ORAL DAILY
Qty: 30 | Refills: 1 | Status: ACTIVE | COMMUNITY
Start: 2021-08-06 | End: 1900-01-01

## 2022-02-04 RX ORDER — GABAPENTIN 100 MG/1
100 CAPSULE ORAL 3 TIMES DAILY
Qty: 90 | Refills: 1 | Status: ACTIVE | COMMUNITY
Start: 2021-02-01 | End: 1900-01-01

## 2022-02-04 RX ORDER — SITAGLIPTIN 25 MG/1
25 TABLET, FILM COATED ORAL DAILY
Qty: 30 | Refills: 1 | Status: ACTIVE | COMMUNITY
Start: 1900-01-01 | End: 1900-01-01

## 2022-03-24 ENCOUNTER — APPOINTMENT (OUTPATIENT)
Dept: INTERNAL MEDICINE | Facility: CLINIC | Age: 62
End: 2022-03-24

## 2022-05-31 NOTE — ASU PREOP CHECKLIST - DENTURES
[Joint Pain] : joint pain [Negative] : Heme/Lymph [Fever] : no fever [Chills] : no chills [Cough] : no cough [SOB on Exertion] : no shortness of breath on exertion yes

## 2022-06-20 ENCOUNTER — INPATIENT (INPATIENT)
Facility: HOSPITAL | Age: 62
LOS: 7 days | Discharge: HOME | DRG: 280 | End: 2022-06-28
Attending: STUDENT IN AN ORGANIZED HEALTH CARE EDUCATION/TRAINING PROGRAM | Admitting: SPECIALIST
Payer: MEDICAID

## 2022-06-20 VITALS
SYSTOLIC BLOOD PRESSURE: 206 MMHG | RESPIRATION RATE: 16 BRPM | TEMPERATURE: 97 F | OXYGEN SATURATION: 99 % | DIASTOLIC BLOOD PRESSURE: 97 MMHG | HEIGHT: 64 IN | WEIGHT: 114.64 LBS | HEART RATE: 58 BPM

## 2022-06-20 DIAGNOSIS — I77.0 ARTERIOVENOUS FISTULA, ACQUIRED: Chronic | ICD-10-CM

## 2022-06-20 PROCEDURE — 99291 CRITICAL CARE FIRST HOUR: CPT | Mod: 25

## 2022-06-20 PROCEDURE — 93010 ELECTROCARDIOGRAM REPORT: CPT

## 2022-06-20 NOTE — ED ADULT TRIAGE NOTE - CHIEF COMPLAINT QUOTE
dialysis pt supposed to be 3x weekly, has been in [facility] x 3 months receiving only ONCE a week, last treatment 6/16 in [facility]  sent here by  from dialysis center to restart dialysis in

## 2022-06-21 DIAGNOSIS — N18.6 END STAGE RENAL DISEASE: ICD-10-CM

## 2022-06-21 DIAGNOSIS — E87.5 HYPERKALEMIA: ICD-10-CM

## 2022-06-21 DIAGNOSIS — E87.2 ACIDOSIS: ICD-10-CM

## 2022-06-21 DIAGNOSIS — D64.9 ANEMIA, UNSPECIFIED: ICD-10-CM

## 2022-06-21 DIAGNOSIS — I10 ESSENTIAL (PRIMARY) HYPERTENSION: ICD-10-CM

## 2022-06-21 DIAGNOSIS — N25.0 RENAL OSTEODYSTROPHY: ICD-10-CM

## 2022-06-21 LAB
A1C WITH ESTIMATED AVERAGE GLUCOSE RESULT: 6.4 % — HIGH (ref 4–5.6)
ALBUMIN SERPL ELPH-MCNC: 4.8 G/DL — SIGNIFICANT CHANGE UP (ref 3.3–5)
ALP SERPL-CCNC: 147 U/L — HIGH (ref 40–120)
ALT FLD-CCNC: 34 U/L — SIGNIFICANT CHANGE UP (ref 10–45)
ANION GAP SERPL CALC-SCNC: 18 MMOL/L — HIGH (ref 5–17)
ANION GAP SERPL CALC-SCNC: 24 MMOL/L — HIGH (ref 5–17)
APTT BLD: 36.8 SEC — HIGH (ref 27.5–35.5)
AST SERPL-CCNC: 38 U/L — SIGNIFICANT CHANGE UP (ref 10–40)
BASE EXCESS BLDV CALC-SCNC: -17.8 MMOL/L — LOW (ref -2–2)
BASOPHILS # BLD AUTO: 0.02 K/UL — SIGNIFICANT CHANGE UP (ref 0–0.2)
BASOPHILS NFR BLD AUTO: 0.4 % — SIGNIFICANT CHANGE UP (ref 0–2)
BILIRUB SERPL-MCNC: 0.4 MG/DL — SIGNIFICANT CHANGE UP (ref 0.2–1.2)
BLD GP AB SCN SERPL QL: NEGATIVE — SIGNIFICANT CHANGE UP
BUN SERPL-MCNC: 137 MG/DL — HIGH (ref 7–23)
BUN SERPL-MCNC: 53 MG/DL — HIGH (ref 7–23)
CA-I SERPL-SCNC: 0.83 MMOL/L — LOW (ref 1.15–1.33)
CALCIUM SERPL-MCNC: 6.4 MG/DL — CRITICAL LOW (ref 8.4–10.5)
CALCIUM SERPL-MCNC: 7 MG/DL — LOW (ref 8.4–10.5)
CHLORIDE BLDV-SCNC: 107 MMOL/L — SIGNIFICANT CHANGE UP (ref 96–108)
CHLORIDE SERPL-SCNC: 102 MMOL/L — SIGNIFICANT CHANGE UP (ref 96–108)
CHLORIDE SERPL-SCNC: 97 MMOL/L — SIGNIFICANT CHANGE UP (ref 96–108)
CO2 BLDV-SCNC: 13 MMOL/L — LOW (ref 22–26)
CO2 SERPL-SCNC: 10 MMOL/L — CRITICAL LOW (ref 22–31)
CO2 SERPL-SCNC: 21 MMOL/L — LOW (ref 22–31)
CREAT SERPL-MCNC: 18.36 MG/DL — HIGH (ref 0.5–1.3)
CREAT SERPL-MCNC: 8.58 MG/DL — HIGH (ref 0.5–1.3)
EGFR: 3 ML/MIN/1.73M2 — LOW
EGFR: 7 ML/MIN/1.73M2 — LOW
EOSINOPHIL # BLD AUTO: 0.17 K/UL — SIGNIFICANT CHANGE UP (ref 0–0.5)
EOSINOPHIL NFR BLD AUTO: 3.3 % — SIGNIFICANT CHANGE UP (ref 0–6)
ESTIMATED AVERAGE GLUCOSE: 137 MG/DL — HIGH (ref 68–114)
FLUAV AG NPH QL: SIGNIFICANT CHANGE UP
FLUBV AG NPH QL: SIGNIFICANT CHANGE UP
GAS PNL BLDV: 135 MMOL/L — LOW (ref 136–145)
GAS PNL BLDV: SIGNIFICANT CHANGE UP
GAS PNL BLDV: SIGNIFICANT CHANGE UP
GLUCOSE BLDC GLUCOMTR-MCNC: 120 MG/DL — HIGH (ref 70–99)
GLUCOSE BLDC GLUCOMTR-MCNC: 130 MG/DL — HIGH (ref 70–99)
GLUCOSE BLDC GLUCOMTR-MCNC: 243 MG/DL — HIGH (ref 70–99)
GLUCOSE BLDC GLUCOMTR-MCNC: 254 MG/DL — HIGH (ref 70–99)
GLUCOSE BLDC GLUCOMTR-MCNC: 62 MG/DL — LOW (ref 70–99)
GLUCOSE BLDC GLUCOMTR-MCNC: 64 MG/DL — LOW (ref 70–99)
GLUCOSE BLDV-MCNC: 112 MG/DL — HIGH (ref 70–99)
GLUCOSE SERPL-MCNC: 114 MG/DL — HIGH (ref 70–99)
GLUCOSE SERPL-MCNC: 219 MG/DL — HIGH (ref 70–99)
HBV SURFACE AB SER-ACNC: 590.4 MIU/ML — SIGNIFICANT CHANGE UP
HCO3 BLDV-SCNC: 12 MMOL/L — LOW (ref 22–29)
HCT VFR BLD CALC: 27 % — LOW (ref 39–50)
HCT VFR BLDA CALC: 33 % — LOW (ref 39–51)
HGB BLD CALC-MCNC: 10.9 G/DL — LOW (ref 12.6–17.4)
HGB BLD-MCNC: 8.7 G/DL — LOW (ref 13–17)
IMM GRANULOCYTES NFR BLD AUTO: 0.4 % — SIGNIFICANT CHANGE UP (ref 0–1.5)
INR BLD: 1.58 RATIO — HIGH (ref 0.88–1.16)
LACTATE BLDV-MCNC: 1.1 MMOL/L — SIGNIFICANT CHANGE UP (ref 0.7–2)
LYMPHOCYTES # BLD AUTO: 0.83 K/UL — LOW (ref 1–3.3)
LYMPHOCYTES # BLD AUTO: 16.2 % — SIGNIFICANT CHANGE UP (ref 13–44)
MAGNESIUM SERPL-MCNC: 2.1 MG/DL — SIGNIFICANT CHANGE UP (ref 1.6–2.6)
MCHC RBC-ENTMCNC: 30.1 PG — SIGNIFICANT CHANGE UP (ref 27–34)
MCHC RBC-ENTMCNC: 32.2 GM/DL — SIGNIFICANT CHANGE UP (ref 32–36)
MCV RBC AUTO: 93.4 FL — SIGNIFICANT CHANGE UP (ref 80–100)
MONOCYTES # BLD AUTO: 0.65 K/UL — SIGNIFICANT CHANGE UP (ref 0–0.9)
MONOCYTES NFR BLD AUTO: 12.7 % — SIGNIFICANT CHANGE UP (ref 2–14)
MRSA PCR RESULT.: SIGNIFICANT CHANGE UP
NEUTROPHILS # BLD AUTO: 3.42 K/UL — SIGNIFICANT CHANGE UP (ref 1.8–7.4)
NEUTROPHILS NFR BLD AUTO: 67 % — SIGNIFICANT CHANGE UP (ref 43–77)
NRBC # BLD: 0 /100 WBCS — SIGNIFICANT CHANGE UP (ref 0–0)
PCO2 BLDV: 43 MMHG — SIGNIFICANT CHANGE UP (ref 42–55)
PH BLDV: 7.05 — CRITICAL LOW (ref 7.32–7.43)
PHOSPHATE SERPL-MCNC: 4.2 MG/DL — SIGNIFICANT CHANGE UP (ref 2.5–4.5)
PLATELET # BLD AUTO: 111 K/UL — LOW (ref 150–400)
PO2 BLDV: 22 MMHG — LOW (ref 25–45)
POTASSIUM BLDV-SCNC: 7.2 MMOL/L — CRITICAL HIGH (ref 3.5–5.1)
POTASSIUM SERPL-MCNC: 3 MMOL/L — LOW (ref 3.5–5.3)
POTASSIUM SERPL-MCNC: 6.7 MMOL/L — CRITICAL HIGH (ref 3.5–5.3)
POTASSIUM SERPL-SCNC: 3 MMOL/L — LOW (ref 3.5–5.3)
POTASSIUM SERPL-SCNC: 6.7 MMOL/L — CRITICAL HIGH (ref 3.5–5.3)
PROT SERPL-MCNC: 8.8 G/DL — HIGH (ref 6–8.3)
PROTHROM AB SERPL-ACNC: 18.3 SEC — HIGH (ref 10.5–13.4)
RBC # BLD: 2.89 M/UL — LOW (ref 4.2–5.8)
RBC # FLD: 14.6 % — HIGH (ref 10.3–14.5)
RH IG SCN BLD-IMP: POSITIVE — SIGNIFICANT CHANGE UP
RSV RNA NPH QL NAA+NON-PROBE: SIGNIFICANT CHANGE UP
S AUREUS DNA NOSE QL NAA+PROBE: SIGNIFICANT CHANGE UP
SAO2 % BLDV: 24.5 % — LOW (ref 67–88)
SARS-COV-2 RNA SPEC QL NAA+PROBE: SIGNIFICANT CHANGE UP
SODIUM SERPL-SCNC: 136 MMOL/L — SIGNIFICANT CHANGE UP (ref 135–145)
SODIUM SERPL-SCNC: 136 MMOL/L — SIGNIFICANT CHANGE UP (ref 135–145)
WBC # BLD: 5.11 K/UL — SIGNIFICANT CHANGE UP (ref 3.8–10.5)
WBC # FLD AUTO: 5.11 K/UL — SIGNIFICANT CHANGE UP (ref 3.8–10.5)

## 2022-06-21 PROCEDURE — 93010 ELECTROCARDIOGRAM REPORT: CPT

## 2022-06-21 PROCEDURE — 71046 X-RAY EXAM CHEST 2 VIEWS: CPT | Mod: 26

## 2022-06-21 PROCEDURE — 99233 SBSQ HOSP IP/OBS HIGH 50: CPT | Mod: GC

## 2022-06-21 PROCEDURE — 99291 CRITICAL CARE FIRST HOUR: CPT | Mod: GC,25

## 2022-06-21 PROCEDURE — 99255 IP/OBS CONSLTJ NEW/EST HI 80: CPT | Mod: GC

## 2022-06-21 RX ORDER — SODIUM ZIRCONIUM CYCLOSILICATE 10 G/10G
10 POWDER, FOR SUSPENSION ORAL ONCE
Refills: 0 | Status: COMPLETED | OUTPATIENT
Start: 2022-06-21 | End: 2022-06-21

## 2022-06-21 RX ORDER — CALCIUM GLUCONATE 100 MG/ML
1 VIAL (ML) INTRAVENOUS ONCE
Refills: 0 | Status: DISCONTINUED | OUTPATIENT
Start: 2022-06-21 | End: 2022-06-21

## 2022-06-21 RX ORDER — CLOPIDOGREL BISULFATE 75 MG/1
75 TABLET, FILM COATED ORAL ONCE
Refills: 0 | Status: COMPLETED | OUTPATIENT
Start: 2022-06-21 | End: 2022-06-21

## 2022-06-21 RX ORDER — DEXTROSE 50 % IN WATER 50 %
12.5 SYRINGE (ML) INTRAVENOUS ONCE
Refills: 0 | Status: DISCONTINUED | OUTPATIENT
Start: 2022-06-21 | End: 2022-06-22

## 2022-06-21 RX ORDER — SODIUM CHLORIDE 9 MG/ML
1000 INJECTION, SOLUTION INTRAVENOUS
Refills: 0 | Status: DISCONTINUED | OUTPATIENT
Start: 2022-06-21 | End: 2022-06-28

## 2022-06-21 RX ORDER — SODIUM BICARBONATE 1 MEQ/ML
0.14 SYRINGE (ML) INTRAVENOUS
Qty: 150 | Refills: 0 | Status: DISCONTINUED | OUTPATIENT
Start: 2022-06-21 | End: 2022-06-21

## 2022-06-21 RX ORDER — INSULIN LISPRO 100/ML
VIAL (ML) SUBCUTANEOUS
Refills: 0 | Status: DISCONTINUED | OUTPATIENT
Start: 2022-06-21 | End: 2022-06-28

## 2022-06-21 RX ORDER — CLOPIDOGREL BISULFATE 75 MG/1
75 TABLET, FILM COATED ORAL DAILY
Refills: 0 | Status: DISCONTINUED | OUTPATIENT
Start: 2022-06-22 | End: 2022-06-27

## 2022-06-21 RX ORDER — HYDRALAZINE HCL 50 MG
12.5 TABLET ORAL THREE TIMES A DAY
Refills: 0 | Status: DISCONTINUED | OUTPATIENT
Start: 2022-06-21 | End: 2022-06-21

## 2022-06-21 RX ORDER — LORATADINE 10 MG/1
10 TABLET ORAL DAILY
Refills: 0 | Status: DISCONTINUED | OUTPATIENT
Start: 2022-06-21 | End: 2022-06-28

## 2022-06-21 RX ORDER — AMLODIPINE BESYLATE 2.5 MG/1
5 TABLET ORAL DAILY
Refills: 0 | Status: DISCONTINUED | OUTPATIENT
Start: 2022-06-21 | End: 2022-06-22

## 2022-06-21 RX ORDER — DEXTROSE 50 % IN WATER 50 %
25 SYRINGE (ML) INTRAVENOUS ONCE
Refills: 0 | Status: DISCONTINUED | OUTPATIENT
Start: 2022-06-21 | End: 2022-06-22

## 2022-06-21 RX ORDER — CALCIUM GLUCONATE 100 MG/ML
2 VIAL (ML) INTRAVENOUS ONCE
Refills: 0 | Status: COMPLETED | OUTPATIENT
Start: 2022-06-21 | End: 2022-06-21

## 2022-06-21 RX ORDER — INSULIN HUMAN 100 [IU]/ML
5 INJECTION, SOLUTION SUBCUTANEOUS ONCE
Refills: 0 | Status: COMPLETED | OUTPATIENT
Start: 2022-06-21 | End: 2022-06-21

## 2022-06-21 RX ORDER — DEXTROSE 50 % IN WATER 50 %
25 SYRINGE (ML) INTRAVENOUS ONCE
Refills: 0 | Status: COMPLETED | OUTPATIENT
Start: 2022-06-21 | End: 2022-06-21

## 2022-06-21 RX ORDER — CHLORHEXIDINE GLUCONATE 213 G/1000ML
1 SOLUTION TOPICAL
Refills: 0 | Status: DISCONTINUED | OUTPATIENT
Start: 2022-06-21 | End: 2022-06-28

## 2022-06-21 RX ORDER — LABETALOL HCL 100 MG
10 TABLET ORAL ONCE
Refills: 0 | Status: COMPLETED | OUTPATIENT
Start: 2022-06-21 | End: 2022-06-21

## 2022-06-21 RX ORDER — LISINOPRIL 2.5 MG/1
2.5 TABLET ORAL DAILY
Refills: 0 | Status: DISCONTINUED | OUTPATIENT
Start: 2022-06-21 | End: 2022-06-21

## 2022-06-21 RX ORDER — HEPARIN SODIUM 5000 [USP'U]/ML
5000 INJECTION INTRAVENOUS; SUBCUTANEOUS EVERY 8 HOURS
Refills: 0 | Status: DISCONTINUED | OUTPATIENT
Start: 2022-06-21 | End: 2022-06-22

## 2022-06-21 RX ORDER — HYDRALAZINE HCL 50 MG
10 TABLET ORAL THREE TIMES A DAY
Refills: 0 | Status: DISCONTINUED | OUTPATIENT
Start: 2022-06-21 | End: 2022-06-21

## 2022-06-21 RX ORDER — HYDRALAZINE HCL 50 MG
5 TABLET ORAL ONCE
Refills: 0 | Status: COMPLETED | OUTPATIENT
Start: 2022-06-21 | End: 2022-06-21

## 2022-06-21 RX ORDER — METOPROLOL TARTRATE 50 MG
25 TABLET ORAL
Refills: 0 | Status: DISCONTINUED | OUTPATIENT
Start: 2022-06-21 | End: 2022-06-28

## 2022-06-21 RX ORDER — GLUCAGON INJECTION, SOLUTION 0.5 MG/.1ML
1 INJECTION, SOLUTION SUBCUTANEOUS ONCE
Refills: 0 | Status: DISCONTINUED | OUTPATIENT
Start: 2022-06-21 | End: 2022-06-28

## 2022-06-21 RX ORDER — LISINOPRIL 2.5 MG/1
2.5 TABLET ORAL DAILY
Refills: 0 | Status: DISCONTINUED | OUTPATIENT
Start: 2022-06-21 | End: 2022-06-28

## 2022-06-21 RX ORDER — ASPIRIN/CALCIUM CARB/MAGNESIUM 324 MG
81 TABLET ORAL DAILY
Refills: 0 | Status: DISCONTINUED | OUTPATIENT
Start: 2022-06-21 | End: 2022-06-28

## 2022-06-21 RX ORDER — POTASSIUM CHLORIDE 20 MEQ
40 PACKET (EA) ORAL ONCE
Refills: 0 | Status: COMPLETED | OUTPATIENT
Start: 2022-06-21 | End: 2022-06-21

## 2022-06-21 RX ORDER — DEXTROSE 50 % IN WATER 50 %
15 SYRINGE (ML) INTRAVENOUS ONCE
Refills: 0 | Status: DISCONTINUED | OUTPATIENT
Start: 2022-06-21 | End: 2022-06-22

## 2022-06-21 RX ORDER — INSULIN LISPRO 100/ML
VIAL (ML) SUBCUTANEOUS AT BEDTIME
Refills: 0 | Status: DISCONTINUED | OUTPATIENT
Start: 2022-06-21 | End: 2022-06-28

## 2022-06-21 RX ORDER — METOPROLOL TARTRATE 50 MG
25 TABLET ORAL
Refills: 0 | Status: DISCONTINUED | OUTPATIENT
Start: 2022-06-21 | End: 2022-06-21

## 2022-06-21 RX ORDER — GABAPENTIN 400 MG/1
100 CAPSULE ORAL EVERY 8 HOURS
Refills: 0 | Status: DISCONTINUED | OUTPATIENT
Start: 2022-06-21 | End: 2022-06-28

## 2022-06-21 RX ORDER — DEXTROSE 50 % IN WATER 50 %
50 SYRINGE (ML) INTRAVENOUS ONCE
Refills: 0 | Status: COMPLETED | OUTPATIENT
Start: 2022-06-21 | End: 2022-06-21

## 2022-06-21 RX ADMIN — GABAPENTIN 100 MILLIGRAM(S): 400 CAPSULE ORAL at 06:05

## 2022-06-21 RX ADMIN — Medication 40 MILLIEQUIVALENT(S): at 17:33

## 2022-06-21 RX ADMIN — GABAPENTIN 100 MILLIGRAM(S): 400 CAPSULE ORAL at 21:00

## 2022-06-21 RX ADMIN — Medication 200 GRAM(S): at 02:07

## 2022-06-21 RX ADMIN — LORATADINE 10 MILLIGRAM(S): 10 TABLET ORAL at 12:23

## 2022-06-21 RX ADMIN — Medication 4: at 17:33

## 2022-06-21 RX ADMIN — Medication 50 MEQ/KG/HR: at 01:56

## 2022-06-21 RX ADMIN — Medication 10 MILLIGRAM(S): at 04:33

## 2022-06-21 RX ADMIN — Medication 50 MILLILITER(S): at 01:53

## 2022-06-21 RX ADMIN — LISINOPRIL 2.5 MILLIGRAM(S): 2.5 TABLET ORAL at 06:05

## 2022-06-21 RX ADMIN — CLOPIDOGREL BISULFATE 75 MILLIGRAM(S): 75 TABLET, FILM COATED ORAL at 06:04

## 2022-06-21 RX ADMIN — Medication 10 MILLIGRAM(S): at 10:11

## 2022-06-21 RX ADMIN — Medication 81 MILLIGRAM(S): at 12:23

## 2022-06-21 RX ADMIN — HEPARIN SODIUM 5000 UNIT(S): 5000 INJECTION INTRAVENOUS; SUBCUTANEOUS at 21:00

## 2022-06-21 RX ADMIN — GABAPENTIN 100 MILLIGRAM(S): 400 CAPSULE ORAL at 14:34

## 2022-06-21 RX ADMIN — AMLODIPINE BESYLATE 5 MILLIGRAM(S): 2.5 TABLET ORAL at 10:11

## 2022-06-21 RX ADMIN — SODIUM ZIRCONIUM CYCLOSILICATE 10 GRAM(S): 10 POWDER, FOR SUSPENSION ORAL at 01:53

## 2022-06-21 RX ADMIN — HEPARIN SODIUM 5000 UNIT(S): 5000 INJECTION INTRAVENOUS; SUBCUTANEOUS at 14:34

## 2022-06-21 RX ADMIN — INSULIN HUMAN 5 UNIT(S): 100 INJECTION, SOLUTION SUBCUTANEOUS at 01:54

## 2022-06-21 RX ADMIN — Medication 5 MILLIGRAM(S): at 08:48

## 2022-06-21 RX ADMIN — Medication 25 GRAM(S): at 12:35

## 2022-06-21 NOTE — H&P ADULT - NSHPPHYSICALEXAM_GEN_ALL_CORE
Objective:    Vitals: Vital Signs Last 24 Hrs  T(C): 36.4 (06-21-22 @ 03:49), Max: 36.8 (06-21-22 @ 00:35)  T(F): 97.5 (06-21-22 @ 03:49), Max: 98.2 (06-21-22 @ 00:35)  HR: 66 (06-21-22 @ 03:49) (58 - 66)  BP: 209/95 (06-21-22 @ 03:49) (202/88 - 212/100)  BP(mean): 144 (06-21-22 @ 03:49) (144 - 144)  RR: 18 (06-21-22 @ 03:49) (16 - 19)  SpO2: 98% (06-21-22 @ 03:49) (98% - 100%)            I&O's Summary      PHYSICAL EXAM:  GENERAL: NAD, well-groomed, well-developed  HEAD:  Atraumatic, Normocephalic  EYES: EOMI, PERRLA, conjunctiva and sclera clear  ENMT: No tonsillar erythema, exudates, or enlargement; Moist mucous membranes, Good dentition, No lesions  NECK: Supple, No JVD, Normal thyroid  CHEST/LUNG: Clear to auscultation bilaterally; No rales, rhonchi, wheezing, or rubs  HEART: Regular rate and rhythm; No murmurs, rubs, or gallops  ABDOMEN: Soft, Nontender, Nondistended; Bowel sounds present  EXTREMITIES:  2+ Peripheral Pulses, No clubbing, cyanosis, or edema  LYMPH: No lymphadenopathy noted  SKIN: No rashes or lesions  NERVOUS SYSTEM:  Alert & Oriented X4, Good concentration  PSYCH: Normal Affect. Speaking in Full Sentences. Laying in bed comfortably; not agitated

## 2022-06-21 NOTE — PROGRESS NOTE ADULT - ATTENDING COMMENTS
61M w ESRD on HD, CAD s/p stent (plavix/asa), T2DM, HTN, admitted with hyperkalemia with EKG changes and severe acidosis after missing multiple HD cessions. In MICU for emergent HD.   requiring emergent dialysis  Nuro: alert/oriented  CV: CAD, poorly controlled BP. Restart home meds, add Amlodipine and low dose Hydral. Goal -180 today (hypertensive up to 220s at the time of my eval). Repeat EKG after HD  Resp: stable  ID: no s/s of infection  GI: start diet  Renal: emergent HD this am. Repeat blood work after  Endo: FS/ISS  PPx: SQH  Dispo: floor after HD

## 2022-06-21 NOTE — ED PROVIDER NOTE - NS ED ROS FT
GENERAL: +weakness, No fever, no chills  EYES: no change in vision  HEENT: no trouble swallowing, no trouble speaking  CARDIAC: no chest pain, no palpitations  PULMONARY: no cough, no SOB  GI: no abdominal pain, +nausea, +vomiting, no diarrhea, no constipation  SKIN: no rashes  NEURO: no headache, no weakness  MSK: no joint pain

## 2022-06-21 NOTE — PATIENT PROFILE ADULT - FALL HARM RISK - HARM RISK INTERVENTIONS

## 2022-06-21 NOTE — ED PROVIDER NOTE - PROGRESS NOTE DETAILS
Luis Alberto, PGY3: house nephrology called concern for emergent dialysis with acidosis, hyperkalemia with EKG changes, agree with medical management, will review case and will give final confirmation for dialysis Luis Alberto, PGY3: micu consulted for emergent dialysis, will evaluate pt Luis Alberto, PGY3: agree with plan for emergent dialysis, nephrology consented, MICU accept

## 2022-06-21 NOTE — H&P ADULT - ASSESSMENT
Patient is 61yMale with PMHx of ESRD on HD via LUE AVF (doesn't make urine), CAD s/p stent (plavix/asa), T2DM, HTN, HLD, anemia, presenting with weakness after skipping multiple dialysis sessions. Was found to be hyperkalemia, acidotic, admitted for emergent dialysis.     NEURO:  #Mental status: baseline  -Currently A&O x 3  -complaints of weakness, continue to monitor     CV:  #CAD s/p stent   -on plavix/aspirin at home, will restart     #Hemodynamically unstable:  -Needs emergent dialysis   -Not on pressors    #HTN/HLD   -will restart home metoprolol and lisinopril     PULM:  -on RA saturating appropriately   -will restart home Benzonatate    RENAL:  #KONRAD:   -UO:  -Villarreal:    GI:  #Transaminitis: Elevated LFTs  #Diet:  #Bowel regiment:    ENDO:  #DM2: HbA1c unknown   -emergent dialysis for acidosis   -will send HbA1c  -will restart home Januvia, gabapentin     METABOLIC:  #acidosis   -vBG pH 7.05, now post sodium bicarb push, currently receiving bicarb gtt     #Hyperkalemia   -potassium 6.7 with ekg changes, now post lokelma, calcium gluconate, insulin, dextrose   -follow-up EKG and BMP     HEMATOLOGIC:  #Anemia   -hgb 8.7, monitor  -transfuse for less than hgb 7     #Coag panel elevated in the setting of anticoagulation     #DVT prophylaxis with home medications     ID:  #Pt without strong objective or clinical evidence of infection. Will observe off antibiotics  -lactate 1.1, WBC 5.11     SKIN:  #Lines:  #Decubitus ulcers:   Patient is 61yMale with PMHx of ESRD on HD via LUE AVF (doesn't make urine), CAD s/p stent (plavix/asa), T2DM, HTN, HLD, anemia, presenting with weakness after skipping multiple dialysis sessions. Was found to be hyperkalemia, acidotic, admitted for emergent dialysis.     NEURO:  #Mental status: baseline  -Currently A&O x 3  -complaints of weakness, continue to monitor     CV:  #CAD s/p stent   -on plavix/aspirin at home, will restart     #Hemodynamically unstable:  -Needs emergent dialysis   -Not on pressors    #HTN/HLD   -will restart home metoprolol and lisinopril     PULM:  -on RA saturating appropriately   -will restart home Benzonatate, claritin     RENAL:  #KONRAD:   -UO:  -Villarreal:    GI:  #Transaminitis: Elevated LFTs  #Diet:  #Bowel regiment:    ENDO:  #DM2: HbA1c unknown   -emergent dialysis for acidosis   -will send HbA1c  -will restart home Januvia, gabapentin     METABOLIC:  #acidosis   -vBG pH 7.05, now post sodium bicarb push, currently receiving bicarb gtt     #Hyperkalemia   -potassium 6.7 with ekg changes, now post lokelma, calcium gluconate, insulin, dextrose   -follow-up EKG and BMP     HEMATOLOGIC:  #Anemia   -hgb 8.7, monitor  -transfuse for less than hgb 7     #Coag panel elevated in the setting of anticoagulation     #DVT prophylaxis with home medications     ID:  #Pt without strong objective or clinical evidence of infection. Will observe off antibiotics  -lactate 1.1, WBC 5.11     SKIN:  #Lines:  #Decubitus ulcers:   Patient is 61yMale with PMHx of ESRD on HD via LUE AVF (doesn't make urine), CAD s/p stent (plavix/asa), T2DM, HTN, HLD, anemia, presenting with weakness after skipping multiple dialysis sessions. Was found to be hyperkalemia, acidotic, admitted for emergent dialysis.     NEURO:  #Mental status: baseline  -Currently A&O x 3  -complaints of weakness, continue to monitor     CV:  #CAD s/p stent   -on plavix/aspirin at home, will restart     #Hemodynamically unstable:  -Needs emergent dialysis   -Not on pressors    #HTN/HLD   -will restart home metoprolol and lisinopril     PULM:  -on RA saturating appropriately   -will restart home Benzonatate, claritin     RENAL:  #KONRAD:   -UO:  -Villarreal:    GI:  #Transaminitis: Elevated LFTs  #Diet:  #Bowel regiment:    ENDO:  #DM2: HbA1c unknown   -emergent dialysis for acidosis   -will send HbA1c, POCT glucose 97 at MICU admission   -will restart home gabapentin     METABOLIC:  #acidosis   -vBG pH 7.05, now post sodium bicarb push, currently receiving bicarb gtt     #Hyperkalemia   -potassium 6.7 with ekg changes, now post lokelma, calcium gluconate, insulin, dextrose   -follow-up EKG and BMP     HEMATOLOGIC:  #Anemia   -hgb 8.7, monitor  -transfuse for less than hgb 7     #Coag panel elevated in the setting of anticoagulation     #DVT prophylaxis with home medications     ID:  #Pt without strong objective or clinical evidence of infection. Will observe off antibiotics  -lactate 1.1, WBC 5.11     SKIN:  #Lines:  #Decubitus ulcers:   Patient is 61yMale with PMHx of ESRD on HD via LUE AVF (doesn't make urine), CAD s/p stent (plavix/asa), T2DM, HTN, HLD, anemia, presenting with weakness after skipping multiple dialysis sessions. Was found to be hyperkalemia, acidotic, admitted for emergent dialysis.     NEURO:  #Mental status: baseline  -Currently A&O x 3  -complaints of weakness, continue to monitor     CV:  #CAD s/p stent   -on plavix/aspirin at home, will restart     #Hemodynamically unstable:  -Needs emergent dialysis   -Not on pressors    #HTN/HLD   -will restart home metoprolol and lisinopril     PULM:  -on RA saturating appropriately   -will restart home Benzonatate, claritin     RENAL:  #KONRAD: elevated BUN/Cr in the setting of missed dialysis, continue to monitor   -UO: no urine output at baseline     GI:  #Diet: regular (dm restrictions)     ENDO:  #DM2: HbA1c unknown   -emergent dialysis for acidosis   -will send HbA1c, POCT glucose 97 at MICU admission   -will restart home gabapentin     METABOLIC:  #acidosis   -vBG pH 7.05, now post sodium bicarb push, currently receiving bicarb gtt     #Hyperkalemia   -potassium 6.7 with ekg changes, now post lokelma, calcium gluconate, insulin, dextrose   -follow-up EKG and BMP     HEMATOLOGIC:  #Anemia   -hgb 8.7, monitor  -transfuse for less than hgb 7     #Coag panel elevated in the setting of anticoagulation     #DVT prophylaxis with home medications     ID:  #Pt without strong objective or clinical evidence of infection. Will observe off antibiotics  -lactate 1.1, WBC 5.11     SKIN:  #Lines:  #Decubitus ulcers:   61M Hx ESRD on HD via LUE AVF (doesn't make urine), CAD s/p stent (plavix/asa), T2DM, HTN, HLD, anemia, presenting with weakness after skipping multiple dialysis sessions admitted for hyperkalemia with EKG changes, severe acidosis requiring emergent dialysis    NEURO:  #Mental status: baseline  -Currently A&O x 3  -complaints of weakness, continue to monitor     CV:  #CAD s/p stent   - c/w plavix/aspirin at home,    #Hypertensive Urgency  - SBP: 210s, will give 10 labetalol IVP for now, pending HD     #HTN/HLD   -will restart home metoprolol and lisinopril     PULM:  - No active Issues    RENAL:  #ESRD  - elevated BUN/Cr: 127/18 in the setting of missed dialysis 2/2 financial constraints in [facility] now requiring emergent HD for Hyperkalemia w/ EKG changes s/p cocktail, Acidosis  - UO: no urine output at baseline     GI:  #Diet: regular (dm restrictions)     ENDO:  #DM2: HbA1c unknown   -emergent dialysis for acidosis   -will send HbA1c, POCT glucose 97 at MICU admission   -will restart home gabapentin     METABOLIC:  #acidosis   -vBG pH 7.05, now post sodium bicarb push, and s/p bicarb gtt     #Hyperkalemia   -potassium 6.7 with ekg changes, now post lokelma, calcium gluconate, insulin, dextrose   -follow-up EKG and BMP     HEMATOLOGIC:  #Anemia   -hgb 8.7, monitor  -transfuse for less than hgb 7     #Coag panel elevated in the setting of anticoagulation     #DVT prophylaxis with home medications     ID:  #Pt without strong objective or clinical evidence of infection. Will observe off antibiotics  -lactate 1.1, WBC 5.11     SKIN:  #Lines:  #Decubitus ulcers:

## 2022-06-21 NOTE — H&P ADULT - NSHPREVIEWOFSYSTEMS_GEN_ALL_CORE
REVIEW OF SYSTEMS:  CONSTITUTIONAL: +weakness. fevers, chills, sick contacts, or unintended weight loss  EYES: No visual changes or vertigo  ENT: No throat pain, rhinorrhea, or hearing loss   NECK: No pain or stiffness  RESPIRATORY: No cough, wheezing, hemoptysis; No shortness of breath  CARDIOVASCULAR: No chest pain or palpitations  GASTROINTESTINAL: No abdominal or epigastric pain. No nausea, vomiting, or hematemesis; No diarrhea or constipation. No melena or hematochezia.  GENITOURINARY: No dysuria, frequency or hematuria  NEUROLOGICAL: No numbness or weakness  SKIN: No itching, rashes, or bruises  Psych: Good mood, no substance use REVIEW OF SYSTEMS:  CONSTITUTIONAL: +weakness. fevers, chills, sick contacts, or unintended weight loss  EYES: No visual changes or vertigo  ENT: No throat pain, rhinorrhea, or hearing loss   NECK: No pain or stiffness  RESPIRATORY: No cough, wheezing, hemoptysis; No shortness of breath  CARDIOVASCULAR: No chest pain or palpitations  GASTROINTESTINAL: No abdominal or epigastric pain. +nausea, vomiting. No hematemesis; No diarrhea or constipation. No melena or hematochezia.  GENITOURINARY: No dysuria, frequency or hematuria  NEUROLOGICAL: No numbness or weakness  SKIN: No itching, rashes, or bruises  Psych: Good mood, no substance use

## 2022-06-21 NOTE — ED PROVIDER NOTE - CLINICAL SUMMARY MEDICAL DECISION MAKING FREE TEXT BOX
60y/o M w/ h/o ESRD on HD via LUE AVF (doesn't make urine) last dialysis was 5d ago, CAD s/p stent (plavix/asa), T2DM, HTN, HLD, anemia p/w weakness, n/v. Concern for electrolyte abnormality and urgent dialysis. Has access. Plan vbg, labs, ekg, cxr and admission.

## 2022-06-21 NOTE — CONSULT NOTE ADULT - PROBLEM SELECTOR RECOMMENDATION 5
BP above target range. sBP ~200s in ED-  likely due to poor compliance with medications and HD treatment. Will plan for HD tonight with gentle UF, goal BP for today ~160/100

## 2022-06-21 NOTE — ED PROVIDER NOTE - NSICDXPASTMEDICALHX_GEN_ALL_CORE_FT
PAST MEDICAL HISTORY:  CAD (coronary artery disease) s/p sent in 06/2020    ESRD on hemodialysis M/W/Saturday at home in [facility]    HTN (hypertension)     T2DM (type 2 diabetes mellitus)

## 2022-06-21 NOTE — CONSULT NOTE ADULT - ATTENDING COMMENTS
Pt. with ESRD on HD, admitted with hypertensive crises, acidosis and hyperkalemia in the setting of missed dialysis.   Seen after urgent HD, BP improved post HD.   Pt. tolerated HD well, LUE AVF functioning well.   Plan for additional session of HD tomorrow.  Monitor BMP and BP.     Carey Olea MD  Office : [phone]    Please contact me on [employer].

## 2022-06-21 NOTE — ED PROVIDER NOTE - ATTENDING CONTRIBUTION TO CARE
Afebrile. Awake and Alert. Lungs CTA. Heart RRR. Abdomen soft NTND. CN II-XII grossly intact. Moves all extremities without lateralization.    ESRD on HD  r/o hyperkalemia  No clinical signs of fluid overload

## 2022-06-21 NOTE — H&P ADULT - NSICDXPASTMEDICALHX_GEN_ALL_CORE_FT
PAST MEDICAL HISTORY:  CAD (coronary artery disease) s/p sent in 06/2020    ESRD on hemodialysis M/W/Saturday at home in [facility]    HTN (hypertension)     T2DM (type 2 diabetes mellitus)

## 2022-06-21 NOTE — CHART NOTE - NSCHARTNOTEFT_GEN_A_CORE
MICU Transfer Note  ---------------------------    Transfer from: MICU  Transfer to:  (  ) Medicine    (  ) Telemetry    (  ) RCU    (  ) Palliative    (  ) Stroke Unit    (  ) _______________  Accepting Physician:      MIKE COURSE        OBJECTIVE --  Vital Signs Last 24 Hrs  T(C): 36.6 (21 Jun 2022 10:27), Max: 36.8 (21 Jun 2022 00:35)  T(F): 97.8 (21 Jun 2022 10:27), Max: 98.2 (21 Jun 2022 00:35)  HR: 82 (21 Jun 2022 12:00) (52 - 82)  BP: 160/79 (21 Jun 2022 12:00) (155/74 - 219/107)  BP(mean): 111 (21 Jun 2022 12:00) (107 - 154)  RR: 21 (21 Jun 2022 12:00) (12 - 25)  SpO2: 99% (21 Jun 2022 12:00) (95% - 100%)    I&O's Summary    20 Jun 2022 07:01  -  21 Jun 2022 07:00  --------------------------------------------------------  IN: 100 mL / OUT: 0 mL / NET: 100 mL    21 Jun 2022 07:01  -  21 Jun 2022 14:16  --------------------------------------------------------  IN: 800 mL / OUT: 2300 mL / NET: -1500 mL        MEDICATIONS  (STANDING):  amLODIPine   Tablet 5 milliGRAM(s) Oral daily  aspirin  chewable 81 milliGRAM(s) Oral daily  chlorhexidine 2% Cloths 1 Application(s) Topical <User Schedule>  chlorhexidine 4% Liquid 1 Application(s) Topical <User Schedule>  dextrose 5%. 1000 milliLiter(s) (50 mL/Hr) IV Continuous <Continuous>  dextrose 5%. 1000 milliLiter(s) (100 mL/Hr) IV Continuous <Continuous>  dextrose 50% Injectable 25 Gram(s) IV Push once  dextrose 50% Injectable 12.5 Gram(s) IV Push once  dextrose 50% Injectable 25 Gram(s) IV Push once  gabapentin 100 milliGRAM(s) Oral every 8 hours  glucagon  Injectable 1 milliGRAM(s) IntraMuscular once  heparin   Injectable 5000 Unit(s) SubCutaneous every 8 hours  hydrALAZINE 10 milliGRAM(s) Oral three times a day  insulin lispro (ADMELOG) corrective regimen sliding scale   SubCutaneous three times a day before meals  insulin lispro (ADMELOG) corrective regimen sliding scale   SubCutaneous at bedtime  lisinopril 2.5 milliGRAM(s) Oral daily  loratadine 10 milliGRAM(s) Oral daily  metoprolol tartrate 25 milliGRAM(s) Oral two times a day    MEDICATIONS  (PRN):  benzonatate 100 milliGRAM(s) Oral every 8 hours PRN Cough  dextrose Oral Gel 15 Gram(s) Oral once PRN Blood Glucose LESS THAN 70 milliGRAM(s)/deciliter        LABS                                            8.7                   Neurophils% (auto):   67.0   (06-21 @ 00:38):    5.11 )-----------(111          Lymphocytes% (auto):  16.2                                          27.0                   Eosinphils% (auto):   3.3      Manual%: Neutrophils x    ; Lymphocytes x    ; Eosinophils x    ; Bands%: x    ; Blasts x                                    136    |  102    |  137                 Calcium: 6.4   / iCa: x      (06-21 @ 00:38)    ----------------------------<  114       Magnesium: x                                6.7     |  10     |  18.36            Phosphorous: x        TPro  8.8    /  Alb  4.8    /  TBili  0.4    /  DBili  x      /  AST  38     /  ALT  34     /  AlkPhos  147    21 Jun 2022 00:38    ( 06-21 @ 00:38 )   PT: 18.3 sec;   INR: 1.58 ratio  aPTT: 36.8 sec          ASSESSMENT & PLAN:         For Follow-Up:  [ ] f/u neph recs for next HD session and outpatient planning  [ ] continue BP medication management MICU Transfer Note  ---------------------------    Transfer from: MICU  Transfer to:  (  ) Medicine    (  ) Telemetry    (  ) RCU    (  ) Palliative    (  ) Stroke Unit    (  ) _______________  Accepting Physician:    HPI:  60y/o M w/ h/o ESRD on HD via LUE AVF (doesn't make urine), CAD s/p stent (plavix/asa), T2DM, HTN, HLD, anemia, presenting with weakness. Pt states that he just came back from [facility] and was getting dialysis only once a week for 3 months, last dialysis was 5d ago. States that he was unable to receive more dialysis due to financial constraints. Has been progressively feeling weak, tired, had episodes of nausea and vomiting. Denies fevers, chills, chest pain, cough, sob, abdominal pain, back pain. [facility].    In the ER was found to be hyperkalemia 6.7 with EKG changes, acidosis, house nephrology and MICU consulted for emergent dialysis. Received calcium gluconate 2g IVPB, Humalog 5U IV push, dextrose 50% 50mL, Lokelma 10g. Currently receiving sodium bicarb infusion 150meq.       MICU COURSE:  Pt found to have EKG changes in regards to hyperkalemia initially.  Pt received urgent HD in the MICU.   Repeat EKG after HD without changes. Repeat labs acceptable after HD.  Patient is on metoprolol and lisinopril at home, states that he normally has elevated BP, sometimes even around 200s during outpatient dialysis sessions  Was started on Amlodipine and Hydralazine on top of the metoprolol and lisinopril, goal SBP around 170s today given his elevated blood pressures.       OBJECTIVE --  Vital Signs Last 24 Hrs  T(C): 36.6 (21 Jun 2022 10:27), Max: 36.8 (21 Jun 2022 00:35)  T(F): 97.8 (21 Jun 2022 10:27), Max: 98.2 (21 Jun 2022 00:35)  HR: 82 (21 Jun 2022 12:00) (52 - 82)  BP: 160/79 (21 Jun 2022 12:00) (155/74 - 219/107)  BP(mean): 111 (21 Jun 2022 12:00) (107 - 154)  RR: 21 (21 Jun 2022 12:00) (12 - 25)  SpO2: 99% (21 Jun 2022 12:00) (95% - 100%)    I&O's Summary    20 Jun 2022 07:01  -  21 Jun 2022 07:00  --------------------------------------------------------  IN: 100 mL / OUT: 0 mL / NET: 100 mL    21 Jun 2022 07:01  -  21 Jun 2022 14:16  --------------------------------------------------------  IN: 800 mL / OUT: 2300 mL / NET: -1500 mL        MEDICATIONS  (STANDING):  amLODIPine   Tablet 5 milliGRAM(s) Oral daily  aspirin  chewable 81 milliGRAM(s) Oral daily  chlorhexidine 2% Cloths 1 Application(s) Topical <User Schedule>  chlorhexidine 4% Liquid 1 Application(s) Topical <User Schedule>  dextrose 5%. 1000 milliLiter(s) (50 mL/Hr) IV Continuous <Continuous>  dextrose 5%. 1000 milliLiter(s) (100 mL/Hr) IV Continuous <Continuous>  dextrose 50% Injectable 25 Gram(s) IV Push once  dextrose 50% Injectable 12.5 Gram(s) IV Push once  dextrose 50% Injectable 25 Gram(s) IV Push once  gabapentin 100 milliGRAM(s) Oral every 8 hours  glucagon  Injectable 1 milliGRAM(s) IntraMuscular once  heparin   Injectable 5000 Unit(s) SubCutaneous every 8 hours  hydrALAZINE 10 milliGRAM(s) Oral three times a day  insulin lispro (ADMELOG) corrective regimen sliding scale   SubCutaneous three times a day before meals  insulin lispro (ADMELOG) corrective regimen sliding scale   SubCutaneous at bedtime  lisinopril 2.5 milliGRAM(s) Oral daily  loratadine 10 milliGRAM(s) Oral daily  metoprolol tartrate 25 milliGRAM(s) Oral two times a day    MEDICATIONS  (PRN):  benzonatate 100 milliGRAM(s) Oral every 8 hours PRN Cough  dextrose Oral Gel 15 Gram(s) Oral once PRN Blood Glucose LESS THAN 70 milliGRAM(s)/deciliter        LABS                                            8.7                   Neurophils% (auto):   67.0   (06-21 @ 00:38):    5.11 )-----------(111          Lymphocytes% (auto):  16.2                                          27.0                   Eosinphils% (auto):   3.3      Manual%: Neutrophils x    ; Lymphocytes x    ; Eosinophils x    ; Bands%: x    ; Blasts x                                    136    |  102    |  137                 Calcium: 6.4   / iCa: x      (06-21 @ 00:38)    ----------------------------<  114       Magnesium: x                                6.7     |  10     |  18.36            Phosphorous: x        TPro  8.8    /  Alb  4.8    /  TBili  0.4    /  DBili  x      /  AST  38     /  ALT  34     /  AlkPhos  147    21 Jun 2022 00:38    ( 06-21 @ 00:38 )   PT: 18.3 sec;   INR: 1.58 ratio  aPTT: 36.8 sec          ASSESSMENT & PLAN:   61M Hx ESRD on HD via LUE AVF (doesn't make urine), CAD s/p stent (plavix/asa), T2DM, HTN, HLD, anemia, presenting with weakness after skipping multiple dialysis sessions admitted for hyperkalemia with EKG changes, severe acidosis requiring emergent dialysis    NEURO:  #Mental status: baseline  -Currently A&O x 3  -complaints of weakness, continue to monitor     CV:  #CAD s/p stent   - c/w plavix/aspirin at home,    #Hypertensive Urgency  - SBP: 210s at admission and pre-HD, will give 10 labetalol IVP for now, s/p HD     #HTN/HLD   -will restart home metoprolol and lisinopril   - add amlodipine 5mg  - hydralazine 10mg TID, hold if SBP<170    PULM:  - No active Issues    RENAL:  #ESRD  - elevated BUN/Cr: 127/18 in the setting of missed dialysis 2/2 financial constraints in [facility] now requiring emergent HD for Hyperkalemia w/ EKG changes s/p cocktail, Acidosis  - repeat labs after HD  - UO: no urine output at baseline     GI:  #Diet: regular (dm,renal restrictions)     ENDO:  #DM2: HbA1c 6.4  - insulin SSI  -will restart home gabapentin     METABOLIC:  #acidosis   -vBG pH 7.05, now post sodium bicarb push, and s/p bicarb gtt     #Hyperkalemia   -potassium 6.7 with ekg changes, now post lokelma, calcium gluconate, insulin, dextrose   -follow-up EKG and BMP after HD    HEMATOLOGIC:  #Anemia   -hgb 8.7, monitor  -transfuse for less than hgb 7     #Coag panel elevated in the setting of anticoagulation     #DVT prophylaxis with home medications     ID:  #Pt without strong objective or clinical evidence of infection. Will observe off antibiotics  -lactate 1.1, WBC 5.11       For Follow-Up:  [ ] f/u neph recs for next HD session and outpatient planning  [ ] Amlodipine and hydralazine added on this admission  [ ] continue BP medication management (pt with elevated BPs to 200s even outpatient, would be cautious in controlling BP too low) MICU Transfer Note  ---------------------------    Transfer from: MICU  Transfer to:  (  ) Medicine    (  ) Telemetry    (  ) RCU    (  ) Palliative    (  ) Stroke Unit    (  ) _______________  Accepting Physician:    HPI:  62y/o M w/ h/o ESRD on HD via LUE AVF (doesn't make urine), CAD s/p stent (plavix/asa), T2DM, HTN, HLD, anemia, presenting with weakness. Pt states that he just came back from [facility] and was getting dialysis only once a week for 3 months, last dialysis was 5d ago. States that he was unable to receive more dialysis due to financial constraints. Has been progressively feeling weak, tired, had episodes of nausea and vomiting. Denies fevers, chills, chest pain, cough, sob, abdominal pain, back pain. [facility].    In the ER was found to be hyperkalemia 6.7 with EKG changes, acidosis, house nephrology and MICU consulted for emergent dialysis. Received calcium gluconate 2g IVPB, Humalog 5U IV push, dextrose 50% 50mL, Lokelma 10g. Currently receiving sodium bicarb infusion 150meq.       MICU COURSE:  Pt found to have EKG changes in regards to hyperkalemia initially.  Pt received urgent HD in the MICU.   Repeat EKG after HD without changes. Repeat labs acceptable after HD.  Patient is on metoprolol and lisinopril at home, states that he normally has elevated BP, sometimes even around 200s during outpatient dialysis sessions  Was started on Amlodipine and Hydralazine on top of the metoprolol and lisinopril, goal SBP around 170s today given his elevated blood pressures.       OBJECTIVE --  Vital Signs Last 24 Hrs  T(C): 36.6 (21 Jun 2022 10:27), Max: 36.8 (21 Jun 2022 00:35)  T(F): 97.8 (21 Jun 2022 10:27), Max: 98.2 (21 Jun 2022 00:35)  HR: 82 (21 Jun 2022 12:00) (52 - 82)  BP: 160/79 (21 Jun 2022 12:00) (155/74 - 219/107)  BP(mean): 111 (21 Jun 2022 12:00) (107 - 154)  RR: 21 (21 Jun 2022 12:00) (12 - 25)  SpO2: 99% (21 Jun 2022 12:00) (95% - 100%)    I&O's Summary    20 Jun 2022 07:01  -  21 Jun 2022 07:00  --------------------------------------------------------  IN: 100 mL / OUT: 0 mL / NET: 100 mL    21 Jun 2022 07:01  -  21 Jun 2022 14:16  --------------------------------------------------------  IN: 800 mL / OUT: 2300 mL / NET: -1500 mL        MEDICATIONS  (STANDING):  amLODIPine   Tablet 5 milliGRAM(s) Oral daily  aspirin  chewable 81 milliGRAM(s) Oral daily  chlorhexidine 2% Cloths 1 Application(s) Topical <User Schedule>  chlorhexidine 4% Liquid 1 Application(s) Topical <User Schedule>  dextrose 5%. 1000 milliLiter(s) (50 mL/Hr) IV Continuous <Continuous>  dextrose 5%. 1000 milliLiter(s) (100 mL/Hr) IV Continuous <Continuous>  dextrose 50% Injectable 25 Gram(s) IV Push once  dextrose 50% Injectable 12.5 Gram(s) IV Push once  dextrose 50% Injectable 25 Gram(s) IV Push once  gabapentin 100 milliGRAM(s) Oral every 8 hours  glucagon  Injectable 1 milliGRAM(s) IntraMuscular once  heparin   Injectable 5000 Unit(s) SubCutaneous every 8 hours  hydrALAZINE 10 milliGRAM(s) Oral three times a day  insulin lispro (ADMELOG) corrective regimen sliding scale   SubCutaneous three times a day before meals  insulin lispro (ADMELOG) corrective regimen sliding scale   SubCutaneous at bedtime  lisinopril 2.5 milliGRAM(s) Oral daily  loratadine 10 milliGRAM(s) Oral daily  metoprolol tartrate 25 milliGRAM(s) Oral two times a day    MEDICATIONS  (PRN):  benzonatate 100 milliGRAM(s) Oral every 8 hours PRN Cough  dextrose Oral Gel 15 Gram(s) Oral once PRN Blood Glucose LESS THAN 70 milliGRAM(s)/deciliter        LABS                                            8.7                   Neurophils% (auto):   67.0   (06-21 @ 00:38):    5.11 )-----------(111          Lymphocytes% (auto):  16.2                                          27.0                   Eosinphils% (auto):   3.3      Manual%: Neutrophils x    ; Lymphocytes x    ; Eosinophils x    ; Bands%: x    ; Blasts x                                    136    |  102    |  137                 Calcium: 6.4   / iCa: x      (06-21 @ 00:38)    ----------------------------<  114       Magnesium: x                                6.7     |  10     |  18.36            Phosphorous: x        TPro  8.8    /  Alb  4.8    /  TBili  0.4    /  DBili  x      /  AST  38     /  ALT  34     /  AlkPhos  147    21 Jun 2022 00:38    ( 06-21 @ 00:38 )   PT: 18.3 sec;   INR: 1.58 ratio  aPTT: 36.8 sec          ASSESSMENT & PLAN:   61M Hx ESRD on HD via LUE AVF (doesn't make urine), CAD s/p stent (plavix/asa), T2DM, HTN, HLD, anemia, presenting with weakness after skipping multiple dialysis sessions admitted for hyperkalemia with EKG changes, severe acidosis requiring emergent dialysis    NEURO:  #Mental status: baseline  -Currently A&O x 3  -complaints of weakness, continue to monitor     CV:  #CAD s/p stent   - c/w plavix/aspirin at home,    #Hypertensive Urgency  - SBP: 210s at admission and pre-HD, will give 10 labetalol IVP for now, s/p HD     #HTN/HLD   -will restart home metoprolol and lisinopril   - add amlodipine 5mg  - hydralazine 10mg TID, hold if SBP<170    PULM:  - No active Issues    RENAL:  #ESRD  - elevated BUN/Cr: 127/18 in the setting of missed dialysis 2/2 financial constraints in [facility] now requiring emergent HD for Hyperkalemia w/ EKG changes s/p cocktail, Acidosis  - repeat labs after HD  - UO: no urine output at baseline     GI:  #Diet: regular (dm,renal restrictions)     ENDO:  #DM2: HbA1c 6.4  - insulin SSI  -will restart home gabapentin     METABOLIC:  #acidosis   -vBG pH 7.05, now post sodium bicarb push, and s/p bicarb gtt     #Hyperkalemia   -potassium 6.7 with ekg changes, now post lokelma, calcium gluconate, insulin, dextrose   -follow-up EKG and BMP after HD    HEMATOLOGIC:  #Anemia   -hgb 8.7, monitor  -transfuse for less than hgb 7     #Coag panel elevated in the setting of anticoagulation     #DVT prophylaxis with home medications     ID:  #Pt without strong objective or clinical evidence of infection. Will observe off antibiotics  -lactate 1.1, WBC 5.11       For Follow-Up:  [ ] f/u neph recs for next HD session and outpatient planning  [ ] Amlodipine added on this admission  [ ] continue BP medication management (pt with elevated BPs to 200s even outpatient, would be cautious in controlling BP too low) MICU Transfer Note  ---------------------------    Transfer from: MICU  Transfer to:  (X) Medicine    (  ) Telemetry    (  ) RCU    (  ) Palliative    (  ) Stroke Unit    (  ) _______________  Accepting Physician: Dr. Nagi Carvajal    HPI:  60y/o M w/ h/o ESRD on HD via LUE AVF (doesn't make urine), CAD s/p stent (plavix/asa), T2DM, HTN, HLD, anemia, presenting with weakness. Pt states that he just came back from [facility] and was getting dialysis only once a week for 3 months, last dialysis was 5d ago. States that he was unable to receive more dialysis due to financial constraints. Has been progressively feeling weak, tired, had episodes of nausea and vomiting. Denies fevers, chills, chest pain, cough, sob, abdominal pain, back pain. [facility].    In the ER was found to be hyperkalemia 6.7 with EKG changes, acidosis, house nephrology and MICU consulted for emergent dialysis. Received calcium gluconate 2g IVPB, Humalog 5U IV push, dextrose 50% 50mL, Lokelma 10g. Currently receiving sodium bicarb infusion 150meq.       MICU COURSE:  Pt found to have EKG changes in regards to hyperkalemia initially.  Pt received urgent HD in the MICU.   Repeat EKG after HD without changes. Repeat labs acceptable after HD.  Patient is on metoprolol and lisinopril at home, states that he normally has elevated BP, sometimes even around 200s during outpatient dialysis sessions  Was started on Amlodipine and Hydralazine on top of the metoprolol and lisinopril, goal SBP around 170s today given his elevated blood pressures.       OBJECTIVE --  Vital Signs Last 24 Hrs  T(C): 36.6 (21 Jun 2022 10:27), Max: 36.8 (21 Jun 2022 00:35)  T(F): 97.8 (21 Jun 2022 10:27), Max: 98.2 (21 Jun 2022 00:35)  HR: 82 (21 Jun 2022 12:00) (52 - 82)  BP: 160/79 (21 Jun 2022 12:00) (155/74 - 219/107)  BP(mean): 111 (21 Jun 2022 12:00) (107 - 154)  RR: 21 (21 Jun 2022 12:00) (12 - 25)  SpO2: 99% (21 Jun 2022 12:00) (95% - 100%)    I&O's Summary    20 Jun 2022 07:01  -  21 Jun 2022 07:00  --------------------------------------------------------  IN: 100 mL / OUT: 0 mL / NET: 100 mL    21 Jun 2022 07:01  -  21 Jun 2022 14:16  --------------------------------------------------------  IN: 800 mL / OUT: 2300 mL / NET: -1500 mL        MEDICATIONS  (STANDING):  amLODIPine   Tablet 5 milliGRAM(s) Oral daily  aspirin  chewable 81 milliGRAM(s) Oral daily  chlorhexidine 2% Cloths 1 Application(s) Topical <User Schedule>  chlorhexidine 4% Liquid 1 Application(s) Topical <User Schedule>  dextrose 5%. 1000 milliLiter(s) (50 mL/Hr) IV Continuous <Continuous>  dextrose 5%. 1000 milliLiter(s) (100 mL/Hr) IV Continuous <Continuous>  dextrose 50% Injectable 25 Gram(s) IV Push once  dextrose 50% Injectable 12.5 Gram(s) IV Push once  dextrose 50% Injectable 25 Gram(s) IV Push once  gabapentin 100 milliGRAM(s) Oral every 8 hours  glucagon  Injectable 1 milliGRAM(s) IntraMuscular once  heparin   Injectable 5000 Unit(s) SubCutaneous every 8 hours  hydrALAZINE 10 milliGRAM(s) Oral three times a day  insulin lispro (ADMELOG) corrective regimen sliding scale   SubCutaneous three times a day before meals  insulin lispro (ADMELOG) corrective regimen sliding scale   SubCutaneous at bedtime  lisinopril 2.5 milliGRAM(s) Oral daily  loratadine 10 milliGRAM(s) Oral daily  metoprolol tartrate 25 milliGRAM(s) Oral two times a day    MEDICATIONS  (PRN):  benzonatate 100 milliGRAM(s) Oral every 8 hours PRN Cough  dextrose Oral Gel 15 Gram(s) Oral once PRN Blood Glucose LESS THAN 70 milliGRAM(s)/deciliter        LABS                                            8.7                   Neurophils% (auto):   67.0   (06-21 @ 00:38):    5.11 )-----------(111          Lymphocytes% (auto):  16.2                                          27.0                   Eosinphils% (auto):   3.3      Manual%: Neutrophils x    ; Lymphocytes x    ; Eosinophils x    ; Bands%: x    ; Blasts x                                    136    |  102    |  137                 Calcium: 6.4   / iCa: x      (06-21 @ 00:38)    ----------------------------<  114       Magnesium: x                                6.7     |  10     |  18.36            Phosphorous: x        TPro  8.8    /  Alb  4.8    /  TBili  0.4    /  DBili  x      /  AST  38     /  ALT  34     /  AlkPhos  147    21 Jun 2022 00:38    ( 06-21 @ 00:38 )   PT: 18.3 sec;   INR: 1.58 ratio  aPTT: 36.8 sec          ASSESSMENT & PLAN:   61M Hx ESRD on HD via LUE AVF (doesn't make urine), CAD s/p stent (plavix/asa), T2DM, HTN, HLD, anemia, presenting with weakness after skipping multiple dialysis sessions admitted for hyperkalemia with EKG changes, severe acidosis requiring emergent dialysis    NEURO:  #Mental status: baseline  -Currently A&O x 3  -complaints of weakness, continue to monitor     CV:  #CAD s/p stent   - c/w plavix/aspirin at home,    #Hypertensive Urgency  - SBP: 210s at admission and pre-HD, will give 10 labetalol IVP for now, s/p HD     #HTN/HLD   -will restart home metoprolol and lisinopril   - add amlodipine 5mg  - hydralazine 10mg TID, hold if SBP<170    PULM:  - No active Issues    RENAL:  #ESRD  - elevated BUN/Cr: 127/18 in the setting of missed dialysis 2/2 financial constraints in [facility] now requiring emergent HD for Hyperkalemia w/ EKG changes s/p cocktail, Acidosis  - repeat labs after HD  - UO: no urine output at baseline     GI:  #Diet: regular (dm,renal restrictions)     ENDO:  #DM2: HbA1c 6.4  - insulin SSI  -will restart home gabapentin     METABOLIC:  #acidosis   -vBG pH 7.05, now post sodium bicarb push, and s/p bicarb gtt     #Hyperkalemia   -potassium 6.7 with ekg changes, now post lokelma, calcium gluconate, insulin, dextrose   -follow-up EKG and BMP after HD    HEMATOLOGIC:  #Anemia   -hgb 8.7, monitor  -transfuse for less than hgb 7     #Coag panel elevated in the setting of anticoagulation     #DVT prophylaxis with home medications     ID:  #Pt without strong objective or clinical evidence of infection. Will observe off antibiotics  -lactate 1.1, WBC 5.11       For Follow-Up:  [ ] f/u neph recs for next HD session and outpatient planning  [ ] Amlodipine added on this admission  [ ] continue BP medication management (pt with elevated BPs to 200s even outpatient, would be cautious in controlling BP too low)

## 2022-06-21 NOTE — H&P ADULT - ATTENDING COMMENTS
61M Hx HTN, T2DM, CAD Stents, ESRD on HD visiting [facility] for 3 month and receiving only 1 HD Session per week returned to [place] and came to [facility] ED with N/V, Profound Weakness for Urgent HD. (last HD x 5 days ago)  - Intact Neuro Status   - RAO2  SpO2 98%  - Hemodynamically stable without Pressor   - Continue ASA and Brilinta for CAD Stents   - S/P Hyper-K Protocol in ED for Hyperkalemia 6.7 with HAG-MA (HCO3 10; vpH 6.95)   - Renal Service will arrange for Urgent HD in ICU Setting   - NPO and Aspiration Precaution   - Close monitor Neuro and Cardiology Status during HD session   - DVT prophylaxis on SQ Hep   - GOC - Full Code     Patient seen and examined with ICU Resident/Fellow at bedside after lab data, medical records and radiology reports reviewed. I have read and agreeable in general with resident's Documented Note, Assessment and Management Plan which reflected my opinions from bedside round and discussion.   Total Critical Care Time = 45 Min excluding teaching and procedure activity.

## 2022-06-21 NOTE — H&P ADULT - NSHPSOCIALHISTORY_GEN_ALL_CORE
Lives with:  ( x ) children   (x  ) spouse   Occupation: , immigrated from [employer]    Substance Use (street drugs): ( x ) never used  Tobacco Usage:  ( x  ) never smoked  Alcohol Usage: None

## 2022-06-21 NOTE — CONSULT NOTE ADULT - PROBLEM SELECTOR RECOMMENDATION 2
patient who missed HD - found with K 6.7 - non hemolyzed. In the ED received Calcium, IV bicarb, lokelma. Will arrange for urgent HD. Stop Bicarb gtt as patient to get HD.

## 2022-06-21 NOTE — CONSULT NOTE ADULT - PROBLEM SELECTOR RECOMMENDATION 4
Patient with anemia in the setting of ESRD. Hemoglobin below target range. Please check iron studies -  Monitor hemoglobin. hypertensive in ED to sBP 200's will hold Epo for tonight.

## 2022-06-21 NOTE — CONSULT NOTE ADULT - PROBLEM SELECTOR RECOMMENDATION 9
Pt. with ESRD on HD three times a week (MWF), who traveled to [facility] for family issues, now back after 4 months and not accepted at outpatient dialysis center. Found with severe hyperkalemia and acidosis.  Last HD was on 6/16/22 via left AVF. Labs reviewed. Will arrange for maintenance urgent today. Monitor labs. Adjust medications to HD Pt. with ESRD on HD three times a week (MWF), who traveled to [facility] for family issues, now back after 4 months and not accepted at outpatient dialysis center. Found with severe hyperkalemia and acidosis.  Last HD was on 6/16/22 via left AVF. Labs reviewed. Will arrange for urgent HD, please consult in ICU. Monitor labs. Adjust medications to HD

## 2022-06-21 NOTE — PROGRESS NOTE ADULT - ASSESSMENT
61M Hx ESRD on HD via LUE AVF (doesn't make urine), CAD s/p stent (plavix/asa), T2DM, HTN, HLD, anemia, presenting with weakness after skipping multiple dialysis sessions admitted for hyperkalemia with EKG changes, severe acidosis requiring emergent dialysis    NEURO:  #Mental status: baseline  -Currently A&O x 3  -complaints of weakness, continue to monitor     CV:  #CAD s/p stent   - c/w plavix/aspirin at home,    #Hypertensive Urgency  - SBP: 210s, will give 10 labetalol IVP for now, pending HD     #HTN/HLD   -will restart home metoprolol and lisinopril     PULM:  - No active Issues    RENAL:  #ESRD  - elevated BUN/Cr: 127/18 in the setting of missed dialysis 2/2 financial constraints in [facility] now requiring emergent HD for Hyperkalemia w/ EKG changes s/p cocktail, Acidosis  - UO: no urine output at baseline     GI:  #Diet: regular (dm restrictions)     ENDO:  #DM2: HbA1c unknown   -emergent dialysis for acidosis   -will send HbA1c, POCT glucose 97 at MICU admission   -will restart home gabapentin     METABOLIC:  #acidosis   -vBG pH 7.05, now post sodium bicarb push, and s/p bicarb gtt     #Hyperkalemia   -potassium 6.7 with ekg changes, now post lokelma, calcium gluconate, insulin, dextrose   -follow-up EKG and BMP     HEMATOLOGIC:  #Anemia   -hgb 8.7, monitor  -transfuse for less than hgb 7     #Coag panel elevated in the setting of anticoagulation     #DVT prophylaxis with home medications     ID:  #Pt without strong objective or clinical evidence of infection. Will observe off antibiotics  -lactate 1.1, WBC 5.11     SKIN:  #Lines:  #Decubitus ulcers:   61M Hx ESRD on HD via LUE AVF (doesn't make urine), CAD s/p stent (plavix/asa), T2DM, HTN, HLD, anemia, presenting with weakness after skipping multiple dialysis sessions admitted for hyperkalemia with EKG changes, severe acidosis requiring emergent dialysis    NEURO:  #Mental status: baseline  -Currently A&O x 3  -complaints of weakness, continue to monitor     CV:  #CAD s/p stent   - c/w plavix/aspirin at home,    #Hypertensive Urgency  - SBP: 210s at admission and pre-HD, will give 10 labetalol IVP for now, s/p HD     #HTN/HLD   -will restart home metoprolol and lisinopril   - add amlodipine 5mg  - hydralazine 10mg TID, hold if SBP<170    PULM:  - No active Issues    RENAL:  #ESRD  - elevated BUN/Cr: 127/18 in the setting of missed dialysis 2/2 financial constraints in [facility] now requiring emergent HD for Hyperkalemia w/ EKG changes s/p cocktail, Acidosis  - repeat labs after HD  - UO: no urine output at baseline     GI:  #Diet: regular (dm,renal restrictions)     ENDO:  #DM2: HbA1c 6.4  - insulin SSI  -will restart home gabapentin     METABOLIC:  #acidosis   -vBG pH 7.05, now post sodium bicarb push, and s/p bicarb gtt     #Hyperkalemia   -potassium 6.7 with ekg changes, now post lokelma, calcium gluconate, insulin, dextrose   -follow-up EKG and BMP after HD    HEMATOLOGIC:  #Anemia   -hgb 8.7, monitor  -transfuse for less than hgb 7     #Coag panel elevated in the setting of anticoagulation     #DVT prophylaxis with home medications     ID:  #Pt without strong objective or clinical evidence of infection. Will observe off antibiotics  -lactate 1.1, WBC 5.11

## 2022-06-21 NOTE — PROGRESS NOTE ADULT - SUBJECTIVE AND OBJECTIVE BOX
Alonso Pan PGY1    INTERVAL HPI/OVERNIGHT EVENTS: Patient admitted overnight for emergent dialysis.    SUBJECTIVE: Patient seen and examined at bedside.     CONSTITUTIONAL: No weakness, fevers or chills  EYES/ENT: No visual changes;  No vertigo or throat pain   NECK: No pain or stiffness  RESPIRATORY: No cough, wheezing, hemoptysis; No shortness of breath  CARDIOVASCULAR: No chest pain or palpitations  GASTROINTESTINAL: No abdominal or epigastric pain. No nausea, vomiting, or hematemesis; No diarrhea or constipation. No melena or hematochezia.  GENITOURINARY: No dysuria, frequency or hematuria  NEUROLOGICAL: No numbness or weakness  SKIN: No itching, rashes    OBJECTIVE:    VITAL SIGNS:  ICU Vital Signs Last 24 Hrs  T(C): 36.4 (21 Jun 2022 03:49), Max: 36.8 (21 Jun 2022 00:35)  T(F): 97.5 (21 Jun 2022 03:49), Max: 98.2 (21 Jun 2022 00:35)  HR: 52 (21 Jun 2022 06:00) (52 - 66)  BP: 170/79 (21 Jun 2022 06:00) (170/79 - 219/107)  BP(mean): 114 (21 Jun 2022 06:00) (114 - 154)  ABP: --  ABP(mean): --  RR: 12 (21 Jun 2022 06:00) (12 - 19)  SpO2: 97% (21 Jun 2022 06:00) (95% - 100%)        06-20 @ 07:01  -  06-21 @ 06:59  --------------------------------------------------------  IN: 100 mL / OUT: 0 mL / NET: 100 mL      CAPILLARY BLOOD GLUCOSE      POCT Blood Glucose.: 97 mg/dL (21 Jun 2022 01:50)      PHYSICAL EXAM:    GENERAL: NAD, well-groomed, well-developed  HEAD:  Atraumatic, Normocephalic  EYES: EOMI, PERRLA, conjunctiva and sclera clear  ENMT: No tonsillar erythema, exudates, or enlargement; Moist mucous membranes, Good dentition, No lesions  NECK: Supple, No JVD, Normal thyroid  CHEST/LUNG: Clear to auscultation bilaterally; No rales, rhonchi, wheezing, or rubs  HEART: Regular rate and rhythm; No murmurs, rubs, or gallops  ABDOMEN: Soft, Nontender, Nondistended; Bowel sounds present  EXTREMITIES:  2+ Peripheral Pulses, No clubbing, cyanosis, or edema, LUE fistula  LYMPH: No lymphadenopathy noted  SKIN: No rashes or lesions  NERVOUS SYSTEM:  Alert & Oriented X4, Good concentration  PSYCH: Normal Affect. Speaking in Full Sentences. Laying in bed comfortably; not agitated    MEDICATIONS:  MEDICATIONS  (STANDING):  aspirin  chewable 81 milliGRAM(s) Oral daily  chlorhexidine 4% Liquid 1 Application(s) Topical <User Schedule>  gabapentin 100 milliGRAM(s) Oral every 8 hours  lisinopril 2.5 milliGRAM(s) Oral daily  loratadine 10 milliGRAM(s) Oral daily  metoprolol tartrate 25 milliGRAM(s) Oral two times a day    MEDICATIONS  (PRN):  benzonatate 100 milliGRAM(s) Oral every 8 hours PRN Cough      ALLERGIES:  Allergies    No Known Allergies    Intolerances        LABS:                        8.7    5.11  )-----------( 111      ( 21 Jun 2022 00:38 )             27.0     06-21    136  |  102  |  137<H>  ----------------------------<  114<H>  6.7<HH>   |  10<LL>  |  18.36<H>    Ca    6.4<LL>      21 Jun 2022 00:38    TPro  8.8<H>  /  Alb  4.8  /  TBili  0.4  /  DBili  x   /  AST  38  /  ALT  34  /  AlkPhos  147<H>  06-21    PT/INR - ( 21 Jun 2022 00:38 )   PT: 18.3 sec;   INR: 1.58 ratio         PTT - ( 21 Jun 2022 00:38 )  PTT:36.8 sec      RADIOLOGY & ADDITIONAL TESTS: Reviewed. Alonso Pan PGY1    INTERVAL HPI/OVERNIGHT EVENTS: Patient admitted overnight for emergent dialysis.    SUBJECTIVE: Patient seen and examined at bedside. Doing better.     CONSTITUTIONAL: No weakness, fevers or chills  EYES/ENT: No visual changes;  No vertigo or throat pain   NECK: No pain or stiffness  RESPIRATORY: No cough, wheezing, hemoptysis; No shortness of breath  CARDIOVASCULAR: No chest pain or palpitations  GASTROINTESTINAL: No abdominal or epigastric pain. No nausea, vomiting, or hematemesis; No diarrhea or constipation. No melena or hematochezia.  GENITOURINARY: No dysuria, frequency or hematuria  NEUROLOGICAL: No numbness or weakness  SKIN: No itching, rashes    OBJECTIVE:    VITAL SIGNS:  ICU Vital Signs Last 24 Hrs  T(C): 36.4 (21 Jun 2022 03:49), Max: 36.8 (21 Jun 2022 00:35)  T(F): 97.5 (21 Jun 2022 03:49), Max: 98.2 (21 Jun 2022 00:35)  HR: 52 (21 Jun 2022 06:00) (52 - 66)  BP: 170/79 (21 Jun 2022 06:00) (170/79 - 219/107)  BP(mean): 114 (21 Jun 2022 06:00) (114 - 154)  ABP: --  ABP(mean): --  RR: 12 (21 Jun 2022 06:00) (12 - 19)  SpO2: 97% (21 Jun 2022 06:00) (95% - 100%)        06-20 @ 07:01  -  06-21 @ 06:59  --------------------------------------------------------  IN: 100 mL / OUT: 0 mL / NET: 100 mL      CAPILLARY BLOOD GLUCOSE      POCT Blood Glucose.: 97 mg/dL (21 Jun 2022 01:50)      PHYSICAL EXAM:    GENERAL: NAD, well-groomed, well-developed  HEAD:  Atraumatic, Normocephalic  EYES: EOMI, PERRLA, conjunctiva and sclera clear  ENMT: No tonsillar erythema, exudates, or enlargement; Moist mucous membranes, Good dentition, No lesions  NECK: Supple, No JVD, Normal thyroid  CHEST/LUNG: Clear to auscultation bilaterally; No rales, rhonchi, wheezing, or rubs  HEART: Regular rate and rhythm; No murmurs, rubs, or gallops  ABDOMEN: Soft, Nontender, Nondistended; Bowel sounds present  EXTREMITIES:  2+ Peripheral Pulses, No clubbing, cyanosis, or edema, LUE fistula  LYMPH: No lymphadenopathy noted  SKIN: No rashes or lesions  NERVOUS SYSTEM:  Alert & Oriented X4, Good concentration  PSYCH: Normal Affect. Speaking in Full Sentences. Laying in bed comfortably; not agitated    MEDICATIONS:  MEDICATIONS  (STANDING):  aspirin  chewable 81 milliGRAM(s) Oral daily  chlorhexidine 4% Liquid 1 Application(s) Topical <User Schedule>  gabapentin 100 milliGRAM(s) Oral every 8 hours  lisinopril 2.5 milliGRAM(s) Oral daily  loratadine 10 milliGRAM(s) Oral daily  metoprolol tartrate 25 milliGRAM(s) Oral two times a day    MEDICATIONS  (PRN):  benzonatate 100 milliGRAM(s) Oral every 8 hours PRN Cough      ALLERGIES:  Allergies    No Known Allergies    Intolerances        LABS:                        8.7    5.11  )-----------( 111      ( 21 Jun 2022 00:38 )             27.0     06-21    136  |  102  |  137<H>  ----------------------------<  114<H>  6.7<HH>   |  10<LL>  |  18.36<H>    Ca    6.4<LL>      21 Jun 2022 00:38    TPro  8.8<H>  /  Alb  4.8  /  TBili  0.4  /  DBili  x   /  AST  38  /  ALT  34  /  AlkPhos  147<H>  06-21    PT/INR - ( 21 Jun 2022 00:38 )   PT: 18.3 sec;   INR: 1.58 ratio         PTT - ( 21 Jun 2022 00:38 )  PTT:36.8 sec      RADIOLOGY & ADDITIONAL TESTS: Reviewed.

## 2022-06-21 NOTE — ED ADULT NURSE NOTE - OBJECTIVE STATEMENT
62 yo M pt PMHx of DM ESRD dialysis M,W,F p/w HTN and feeling fatigued after spending 3 months in [facility] only receiving Dialysis once a week. pt is A&Ox4, MAEW, LS CTA, no peripheral edema, LUE fistula with bruit auscultated and thrill palpated, abd soft non tender non distended.  Pt denies headache, dizziness, chest pain, palpitations, cough, SOB, abdominal pain, n/v/d, urinary symptoms, fevers, chills, weakness at this time.

## 2022-06-21 NOTE — H&P ADULT - NSHPLABSRESULTS_GEN_ALL_CORE
LABS:                        8.7    5.11  )-----------( 111      ( 21 Jun 2022 00:38 )             27.0     Hgb Trend: 8.7<--  06-21    136  |  102  |  137<H>  ----------------------------<  114<H>  6.7<HH>   |  10<LL>  |  18.36<H>    Ca    6.4<LL>      21 Jun 2022 00:38    TPro  8.8<H>  /  Alb  4.8  /  TBili  0.4  /  DBili  x   /  AST  38  /  ALT  34  /  AlkPhos  147<H>  06-21    Creatinine Trend: 18.36<--  PT/INR - ( 21 Jun 2022 00:38 )   PT: 18.3 sec;   INR: 1.58 ratio         PTT - ( 21 Jun 2022 00:38 )  PTT:36.8 sec                  CAPILLARY BLOOD GLUCOSE      POCT Blood Glucose.: 97 mg/dL (21 Jun 2022 01:50)

## 2022-06-21 NOTE — H&P ADULT - HISTORY OF PRESENT ILLNESS
60y/o M w/ h/o ESRD on HD via LUE AVF (doesn't make urine), CAD s/p stent (plavix/asa), T2DM, HTN, HLD, anemia, presenting with weakness. Pt states that he just came back from [facility] and was getting dialysis only once a week for 3 months, last dialysis was 5d ago. Has been progressively feeling weak, tired, had episodes of nausea and vomiting. Denies fevers, chills, chest pain, cough, sob, abdominal pain, back pain. [facility].    In the ER was found to be hyperkalemia 6.7 with EKG changes, acidosis, house nephrology and MICU consulted for emergent dialysis. Received calcium gluconate 2g IVPB, Humalog 5U IV push, dextrose 50% 50mL, Lokelma 10g. Currently receiving sodium bicarb infusion 150meq.    62y/o M w/ h/o ESRD on HD via LUE AVF (doesn't make urine), CAD s/p stent (plavix/asa), T2DM, HTN, HLD, anemia, presenting with weakness. Pt states that he just came back from [facility] and was getting dialysis only once a week for 3 months, last dialysis was 5d ago. States that he was unable to receive more dialysis due to financial constraints. Has been progressively feeling weak, tired, had episodes of nausea and vomiting. Denies fevers, chills, chest pain, cough, sob, abdominal pain, back pain. [facility].    In the ER was found to be hyperkalemia 6.7 with EKG changes, acidosis, house nephrology and MICU consulted for emergent dialysis. Received calcium gluconate 2g IVPB, Humalog 5U IV push, dextrose 50% 50mL, Lokelma 10g. Currently receiving sodium bicarb infusion 150meq.

## 2022-06-21 NOTE — ED PROVIDER NOTE - PHYSICAL EXAMINATION
Gen: NAD, non-toxic appearing  Head: normal appearing  HEENT: normal conjunctiva, oral mucosa moist  Lung: no respiratory distress, speaking in full sentences, CTA b/l  CV: regular rate and rhythm, no murmurs, palpable L AVF with thrill  Abd: soft, non distended, non tender   MSK: no visible deformities  Neuro: No focal deficits, AAOx3  Skin: Warm  Psych: normal affect

## 2022-06-21 NOTE — ED ADULT NURSE NOTE - NSICDXPASTMEDICALHX_GEN_ALL_CORE_FT
PAST MEDICAL HISTORY:  CAD (coronary artery disease) s/p sent in 06/2020    ESRD on hemodialysis M/W/Saturday at home in [facility]    HTN (hypertension)     T2DM (type 2 diabetes mellitus)

## 2022-06-21 NOTE — ED PROVIDER NOTE - OBJECTIVE STATEMENT
62y/o M w/ h/o ESRD on HD via LUE AVF (doesn't make urine), CAD s/p stent (plavix/asa), T2DM, HTN, HLD, anemia p/w weakness. Pt states that he just came back from [facility] and was getting dialysis only once a week for 3 months, last dialysis was 5d ago. Has been progressively feeling weak, tired, had episodes of nausea and vomiting. Denies fevers, chills, chest pain, cough, sob, abdominal pain, back pain.    Primitivo Lazaro PMD  [facility]

## 2022-06-21 NOTE — ED ADULT NURSE NOTE - CHIEF COMPLAINT QUOTE
dialysis pt supposed to be 3x weekly, has been in [facility] x 3 months receiving only ONCE a week, last treatment 6/16 in [facility]  sent here by  from dialysis center to restart dialysis in

## 2022-06-22 LAB
A1C WITH ESTIMATED AVERAGE GLUCOSE RESULT: 6.4 % — HIGH (ref 4–5.6)
ALBUMIN SERPL ELPH-MCNC: 3.5 G/DL — SIGNIFICANT CHANGE UP (ref 3.3–5)
ALBUMIN SERPL ELPH-MCNC: 3.6 G/DL — SIGNIFICANT CHANGE UP (ref 3.3–5)
ALP SERPL-CCNC: 130 U/L — HIGH (ref 40–120)
ALP SERPL-CCNC: 134 U/L — HIGH (ref 40–120)
ALT FLD-CCNC: 33 U/L — SIGNIFICANT CHANGE UP (ref 10–45)
ALT FLD-CCNC: 36 U/L — SIGNIFICANT CHANGE UP (ref 10–45)
ANION GAP SERPL CALC-SCNC: 13 MMOL/L — SIGNIFICANT CHANGE UP (ref 5–17)
ANION GAP SERPL CALC-SCNC: 16 MMOL/L — SIGNIFICANT CHANGE UP (ref 5–17)
APTT BLD: 36.4 SEC — HIGH (ref 27.5–35.5)
AST SERPL-CCNC: 49 U/L — HIGH (ref 10–40)
AST SERPL-CCNC: 64 U/L — HIGH (ref 10–40)
BASE EXCESS BLDV CALC-SCNC: -2.2 MMOL/L — LOW (ref -2–2)
BASOPHILS # BLD AUTO: 0.02 K/UL — SIGNIFICANT CHANGE UP (ref 0–0.2)
BASOPHILS NFR BLD AUTO: 0.5 % — SIGNIFICANT CHANGE UP (ref 0–2)
BILIRUB SERPL-MCNC: 0.4 MG/DL — SIGNIFICANT CHANGE UP (ref 0.2–1.2)
BILIRUB SERPL-MCNC: 0.5 MG/DL — SIGNIFICANT CHANGE UP (ref 0.2–1.2)
BUN SERPL-MCNC: 40 MG/DL — HIGH (ref 7–23)
BUN SERPL-MCNC: 70 MG/DL — HIGH (ref 7–23)
CA-I BLD-SCNC: 0.96 MMOL/L — LOW (ref 1.15–1.33)
CALCIUM SERPL-MCNC: 6 MG/DL — CRITICAL LOW (ref 8.4–10.5)
CALCIUM SERPL-MCNC: 8 MG/DL — LOW (ref 8.4–10.5)
CHLORIDE SERPL-SCNC: 100 MMOL/L — SIGNIFICANT CHANGE UP (ref 96–108)
CHLORIDE SERPL-SCNC: 95 MMOL/L — LOW (ref 96–108)
CO2 BLDV-SCNC: 24 MMOL/L — SIGNIFICANT CHANGE UP (ref 22–26)
CO2 SERPL-SCNC: 21 MMOL/L — LOW (ref 22–31)
CO2 SERPL-SCNC: 26 MMOL/L — SIGNIFICANT CHANGE UP (ref 22–31)
CREAT SERPL-MCNC: 10.14 MG/DL — HIGH (ref 0.5–1.3)
CREAT SERPL-MCNC: 5.48 MG/DL — HIGH (ref 0.5–1.3)
EGFR: 11 ML/MIN/1.73M2 — LOW
EGFR: 5 ML/MIN/1.73M2 — LOW
EOSINOPHIL # BLD AUTO: 0.1 K/UL — SIGNIFICANT CHANGE UP (ref 0–0.5)
EOSINOPHIL NFR BLD AUTO: 2.4 % — SIGNIFICANT CHANGE UP (ref 0–6)
ESTIMATED AVERAGE GLUCOSE: 137 MG/DL — HIGH (ref 68–114)
GLUCOSE BLDC GLUCOMTR-MCNC: 104 MG/DL — HIGH (ref 70–99)
GLUCOSE BLDC GLUCOMTR-MCNC: 105 MG/DL — HIGH (ref 70–99)
GLUCOSE BLDC GLUCOMTR-MCNC: 244 MG/DL — HIGH (ref 70–99)
GLUCOSE SERPL-MCNC: 169 MG/DL — HIGH (ref 70–99)
GLUCOSE SERPL-MCNC: 249 MG/DL — HIGH (ref 70–99)
HBV SURFACE AG SER-ACNC: SIGNIFICANT CHANGE UP
HCO3 BLDV-SCNC: 23 MMOL/L — SIGNIFICANT CHANGE UP (ref 22–29)
HCT VFR BLD CALC: 23.2 % — LOW (ref 39–50)
HGB BLD-MCNC: 7.5 G/DL — LOW (ref 13–17)
HOROWITZ INDEX BLDV+IHG-RTO: 21 — SIGNIFICANT CHANGE UP
IMM GRANULOCYTES NFR BLD AUTO: 0.2 % — SIGNIFICANT CHANGE UP (ref 0–1.5)
INR BLD: 1.72 RATIO — HIGH (ref 0.88–1.16)
LYMPHOCYTES # BLD AUTO: 0.72 K/UL — LOW (ref 1–3.3)
LYMPHOCYTES # BLD AUTO: 17.1 % — SIGNIFICANT CHANGE UP (ref 13–44)
MAGNESIUM SERPL-MCNC: 1.9 MG/DL — SIGNIFICANT CHANGE UP (ref 1.6–2.6)
MAGNESIUM SERPL-MCNC: 2.1 MG/DL — SIGNIFICANT CHANGE UP (ref 1.6–2.6)
MCHC RBC-ENTMCNC: 29.3 PG — SIGNIFICANT CHANGE UP (ref 27–34)
MCHC RBC-ENTMCNC: 32.3 GM/DL — SIGNIFICANT CHANGE UP (ref 32–36)
MCV RBC AUTO: 90.6 FL — SIGNIFICANT CHANGE UP (ref 80–100)
MONOCYTES # BLD AUTO: 0.67 K/UL — SIGNIFICANT CHANGE UP (ref 0–0.9)
MONOCYTES NFR BLD AUTO: 15.9 % — HIGH (ref 2–14)
NEUTROPHILS # BLD AUTO: 2.7 K/UL — SIGNIFICANT CHANGE UP (ref 1.8–7.4)
NEUTROPHILS NFR BLD AUTO: 63.9 % — SIGNIFICANT CHANGE UP (ref 43–77)
NRBC # BLD: 0 /100 WBCS — SIGNIFICANT CHANGE UP (ref 0–0)
PCO2 BLDV: 41 MMHG — LOW (ref 42–55)
PH BLDV: 7.36 — SIGNIFICANT CHANGE UP (ref 7.32–7.43)
PHOSPHATE SERPL-MCNC: 3.6 MG/DL — SIGNIFICANT CHANGE UP (ref 2.5–4.5)
PHOSPHATE SERPL-MCNC: 5.2 MG/DL — HIGH (ref 2.5–4.5)
PLATELET # BLD AUTO: 96 K/UL — LOW (ref 150–400)
PO2 BLDV: 46 MMHG — HIGH (ref 25–45)
POTASSIUM SERPL-MCNC: 3.6 MMOL/L — SIGNIFICANT CHANGE UP (ref 3.5–5.3)
POTASSIUM SERPL-MCNC: 4.4 MMOL/L — SIGNIFICANT CHANGE UP (ref 3.5–5.3)
POTASSIUM SERPL-SCNC: 3.6 MMOL/L — SIGNIFICANT CHANGE UP (ref 3.5–5.3)
POTASSIUM SERPL-SCNC: 4.4 MMOL/L — SIGNIFICANT CHANGE UP (ref 3.5–5.3)
PROT SERPL-MCNC: 6.8 G/DL — SIGNIFICANT CHANGE UP (ref 6–8.3)
PROT SERPL-MCNC: 7 G/DL — SIGNIFICANT CHANGE UP (ref 6–8.3)
PROTHROM AB SERPL-ACNC: 20.1 SEC — HIGH (ref 10.5–13.4)
RBC # BLD: 2.56 M/UL — LOW (ref 4.2–5.8)
RBC # FLD: 14.4 % — SIGNIFICANT CHANGE UP (ref 10.3–14.5)
SAO2 % BLDV: 79.6 % — SIGNIFICANT CHANGE UP (ref 67–88)
SODIUM SERPL-SCNC: 134 MMOL/L — LOW (ref 135–145)
SODIUM SERPL-SCNC: 137 MMOL/L — SIGNIFICANT CHANGE UP (ref 135–145)
WBC # BLD: 4.22 K/UL — SIGNIFICANT CHANGE UP (ref 3.8–10.5)
WBC # FLD AUTO: 4.22 K/UL — SIGNIFICANT CHANGE UP (ref 3.8–10.5)

## 2022-06-22 PROCEDURE — 99233 SBSQ HOSP IP/OBS HIGH 50: CPT | Mod: GC

## 2022-06-22 PROCEDURE — 99291 CRITICAL CARE FIRST HOUR: CPT

## 2022-06-22 RX ORDER — CALCIUM GLUCONATE 100 MG/ML
2 VIAL (ML) INTRAVENOUS ONCE
Refills: 0 | Status: COMPLETED | OUTPATIENT
Start: 2022-06-22 | End: 2022-06-22

## 2022-06-22 RX ORDER — HYDRALAZINE HCL 50 MG
10 TABLET ORAL ONCE
Refills: 0 | Status: COMPLETED | OUTPATIENT
Start: 2022-06-22 | End: 2022-06-22

## 2022-06-22 RX ORDER — HYDRALAZINE HCL 50 MG
5 TABLET ORAL EVERY 6 HOURS
Refills: 0 | Status: DISCONTINUED | OUTPATIENT
Start: 2022-06-22 | End: 2022-06-23

## 2022-06-22 RX ORDER — HEPARIN SODIUM 5000 [USP'U]/ML
5000 INJECTION INTRAVENOUS; SUBCUTANEOUS EVERY 12 HOURS
Refills: 0 | Status: DISCONTINUED | OUTPATIENT
Start: 2022-06-22 | End: 2022-06-28

## 2022-06-22 RX ADMIN — LISINOPRIL 2.5 MILLIGRAM(S): 2.5 TABLET ORAL at 17:29

## 2022-06-22 RX ADMIN — GABAPENTIN 100 MILLIGRAM(S): 400 CAPSULE ORAL at 23:01

## 2022-06-22 RX ADMIN — HEPARIN SODIUM 5000 UNIT(S): 5000 INJECTION INTRAVENOUS; SUBCUTANEOUS at 05:18

## 2022-06-22 RX ADMIN — HEPARIN SODIUM 5000 UNIT(S): 5000 INJECTION INTRAVENOUS; SUBCUTANEOUS at 17:29

## 2022-06-22 RX ADMIN — CHLORHEXIDINE GLUCONATE 1 APPLICATION(S): 213 SOLUTION TOPICAL at 05:20

## 2022-06-22 RX ADMIN — Medication 81 MILLIGRAM(S): at 12:13

## 2022-06-22 RX ADMIN — GABAPENTIN 100 MILLIGRAM(S): 400 CAPSULE ORAL at 13:10

## 2022-06-22 RX ADMIN — Medication 25 MILLIGRAM(S): at 05:18

## 2022-06-22 RX ADMIN — Medication 25 MILLIGRAM(S): at 17:29

## 2022-06-22 RX ADMIN — GABAPENTIN 100 MILLIGRAM(S): 400 CAPSULE ORAL at 05:18

## 2022-06-22 RX ADMIN — CLOPIDOGREL BISULFATE 75 MILLIGRAM(S): 75 TABLET, FILM COATED ORAL at 12:13

## 2022-06-22 RX ADMIN — Medication 10 MILLIGRAM(S): at 15:44

## 2022-06-22 RX ADMIN — LORATADINE 10 MILLIGRAM(S): 10 TABLET ORAL at 12:13

## 2022-06-22 RX ADMIN — Medication 200 GRAM(S): at 05:18

## 2022-06-22 NOTE — PROGRESS NOTE ADULT - PROBLEM SELECTOR PLAN 1
Pt. with ESRD on HD three times a week (MWF), who traveled to [facility] for family issues, now back after 4 months and not accepted at outpatient dialysis center. Found with severe hyperkalemia and acidosis. Pt underwent urgent HD in CICU on 6/21/22. Plan for another HD session today. Pt clinically stable. Last HD was on 6/16/22 via left AVF. Labs reviewed. Monitor labs. Adjust medications to HD

## 2022-06-22 NOTE — PROGRESS NOTE ADULT - SUBJECTIVE AND OBJECTIVE BOX
Alonso Pan PGY1    INTERVAL HPI/OVERNIGHT EVENTS: BPs in 120s after dialysis. Patient AOx3 with no neurological deficits. Ca low to 6, given supplementation overnight.     SUBJECTIVE: Patient seen and examined at bedside.     CONSTITUTIONAL: No weakness, fevers or chills  EYES/ENT: No visual changes;  No vertigo or throat pain   NECK: No pain or stiffness  RESPIRATORY: No cough, wheezing, hemoptysis; No shortness of breath  CARDIOVASCULAR: No chest pain or palpitations  GASTROINTESTINAL: No abdominal or epigastric pain. No nausea, vomiting, or hematemesis; No diarrhea or constipation. No melena or hematochezia.  GENITOURINARY: No dysuria, frequency or hematuria  NEUROLOGICAL: No numbness or weakness  SKIN: No itching, rashes    OBJECTIVE:    VITAL SIGNS:  ICU Vital Signs Last 24 Hrs  T(C): 37.1 (22 Jun 2022 04:00), Max: 37.2 (21 Jun 2022 20:00)  T(F): 98.8 (22 Jun 2022 04:00), Max: 99 (21 Jun 2022 20:00)  HR: 62 (22 Jun 2022 07:00) (62 - 82)  BP: 103/52 (22 Jun 2022 07:00) (103/52 - 202/91)  BP(mean): 73 (22 Jun 2022 07:00) (73 - 130)  ABP: --  ABP(mean): --  RR: 14 (22 Jun 2022 07:00) (12 - 25)  SpO2: 97% (22 Jun 2022 07:00) (95% - 100%)        06-21 @ 07:01  -  06-22 @ 07:00  --------------------------------------------------------  IN: 1280 mL / OUT: 2300 mL / NET: -1020 mL      CAPILLARY BLOOD GLUCOSE      POCT Blood Glucose.: 130 mg/dL (21 Jun 2022 20:59)      PHYSICAL EXAM:    GENERAL: NAD, well-groomed, well-developed  HEAD:  Atraumatic, Normocephalic  EYES: EOMI, PERRLA, conjunctiva and sclera clear  ENMT: No tonsillar erythema, exudates, or enlargement; Moist mucous membranes, Good dentition, No lesions  NECK: Supple, No JVD, Normal thyroid  CHEST/LUNG: Clear to auscultation bilaterally; No rales, rhonchi, wheezing, or rubs  HEART: Regular rate and rhythm; No murmurs, rubs, or gallops  ABDOMEN: Soft, Nontender, Nondistended; Bowel sounds present  EXTREMITIES:  2+ Peripheral Pulses, No clubbing, cyanosis, or edema, LUE fistula  LYMPH: No lymphadenopathy noted  SKIN: No rashes or lesions  NERVOUS SYSTEM:  Alert & Oriented X4, Good concentration  PSYCH: Normal Affect. Speaking in Full Sentences. Laying in bed comfortably; not agitated    MEDICATIONS:  MEDICATIONS  (STANDING):  amLODIPine   Tablet 5 milliGRAM(s) Oral daily  aspirin  chewable 81 milliGRAM(s) Oral daily  chlorhexidine 2% Cloths 1 Application(s) Topical <User Schedule>  chlorhexidine 4% Liquid 1 Application(s) Topical <User Schedule>  clopidogrel Tablet 75 milliGRAM(s) Oral daily  dextrose 5%. 1000 milliLiter(s) (50 mL/Hr) IV Continuous <Continuous>  dextrose 5%. 1000 milliLiter(s) (100 mL/Hr) IV Continuous <Continuous>  dextrose 50% Injectable 25 Gram(s) IV Push once  dextrose 50% Injectable 12.5 Gram(s) IV Push once  dextrose 50% Injectable 25 Gram(s) IV Push once  gabapentin 100 milliGRAM(s) Oral every 8 hours  glucagon  Injectable 1 milliGRAM(s) IntraMuscular once  heparin   Injectable 5000 Unit(s) SubCutaneous every 8 hours  insulin lispro (ADMELOG) corrective regimen sliding scale   SubCutaneous three times a day before meals  insulin lispro (ADMELOG) corrective regimen sliding scale   SubCutaneous at bedtime  lisinopril 2.5 milliGRAM(s) Oral daily  loratadine 10 milliGRAM(s) Oral daily  metoprolol tartrate 25 milliGRAM(s) Oral two times a day    MEDICATIONS  (PRN):  benzonatate 100 milliGRAM(s) Oral every 8 hours PRN Cough  dextrose Oral Gel 15 Gram(s) Oral once PRN Blood Glucose LESS THAN 70 milliGRAM(s)/deciliter      ALLERGIES:  Allergies    No Known Allergies    Intolerances        LABS:                        7.5    4.22  )-----------( 96       ( 22 Jun 2022 01:28 )             23.2     06-22    137  |  100  |  70<H>  ----------------------------<  169<H>  4.4   |  21<L>  |  10.14<H>    Ca    6.0<LL>      22 Jun 2022 01:21  Phos  5.2     06-22  Mg     2.1     06-22    TPro  6.8  /  Alb  3.6  /  TBili  0.5  /  DBili  x   /  AST  64<H>  /  ALT  36  /  AlkPhos  134<H>  06-22    PT/INR - ( 22 Jun 2022 01:20 )   PT: 20.1 sec;   INR: 1.72 ratio         PTT - ( 22 Jun 2022 01:20 )  PTT:36.4 sec      RADIOLOGY & ADDITIONAL TESTS: Reviewed.

## 2022-06-22 NOTE — PROGRESS NOTE ADULT - PROBLEM SELECTOR PLAN 2
patient who missed HD - found with K 6.7 - non hemolyzed. In the ED received Calcium, IV bicarb, lokelma and bicarb infusion on admission. s/p urgent HD on 6/21/22. serum K wnl today. Monitor k.

## 2022-06-22 NOTE — CHART NOTE - NSCHARTNOTEFT_GEN_A_CORE
MAR Accept Note  Transfer to:  [facility]MON  Accepting Attending Physician:  Briana  Assigned Room:  517D    Patient seen and examined.   Labs and data reviewed.   No findings precluding transfer of service.       HPI/MICU COURSE:   Please refer to MICU transfer note for full details. Briefly, 61M Hx ESRD on HD via LUE AVF (doesn't make urine), CAD s/p stent (plavix/asa), T2DM, HTN, HLD, anemia, presenting with weakness after skipping multiple dialysis sessions admitted for hyperkalemia with EKG changes, severe acidosis requiring emergent dialysis. s/p HD in MICU, stable for floors. Course c/b labile BPs.       FOR FOLLOW-UP:  -HD per nephro   -BP monitoring, re-introduce anti-HTN meds as tolerated    Shae Lamar  PGY3

## 2022-06-22 NOTE — PROGRESS NOTE ADULT - ASSESSMENT
61M Hx ESRD on HD via LUE AVF (doesn't make urine), CAD s/p stent (plavix/asa), T2DM, HTN, HLD, anemia, presenting with weakness after skipping multiple dialysis sessions admitted for hyperkalemia with EKG changes, severe acidosis requiring emergent dialysis    NEURO:  #Mental status: baseline  -Currently A&O x 3  -complaints of weakness, continue to monitor     CV:  #CAD s/p stent   - c/w plavix/aspirin at home,    #Hypertensive Urgency  - SBP: 210s at admission and pre-HD, will give 10 labetalol IVP for now, s/p HD     #HTN/HLD   -will restart home metoprolol and lisinopril   - add amlodipine 5mg      PULM:  - No active Issues    RENAL:  #ESRD  - elevated BUN/Cr: 127/18 in the setting of missed dialysis 2/2 financial constraints in [facility] now requiring emergent HD for Hyperkalemia w/ EKG changes s/p cocktail, Acidosis  - repeat labs after HD  - UO: no urine output at baseline     GI:  #Diet: regular (dm,renal restrictions)     ENDO:  #DM2: HbA1c 6.4  - insulin SSI  -will restart home gabapentin     METABOLIC:  #acidosis   -vBG pH 7.05, now post sodium bicarb push, and s/p bicarb gtt     #Hyperkalemia   -potassium 6.7 with ekg changes, now post lokelma, calcium gluconate, insulin, dextrose   -follow-up EKG and BMP after HD    HEMATOLOGIC:  #Anemia   -hgb 8.7, monitor  -transfuse for less than hgb 7     #Coag panel elevated in the setting of anticoagulation     #DVT prophylaxis with home medications     ID:  #Pt without strong objective or clinical evidence of infection. Will observe off antibiotics  -lactate 1.1, WBC 5.11        61M Hx ESRD on HD via LUE AVF (doesn't make urine), CAD s/p stent (plavix/asa), T2DM, HTN, HLD, anemia, presenting with weakness after skipping multiple dialysis sessions admitted for hyperkalemia with EKG changes, severe acidosis requiring emergent dialysis    NEURO:  #Mental status: baseline  -Currently A&O x 3  -complaints of weakness, continue to monitor     CV:  #CAD s/p stent   - c/w plavix/aspirin at home,    #Hypertensive Urgency  - SBP: 210s at admission and pre-HD, will give 10 labetalol IVP for now, s/p HD     #HTN/HLD   -will continue home metoprolol and lisinopril       PULM:  - No active Issues    RENAL:  #ESRD  - s/p emergent dialysis 6/21  - repeat labs after HD  - UO: no urine output at baseline     GI:  #Diet: regular (dm,renal restrictions)     ENDO:  #DM2: HbA1c 6.4  - insulin SSI  -will restart home gabapentin     METABOLIC:  #acidosis   - corrected after HD    #Hyperkalemia   -potassium 6.7 with ekg changes, now post lokelma, calcium gluconate, insulin, dextrose   - corrected after HD    HEMATOLOGIC:  #Anemia   -hgb 8.7, monitor  -transfuse for less than hgb 7   - check iron studies, per neph    #Coag panel elevated in the setting of anticoagulation     #DVT prophylaxis with home medications     ID:  #Pt without strong objective or clinical evidence of infection. Will observe off antibiotics  -lactate 1.1, WBC 5.11

## 2022-06-22 NOTE — PHARMACOTHERAPY INTERVENTION NOTE - COMMENTS
Patient is 61M with PMH significant for ESRD on HD, CAD on DAPT s/p stent, T2DM, HTN, HLD, anemia, p/w weakness found to have severe acidosis and hyperkalemia with EKG changes.    Given BMI 20 and weight 55kg, recommend to decrease heparin prophylaxis to 5000 units q12h instead. Discussed with MD.    Thank you,    Betzaida Pacheco PharmD, PGY-1 Pharmacy Resident  Available on [facility] or [id number] [id number]

## 2022-06-22 NOTE — PROGRESS NOTE ADULT - PROBLEM SELECTOR PLAN 4
Patient with anemia in the setting of ESRD. Hemoglobin below target range. Please check iron studies -  Monitor hemoglobin.

## 2022-06-22 NOTE — PROGRESS NOTE ADULT - ATTENDING COMMENTS
61M w ESRD on HD, CAD s/p stent (plavix/asa), T2DM, HTN, admitted with hyperkalemia with EKG changes and severe acidosis after missing multiple HD cessions. In MICU for emergent HD.   requiring emergent dialysis  Nuro: alert/oriented  CV: CAD, BPs well controlled. Hold Amlodipine for now. c/w home BB and ACE  Resp: stable  ID: no s/s of infection  GI: renal diet  Renal: plan for repeat HD today  Endo: FS/ISS  PPx: [facility]  Dispo: stable for tranfer to floor 61M w ESRD on HD, CAD s/p stent (plavix/asa), T2DM, HTN, admitted with hyperkalemia with EKG changes and severe acidosis after missing multiple HD cessions. In MICU for emergent HD.   requiring emergent dialysis  Nuro: alert/oriented  CV: CAD, BPs well controlled. Hold Amlodipine for now. c/w home BB and ACE  Resp: stable  ID: no s/s of infection  GI: renal diet  Renal: plan for repeat HD today  Heme: monitor h/h  Endo: FS/ISS  PPx: [facility]  Dispo: stable for tranfer to floor

## 2022-06-22 NOTE — PROGRESS NOTE ADULT - SUBJECTIVE AND OBJECTIVE BOX
[facility] DIVISION OF KIDNEY DISEASES AND HYPERTENSION   FOLLOW UP NOTE    --------------------------------------------------------------------------------  Chief Complaint: ESRD on HD    24 hour events/subjective: Pt. was seen and examined today. Overnight events  noted. Pt underwent urgent HD in CICU on 6/21/22 and tolerated well.  BP well controlled now. Plan for another HD session today.      PAST HISTORY  --------------------------------------------------------------------------------  No significant changes to PMH, PSH, FHx, SHx, unless otherwise noted    ALLERGIES & MEDICATIONS  --------------------------------------------------------------------------------  Allergies    No Known Allergies    Intolerances      Standing Inpatient Medications  amLODIPine   Tablet 5 milliGRAM(s) Oral daily  aspirin  chewable 81 milliGRAM(s) Oral daily  chlorhexidine 2% Cloths 1 Application(s) Topical <User Schedule>  chlorhexidine 4% Liquid 1 Application(s) Topical <User Schedule>  clopidogrel Tablet 75 milliGRAM(s) Oral daily  dextrose 5%. 1000 milliLiter(s) IV Continuous <Continuous>  dextrose 5%. 1000 milliLiter(s) IV Continuous <Continuous>  dextrose 50% Injectable 25 Gram(s) IV Push once  dextrose 50% Injectable 12.5 Gram(s) IV Push once  dextrose 50% Injectable 25 Gram(s) IV Push once  gabapentin 100 milliGRAM(s) Oral every 8 hours  glucagon  Injectable 1 milliGRAM(s) IntraMuscular once  heparin   Injectable 5000 Unit(s) SubCutaneous every 8 hours  insulin lispro (ADMELOG) corrective regimen sliding scale   SubCutaneous three times a day before meals  insulin lispro (ADMELOG) corrective regimen sliding scale   SubCutaneous at bedtime  lisinopril 2.5 milliGRAM(s) Oral daily  loratadine 10 milliGRAM(s) Oral daily  metoprolol tartrate 25 milliGRAM(s) Oral two times a day    PRN Inpatient Medications  benzonatate 100 milliGRAM(s) Oral every 8 hours PRN  dextrose Oral Gel 15 Gram(s) Oral once PRN      REVIEW OF SYSTEMS  --------------------------------------------------------------------------------  Gen: +fatigue +weakness No fevers/chills  Skin: No rashes  Head/Eyes/Ears: Normal hearing,   Respiratory: No dyspnea, cough  CV: No chest pain  GI: +nausea +vomiting.   : No dysuria, hematuria  MSK: +LE edema    All other systems were reviewed and are negative, except as noted.    VITALS/PHYSICAL EXAM  --------------------------------------------------------------------------------  T(C): 37.1 (06-22-22 @ 04:00), Max: 37.2 (06-21-22 @ 20:00)  HR: 62 (06-22-22 @ 07:00) (62 - 82)  BP: 103/52 (06-22-22 @ 07:00) (103/52 - 199/90)  RR: 14 (06-22-22 @ 07:00) (12 - 25)  SpO2: 97% (06-22-22 @ 07:00) (95% - 100%)  Wt(kg): --  Height (cm): 162.6 (06-21-22 @ 03:49)  Weight (kg): 55.2 (06-21-22 @ 03:49)  BMI (kg/m2): 20.9 (06-21-22 @ 03:49)  BSA (m2): 1.58 (06-21-22 @ 03:49)      06-21-22 @ 07:01  -  06-22-22 @ 07:00  --------------------------------------------------------  IN: 1280 mL / OUT: 2300 mL / NET: -1020 mL    Physical Exam:  	Gen: NAD  	HEENT: MMM  	Pulm: CTA B/L  	CV: S1S2,   	Abd: Soft, +BS, ND  	Ext: trace LE edema B/L (improved)  	Neuro: Awake, A&O x3  	Skin: Warm and dry               Psych: normal affect and mood  	Vascular access: left AVF +thrill and +bruit.     LABS/STUDIES  --------------------------------------------------------------------------------              7.5    4.22  >-----------<  96       [06-22-22 @ 01:28]              23.2     137  |  100  |  70  ----------------------------<  169      [06-22-22 @ 01:21]  4.4   |  21  |  10.14        Ca     6.0     [06-22-22 @ 01:21]      Mg     2.1     [06-22-22 @ 01:21]      Phos  5.2     [06-22-22 @ 01:21]      Creatinine Trend:  SCr 10.14 [06-22 @ 01:21]  SCr 8.58 [06-21 @ 14:59]  SCr 18.36 [06-21 @ 00:38]

## 2022-06-22 NOTE — PROGRESS NOTE ADULT - PROBLEM SELECTOR PLAN 6
serum phos within target range.   Serum Ca low at 6 today. s/p 2 gm IV calcium gluconate. Check ionized Calcium, target >1.1.  HD with high Ca bath today. Monitor serum Ca. Repeat labs after HD today.

## 2022-06-23 ENCOUNTER — TRANSCRIPTION ENCOUNTER (OUTPATIENT)
Age: 62
End: 2022-06-23

## 2022-06-23 DIAGNOSIS — I25.10 ATHEROSCLEROTIC HEART DISEASE OF NATIVE CORONARY ARTERY WITHOUT ANGINA PECTORIS: ICD-10-CM

## 2022-06-23 DIAGNOSIS — I16.1 HYPERTENSIVE EMERGENCY: ICD-10-CM

## 2022-06-23 DIAGNOSIS — E11.9 TYPE 2 DIABETES MELLITUS WITHOUT COMPLICATIONS: ICD-10-CM

## 2022-06-23 DIAGNOSIS — Z29.9 ENCOUNTER FOR PROPHYLACTIC MEASURES, UNSPECIFIED: ICD-10-CM

## 2022-06-23 LAB
ALBUMIN SERPL ELPH-MCNC: 3.7 G/DL — SIGNIFICANT CHANGE UP (ref 3.3–5)
ALP SERPL-CCNC: 137 U/L — HIGH (ref 40–120)
ALT FLD-CCNC: 33 U/L — SIGNIFICANT CHANGE UP (ref 10–45)
ANION GAP SERPL CALC-SCNC: 15 MMOL/L — SIGNIFICANT CHANGE UP (ref 5–17)
AST SERPL-CCNC: 43 U/L — HIGH (ref 10–40)
BASOPHILS # BLD AUTO: 0.14 K/UL — SIGNIFICANT CHANGE UP (ref 0–0.2)
BASOPHILS NFR BLD AUTO: 2.6 % — HIGH (ref 0–2)
BILIRUB SERPL-MCNC: 0.4 MG/DL — SIGNIFICANT CHANGE UP (ref 0.2–1.2)
BUN SERPL-MCNC: 52 MG/DL — HIGH (ref 7–23)
CALCIUM SERPL-MCNC: 7.5 MG/DL — LOW (ref 8.4–10.5)
CHLORIDE SERPL-SCNC: 96 MMOL/L — SIGNIFICANT CHANGE UP (ref 96–108)
CO2 SERPL-SCNC: 23 MMOL/L — SIGNIFICANT CHANGE UP (ref 22–31)
CREAT SERPL-MCNC: 6.52 MG/DL — HIGH (ref 0.5–1.3)
EGFR: 9 ML/MIN/1.73M2 — LOW
EOSINOPHIL # BLD AUTO: 0.28 K/UL — SIGNIFICANT CHANGE UP (ref 0–0.5)
EOSINOPHIL NFR BLD AUTO: 5.3 % — SIGNIFICANT CHANGE UP (ref 0–6)
FERRITIN SERPL-MCNC: 968 NG/ML — HIGH (ref 30–400)
FOLATE SERPL-MCNC: 2.8 NG/ML — LOW
GLUCOSE BLDC GLUCOMTR-MCNC: 112 MG/DL — HIGH (ref 70–99)
GLUCOSE BLDC GLUCOMTR-MCNC: 118 MG/DL — HIGH (ref 70–99)
GLUCOSE BLDC GLUCOMTR-MCNC: 119 MG/DL — HIGH (ref 70–99)
GLUCOSE BLDC GLUCOMTR-MCNC: 193 MG/DL — HIGH (ref 70–99)
GLUCOSE SERPL-MCNC: 117 MG/DL — HIGH (ref 70–99)
HCT VFR BLD CALC: 27.5 % — LOW (ref 39–50)
HGB BLD-MCNC: 8.8 G/DL — LOW (ref 13–17)
IRON SATN MFR SERPL: 28 % — SIGNIFICANT CHANGE UP (ref 16–55)
IRON SATN MFR SERPL: 42 UG/DL — LOW (ref 45–165)
LYMPHOCYTES # BLD AUTO: 0.66 K/UL — LOW (ref 1–3.3)
LYMPHOCYTES # BLD AUTO: 12.4 % — LOW (ref 13–44)
MAGNESIUM SERPL-MCNC: 2 MG/DL — SIGNIFICANT CHANGE UP (ref 1.6–2.6)
MANUAL SMEAR VERIFICATION: SIGNIFICANT CHANGE UP
MCHC RBC-ENTMCNC: 29.6 PG — SIGNIFICANT CHANGE UP (ref 27–34)
MCHC RBC-ENTMCNC: 32 GM/DL — SIGNIFICANT CHANGE UP (ref 32–36)
MCV RBC AUTO: 92.6 FL — SIGNIFICANT CHANGE UP (ref 80–100)
MONOCYTES # BLD AUTO: 0.66 K/UL — SIGNIFICANT CHANGE UP (ref 0–0.9)
MONOCYTES NFR BLD AUTO: 12.4 % — SIGNIFICANT CHANGE UP (ref 2–14)
NEUTROPHILS # BLD AUTO: 3.5 K/UL — SIGNIFICANT CHANGE UP (ref 1.8–7.4)
NEUTROPHILS NFR BLD AUTO: 65.5 % — SIGNIFICANT CHANGE UP (ref 43–77)
PHOSPHATE SERPL-MCNC: 4.6 MG/DL — HIGH (ref 2.5–4.5)
PLAT MORPH BLD: NORMAL — SIGNIFICANT CHANGE UP
PLATELET # BLD AUTO: 136 K/UL — LOW (ref 150–400)
POTASSIUM SERPL-MCNC: 4.1 MMOL/L — SIGNIFICANT CHANGE UP (ref 3.5–5.3)
POTASSIUM SERPL-SCNC: 4.1 MMOL/L — SIGNIFICANT CHANGE UP (ref 3.5–5.3)
PROT SERPL-MCNC: 7.4 G/DL — SIGNIFICANT CHANGE UP (ref 6–8.3)
RBC # BLD: 2.97 M/UL — LOW (ref 4.2–5.8)
RBC # FLD: 14.4 % — SIGNIFICANT CHANGE UP (ref 10.3–14.5)
RBC BLD AUTO: SIGNIFICANT CHANGE UP
SODIUM SERPL-SCNC: 134 MMOL/L — LOW (ref 135–145)
TIBC SERPL-MCNC: 149 UG/DL — LOW (ref 220–430)
UIBC SERPL-MCNC: 107 UG/DL — LOW (ref 110–370)
VARIANT LYMPHS # BLD: 1.8 % — SIGNIFICANT CHANGE UP (ref 0–6)
VIT B12 SERPL-MCNC: 854 PG/ML — SIGNIFICANT CHANGE UP (ref 232–1245)
WBC # BLD: 5.34 K/UL — SIGNIFICANT CHANGE UP (ref 3.8–10.5)
WBC # FLD AUTO: 5.34 K/UL — SIGNIFICANT CHANGE UP (ref 3.8–10.5)

## 2022-06-23 PROCEDURE — 99233 SBSQ HOSP IP/OBS HIGH 50: CPT | Mod: GC

## 2022-06-23 PROCEDURE — 99233 SBSQ HOSP IP/OBS HIGH 50: CPT

## 2022-06-23 RX ORDER — ASPIRIN/CALCIUM CARB/MAGNESIUM 324 MG
1 TABLET ORAL
Qty: 0 | Refills: 0 | DISCHARGE

## 2022-06-23 RX ORDER — LORATADINE 10 MG/1
1 TABLET ORAL
Qty: 0 | Refills: 0 | DISCHARGE

## 2022-06-23 RX ORDER — AMLODIPINE BESYLATE 2.5 MG/1
5 TABLET ORAL DAILY
Refills: 0 | Status: DISCONTINUED | OUTPATIENT
Start: 2022-06-23 | End: 2022-06-28

## 2022-06-23 RX ORDER — CALCIUM GLUCONATE 100 MG/ML
2 VIAL (ML) INTRAVENOUS ONCE
Refills: 0 | Status: COMPLETED | OUTPATIENT
Start: 2022-06-23 | End: 2022-06-23

## 2022-06-23 RX ORDER — FOLIC ACID 0.8 MG
1 TABLET ORAL DAILY
Refills: 0 | Status: DISCONTINUED | OUTPATIENT
Start: 2022-06-23 | End: 2022-06-28

## 2022-06-23 RX ADMIN — HEPARIN SODIUM 5000 UNIT(S): 5000 INJECTION INTRAVENOUS; SUBCUTANEOUS at 18:10

## 2022-06-23 RX ADMIN — GABAPENTIN 100 MILLIGRAM(S): 400 CAPSULE ORAL at 05:54

## 2022-06-23 RX ADMIN — Medication 81 MILLIGRAM(S): at 10:55

## 2022-06-23 RX ADMIN — LORATADINE 10 MILLIGRAM(S): 10 TABLET ORAL at 12:31

## 2022-06-23 RX ADMIN — Medication 200 GRAM(S): at 18:34

## 2022-06-23 RX ADMIN — AMLODIPINE BESYLATE 5 MILLIGRAM(S): 2.5 TABLET ORAL at 12:31

## 2022-06-23 RX ADMIN — Medication 25 MILLIGRAM(S): at 05:55

## 2022-06-23 RX ADMIN — GABAPENTIN 100 MILLIGRAM(S): 400 CAPSULE ORAL at 13:26

## 2022-06-23 RX ADMIN — CLOPIDOGREL BISULFATE 75 MILLIGRAM(S): 75 TABLET, FILM COATED ORAL at 10:55

## 2022-06-23 RX ADMIN — CHLORHEXIDINE GLUCONATE 1 APPLICATION(S): 213 SOLUTION TOPICAL at 06:54

## 2022-06-23 RX ADMIN — CHLORHEXIDINE GLUCONATE 1 APPLICATION(S): 213 SOLUTION TOPICAL at 05:55

## 2022-06-23 RX ADMIN — HEPARIN SODIUM 5000 UNIT(S): 5000 INJECTION INTRAVENOUS; SUBCUTANEOUS at 05:58

## 2022-06-23 RX ADMIN — GABAPENTIN 100 MILLIGRAM(S): 400 CAPSULE ORAL at 21:29

## 2022-06-23 RX ADMIN — Medication 1 MILLIGRAM(S): at 10:55

## 2022-06-23 NOTE — DISCHARGE NOTE PROVIDER - PROVIDER TOKENS
PROVIDER:[TOKEN:[[id number]:[id number]],FOLLOWUP:[1-3 days]] PROVIDER:[TOKEN:[[id number]:[id number]],FOLLOWUP:[1-3 days]],PROVIDER:[TOKEN:[[id number]]],PROVIDER:[TOKEN:[[id number]]]

## 2022-06-23 NOTE — DISCHARGE NOTE PROVIDER - NSDCCPCAREPLAN_GEN_ALL_CORE_FT
PRINCIPAL DISCHARGE DIAGNOSIS  Diagnosis: Hyperkalemia  Assessment and Plan of Treatment: You were admitted ot  ehospital becuase your potassium was very flor and there were other abnormlities on your blood work because you were not getting regular session of dialysis. You were admitted to the ICU for urgent dialysis and sessions and you improved. It is very important you do not miss any dialysis sessions. Please follow-up with your kidney and primary care docotrs.   If you start to experience chest pain, shortness of breath, abdominal pain, nausea, or vomiting please return to the emergency room.         PRINCIPAL DISCHARGE DIAGNOSIS  Diagnosis: Hyperkalemia  Assessment and Plan of Treatment: You were admitted ot  ehospital becuase your potassium was very flor and there were other abnormlities on your blood work because you were not getting regular session of dialysis. You were admitted to the ICU for urgent dialysis and sessions and you improved. It is very important you do not miss any dialysis sessions. Please follow-up with your kidney and primary care docotrs.   If you start to experience chest pain, shortness of breath, abdominal pain, nausea, or vomiting please return to the emergency room.        SECONDARY DISCHARGE DIAGNOSES  Diagnosis: Folate deficiency  Assessment and Plan of Treatment: Your folate level was very low. We are discharging you with daily folate. Please follow-up with your primary care doctor regarding this.     PRINCIPAL DISCHARGE DIAGNOSIS  Diagnosis: Hyperkalemia  Assessment and Plan of Treatment: You were admitted ot  ehospital becuase your potassium was very flor and there were other abnormlities on your blood work because you were not getting regular session of dialysis. You were admitted to the ICU for urgent dialysis and sessions and you improved. It is very important you do not miss any dialysis sessions. Please follow-up with your kidney and primary care docotrs.   If you start to experience chest pain, shortness of breath, abdominal pain, nausea, or vomiting please return to the emergency room.        SECONDARY DISCHARGE DIAGNOSES  Diagnosis: ESRD on dialysis  Assessment and Plan of Treatment: HD as scheduled    Diagnosis: Folate deficiency  Assessment and Plan of Treatment: Your folate level was very low. We are discharging you with daily folate. Please follow-up with your primary care doctor regarding this.    Diagnosis: CAD (coronary artery disease)  Assessment and Plan of Treatment: You underwent cardiac catheterization . You have significant triple vessel cardiovascular disease.  Cath Report Pre-Damon: pLAD 90% ISR, long diffuse, CX 80% diffuse, RCA 70% diffuse   Continue ASA 81, Lopressor 25 BID  Start Atorvastatin 80mg QD  Pt currently refusing surgery at this time  Follow up with Dr. Horta and Dr. Mcdaniels        PRINCIPAL DISCHARGE DIAGNOSIS  Diagnosis: Hyperkalemia  Assessment and Plan of Treatment: You were admitted ot  ehospital becuase your potassium was very flor and there were other abnormlities on your blood work because you were not getting regular session of dialysis. You were admitted to the ICU for urgent dialysis and sessions and you improved. It is very important you do not miss any dialysis sessions. Please follow-up with your kidney and primary care docotrs.   If you start to experience chest pain, shortness of breath, abdominal pain, nausea, or vomiting please return to the emergency room.        SECONDARY DISCHARGE DIAGNOSES  Diagnosis: ESRD on dialysis  Assessment and Plan of Treatment: HD as scheduled    Diagnosis: Folate deficiency  Assessment and Plan of Treatment: Your folate level was very low. We are discharging you with daily folate. Please follow-up with your primary care doctor regarding this.    Diagnosis: CAD (coronary artery disease)  Assessment and Plan of Treatment: You underwent cardiac catheterization . You have significant triple vessel cardiovascular disease.  Cath Report Pre-Damon: pLAD 90% ISR, long diffuse, CX 80% diffuse, RCA 70% diffuse   Continue ASA 81, Lopressor 25 BID  Start Atorvastatin 80mg QD  Pt currently refusing surgery at this time  Follow up with Dr. Horta and Dr. Mcdaniels       Diagnosis: Anemia of chronic disease  Assessment and Plan of Treatment: Follow up with nephrology for managment of anemia in the setting of renal disease    Diagnosis: Mitral regurgitation  Assessment and Plan of Treatment: Follow up with cardiology for managment and evaluation of valve     PRINCIPAL DISCHARGE DIAGNOSIS  Diagnosis: Hyperkalemia  Assessment and Plan of Treatment: You were admitted ot  ehospital becuase your potassium was very flor and there were other abnormlities on your blood work because you were not getting regular session of dialysis. You were admitted to the ICU for urgent dialysis and sessions and you improved. It is very important you do not miss any dialysis sessions. Please follow-up with your kidney and primary care docotrs.   If you start to experience chest pain, shortness of breath, abdominal pain, nausea, or vomiting please return to the emergency room.        SECONDARY DISCHARGE DIAGNOSES  Diagnosis: ESRD on dialysis  Assessment and Plan of Treatment: HD as scheduled    Diagnosis: Folate deficiency  Assessment and Plan of Treatment: Your folate level was very low. We are discharging you with daily folate. Please follow-up with your primary care doctor regarding this.    Diagnosis: CAD (coronary artery disease)  Assessment and Plan of Treatment: You underwent cardiac catheterization . You have significant triple vessel cardiovascular disease.  Cath Report Pre-Damon: pLAD 90% ISR, long diffuse, CX 80% diffuse, RCA 70% diffuse   Continue ASA 81, Lopressor 25 BID  Start Atorvastatin 80mg QD  You have refused cardiothoracic surgery and Percutanous intervention at this time. Follow up with Dr. Horta and Dr. Mcdaniels for managment and re evaluation       Diagnosis: Anemia of chronic disease  Assessment and Plan of Treatment: Follow up with nephrology for managment of anemia in the setting of renal disease    Diagnosis: Mitral regurgitation  Assessment and Plan of Treatment: Follow up with cardiology for managment and evaluation of valve

## 2022-06-23 NOTE — PROGRESS NOTE ADULT - PROBLEM SELECTOR PLAN 5
- hgb 8.7 on admssion   - iron studies: iron 58/ TIBC 204/ ferritin 968  - monitor CBC, transfuse for less than hgb 7   - maintain active type and screen - hgb 8.7 on admssion   - iron studies: iron 58/ TIBC 204/ ferritin 968  - f/u folate and B12  - monitor CBC, transfuse for less than hgb 7   - maintain active type and screen - hgb 8.7 on admission   - iron studies: iron 58/ TIBC 204/ ferritin 968  - folate 2.8 and B12 854, start folate supplementation   - monitor CBC, transfuse for less than hgb 7   - maintain active type and screen

## 2022-06-23 NOTE — PROGRESS NOTE ADULT - PROBLEM SELECTOR PLAN 4
- s/p emergent dialysis 6/21  - UO: no urine output at baseline   - nephro following, recs appreciated - s/p emergent dialysis 6/21  - UO: no urine output at baseline   - nephro following, recs appreciated  - HD schedule MWF, HD tomorrow

## 2022-06-23 NOTE — DISCHARGE NOTE PROVIDER - HOSPITAL COURSE
HPI:  60y/o M w/ h/o ESRD on HD via LUE AVF (doesn't make urine), CAD s/p stent (plavix/asa), T2DM, HTN, HLD, anemia, presenting with weakness. Pt states that he just came back from [facility] and was getting dialysis only once a week for 3 months, last dialysis was 5d ago. States that he was unable to receive more dialysis due to financial constraints. Has been progressively feeling weak, tired, had episodes of nausea and vomiting. Denies fevers, chills, chest pain, cough, sob, abdominal pain, back pain. [facility].    In the ER was found to be hyperkalemia 6.7 with EKG changes, acidosis, house nephrology and MICU consulted for emergent dialysis. Received calcium gluconate 2g IVPB, Humalog 5U IV push, dextrose 50% 50mL, Lokelma 10g. Currently receiving sodium bicarb infusion 150meq.    Pt admitted to MICU for urgent HD after which hyperkalemia and acidosis resolved. Pt resumed his MWF HD schedule and SW worked to get pt back into an HD center.       Patient is now hemodynamically stable and medically optimized for discharge home with referrals to appropriate clinics.     HPI:  60y/o M w/ h/o ESRD on HD via LUE AVF (doesn't make urine), CAD s/p stent (plavix/asa), T2DM, HTN, HLD, anemia, presenting with weakness. Pt states that he just came back from [facility] and was getting dialysis only once a week for 3 months, last dialysis was 5d ago. States that he was unable to receive more dialysis due to financial constraints. Has been progressively feeling weak, tired, had episodes of nausea and vomiting. Denies fevers, chills, chest pain, cough, sob, abdominal pain, back pain. [facility].    In the ER was found to be hyperkalemia 6.7 with EKG changes, acidosis, house nephrology and MICU consulted for emergent dialysis. Received calcium gluconate 2g IVPB, Humalog 5U IV push, dextrose 50% 50mL, Lokelma 10g. Currently receiving sodium bicarb infusion 150meq.    Pt admitted to MICU for urgent HD after which hyperkalemia and acidosis resolved. Course c/b low ionized Ca; pt repleted for goal >1.1.         Patient is now hemodynamically stable and medically optimized for discharge home with referrals to appropriate clinics.     61M Hx ESRD on HD via LUE AVF (doesn't make urine), CAD s/p stent (plavix/asa), T2DM, HTN, HLD, anemia, presenting with weakness after skipping multiple dialysis sessions admitted for hyperkalemia with EKG changes, severe acidosis requiring emergent dialysis. Admitted to MICU now downgraded to the floors. Course c/b troponinemia.       Problem/Plan - 1:  ·  Problem: Troponin level elevated.   ·  Plan: Asymptomatic, ST depressions laterally. unclear if 2/2 to poor renal clearance vs MI (less likely)    -Troponins lateral  -Cardiology consulted: Ened[facility]  -[facility] showing triple vessel disease --> refused CABG after being evaluated by CT surgery & refused high risk PCI to LAD, he states he's old and if he dies he dies, he has capacity  -Continue with DAPT & beta blocker & high dose statin.     Problem/Plan - 2:  ·  Problem: ESRD on dialysis.   ·  Plan: - s/p emergent dialysis 6/21  - UO: no urine output at baseline   - nephro following, recs appreciated  - CM to establish outpatient HD.     Problem/Plan - 3:  ·  Problem: Hypocalcemia.   ·  Plan: - Monitor  - Renal to provide high calcium diasylate during HD.     Problem/Plan - 4:  ·  Problem: Hyperkalemia.   ·  Plan: - Resolved after HD.     Problem/Plan - 5:  ·  Problem: Metabolic acidosis.   ·  Plan: - Resolved after HD.     Problem/Plan - 6:  ·  Problem: Hypertensive emergency.   ·  Plan: - Resolved  - home meds restarted, metoprolol tartrate 25mg BID and lisinopril 2.5mg QD  - started amlodipine 5 mg QD.     Problem/Plan - 7:  ·  Problem: Anemia.   ·  Plan: - hgb 8.1   - iron studies: iron 58/ TIBC 204/ ferritin 968  - folate 2.8 and B12 854, started folate supplementation   - monitor CBC, transfuse for less than hgb 7   - maintain active type and screen.     Problem/Plan - 8:  ·  Problem: CAD (coronary artery disease).   ·  Plan: S/p stent on ASA and plavix at home  - continue home meds  - For [facility].     Problem/Plan - 9:  ·  Problem: DM (diabetes mellitus).   ·  Plan: On Januvia at home  - ISS, monitor BG  - continue home gabapentin.     Problem/Plan - 10:  ·  Problem: Prophylactic measure.   ·  Plan; DVT ppx: heparin subq  Diet: consistent carb  Dispo: pending setup of outpt HD.

## 2022-06-23 NOTE — PROGRESS NOTE ADULT - ATTENDING COMMENTS
seen this am. 61M Hx ESRD on HD via LUE AVF (doesn't make urine), CAD s/p stent (plavix/asa), T2DM, HTN, HLD, anemia, presenting with weakness after skipping multiple dialysis sessions admitted for hyperkalemia with EKG changes, severe acidosis requiring emergent dialysis. Pt feels back to baseline today. No cp/sob. no f/c/r. no n/v/d/abd pain. PE: nad; a+ox3; euvolemic; abd benign; non-focal; +fistula LUE    Hypocalcemia- replete and monitor    ESRD- HD per Renal. will need to be set up for outpt HD    Folate deficiency- cont Folate supplementation. outpt w/u seen this am. 61M Hx ESRD on HD via LUE AVF (doesn't make urine), CAD s/p stent (plavix/asa), T2DM, HTN, HLD, anemia, presenting with weakness after skipping multiple dialysis sessions admitted for hyperkalemia with EKG changes, severe acidosis requiring emergent dialysis. Pt feels back to baseline today. No cp/sob. no f/c/r. no n/v/d/abd pain. PE: nad; a+ox3; euvolemic; abd benign; non-focal; +fistula LUE    Hypocalcemia- replete and monitor    ESRD- HD per Renal. will need to be set up for outpt HD    Folate deficiency- cont Folate supplementation. outpt w/u    AST elevation- monitor. no ETOH

## 2022-06-23 NOTE — PROGRESS NOTE ADULT - ASSESSMENT
61M Hx ESRD on HD via LUE AVF (doesn't make urine), CAD s/p stent (plavix/asa), T2DM, HTN, HLD, anemia, presenting with weakness after skipping multiple dialysis sessions admitted for hyperkalemia with EKG changes, severe acidosis requiring emergent dialysis. Admitted to MICU now downgraded to the floors

## 2022-06-23 NOTE — PROGRESS NOTE ADULT - SUBJECTIVE AND OBJECTIVE BOX
[facility] DIVISION OF KIDNEY DISEASES AND HYPERTENSION -- PROGRESS NOTE    Chief complaint: hypervolemia    24 hour events/subjective: had hD session yesterday        PAST HISTORY  --------------------------------------------------------------------------------  No significant changes to PMH, PSH, FHx, SHx, unless otherwise noted    ALLERGIES & MEDICATIONS  --------------------------------------------------------------------------------  Allergies    No Known Allergies    Intolerances      Standing Inpatient Medications  amLODIPine   Tablet 5 milliGRAM(s) Oral daily  aspirin  chewable 81 milliGRAM(s) Oral daily  chlorhexidine 2% Cloths 1 Application(s) Topical <User Schedule>  chlorhexidine 4% Liquid 1 Application(s) Topical <User Schedule>  clopidogrel Tablet 75 milliGRAM(s) Oral daily  dextrose 5%. 1000 milliLiter(s) IV Continuous <Continuous>  dextrose 5%. 1000 milliLiter(s) IV Continuous <Continuous>  folic acid 1 milliGRAM(s) Oral daily  gabapentin 100 milliGRAM(s) Oral every 8 hours  glucagon  Injectable 1 milliGRAM(s) IntraMuscular once  heparin   Injectable 5000 Unit(s) SubCutaneous every 12 hours  insulin lispro (ADMELOG) corrective regimen sliding scale   SubCutaneous three times a day before meals  insulin lispro (ADMELOG) corrective regimen sliding scale   SubCutaneous at bedtime  lisinopril 2.5 milliGRAM(s) Oral daily  loratadine 10 milliGRAM(s) Oral daily  metoprolol tartrate 25 milliGRAM(s) Oral two times a day    PRN Inpatient Medications  benzonatate 100 milliGRAM(s) Oral every 8 hours PRN      REVIEW OF SYSTEMS  --------------------------------------------------------------------------------  Constitutional: [ ] Fever [ ] Chills [ ] Fatigue [ ] Weight change   HEENT: [ ] Blurred vision [ ] Eye Pain [ ] Headache [ ] Runny nose [ ] Sore Throat   Respiratory: [ ] Cough [ ] Wheezing [ ] Shortness of breath  Cardiovascular: [ ] Chest Pain [ ] Palpitations [ ] DIXON [ ] PND [ ] Orthopnea  Gastrointestinal: [ ] Abdominal Pain [ ] Diarrhea [ ] Constipation [ ] Hemorrhoids [ ] Nausea [ ] Vomiting  Genitourinary: [ ] Nocturia [ ] Dysuria [ ] Incontinence  Extremities: [ ] Swelling [ ] Joint Pain  Neurologic: [ ] Focal deficit [ ] Paresthesias [ ] Syncope  Lymphatic: [ ] Swelling [ ] Lymphadenopathy   Skin: [ ] Rash [ ] Ecchymoses [ ] Wounds [ ] Lesions  Psychiatry: [ ] Depression [ ] Suicidal/Homicidal Ideation [ ] Anxiety [ ] Sleep Disturbances  [x ] 10 point review of systems is otherwise negative except as mentioned above              [ ]Unable to obtain due to   All other systems were reviewed and are negative, except as noted.    VITALS/PHYSICAL EXAM  --------------------------------------------------------------------------------  T(C): 36.7 (06-23-22 @ 12:29), Max: 37.1 (06-23-22 @ 05:15)  HR: 51 (06-23-22 @ 12:29) (51 - 75)  BP: 119/59 (06-23-22 @ 12:29) (119/59 - 193/93)  RR: 16 (06-23-22 @ 12:29) (10 - 19)  SpO2: 100% (06-23-22 @ 12:29) (99% - 100%)  Wt(kg): --        06-22-22 @ 07:01  -  06-23-22 @ 07:00  --------------------------------------------------------  IN: 440 mL / OUT: 1200 mL / NET: -760 mL      Physical Exam:  	Gen: NAD, well-appearing  	HEENT: on room air  	Pulm: CTA B/L  	CV: normal S1S2; no rub  	Abd: soft                      Back : No sacral edema  	: No kwok  	LE: no edema  	Skin: Warm, without rashes  	Vascular access: AVF    LABS/STUDIES  --------------------------------------------------------------------------------              8.8    5.34  >-----------<  136      [06-23-22 @ 07:17]              27.5     134  |  96  |  52  ----------------------------<  117      [06-23-22 @ 07:17]  4.1   |  23  |  6.52        Ca     7.5     [06-23-22 @ 07:17]      iCa    0.96     [06-22 @ 23:02]      Mg     2.0     [06-23-22 @ 07:17]      Phos  4.6     [06-23-22 @ 07:17]    TPro  7.4  /  Alb  3.7  /  TBili  0.4  /  DBili  x   /  AST  43  /  ALT  33  /  AlkPhos  137  [06-23-22 @ 07:17]    PT/INR: PT 20.1 , INR 1.72       [06-22-22 @ 01:20]  PTT: 36.4       [06-22-22 @ 01:20]      Creatinine Trend:  SCr 6.52 [06-23 @ 07:17]  SCr 5.48 [06-22 @ 23:02]  SCr 10.14 [06-22 @ 01:21]  SCr 8.58 [06-21 @ 14:59]  SCr 18.36 [06-21 @ 00:38]        Iron 42, TIBC 149, %sat 28      [06-22-22 @ 23:02]  Ferritin 968      [06-22-22 @ 23:02]    HBsAb 590.4      [06-21-22 @ 00:39]  HBsAg Nonreact      [06-22-22 @ 14:24]

## 2022-06-23 NOTE — DISCHARGE NOTE PROVIDER - CARE PROVIDERS DIRECT ADDRESSES
,DirectAddress_Unknown ,DirectAddress_Unknown,[email or web address],DirectAddress_Unknown

## 2022-06-23 NOTE — DISCHARGE NOTE PROVIDER - CARE PROVIDER_API CALL
Quique Ho)  Critical Care Medicine  [address] [place] [address]  Phone: [phone]  Fax: [phone])-  Follow Up Time: 1-3 days   Quique Ho)  Critical Care Medicine  [address] [place] [address]  Phone: [phone]  Fax: [phone])-  Follow Up Time: 1-3 days    Ugo Horta)  Surgery; Thoracic Surgery  [address] [place] [address]  Phone: [phone]  Fax: [phone]  Follow Up Time:     Chad Mcdaniels)  Cardiovascular Disease; Interventional Cardiology; Nuclear Cardiology  [address] [place] [address], [address]  [place] [address]  Phone: [phone]  Fax: [phone]  Follow Up Time:

## 2022-06-23 NOTE — DISCHARGE NOTE PROVIDER - NSDCMRMEDTOKEN_GEN_ALL_CORE_FT
aspirin 81 mg oral tablet: 1 tab(s) orally once a day  gabapentin 100 mg oral capsule: 1 cap(s) orally 3 times a day  Januvia 25 mg oral tablet: 1 tab(s) orally once a day   lisinopril 2.5 mg oral tablet: 1 tab(s) orally once a day  metoprolol tartrate 25 mg oral tablet: 1 tab(s) orally 2 times a day  Plavix 75 mg oral tablet: 1 tab(s) orally once a day   amLODIPine 5 mg oral tablet: 1 tab(s) orally once a day  aspirin 81 mg oral delayed release tablet: 1 tab(s) orally once a day   folic acid 1 mg oral tablet: 1 tab(s) orally once a day  gabapentin 100 mg oral capsule: 1 cap(s) orally 3 times a day  Januvia 25 mg oral tablet: 1 tab(s) orally once a day   lisinopril 2.5 mg oral tablet: 1 tab(s) orally once a day  metoprolol tartrate 25 mg oral tablet: 1 tab(s) orally 2 times a day  Plavix 75 mg oral tablet: 1 tab(s) orally once a day   amLODIPine 5 mg oral tablet: 1 tab(s) orally once a day  aspirin 81 mg oral delayed release tablet: 1 tab(s) orally once a day   atorvastatin 80 mg oral tablet: 1 tab(s) orally once a day (at bedtime)  calcium acetate 667 mg oral tablet: 1 tab(s) orally 3 times a day   folic acid 1 mg oral tablet: 1 tab(s) orally once a day  gabapentin 100 mg oral capsule: 1 cap(s) orally 3 times a day  Januvia 25 mg oral tablet: 1 tab(s) orally once a day   lisinopril 2.5 mg oral tablet: 1 tab(s) orally once a day  metoprolol tartrate 25 mg oral tablet: 1 tab(s) orally 2 times a day   amLODIPine 5 mg oral tablet: 1 tab(s) orally once a day  aspirin 81 mg oral delayed release tablet: 1 tab(s) orally once a day   atorvastatin 80 mg oral tablet: 1 tab(s) orally once a day (at bedtime)  calcium acetate 667 mg oral tablet: 1 tab(s) orally 3 times a day   clopidogrel 75 mg oral tablet: 1 tab(s) orally once a day  folic acid 1 mg oral tablet: 1 tab(s) orally once a day  gabapentin 100 mg oral capsule: 1 cap(s) orally 3 times a day  Januvia 25 mg oral tablet: 1 tab(s) orally once a day   lisinopril 2.5 mg oral tablet: 1 tab(s) orally once a day  metoprolol tartrate 25 mg oral tablet: 1 tab(s) orally 2 times a day

## 2022-06-23 NOTE — PROGRESS NOTE ADULT - PROBLEM SELECTOR PLAN 1
- potassium 6.7 with ekg changes, s/p lokelma, calcium gluconate, insulin, dextrose  - corrected after urgent HD  - monitor BMPs

## 2022-06-23 NOTE — PROGRESS NOTE ADULT - PROBLEM SELECTOR PLAN 8
DVT ppx: heparin subq  Diet: consistent carb  Dispo: pending w/u DVT ppx: heparin subq  Diet: consistent carb  Dispo: pending w/u    Prefers to update family

## 2022-06-23 NOTE — PROGRESS NOTE ADULT - PROBLEM SELECTOR PLAN 3
- SBP: 210s at admission and pre-HD  - home meds restarted, metoprolol tatrate 25mg BID and lisinopril 2.5mg QD  - hydral 5mg q6 PRN for SBP >180 - SBP: 210s at admission and pre-HD  - home meds restarted, metoprolol tatrate 25mg BID and lisinopril 2.5mg QD  - start amlodipine 5mg QD

## 2022-06-23 NOTE — PROGRESS NOTE ADULT - SUBJECTIVE AND OBJECTIVE BOX
Berna LindseyDior | PGY-1  Internal Medicine    OVERNIGHT EVENTS: no overnight events      SUBJECTIVE: Patient was examined at bedside this morning. Appeared comfortable. Overnight vitals and monitoring results were reviewed.        MEDICATIONS  (STANDING):  aspirin  chewable 81 milliGRAM(s) Oral daily  chlorhexidine 2% Cloths 1 Application(s) Topical <User Schedule>  chlorhexidine 4% Liquid 1 Application(s) Topical <User Schedule>  clopidogrel Tablet 75 milliGRAM(s) Oral daily  dextrose 5%. 1000 milliLiter(s) (50 mL/Hr) IV Continuous <Continuous>  dextrose 5%. 1000 milliLiter(s) (100 mL/Hr) IV Continuous <Continuous>  gabapentin 100 milliGRAM(s) Oral every 8 hours  glucagon  Injectable 1 milliGRAM(s) IntraMuscular once  heparin   Injectable 5000 Unit(s) SubCutaneous every 12 hours  insulin lispro (ADMELOG) corrective regimen sliding scale   SubCutaneous three times a day before meals  insulin lispro (ADMELOG) corrective regimen sliding scale   SubCutaneous at bedtime  lisinopril 2.5 milliGRAM(s) Oral daily  loratadine 10 milliGRAM(s) Oral daily  metoprolol tartrate 25 milliGRAM(s) Oral two times a day    MEDICATIONS  (PRN):  benzonatate 100 milliGRAM(s) Oral every 8 hours PRN Cough  hydrALAZINE Injectable 5 milliGRAM(s) IV Push every 6 hours PRN SBP >180        T(F): 98.8 (06-23-22 @ 05:15), Max: 98.8 (06-23-22 @ 05:15)  HR: 60 (06-23-22 @ 05:15) (51 - 75)  BP: 126/65 (06-23-22 @ 05:15) (81/47 - 193/93)  BP(mean): 118 (06-22-22 @ 18:35) (60 - 134)  RR: 18 (06-23-22 @ 05:15) (10 - 22)  SpO2: 99% (06-23-22 @ 05:15) (95% - 100%)    PHYSICAL EXAM:     GENERAL: NAD, lying in bed comfortably  HEAD:  Atraumatic, Normocephalic  EYES: EOMI, PERRLA, conjunctiva and sclera clear, no nystagmus noted  CHEST/LUNG: Clear to auscultation bilaterally; No rales, rhonchi, wheezing, or rubs. Unlabored respirations  HEART: Regular rate and rhythm; No murmurs, rubs, or gallops, normal S1/S2  ABDOMEN: normal bowel sounds; Soft, nontender, nondistended, no organomegaly   EXTREMITIES:  2+ Peripheral Pulses, brisk capillary refill. No clubbing, cyanosis, or edema  MSK: No gross deformities noted   Neurological:  A&Ox3, no focal deficits   PSYCH: Normal mood, affect     TELEMETRY:    LABS:                        7.5    4.22  )-----------( 96       ( 22 Jun 2022 01:28 )             23.2     06-22    134<L>  |  95<L>  |  40<H>  ----------------------------<  249<H>  3.6   |  26  |  5.48<H>    Ca    8.0<L>      22 Jun 2022 23:02  Phos  3.6     06-22  Mg     1.9     06-22    TPro  7.0  /  Alb  3.5  /  TBili  0.4  /  DBili  x   /  AST  49<H>  /  ALT  33  /  AlkPhos  130<H>  06-22        PT/INR - ( 22 Jun 2022 01:20 )   PT: 20.1 sec;   INR: 1.72 ratio         PTT - ( 22 Jun 2022 01:20 )  PTT:36.4 sec    Creatinine Trend: 5.48<--, 10.14<--, 8.58<--, 18.36<--  I&O's Summary    22 Jun 2022 07:01  -  23 Jun 2022 07:00  --------------------------------------------------------  IN: 440 mL / OUT: 1200 mL / NET: -760 mL      BNP    RADIOLOGY & ADDITIONAL STUDIES:

## 2022-06-23 NOTE — DISCHARGE NOTE PROVIDER - NSFOLLOWUPCLINICS_GEN_ALL_ED_FT
[facility]  Nephrology  [address] [place] [address], [address] [place] [address]  Phone: [phone]  Fax:

## 2022-06-23 NOTE — PROGRESS NOTE ADULT - PROBLEM SELECTOR PLAN 1
Pt. with ESRD on HD three times a week (MWF), who traveled to [facility] for family issues, now back after 4 months and not accepted at outpatient dialysis center. Found with severe hyperkalemia and acidosis in the setting of missed dialysis. Pt underwent urgent HD in CICU on 6/21/22, followed by another session of HD yesterday. Pt clinically stable. AVF functioning well. Labs reviewed. Plan for next maintenance HD tomorrow. Monitor labs. Adjust medications to HD

## 2022-06-24 ENCOUNTER — TRANSCRIPTION ENCOUNTER (OUTPATIENT)
Age: 62
End: 2022-06-24

## 2022-06-24 DIAGNOSIS — E83.51 HYPOCALCEMIA: ICD-10-CM

## 2022-06-24 LAB
ALBUMIN SERPL ELPH-MCNC: 4 G/DL — SIGNIFICANT CHANGE UP (ref 3.3–5)
ALP SERPL-CCNC: 129 U/L — HIGH (ref 40–120)
ALT FLD-CCNC: 42 U/L — SIGNIFICANT CHANGE UP (ref 10–45)
ANION GAP SERPL CALC-SCNC: 22 MMOL/L — HIGH (ref 5–17)
AST SERPL-CCNC: 36 U/L — SIGNIFICANT CHANGE UP (ref 10–40)
BILIRUB SERPL-MCNC: 0.3 MG/DL — SIGNIFICANT CHANGE UP (ref 0.2–1.2)
BLD GP AB SCN SERPL QL: NEGATIVE — SIGNIFICANT CHANGE UP
BUN SERPL-MCNC: 83 MG/DL — HIGH (ref 7–23)
CA-I BLD-SCNC: 0.79 MMOL/L — LOW (ref 1.15–1.33)
CA-I BLD-SCNC: 0.79 MMOL/L — LOW (ref 1.15–1.33)
CA-I BLD-SCNC: 1.17 MMOL/L — SIGNIFICANT CHANGE UP (ref 1.15–1.33)
CALCIUM SERPL-MCNC: 6.8 MG/DL — LOW (ref 8.4–10.5)
CHLORIDE SERPL-SCNC: 98 MMOL/L — SIGNIFICANT CHANGE UP (ref 96–108)
CK MB BLD-MCNC: 3.5 % — SIGNIFICANT CHANGE UP (ref 0–3.5)
CK MB BLD-MCNC: 3.7 % — HIGH (ref 0–3.5)
CK MB CFR SERPL CALC: 12.1 NG/ML — HIGH (ref 0–6.7)
CK MB CFR SERPL CALC: 12.7 NG/ML — HIGH (ref 0–6.7)
CK SERPL-CCNC: 331 U/L — HIGH (ref 30–200)
CK SERPL-CCNC: 364 U/L — HIGH (ref 30–200)
CO2 SERPL-SCNC: 21 MMOL/L — LOW (ref 22–31)
CREAT SERPL-MCNC: 8.72 MG/DL — HIGH (ref 0.5–1.3)
EGFR: 6 ML/MIN/1.73M2 — LOW
GLUCOSE BLDC GLUCOMTR-MCNC: 136 MG/DL — HIGH (ref 70–99)
GLUCOSE BLDC GLUCOMTR-MCNC: 193 MG/DL — HIGH (ref 70–99)
GLUCOSE BLDC GLUCOMTR-MCNC: 196 MG/DL — HIGH (ref 70–99)
GLUCOSE BLDC GLUCOMTR-MCNC: 210 MG/DL — HIGH (ref 70–99)
GLUCOSE SERPL-MCNC: 122 MG/DL — HIGH (ref 70–99)
HAV IGM SER-ACNC: SIGNIFICANT CHANGE UP
HBV CORE IGM SER-ACNC: SIGNIFICANT CHANGE UP
HBV SURFACE AG SER-ACNC: SIGNIFICANT CHANGE UP
HCT VFR BLD CALC: 25.4 % — LOW (ref 39–50)
HCV AB S/CO SERPL IA: 0.14 S/CO — SIGNIFICANT CHANGE UP (ref 0–0.99)
HCV AB SERPL-IMP: SIGNIFICANT CHANGE UP
HGB BLD-MCNC: 8.1 G/DL — LOW (ref 13–17)
MAGNESIUM SERPL-MCNC: 2 MG/DL — SIGNIFICANT CHANGE UP (ref 1.6–2.6)
MCHC RBC-ENTMCNC: 29.7 PG — SIGNIFICANT CHANGE UP (ref 27–34)
MCHC RBC-ENTMCNC: 31.9 GM/DL — LOW (ref 32–36)
MCV RBC AUTO: 93 FL — SIGNIFICANT CHANGE UP (ref 80–100)
NRBC # BLD: 0 /100 WBCS — SIGNIFICANT CHANGE UP (ref 0–0)
PHOSPHATE SERPL-MCNC: 5.6 MG/DL — HIGH (ref 2.5–4.5)
PLATELET # BLD AUTO: 157 K/UL — SIGNIFICANT CHANGE UP (ref 150–400)
POTASSIUM SERPL-MCNC: 3.6 MMOL/L — SIGNIFICANT CHANGE UP (ref 3.5–5.3)
POTASSIUM SERPL-SCNC: 3.6 MMOL/L — SIGNIFICANT CHANGE UP (ref 3.5–5.3)
PROT SERPL-MCNC: 7.3 G/DL — SIGNIFICANT CHANGE UP (ref 6–8.3)
RBC # BLD: 2.73 M/UL — LOW (ref 4.2–5.8)
RBC # FLD: 14.7 % — HIGH (ref 10.3–14.5)
RH IG SCN BLD-IMP: POSITIVE — SIGNIFICANT CHANGE UP
SARS-COV-2 RNA SPEC QL NAA+PROBE: SIGNIFICANT CHANGE UP
SODIUM SERPL-SCNC: 141 MMOL/L — SIGNIFICANT CHANGE UP (ref 135–145)
TROPONIN T, HIGH SENSITIVITY RESULT: 3758 NG/L — HIGH (ref 0–51)
TROPONIN T, HIGH SENSITIVITY RESULT: 4166 NG/L — HIGH (ref 0–51)
WBC # BLD: 6.71 K/UL — SIGNIFICANT CHANGE UP (ref 3.8–10.5)
WBC # FLD AUTO: 6.71 K/UL — SIGNIFICANT CHANGE UP (ref 3.8–10.5)

## 2022-06-24 PROCEDURE — 90935 HEMODIALYSIS ONE EVALUATION: CPT

## 2022-06-24 PROCEDURE — 93010 ELECTROCARDIOGRAM REPORT: CPT

## 2022-06-24 PROCEDURE — 99232 SBSQ HOSP IP/OBS MODERATE 35: CPT | Mod: GC

## 2022-06-24 RX ORDER — GABAPENTIN 400 MG/1
1 CAPSULE ORAL
Qty: 0 | Refills: 0 | DISCHARGE

## 2022-06-24 RX ORDER — LISINOPRIL 2.5 MG/1
1 TABLET ORAL
Qty: 14 | Refills: 0
Start: 2022-06-24 | End: 2022-07-07

## 2022-06-24 RX ORDER — ASPIRIN/CALCIUM CARB/MAGNESIUM 324 MG
1 TABLET ORAL
Qty: 0 | Refills: 0 | DISCHARGE

## 2022-06-24 RX ORDER — DOXERCALCIFEROL 2.5 UG/1
25 CAPSULE ORAL ONCE
Refills: 0 | Status: DISCONTINUED | OUTPATIENT
Start: 2022-06-24 | End: 2022-06-24

## 2022-06-24 RX ORDER — METOPROLOL TARTRATE 50 MG
1 TABLET ORAL
Qty: 60 | Refills: 0
Start: 2022-06-24 | End: 2022-07-23

## 2022-06-24 RX ORDER — DOXERCALCIFEROL 2.5 UG/1
2.5 CAPSULE ORAL ONCE
Refills: 0 | Status: COMPLETED | OUTPATIENT
Start: 2022-06-24 | End: 2022-06-28

## 2022-06-24 RX ORDER — ASPIRIN/CALCIUM CARB/MAGNESIUM 324 MG
1 TABLET ORAL
Qty: 30 | Refills: 0
Start: 2022-06-24 | End: 2022-07-23

## 2022-06-24 RX ORDER — FOLIC ACID 0.8 MG
1 TABLET ORAL
Qty: 30 | Refills: 0
Start: 2022-06-24 | End: 2022-07-23

## 2022-06-24 RX ORDER — CLOPIDOGREL BISULFATE 75 MG/1
1 TABLET, FILM COATED ORAL
Qty: 30 | Refills: 0
Start: 2022-06-24 | End: 2022-07-23

## 2022-06-24 RX ORDER — CALCIUM GLUCONATE 100 MG/ML
1 VIAL (ML) INTRAVENOUS ONCE
Refills: 0 | Status: DISCONTINUED | OUTPATIENT
Start: 2022-06-24 | End: 2022-06-24

## 2022-06-24 RX ORDER — CALCIUM GLUCONATE 100 MG/ML
2 VIAL (ML) INTRAVENOUS ONCE
Refills: 0 | Status: COMPLETED | OUTPATIENT
Start: 2022-06-24 | End: 2022-06-24

## 2022-06-24 RX ORDER — AMLODIPINE BESYLATE 2.5 MG/1
1 TABLET ORAL
Qty: 30 | Refills: 0
Start: 2022-06-24 | End: 2022-07-23

## 2022-06-24 RX ORDER — CLOPIDOGREL BISULFATE 75 MG/1
1 TABLET, FILM COATED ORAL
Qty: 0 | Refills: 0 | DISCHARGE

## 2022-06-24 RX ORDER — GABAPENTIN 400 MG/1
1 CAPSULE ORAL
Qty: 90 | Refills: 0
Start: 2022-06-24 | End: 2022-07-23

## 2022-06-24 RX ORDER — HYDRALAZINE HCL 50 MG
10 TABLET ORAL EVERY 6 HOURS
Refills: 0 | Status: DISCONTINUED | OUTPATIENT
Start: 2022-06-24 | End: 2022-06-25

## 2022-06-24 RX ORDER — SITAGLIPTIN 50 MG/1
1 TABLET, FILM COATED ORAL
Qty: 30 | Refills: 0
Start: 2022-06-24 | End: 2022-07-23

## 2022-06-24 RX ORDER — CALCIUM ACETATE 667 MG
667 TABLET ORAL
Refills: 0 | Status: DISCONTINUED | OUTPATIENT
Start: 2022-06-24 | End: 2022-06-28

## 2022-06-24 RX ADMIN — GABAPENTIN 100 MILLIGRAM(S): 400 CAPSULE ORAL at 23:42

## 2022-06-24 RX ADMIN — Medication 1 MILLIGRAM(S): at 13:51

## 2022-06-24 RX ADMIN — Medication 10 MILLIGRAM(S): at 19:31

## 2022-06-24 RX ADMIN — Medication 200 GRAM(S): at 15:28

## 2022-06-24 RX ADMIN — Medication 2: at 17:38

## 2022-06-24 RX ADMIN — Medication 2: at 13:50

## 2022-06-24 RX ADMIN — GABAPENTIN 100 MILLIGRAM(S): 400 CAPSULE ORAL at 13:52

## 2022-06-24 RX ADMIN — HEPARIN SODIUM 5000 UNIT(S): 5000 INJECTION INTRAVENOUS; SUBCUTANEOUS at 05:18

## 2022-06-24 RX ADMIN — Medication 81 MILLIGRAM(S): at 13:50

## 2022-06-24 RX ADMIN — GABAPENTIN 100 MILLIGRAM(S): 400 CAPSULE ORAL at 05:18

## 2022-06-24 RX ADMIN — Medication 25 MILLIGRAM(S): at 15:38

## 2022-06-24 RX ADMIN — CLOPIDOGREL BISULFATE 75 MILLIGRAM(S): 75 TABLET, FILM COATED ORAL at 13:52

## 2022-06-24 RX ADMIN — Medication 4: at 07:36

## 2022-06-24 RX ADMIN — CHLORHEXIDINE GLUCONATE 1 APPLICATION(S): 213 SOLUTION TOPICAL at 05:17

## 2022-06-24 RX ADMIN — CHLORHEXIDINE GLUCONATE 1 APPLICATION(S): 213 SOLUTION TOPICAL at 05:19

## 2022-06-24 RX ADMIN — HEPARIN SODIUM 5000 UNIT(S): 5000 INJECTION INTRAVENOUS; SUBCUTANEOUS at 17:39

## 2022-06-24 RX ADMIN — AMLODIPINE BESYLATE 5 MILLIGRAM(S): 2.5 TABLET ORAL at 15:38

## 2022-06-24 RX ADMIN — Medication 25 MILLIGRAM(S): at 23:42

## 2022-06-24 RX ADMIN — Medication 667 MILLIGRAM(S): at 17:44

## 2022-06-24 RX ADMIN — LORATADINE 10 MILLIGRAM(S): 10 TABLET ORAL at 13:51

## 2022-06-24 NOTE — PROGRESS NOTE ADULT - PROBLEM SELECTOR PLAN 7
On Januvia at home  - ISS, monitor BG  - continue home gabapentin S/p stent on ASA and plavix at home  - continue home meds

## 2022-06-24 NOTE — PROGRESS NOTE ADULT - PROBLEM SELECTOR PLAN 8
DVT ppx: heparin subq  Diet: consistent carb  Dispo: pending setup of outpt HD    Prefers to update family On Januvia at home  - ISS, monitor BG  - continue home gabapentin

## 2022-06-24 NOTE — DISCHARGE NOTE NURSING/CASE MANAGEMENT/SOCIAL WORK - PATIENT PORTAL LINK FT
You can access the FollowMyHealth Patient Portal offered by [facility] by registering at the following website: [email or web address]. By joining [facility]’s FollowMyHealth portal, you will also be able to view your health information using other applications (apps) compatible with our system.

## 2022-06-24 NOTE — DISCHARGE NOTE NURSING/CASE MANAGEMENT/SOCIAL WORK - NSDCPEFALRISK_GEN_ALL_CORE
For information on Fall & Injury Prevention, visit: [email or web address] OR  [email or web address] OR  https://www.cdc.gov/steadi/patient.html

## 2022-06-24 NOTE — PROVIDER CONTACT NOTE (OTHER) - SITUATION
Patient on HD, BP keeps going up, see flow sheet
patient BP of 194/75, appears angry and agitated - upset about not going home and being transferred to [address] [place] [address]

## 2022-06-24 NOTE — PROGRESS NOTE ADULT - SUBJECTIVE AND OBJECTIVE BOX
Berna AmberlyTawannaDior | PGY-1  Internal Medicine    OVERNIGHT EVENTS: no overnight events      SUBJECTIVE: Patient was examined at bedside this morning. Appeared comfortable. Overnight vitals and monitoring results were reviewed.       MEDICATIONS  (STANDING):  amLODIPine   Tablet 5 milliGRAM(s) Oral daily  aspirin  chewable 81 milliGRAM(s) Oral daily  chlorhexidine 2% Cloths 1 Application(s) Topical <User Schedule>  chlorhexidine 4% Liquid 1 Application(s) Topical <User Schedule>  clopidogrel Tablet 75 milliGRAM(s) Oral daily  dextrose 5%. 1000 milliLiter(s) (50 mL/Hr) IV Continuous <Continuous>  dextrose 5%. 1000 milliLiter(s) (100 mL/Hr) IV Continuous <Continuous>  folic acid 1 milliGRAM(s) Oral daily  gabapentin 100 milliGRAM(s) Oral every 8 hours  glucagon  Injectable 1 milliGRAM(s) IntraMuscular once  heparin   Injectable 5000 Unit(s) SubCutaneous every 12 hours  insulin lispro (ADMELOG) corrective regimen sliding scale   SubCutaneous three times a day before meals  insulin lispro (ADMELOG) corrective regimen sliding scale   SubCutaneous at bedtime  lisinopril 2.5 milliGRAM(s) Oral daily  loratadine 10 milliGRAM(s) Oral daily  metoprolol tartrate 25 milliGRAM(s) Oral two times a day    MEDICATIONS  (PRN):  benzonatate 100 milliGRAM(s) Oral every 8 hours PRN Cough        T(F): 98.6 (06-24-22 @ 05:11), Max: 98.6 (06-24-22 @ 05:11)  HR: 58 (06-24-22 @ 05:11) (51 - 65)  BP: 123/61 (06-24-22 @ 05:11) (119/59 - 142/71)  BP(mean): --  RR: 16 (06-24-22 @ 05:11) (16 - 18)  SpO2: 97% (06-24-22 @ 05:11) (97% - 100%)    PHYSICAL EXAM:     GENERAL: NAD, lying in bed comfortably  HEAD:  Atraumatic, Normocephalic  EYES: EOMI, PERRLA, conjunctiva and sclera clear, no nystagmus noted  CHEST/LUNG: Clear to auscultation bilaterally; No rales, rhonchi, wheezing, or rubs. Unlabored respirations  HEART: Regular rate and rhythm; No murmurs, rubs, or gallops, normal S1/S2  ABDOMEN: normal bowel sounds; Soft, nontender, nondistended, no organomegaly   EXTREMITIES:  2+ Peripheral Pulses, brisk capillary refill. No clubbing, cyanosis, or edema. L AVF  MSK: No gross deformities noted   Neurological:  A&Ox3, no focal deficits   PSYCH: Normal mood, affect     TELEMETRY:    LABS:                        8.8    5.34  )-----------( 136      ( 23 Jun 2022 07:17 )             27.5     06-23    134<L>  |  96  |  52<H>  ----------------------------<  117<H>  4.1   |  23  |  6.52<H>    Ca    7.5<L>      23 Jun 2022 07:17  Phos  4.6     06-23  Mg     2.0     06-23    TPro  7.4  /  Alb  3.7  /  TBili  0.4  /  DBili  x   /  AST  43<H>  /  ALT  33  /  AlkPhos  137<H>  06-23            Creatinine Trend: 6.52<--, 5.48<--, 10.14<--, 8.58<--, 18.36<--  I&O's Summary    BNP    RADIOLOGY & ADDITIONAL STUDIES:

## 2022-06-24 NOTE — DISCHARGE NOTE NURSING/CASE MANAGEMENT/SOCIAL WORK - NSDCFUADDAPPT_GEN_ALL_CORE_FT
Please follow-up with doctor Georgia [phone] and follow-up with your kidney doctors.     Outpatient dialysis at [facility] starts on Tuesday 6/28/22 at 7:00AM chair time.   Dialysis schedule: Tuesday, Thursday, Saturday 7:00AM.

## 2022-06-24 NOTE — PROGRESS NOTE ADULT - ASSESSMENT
Pt. with ESRD on HD, admitted with HTN crises, hyperkalemia and acidosis in the setting of missed HD.     ESRD on HD: Patient seen and examined on dialysis. Tolerating HD well. AVF functioning well and BP stable during HD. Plan for another short session of HD tomorrow to optimize for discharge as patient will start HD TTS as outpatient. Monitor BMP.    Hypocalcemia: in the setting of ESRD, hyperphosphatemia. Will use high Ca bath during HD today and give hectorol with HD. Start Ca acetate 1 tab TID with meals. Recommend to give IV Calcium gluconate to maintain ionized calcium level >1.1. Check iPTH, 25-OH Vit D levels. Monitor Ca, phos.     Hyperkalemia: resolved after urgent HD on admission. Continue to give low K diet. Monitor K.     Acidosis: resolved. monitor serum CO2 level.

## 2022-06-24 NOTE — PROGRESS NOTE ADULT - PROBLEM SELECTOR PLAN 5
- hgb 8.7 on admission   - iron studies: iron 58/ TIBC 204/ ferritin 968  - folate 2.8 and B12 854, start folate supplementation   - monitor CBC, transfuse for less than hgb 7   - maintain active type and screen - s/p emergent dialysis 6/21  - UO: no urine output at baseline   - nephro following, recs appreciated  - HD schedule MWF, HD tomorrow

## 2022-06-24 NOTE — PROVIDER CONTACT NOTE (OTHER) - REASON
patient BP of 194/75, appears angry and agitated - upset about not going home and being transferred to [address] [place] [address]
elevated bp

## 2022-06-24 NOTE — PROGRESS NOTE ADULT - ATTENDING COMMENTS
seen this am. 61M Hx ESRD on HD via LUE AVF (doesn't make urine), CAD s/p stent (plavix/asa), T2DM, HTN, HLD, anemia, presenting with weakness after skipping multiple dialysis sessions admitted for hyperkalemia with EKG changes, severe acidosis requiring emergent dialysis. Pt feels back to baseline today. No cp/sob. no f/c/r. no n/v/d/abd pain. PE: nad; a+ox3; euvolemic; abd benign; non-focal; +fistula LUE    Hypocalcemia- replete and monitor. HD bath adjusted. If ionized Ca remains low, will check EKG to monitor Qtc.    ESRD- HD per Renal. will need to be set up for outpt HD    Folate deficiency- cont Folate supplementation. outpt w/u    AST elevation- monitor. no ETOH.

## 2022-06-24 NOTE — PROVIDER CONTACT NOTE (CRITICAL VALUE NOTIFICATION) - ACTION/TREATMENT ORDERED:
Cardiology coming to see patient for treatment plan
Provider notified and aware. Will continue to monitor patient.

## 2022-06-24 NOTE — PROVIDER CONTACT NOTE (OTHER) - ASSESSMENT
Pt A&O X4, appears to be angry and agitated r/t transfer. Denies chest pain, headache, blurry vision, or any pain
sleeping, no complaints

## 2022-06-24 NOTE — PROGRESS NOTE ADULT - SUBJECTIVE AND OBJECTIVE BOX
[facility] DIVISION OF KIDNEY DISEASES AND HYPERTENSION -- HEMODIALYSIS NOTE  --------------------------------------------------------------------------------  Chief Complaint: ESRD/Ongoing hemodialysis requirement    24 hour events/subjective: seen on maintenance HD        PAST HISTORY  --------------------------------------------------------------------------------  No significant changes to PMH, PSH, FHx, SHx, unless otherwise noted    ALLERGIES & MEDICATIONS  --------------------------------------------------------------------------------  Allergies    No Known Allergies    Intolerances      Standing Inpatient Medications  amLODIPine   Tablet 5 milliGRAM(s) Oral daily  aspirin  chewable 81 milliGRAM(s) Oral daily  chlorhexidine 2% Cloths 1 Application(s) Topical <User Schedule>  chlorhexidine 4% Liquid 1 Application(s) Topical <User Schedule>  clopidogrel Tablet 75 milliGRAM(s) Oral daily  dextrose 5%. 1000 milliLiter(s) IV Continuous <Continuous>  dextrose 5%. 1000 milliLiter(s) IV Continuous <Continuous>  doxercalciferol Injectable 25 MICROGram(s) IV Push once  folic acid 1 milliGRAM(s) Oral daily  gabapentin 100 milliGRAM(s) Oral every 8 hours  glucagon  Injectable 1 milliGRAM(s) IntraMuscular once  heparin   Injectable 5000 Unit(s) SubCutaneous every 12 hours  insulin lispro (ADMELOG) corrective regimen sliding scale   SubCutaneous three times a day before meals  insulin lispro (ADMELOG) corrective regimen sliding scale   SubCutaneous at bedtime  lisinopril 2.5 milliGRAM(s) Oral daily  loratadine 10 milliGRAM(s) Oral daily  metoprolol tartrate 25 milliGRAM(s) Oral two times a day    PRN Inpatient Medications  benzonatate 100 milliGRAM(s) Oral every 8 hours PRN      REVIEW OF SYSTEMS  --------------------------------------------------------------------------------  Constitutional: [ ] Fever [ ] Chills [ ] Fatigue [ ] Weight change   HEENT: [ ] Blurred vision [ ] Eye Pain [ ] Headache [ ] Runny nose [ ] Sore Throat   Respiratory: [ ] Cough [ ] Wheezing [ ] Shortness of breath  Cardiovascular: [ ] Chest Pain [ ] Palpitations [ ] DIXON [ ] PND [ ] Orthopnea  Gastrointestinal: [ ] Abdominal Pain [ ] Diarrhea [ ] Constipation [ ] Hemorrhoids [ ] Nausea [ ] Vomiting  Genitourinary: [ ] Nocturia [ ] Dysuria [ ] Incontinence  Extremities: [ ] Swelling [ ] Joint Pain  Neurologic: [ ] Focal deficit [ ] Paresthesias [ ] Syncope  Lymphatic: [ ] Swelling [ ] Lymphadenopathy   Skin: [ ] Rash [ ] Ecchymoses [ ] Wounds [ ] Lesions  Psychiatry: [ ] Depression [ ] Suicidal/Homicidal Ideation [ ] Anxiety [ ] Sleep Disturbances  [x ] 10 point review of systems is otherwise negative except as mentioned above              [ ]Unable to obtain due to   All other systems were reviewed and are negative, except as noted.      VITALS/PHYSICAL EXAM  --------------------------------------------------------------------------------  T(C): 36.3 (06-24-22 @ 09:15), Max: 37 (06-24-22 @ 05:11)  HR: 63 (06-24-22 @ 09:15) (51 - 65)  BP: 139/67 (06-24-22 @ 09:15) (119/59 - 142/71)  RR: 18 (06-24-22 @ 09:15) (16 - 18)  SpO2: 99% (06-24-22 @ 09:15) (97% - 100%)  Wt(kg): --        06-24-22 @ 07:01  -  06-24-22 @ 11:12  --------------------------------------------------------  IN: 610 mL / OUT: 0 mL / NET: 610 mL      Physical Exam:  	Gen: NAD, well-appearing  	HEENT: on room air  	Pulm: CTA B/L  	CV: normal S1S2; no rub  	Abd: soft                      Back : No sacral edema  	: No kwok  	LE: no edema  	Skin: Warm, without rashes  	Vascular access: LUE AVF connected to the HD machine    LABS/STUDIES  --------------------------------------------------------------------------------              8.1    6.71  >-----------<  157      [06-24-22 @ 10:10]              25.4     141  |  98  |  83  ----------------------------<  122      [06-24-22 @ 10:10]  3.6   |  21  |  8.72        Ca     6.8     [06-24-22 @ 10:10]      iCa    0.79     [06-24 @ 10:18]      Mg     2.0     [06-24-22 @ 10:10]      Phos  5.6     [06-24-22 @ 10:10]    TPro  7.3  /  Alb  4.0  /  TBili  0.3  /  DBili  x   /  AST  36  /  ALT  42  /  AlkPhos  129  [06-24-22 @ 10:10]          Iron 42, TIBC 149, %sat 28      [06-22-22 @ 23:02]  Ferritin 968      [06-22-22 @ 23:02]    HBsAb 590.4      [06-21-22 @ 00:39]  HBsAg Nonreact      [06-22-22 @ 14:24]

## 2022-06-24 NOTE — PROGRESS NOTE ADULT - PROBLEM SELECTOR PLAN 1
- potassium 6.7 with ekg changes, s/p lokelma, calcium gluconate, insulin, dextrose  - corrected after urgent HD  - monitor BMPs - ionized Ca 0.96->0.75  - s/p Ca with HD today   - give 1gm calcium gluconate  - repeat ical in AM

## 2022-06-24 NOTE — CHART NOTE - NSCHARTNOTEFT_GEN_A_CORE
Was called regarding patient's elevated troponin. Went to patient's beside where patient denied any chest pain, SOB, palpitations, NV, diaphoresis. Vital signs stable. EKG showed sinus rhythm w/ notable STD in V5-V6 and I and avL, similar to prior EKG although more noticeable STD in aVL. Recommend echo for patient. Although troponin elevated, could be demand in setting of electrolyte imbalance/HTN emergency. Patient follows with Dr. Batista who should follow patient inpatient.

## 2022-06-24 NOTE — PROGRESS NOTE ADULT - PROBLEM SELECTOR PLAN 4
- s/p emergent dialysis 6/21  - UO: no urine output at baseline   - nephro following, recs appreciated  - HD schedule MWF, HD tomorrow - SBP: 210s at admission and pre-HD  - home meds restarted, metoprolol tatrate 25mg BID and lisinopril 2.5mg QD  - start amlodipine 5mg QD

## 2022-06-24 NOTE — PROGRESS NOTE ADULT - PROBLEM SELECTOR PLAN 3
- SBP: 210s at admission and pre-HD  - home meds restarted, metoprolol tatrate 25mg BID and lisinopril 2.5mg QD  - start amlodipine 5mg QD - VBG pH 7.05 on admission  - s/p bicarb gtt  - resolved with urgent HD

## 2022-06-24 NOTE — PROGRESS NOTE ADULT - PROBLEM SELECTOR PLAN 6
S/p stent on ASA and plavix at home  - continue home meds - hgb 8.7 on admission   - iron studies: iron 58/ TIBC 204/ ferritin 968  - folate 2.8 and B12 854, start folate supplementation   - monitor CBC, transfuse for less than hgb 7   - maintain active type and screen

## 2022-06-25 DIAGNOSIS — R94.31 ABNORMAL ELECTROCARDIOGRAM [ECG] [EKG]: ICD-10-CM

## 2022-06-25 LAB
24R-OH-CALCIDIOL SERPL-MCNC: 15.3 NG/ML — LOW (ref 30–80)
ALBUMIN SERPL ELPH-MCNC: 3.7 G/DL — SIGNIFICANT CHANGE UP (ref 3.3–5)
ALP SERPL-CCNC: 155 U/L — HIGH (ref 40–120)
ALT FLD-CCNC: 119 U/L — HIGH (ref 10–45)
ANION GAP SERPL CALC-SCNC: 15 MMOL/L — SIGNIFICANT CHANGE UP (ref 5–17)
AST SERPL-CCNC: 120 U/L — HIGH (ref 10–40)
BILIRUB SERPL-MCNC: 0.3 MG/DL — SIGNIFICANT CHANGE UP (ref 0.2–1.2)
BUN SERPL-MCNC: 53 MG/DL — HIGH (ref 7–23)
CA-I BLD-SCNC: 0.88 MMOL/L — LOW (ref 1.15–1.33)
CA-I BLD-SCNC: 0.89 MMOL/L — LOW (ref 1.15–1.33)
CALCIUM SERPL-MCNC: 18.8 MG/DL — CRITICAL HIGH (ref 8.4–10.5)
CALCIUM SERPL-MCNC: 7.3 MG/DL — LOW (ref 8.4–10.5)
CHLORIDE SERPL-SCNC: 98 MMOL/L — SIGNIFICANT CHANGE UP (ref 96–108)
CK MB BLD-MCNC: 3.8 % — HIGH (ref 0–3.5)
CK MB BLD-MCNC: 4.1 % — HIGH (ref 0–3.5)
CK MB CFR SERPL CALC: 9 NG/ML — HIGH (ref 0–6.7)
CK MB CFR SERPL CALC: 9.9 NG/ML — HIGH (ref 0–6.7)
CK SERPL-CCNC: 234 U/L — HIGH (ref 30–200)
CK SERPL-CCNC: 242 U/L — HIGH (ref 30–200)
CO2 SERPL-SCNC: 26 MMOL/L — SIGNIFICANT CHANGE UP (ref 22–31)
CREAT SERPL-MCNC: 5.49 MG/DL — HIGH (ref 0.5–1.3)
EGFR: 11 ML/MIN/1.73M2 — LOW
GLUCOSE BLDC GLUCOMTR-MCNC: 119 MG/DL — HIGH (ref 70–99)
GLUCOSE BLDC GLUCOMTR-MCNC: 128 MG/DL — HIGH (ref 70–99)
GLUCOSE BLDC GLUCOMTR-MCNC: 178 MG/DL — HIGH (ref 70–99)
GLUCOSE BLDC GLUCOMTR-MCNC: 225 MG/DL — HIGH (ref 70–99)
GLUCOSE SERPL-MCNC: 173 MG/DL — HIGH (ref 70–99)
HCT VFR BLD CALC: 27.8 % — LOW (ref 39–50)
HGB BLD-MCNC: 8.7 G/DL — LOW (ref 13–17)
MAGNESIUM SERPL-MCNC: 1.7 MG/DL — SIGNIFICANT CHANGE UP (ref 1.6–2.6)
MCHC RBC-ENTMCNC: 29.3 PG — SIGNIFICANT CHANGE UP (ref 27–34)
MCHC RBC-ENTMCNC: 31.3 GM/DL — LOW (ref 32–36)
MCV RBC AUTO: 93.6 FL — SIGNIFICANT CHANGE UP (ref 80–100)
NRBC # BLD: 0 /100 WBCS — SIGNIFICANT CHANGE UP (ref 0–0)
PHOSPHATE SERPL-MCNC: 2.6 MG/DL — SIGNIFICANT CHANGE UP (ref 2.5–4.5)
PLATELET # BLD AUTO: 168 K/UL — SIGNIFICANT CHANGE UP (ref 150–400)
POTASSIUM SERPL-MCNC: 3.8 MMOL/L — SIGNIFICANT CHANGE UP (ref 3.5–5.3)
POTASSIUM SERPL-SCNC: 3.8 MMOL/L — SIGNIFICANT CHANGE UP (ref 3.5–5.3)
PROT SERPL-MCNC: 7.3 G/DL — SIGNIFICANT CHANGE UP (ref 6–8.3)
PTH-INTACT FLD-MCNC: 292 PG/ML — HIGH (ref 15–65)
RBC # BLD: 2.97 M/UL — LOW (ref 4.2–5.8)
RBC # FLD: 14.5 % — SIGNIFICANT CHANGE UP (ref 10.3–14.5)
SODIUM SERPL-SCNC: 139 MMOL/L — SIGNIFICANT CHANGE UP (ref 135–145)
TROPONIN T, HIGH SENSITIVITY RESULT: 4821 NG/L — HIGH (ref 0–51)
TROPONIN T, HIGH SENSITIVITY RESULT: 5681 NG/L — HIGH (ref 0–51)
TROPONIN T, HIGH SENSITIVITY RESULT: 6292 NG/L — HIGH (ref 0–51)
TROPONIN T, HIGH SENSITIVITY RESULT: 6318 NG/L — HIGH (ref 0–51)
VIT D25+D1,25 OH+D1,25 PNL SERPL-MCNC: <5 PG/ML — LOW (ref 19.9–79.3)
WBC # BLD: 5.35 K/UL — SIGNIFICANT CHANGE UP (ref 3.8–10.5)
WBC # FLD AUTO: 5.35 K/UL — SIGNIFICANT CHANGE UP (ref 3.8–10.5)

## 2022-06-25 PROCEDURE — 99233 SBSQ HOSP IP/OBS HIGH 50: CPT

## 2022-06-25 PROCEDURE — 99233 SBSQ HOSP IP/OBS HIGH 50: CPT | Mod: GC

## 2022-06-25 RX ORDER — CALCIUM GLUCONATE 100 MG/ML
1 VIAL (ML) INTRAVENOUS ONCE
Refills: 0 | Status: COMPLETED | OUTPATIENT
Start: 2022-06-25 | End: 2022-06-25

## 2022-06-25 RX ORDER — DOXERCALCIFEROL 2.5 UG/1
2.5 CAPSULE ORAL ONCE
Refills: 0 | Status: COMPLETED | OUTPATIENT
Start: 2022-06-25 | End: 2022-06-25

## 2022-06-25 RX ADMIN — Medication 667 MILLIGRAM(S): at 08:28

## 2022-06-25 RX ADMIN — HEPARIN SODIUM 5000 UNIT(S): 5000 INJECTION INTRAVENOUS; SUBCUTANEOUS at 05:35

## 2022-06-25 RX ADMIN — DOXERCALCIFEROL 2.5 MICROGRAM(S): 2.5 CAPSULE ORAL at 15:42

## 2022-06-25 RX ADMIN — CHLORHEXIDINE GLUCONATE 1 APPLICATION(S): 213 SOLUTION TOPICAL at 07:35

## 2022-06-25 RX ADMIN — Medication 25 MILLIGRAM(S): at 05:35

## 2022-06-25 RX ADMIN — HEPARIN SODIUM 5000 UNIT(S): 5000 INJECTION INTRAVENOUS; SUBCUTANEOUS at 16:39

## 2022-06-25 RX ADMIN — Medication 81 MILLIGRAM(S): at 12:16

## 2022-06-25 RX ADMIN — Medication 667 MILLIGRAM(S): at 16:39

## 2022-06-25 RX ADMIN — Medication 1 MILLIGRAM(S): at 12:16

## 2022-06-25 RX ADMIN — CHLORHEXIDINE GLUCONATE 1 APPLICATION(S): 213 SOLUTION TOPICAL at 07:28

## 2022-06-25 RX ADMIN — Medication 667 MILLIGRAM(S): at 12:16

## 2022-06-25 RX ADMIN — Medication 2: at 16:39

## 2022-06-25 RX ADMIN — Medication 100 GRAM(S): at 08:28

## 2022-06-25 RX ADMIN — Medication 25 MILLIGRAM(S): at 16:39

## 2022-06-25 RX ADMIN — LORATADINE 10 MILLIGRAM(S): 10 TABLET ORAL at 12:16

## 2022-06-25 RX ADMIN — CLOPIDOGREL BISULFATE 75 MILLIGRAM(S): 75 TABLET, FILM COATED ORAL at 12:16

## 2022-06-25 NOTE — PROGRESS NOTE ADULT - ATTENDING COMMENTS
61M Hx ESRD on HD via LUE AVF (doesn't make urine), CAD s/p stent (plavix/asa), T2DM, HTN, HLD, anemia, presenting with weakness after skipping multiple dialysis sessions admitted for hyperkalemia with EKG changes, severe acidosis requiring emergent dialysis. 6/24 EKG checked for hypocalcemia and ST depressions were noted. Cardiology called and CE checked revealing elevated trops but downtrending CPK. Pt feels back to baseline and wants to go home. No cp/sob. no f/c/r. no n/v/d/abd pain. PE: nad; a+ox3; euvolemic; abd benign; non-focal; +fistula LUE    Elevated troponins- unclear cause of cardiac injury. Apprec Cards input. Pt's Cardiologist, Dr. Batista called for consult. monitor CE. tele. check 2d-echo.    Hypocalcemia- replete and monitor. HD bath adjusted. If ionized Ca remains low.    ESRD- HD per Renal. will need to be set up for outpt HD    Folate deficiency- cont Folate supplementation. outpt w/u    AST elevation- monitor. no ETOH. 61M Hx ESRD on HD via LUE AVF (doesn't make urine), CAD s/p stent (plavix/asa), T2DM, HTN, HLD, anemia, presenting with weakness after skipping multiple dialysis sessions admitted for hyperkalemia with EKG changes, severe acidosis requiring emergent dialysis. 6/24 EKG checked for hypocalcemia and ST depressions were noted. Cardiology called and CE checked revealing elevated trops but downtrending CPK. Pt feels back to baseline and wants to go home. No cp/sob. no f/c/r. no n/v/d/abd pain. PE: nad; a+ox3; euvolemic; abd benign; non-focal; +fistula LUE    Elevated troponins- unclear cause of cardiac injury. Apprec Cards input. Pt's Cardiologist, Dr. Batista called for consult. monitor CE. tele. check 2d-echo.    Hypocalcemia- replete and monitor. HD bath adjusted. If ionized Ca remains low.    ESRD- HD per Renal. will need to be set up for outpt HD    Folate deficiency- cont Folate supplementation. outpt w/u    Acute hepatitis- viral hep panel neg. monitor. no ETOH.

## 2022-06-25 NOTE — PROGRESS NOTE ADULT - PROBLEM SELECTOR PLAN 1
ST depressions in V5-V6 and I and avL  - trops [id number]->[id number]->8801->0028  - currently asymptomatic   - follows with Dr. Batista, paged for consult ST depressions in V5-V6 and I and avL  - trops 1149->4166->4821->5681, trend to peak  - currently asymptomatic   - follows with private Dr. Batista, consulted

## 2022-06-25 NOTE — PROGRESS NOTE ADULT - SUBJECTIVE AND OBJECTIVE BOX
Berna AmberlyTawannaDior | PGY-1  Internal Medicine    OVERNIGHT EVENTS: no overnight events      SUBJECTIVE: Patient was examined at bedside this morning. Appeared comfortable. Overnight vitals and monitoring results were reviewed.       MEDICATIONS  (STANDING):  amLODIPine   Tablet 5 milliGRAM(s) Oral daily  aspirin  chewable 81 milliGRAM(s) Oral daily  calcium acetate 667 milliGRAM(s) Oral three times a day with meals  chlorhexidine 2% Cloths 1 Application(s) Topical <User Schedule>  chlorhexidine 4% Liquid 1 Application(s) Topical <User Schedule>  clopidogrel Tablet 75 milliGRAM(s) Oral daily  dextrose 5%. 1000 milliLiter(s) (50 mL/Hr) IV Continuous <Continuous>  dextrose 5%. 1000 milliLiter(s) (100 mL/Hr) IV Continuous <Continuous>  doxercalciferol Injectable 2.5 MICROGram(s) IV Push once  folic acid 1 milliGRAM(s) Oral daily  gabapentin 100 milliGRAM(s) Oral every 8 hours  glucagon  Injectable 1 milliGRAM(s) IntraMuscular once  heparin   Injectable 5000 Unit(s) SubCutaneous every 12 hours  insulin lispro (ADMELOG) corrective regimen sliding scale   SubCutaneous three times a day before meals  insulin lispro (ADMELOG) corrective regimen sliding scale   SubCutaneous at bedtime  lisinopril 2.5 milliGRAM(s) Oral daily  loratadine 10 milliGRAM(s) Oral daily  metoprolol tartrate 25 milliGRAM(s) Oral two times a day    MEDICATIONS  (PRN):  benzonatate 100 milliGRAM(s) Oral every 8 hours PRN Cough        T(F): 98.6 (06-25-22 @ 04:45), Max: 98.6 (06-24-22 @ 17:31)  HR: 62 (06-25-22 @ 05:22) (62 - 71)  BP: 114/55 (06-25-22 @ 05:22) (104/53 - 194/75)  BP(mean): --  RR: 18 (06-25-22 @ 04:45) (16 - 18)  SpO2: 98% (06-25-22 @ 04:45) (97% - 100%)    PHYSICAL EXAM:     GENERAL: NAD, lying in bed comfortably  HEAD:  Atraumatic, Normocephalic  EYES: EOMI, PERRLA, conjunctiva and sclera clear, no nystagmus noted  CHEST/LUNG: Clear to auscultation bilaterally; No rales, rhonchi, wheezing, or rubs. Unlabored respirations  HEART: Regular rate and rhythm; No murmurs, rubs, or gallops, normal S1/S2  ABDOMEN: normal bowel sounds; Soft, nontender, nondistended, no organomegaly   EXTREMITIES:  2+ Peripheral Pulses, brisk capillary refill. No clubbing, cyanosis, or edema. L AVF  MSK: No gross deformities noted   Neurological:  A&Ox3, no focal deficits   PSYCH: Normal mood, affect      TELEMETRY:    LABS:                        8.7    5.35  )-----------( 168      ( 25 Jun 2022 01:56 )             27.8     06-25    139  |  98  |  53<H>  ----------------------------<  173<H>  3.8   |  26  |  5.49<H>    Ca    7.3<L>      25 Jun 2022 01:56  Phos  2.6     06-25  Mg     1.7     06-25    TPro  7.3  /  Alb  3.7  /  TBili  0.3  /  DBili  x   /  AST  120<H>  /  ALT  119<H>  /  AlkPhos  155<H>  06-25    CARDIAC MARKERS ( 24 Jun 2022 17:03 )  x     / x     / 331 U/L / x     / 12.1 ng/mL  CARDIAC MARKERS ( 24 Jun 2022 10:10 )  x     / x     / 364 U/L / x     / 12.7 ng/mL          Creatinine Trend: 5.49<--, 8.72<--, 6.52<--, 5.48<--, 10.14<--, 8.58<--  I&O's Summary    24 Jun 2022 07:01  -  25 Jun 2022 07:00  --------------------------------------------------------  IN: 830 mL / OUT: 1200 mL / NET: -370 mL      BNP    RADIOLOGY & ADDITIONAL STUDIES:

## 2022-06-25 NOTE — PROGRESS NOTE ADULT - PROBLEM SELECTOR PLAN 6
- s/p emergent dialysis 6/21  - UO: no urine output at baseline   - nephro following, recs appreciated  - HD schedule MWF, HD tomorrow

## 2022-06-25 NOTE — PROGRESS NOTE ADULT - PROBLEM SELECTOR PROBLEM 3
Hyperkalemia How Severe Is It?: moderate Is This A New Presentation, Or A Follow-Up?: Follow Up [facility]

## 2022-06-25 NOTE — PROGRESS NOTE ADULT - PROBLEM SELECTOR PLAN 5
- SBP: 210s at admission and pre-HD  - home meds restarted, metoprolol tatrate 25mg BID and lisinopril 2.5mg QD  - start amlodipine 5mg QD

## 2022-06-25 NOTE — PROGRESS NOTE ADULT - PROBLEM SELECTOR PLAN 7
- hgb 8.7 on admission   - iron studies: iron 58/ TIBC 204/ ferritin 968  - folate 2.8 and B12 854, start folate supplementation   - monitor CBC, transfuse for less than hgb 7   - maintain active type and screen

## 2022-06-25 NOTE — PROGRESS NOTE ADULT - SUBJECTIVE AND OBJECTIVE BOX
[facility] DIVISION OF KIDNEY DISEASES AND HYPERTENSION -- PROGRESS NOTE    Chief complaint: ESRD on HD    24 hour events/subjective: had maintenance HD yesterday        PAST HISTORY  --------------------------------------------------------------------------------  No significant changes to PMH, PSH, FHx, SHx, unless otherwise noted    ALLERGIES & MEDICATIONS  --------------------------------------------------------------------------------  Allergies    No Known Allergies    Intolerances      Standing Inpatient Medications  amLODIPine   Tablet 5 milliGRAM(s) Oral daily  aspirin  chewable 81 milliGRAM(s) Oral daily  calcium acetate 667 milliGRAM(s) Oral three times a day with meals  chlorhexidine 2% Cloths 1 Application(s) Topical <User Schedule>  chlorhexidine 4% Liquid 1 Application(s) Topical <User Schedule>  clopidogrel Tablet 75 milliGRAM(s) Oral daily  dextrose 5%. 1000 milliLiter(s) IV Continuous <Continuous>  dextrose 5%. 1000 milliLiter(s) IV Continuous <Continuous>  doxercalciferol Injectable 2.5 MICROGram(s) IV Push once  folic acid 1 milliGRAM(s) Oral daily  gabapentin 100 milliGRAM(s) Oral every 8 hours  glucagon  Injectable 1 milliGRAM(s) IntraMuscular once  heparin   Injectable 5000 Unit(s) SubCutaneous every 12 hours  insulin lispro (ADMELOG) corrective regimen sliding scale   SubCutaneous three times a day before meals  insulin lispro (ADMELOG) corrective regimen sliding scale   SubCutaneous at bedtime  lisinopril 2.5 milliGRAM(s) Oral daily  loratadine 10 milliGRAM(s) Oral daily  metoprolol tartrate 25 milliGRAM(s) Oral two times a day    PRN Inpatient Medications  benzonatate 100 milliGRAM(s) Oral every 8 hours PRN      REVIEW OF SYSTEMS  --------------------------------------------------------------------------------  Constitutional: [ ] Fever [ ] Chills [ ] Fatigue [ ] Weight change   HEENT: [ ] Blurred vision [ ] Eye Pain [ ] Headache [ ] Runny nose [ ] Sore Throat   Respiratory: [ ] Cough [ ] Wheezing [ ] Shortness of breath  Cardiovascular: [ ] Chest Pain [ ] Palpitations [ ] DIXON [ ] PND [ ] Orthopnea  Gastrointestinal: [ ] Abdominal Pain [ ] Diarrhea [ ] Constipation [ ] Hemorrhoids [ ] Nausea [ ] Vomiting  Genitourinary: [ ] Nocturia [ ] Dysuria [ ] Incontinence  Extremities: [ ] Swelling [ ] Joint Pain  Neurologic: [ ] Focal deficit [ ] Paresthesias [ ] Syncope  Lymphatic: [ ] Swelling [ ] Lymphadenopathy   Skin: [ ] Rash [ ] Ecchymoses [ ] Wounds [ ] Lesions  Psychiatry: [ ] Depression [ ] Suicidal/Homicidal Ideation [ ] Anxiety [ ] Sleep Disturbances  [ x] 10 point review of systems is otherwise negative except as mentioned above              [ ]Unable to obtain due to   All other systems were reviewed and are negative, except as noted.    VITALS/PHYSICAL EXAM  --------------------------------------------------------------------------------  T(C): 37 (06-25-22 @ 04:45), Max: 37 (06-24-22 @ 17:31)  HR: 62 (06-25-22 @ 05:22) (62 - 71)  BP: 114/55 (06-25-22 @ 05:22) (104/53 - 194/75)  RR: 18 (06-25-22 @ 04:45) (16 - 18)  SpO2: 98% (06-25-22 @ 04:45) (97% - 100%)  Wt(kg): --        06-24-22 @ 07:01  -  06-25-22 @ 07:00  --------------------------------------------------------  IN: 830 mL / OUT: 1200 mL / NET: -370 mL      Physical Exam:  	Gen: NAD, well-appearing  	HEENT: on room air  	Pulm: CTA B/L  	CV: normal S1S2; no rub  	Abd: soft                      Back : No sacral edema  	: No kwok  	LE: no edema  	Skin: Warm, without rashes  	Vascular access: KWASI DICKERSON    LABS/STUDIES  --------------------------------------------------------------------------------              8.7    5.35  >-----------<  168      [06-25-22 @ 01:56]              27.8     139  |  98  |  53  ----------------------------<  173      [06-25-22 @ 01:56]  3.8   |  26  |  5.49        Ca     7.3     [06-25-22 @ 01:56]      iCa    0.89     [06-25 @ 02:34]      Mg     1.7     [06-25-22 @ 01:56]      Phos  2.6     [06-25-22 @ 01:56]    TPro  7.3  /  Alb  3.7  /  TBili  0.3  /  DBili  x   /  AST  120  /  ALT  119  /  AlkPhos  155  [06-25-22 @ 01:56]              [06-24-22 @ 17:03]    Creatinine Trend:  SCr 5.49 [06-25 @ 01:56]  SCr 8.72 [06-24 @ 10:10]  SCr 6.52 [06-23 @ 07:17]  SCr 5.48 [06-22 @ 23:02]  SCr 10.14 [06-22 @ 01:21]        Iron 42, TIBC 149, %sat 28      [06-22-22 @ 23:02]  Ferritin 968      [06-22-22 @ 23:02]  PTH -- (Ca 18.8)      [06-24-22 @ 16:36]   292  Vitamin D (25OH) 15.3      [06-24-22 @ 16:36]    HBsAb 590.4      [06-21-22 @ 00:39]  HBsAg Nonreact      [06-24-22 @ 10:10]  HCV 0.14, Nonreact      [06-24-22 @ 10:10]

## 2022-06-25 NOTE — PROGRESS NOTE ADULT - ASSESSMENT
Pt. with ESRD on HD, admitted with HTN crises, hyperkalemia and acidosis in the setting of missed HD.     ESRD on HD: Patient on HD TTS. Had maintenance HD yesterday. Has been Tolerating HD well. AVF functioning well and BP stable during HD. Plan for another short session of HD today to optimize for discharge as patient will start HD TTS as outpatient. Monitor BMP.    Hypocalcemia: in the setting of ESRD, hyperphosphatemia. Will use high Ca bath during HD today and give hectorol with HD. Start Ca acetate 1 tab TID with meals. Recommend to give IV Calcium gluconate to maintain ionized calcium level >1.1. Check iPTH, 25-OH Vit D levels. Monitor Ca, phos.     Hyperkalemia: resolved after urgent HD on admission. Continue to give low K diet. Monitor K.     Acidosis: resolved. monitor serum CO2 level.

## 2022-06-26 DIAGNOSIS — I21.4 NON-ST ELEVATION (NSTEMI) MYOCARDIAL INFARCTION: ICD-10-CM

## 2022-06-26 LAB
ALBUMIN SERPL ELPH-MCNC: 3.5 G/DL — SIGNIFICANT CHANGE UP (ref 3.3–5)
ALP SERPL-CCNC: 144 U/L — HIGH (ref 40–120)
ALT FLD-CCNC: 85 U/L — HIGH (ref 10–45)
ANION GAP SERPL CALC-SCNC: 15 MMOL/L — SIGNIFICANT CHANGE UP (ref 5–17)
AST SERPL-CCNC: 50 U/L — HIGH (ref 10–40)
BILIRUB SERPL-MCNC: 0.3 MG/DL — SIGNIFICANT CHANGE UP (ref 0.2–1.2)
BUN SERPL-MCNC: 52 MG/DL — HIGH (ref 7–23)
CALCIUM SERPL-MCNC: 7.7 MG/DL — LOW (ref 8.4–10.5)
CHLORIDE SERPL-SCNC: 101 MMOL/L — SIGNIFICANT CHANGE UP (ref 96–108)
CHOLEST SERPL-MCNC: 112 MG/DL — SIGNIFICANT CHANGE UP
CK MB BLD-MCNC: 4.7 % — HIGH (ref 0–3.5)
CK MB CFR SERPL CALC: 6 NG/ML — SIGNIFICANT CHANGE UP (ref 0–6.7)
CK SERPL-CCNC: 127 U/L — SIGNIFICANT CHANGE UP (ref 30–200)
CO2 SERPL-SCNC: 25 MMOL/L — SIGNIFICANT CHANGE UP (ref 22–31)
CREAT SERPL-MCNC: 5.09 MG/DL — HIGH (ref 0.5–1.3)
EGFR: 12 ML/MIN/1.73M2 — LOW
GLUCOSE BLDC GLUCOMTR-MCNC: 175 MG/DL — HIGH (ref 70–99)
GLUCOSE BLDC GLUCOMTR-MCNC: 196 MG/DL — HIGH (ref 70–99)
GLUCOSE BLDC GLUCOMTR-MCNC: 216 MG/DL — HIGH (ref 70–99)
GLUCOSE BLDC GLUCOMTR-MCNC: 96 MG/DL — SIGNIFICANT CHANGE UP (ref 70–99)
GLUCOSE SERPL-MCNC: 195 MG/DL — HIGH (ref 70–99)
HCT VFR BLD CALC: 25.9 % — LOW (ref 39–50)
HDLC SERPL-MCNC: 39 MG/DL — LOW
HGB BLD-MCNC: 8.1 G/DL — LOW (ref 13–17)
LIPID PNL WITH DIRECT LDL SERPL: 39 MG/DL — SIGNIFICANT CHANGE UP
MAGNESIUM SERPL-MCNC: 1.8 MG/DL — SIGNIFICANT CHANGE UP (ref 1.6–2.6)
MCHC RBC-ENTMCNC: 30.2 PG — SIGNIFICANT CHANGE UP (ref 27–34)
MCHC RBC-ENTMCNC: 31.3 GM/DL — LOW (ref 32–36)
MCV RBC AUTO: 96.6 FL — SIGNIFICANT CHANGE UP (ref 80–100)
NON HDL CHOLESTEROL: 74 MG/DL — SIGNIFICANT CHANGE UP
NRBC # BLD: 0 /100 WBCS — SIGNIFICANT CHANGE UP (ref 0–0)
PHOSPHATE SERPL-MCNC: 2.5 MG/DL — SIGNIFICANT CHANGE UP (ref 2.5–4.5)
PLATELET # BLD AUTO: 171 K/UL — SIGNIFICANT CHANGE UP (ref 150–400)
POTASSIUM SERPL-MCNC: 3.6 MMOL/L — SIGNIFICANT CHANGE UP (ref 3.5–5.3)
POTASSIUM SERPL-SCNC: 3.6 MMOL/L — SIGNIFICANT CHANGE UP (ref 3.5–5.3)
PROT SERPL-MCNC: 7 G/DL — SIGNIFICANT CHANGE UP (ref 6–8.3)
RBC # BLD: 2.68 M/UL — LOW (ref 4.2–5.8)
RBC # FLD: 14.8 % — HIGH (ref 10.3–14.5)
SODIUM SERPL-SCNC: 141 MMOL/L — SIGNIFICANT CHANGE UP (ref 135–145)
TRIGL SERPL-MCNC: 173 MG/DL — HIGH
TROPONIN T, HIGH SENSITIVITY RESULT: 6433 NG/L — HIGH (ref 0–51)
TROPONIN T, HIGH SENSITIVITY RESULT: 6469 NG/L — HIGH (ref 0–51)
WBC # BLD: 5.62 K/UL — SIGNIFICANT CHANGE UP (ref 3.8–10.5)
WBC # FLD AUTO: 5.62 K/UL — SIGNIFICANT CHANGE UP (ref 3.8–10.5)

## 2022-06-26 PROCEDURE — 99233 SBSQ HOSP IP/OBS HIGH 50: CPT

## 2022-06-26 RX ORDER — CALCIUM GLUCONATE 100 MG/ML
1 VIAL (ML) INTRAVENOUS ONCE
Refills: 0 | Status: COMPLETED | OUTPATIENT
Start: 2022-06-26 | End: 2022-06-26

## 2022-06-26 RX ADMIN — CHLORHEXIDINE GLUCONATE 1 APPLICATION(S): 213 SOLUTION TOPICAL at 07:07

## 2022-06-26 RX ADMIN — HEPARIN SODIUM 5000 UNIT(S): 5000 INJECTION INTRAVENOUS; SUBCUTANEOUS at 06:22

## 2022-06-26 RX ADMIN — Medication 667 MILLIGRAM(S): at 11:31

## 2022-06-26 RX ADMIN — Medication 25 MILLIGRAM(S): at 06:22

## 2022-06-26 RX ADMIN — CLOPIDOGREL BISULFATE 75 MILLIGRAM(S): 75 TABLET, FILM COATED ORAL at 11:31

## 2022-06-26 RX ADMIN — Medication 1 MILLIGRAM(S): at 11:31

## 2022-06-26 RX ADMIN — Medication 81 MILLIGRAM(S): at 11:30

## 2022-06-26 RX ADMIN — LORATADINE 10 MILLIGRAM(S): 10 TABLET ORAL at 11:31

## 2022-06-26 RX ADMIN — Medication 100 GRAM(S): at 10:35

## 2022-06-26 RX ADMIN — AMLODIPINE BESYLATE 5 MILLIGRAM(S): 2.5 TABLET ORAL at 06:22

## 2022-06-26 RX ADMIN — Medication 4: at 16:50

## 2022-06-26 RX ADMIN — Medication 667 MILLIGRAM(S): at 17:04

## 2022-06-26 RX ADMIN — CHLORHEXIDINE GLUCONATE 1 APPLICATION(S): 213 SOLUTION TOPICAL at 07:08

## 2022-06-26 RX ADMIN — Medication 2: at 08:19

## 2022-06-26 RX ADMIN — Medication 25 MILLIGRAM(S): at 17:04

## 2022-06-26 NOTE — PROGRESS NOTE ADULT - PROBLEM SELECTOR PLAN 8
- hgb 8.1   - iron studies: iron 58/ TIBC 204/ ferritin 968  - folate 2.8 and B12 854, started folate supplementation   - monitor CBC, transfuse for less than hgb 7   - maintain active type and screen

## 2022-06-26 NOTE — PROGRESS NOTE ADULT - PROBLEM SELECTOR PLAN 6
- SBP: 210s at admission and pre-HD  - home meds restarted, metoprolol tatrate 25mg BID and lisinopril 2.5mg QD  - started amlodipine 5mg QD  - BP wnl

## 2022-06-26 NOTE — PROGRESS NOTE ADULT - ASSESSMENT
61M Hx ESRD on HD via LUE AVF (doesn't make urine), CAD s/p stent (plavix/asa), T2DM, HTN, HLD, anemia, presenting with weakness after skipping multiple dialysis sessions admitted for hyperkalemia with EKG changes, severe acidosis requiring emergent dialysis. Admitted to MICU now downgraded to the floors. Course c/b NSTEMI.

## 2022-06-26 NOTE — PROGRESS NOTE ADULT - PROBLEM SELECTOR PLAN 2
ST depressions in V5-V6 and I and avL  - trops [id number]->4166->4821->5681, trend to peak  - currently asymptomatic   - follows with private Dr. Batista, consulted

## 2022-06-26 NOTE — PROGRESS NOTE ADULT - PROBLEM SELECTOR PLAN 11
DVT ppx: heparin subq  Diet: consistent carb  Dispo: pending setup of outpt HD, Cards clearance    Prefers to update family

## 2022-06-26 NOTE — PROGRESS NOTE ADULT - SUBJECTIVE AND OBJECTIVE BOX
[facility] Division of Hospital Medicine  Jack Anders  Pager (LETI-MARYELLEN, 8A-5P): [phone]  Other Times:  [phone]      SUBJECTIVE / OVERNIGHT EVENTS:  no acute events overnight  denies cp/sob  displeased about remaining in the hospital    ADDITIONAL REVIEW OF SYSTEMS:    MEDICATIONS  (STANDING):  amLODIPine   Tablet 5 milliGRAM(s) Oral daily  aspirin  chewable 81 milliGRAM(s) Oral daily  calcium acetate 667 milliGRAM(s) Oral three times a day with meals  chlorhexidine 2% Cloths 1 Application(s) Topical <User Schedule>  chlorhexidine 4% Liquid 1 Application(s) Topical <User Schedule>  clopidogrel Tablet 75 milliGRAM(s) Oral daily  dextrose 5%. 1000 milliLiter(s) (100 mL/Hr) IV Continuous <Continuous>  dextrose 5%. 1000 milliLiter(s) (50 mL/Hr) IV Continuous <Continuous>  doxercalciferol Injectable 2.5 MICROGram(s) IV Push once  folic acid 1 milliGRAM(s) Oral daily  gabapentin 100 milliGRAM(s) Oral every 8 hours  glucagon  Injectable 1 milliGRAM(s) IntraMuscular once  heparin   Injectable 5000 Unit(s) SubCutaneous every 12 hours  insulin lispro (ADMELOG) corrective regimen sliding scale   SubCutaneous three times a day before meals  insulin lispro (ADMELOG) corrective regimen sliding scale   SubCutaneous at bedtime  lisinopril 2.5 milliGRAM(s) Oral daily  loratadine 10 milliGRAM(s) Oral daily  metoprolol tartrate 25 milliGRAM(s) Oral two times a day    MEDICATIONS  (PRN):  benzonatate 100 milliGRAM(s) Oral every 8 hours PRN Cough      I&O's Summary    25 Jun 2022 07:01  -  26 Jun 2022 07:00  --------------------------------------------------------  IN: 540 mL / OUT: 800 mL / NET: -260 mL        PHYSICAL EXAM:  Vital Signs Last 24 Hrs  T(C): 36.6 (26 Jun 2022 11:00), Max: 37.2 (25 Jun 2022 21:32)  T(F): 97.9 (26 Jun 2022 11:00), Max: 98.9 (25 Jun 2022 21:32)  HR: 56 (26 Jun 2022 11:00) (56 - 65)  BP: 128/69 (26 Jun 2022 11:00) (119/62 - 174/85)  BP(mean): --  RR: 18 (26 Jun 2022 11:00) (17 - 18)  SpO2: 99% (26 Jun 2022 11:00) (96% - 99%)    CONSTITUTIONAL: NAD, well-developed, well-groomed  EYES: PERRLA; conjunctiva and sclera clear  ENMT: Moist oral mucosa, no pharyngeal injection or exudates  NECK: declined  RESPIRATORY: Normal respiratory effort; declined  CARDIOVASCULAR: declined  ABDOMEN: declined  MUSCULOSKELETAL:  declined  PSYCH: declined  NEUROLOGY: declined  SKIN: declined    LABS:                        8.1    5.62  )-----------( 171      ( 26 Jun 2022 06:54 )             25.9     06-26    141  |  101  |  52<H>  ----------------------------<  195<H>  3.6   |  25  |  5.09<H>    Ca    7.7<L>      26 Jun 2022 06:52  Phos  2.5     06-26  Mg     1.8     06-26    TPro  7.0  /  Alb  3.5  /  TBili  0.3  /  DBili  x   /  AST  50<H>  /  ALT  85<H>  /  AlkPhos  144<H>  06-26      CARDIAC MARKERS ( 25 Jun 2022 18:23 )  x     / x     / 242 U/L / x     / 9.9 ng/mL  CARDIAC MARKERS ( 25 Jun 2022 12:46 )  x     / x     / 234 U/L / x     / 9.0 ng/mL  CARDIAC MARKERS ( 24 Jun 2022 17:03 )  x     / x     / 331 U/L / x     / 12.1 ng/mL      Trop HS 3758 6/24->>7891->2885

## 2022-06-26 NOTE — PROGRESS NOTE ADULT - PROBLEM SELECTOR PLAN 1
Asymptomatic, ST depressions laterally. unclear if 2/2 to poor renal clearance vs MI (less likely)  -trops continue to uptrend although slowly currently [id number]  -Patient of Dr Batista, no response, attempting to reach out again today

## 2022-06-27 DIAGNOSIS — I25.10 ATHEROSCLEROTIC HEART DISEASE OF NATIVE CORONARY ARTERY WITHOUT ANGINA PECTORIS: ICD-10-CM

## 2022-06-27 DIAGNOSIS — R77.8 OTHER SPECIFIED ABNORMALITIES OF PLASMA PROTEINS: ICD-10-CM

## 2022-06-27 LAB
ALBUMIN SERPL ELPH-MCNC: 3.6 G/DL — SIGNIFICANT CHANGE UP (ref 3.3–5)
ALP SERPL-CCNC: 135 U/L — HIGH (ref 40–120)
ALT FLD-CCNC: 69 U/L — HIGH (ref 10–45)
ANION GAP SERPL CALC-SCNC: 18 MMOL/L — HIGH (ref 5–17)
AST SERPL-CCNC: 36 U/L — SIGNIFICANT CHANGE UP (ref 10–40)
BILIRUB SERPL-MCNC: 0.3 MG/DL — SIGNIFICANT CHANGE UP (ref 0.2–1.2)
BUN SERPL-MCNC: 88 MG/DL — HIGH (ref 7–23)
CALCIUM SERPL-MCNC: 7.6 MG/DL — LOW (ref 8.4–10.5)
CHLORIDE SERPL-SCNC: 100 MMOL/L — SIGNIFICANT CHANGE UP (ref 96–108)
CK MB BLD-MCNC: 4.7 % — HIGH (ref 0–3.5)
CK MB CFR SERPL CALC: 5.6 NG/ML — SIGNIFICANT CHANGE UP (ref 0–6.7)
CK SERPL-CCNC: 119 U/L — SIGNIFICANT CHANGE UP (ref 30–200)
CO2 SERPL-SCNC: 23 MMOL/L — SIGNIFICANT CHANGE UP (ref 22–31)
CREAT SERPL-MCNC: 7.95 MG/DL — HIGH (ref 0.5–1.3)
EGFR: 7 ML/MIN/1.73M2 — LOW
GLUCOSE BLDC GLUCOMTR-MCNC: 140 MG/DL — HIGH (ref 70–99)
GLUCOSE BLDC GLUCOMTR-MCNC: 140 MG/DL — HIGH (ref 70–99)
GLUCOSE BLDC GLUCOMTR-MCNC: 145 MG/DL — HIGH (ref 70–99)
GLUCOSE BLDC GLUCOMTR-MCNC: 165 MG/DL — HIGH (ref 70–99)
GLUCOSE SERPL-MCNC: 167 MG/DL — HIGH (ref 70–99)
HCT VFR BLD CALC: 26.2 % — LOW (ref 39–50)
HGB BLD-MCNC: 8 G/DL — LOW (ref 13–17)
MAGNESIUM SERPL-MCNC: 1.9 MG/DL — SIGNIFICANT CHANGE UP (ref 1.6–2.6)
MCHC RBC-ENTMCNC: 29.7 PG — SIGNIFICANT CHANGE UP (ref 27–34)
MCHC RBC-ENTMCNC: 30.5 GM/DL — LOW (ref 32–36)
MCV RBC AUTO: 97.4 FL — SIGNIFICANT CHANGE UP (ref 80–100)
NRBC # BLD: 0 /100 WBCS — SIGNIFICANT CHANGE UP (ref 0–0)
PHOSPHATE SERPL-MCNC: 4.1 MG/DL — SIGNIFICANT CHANGE UP (ref 2.5–4.5)
PLATELET # BLD AUTO: 158 K/UL — SIGNIFICANT CHANGE UP (ref 150–400)
POTASSIUM SERPL-MCNC: 4.2 MMOL/L — SIGNIFICANT CHANGE UP (ref 3.5–5.3)
POTASSIUM SERPL-SCNC: 4.2 MMOL/L — SIGNIFICANT CHANGE UP (ref 3.5–5.3)
PROT SERPL-MCNC: 7 G/DL — SIGNIFICANT CHANGE UP (ref 6–8.3)
RBC # BLD: 2.69 M/UL — LOW (ref 4.2–5.8)
RBC # FLD: 15 % — HIGH (ref 10.3–14.5)
SODIUM SERPL-SCNC: 141 MMOL/L — SIGNIFICANT CHANGE UP (ref 135–145)
TROPONIN T, HIGH SENSITIVITY RESULT: 6342 NG/L — HIGH (ref 0–51)
WBC # BLD: 5.31 K/UL — SIGNIFICANT CHANGE UP (ref 3.8–10.5)
WBC # FLD AUTO: 5.31 K/UL — SIGNIFICANT CHANGE UP (ref 3.8–10.5)

## 2022-06-27 PROCEDURE — 93458 L HRT ARTERY/VENTRICLE ANGIO: CPT | Mod: 26

## 2022-06-27 PROCEDURE — 99152 MOD SED SAME PHYS/QHP 5/>YRS: CPT

## 2022-06-27 PROCEDURE — 99252 IP/OBS CONSLTJ NEW/EST SF 35: CPT

## 2022-06-27 PROCEDURE — 99233 SBSQ HOSP IP/OBS HIGH 50: CPT

## 2022-06-27 PROCEDURE — 93306 TTE W/DOPPLER COMPLETE: CPT | Mod: 26

## 2022-06-27 RX ORDER — ATORVASTATIN CALCIUM 80 MG/1
80 TABLET, FILM COATED ORAL AT BEDTIME
Refills: 0 | Status: DISCONTINUED | OUTPATIENT
Start: 2022-06-27 | End: 2022-06-28

## 2022-06-27 RX ORDER — ATORVASTATIN CALCIUM 80 MG/1
40 TABLET, FILM COATED ORAL AT BEDTIME
Refills: 0 | Status: DISCONTINUED | OUTPATIENT
Start: 2022-06-27 | End: 2022-06-27

## 2022-06-27 RX ADMIN — Medication 667 MILLIGRAM(S): at 08:20

## 2022-06-27 RX ADMIN — Medication 667 MILLIGRAM(S): at 11:49

## 2022-06-27 RX ADMIN — HEPARIN SODIUM 5000 UNIT(S): 5000 INJECTION INTRAVENOUS; SUBCUTANEOUS at 05:25

## 2022-06-27 RX ADMIN — Medication 2: at 08:20

## 2022-06-27 RX ADMIN — AMLODIPINE BESYLATE 5 MILLIGRAM(S): 2.5 TABLET ORAL at 05:26

## 2022-06-27 RX ADMIN — LORATADINE 10 MILLIGRAM(S): 10 TABLET ORAL at 11:48

## 2022-06-27 RX ADMIN — Medication 1 MILLIGRAM(S): at 11:49

## 2022-06-27 RX ADMIN — CLOPIDOGREL BISULFATE 75 MILLIGRAM(S): 75 TABLET, FILM COATED ORAL at 11:48

## 2022-06-27 RX ADMIN — ATORVASTATIN CALCIUM 40 MILLIGRAM(S): 80 TABLET, FILM COATED ORAL at 21:34

## 2022-06-27 RX ADMIN — GABAPENTIN 100 MILLIGRAM(S): 400 CAPSULE ORAL at 21:30

## 2022-06-27 RX ADMIN — Medication 25 MILLIGRAM(S): at 21:30

## 2022-06-27 RX ADMIN — Medication 25 MILLIGRAM(S): at 05:26

## 2022-06-27 RX ADMIN — LISINOPRIL 2.5 MILLIGRAM(S): 2.5 TABLET ORAL at 05:26

## 2022-06-27 RX ADMIN — Medication 81 MILLIGRAM(S): at 11:49

## 2022-06-27 NOTE — PROGRESS NOTE ADULT - SUBJECTIVE AND OBJECTIVE BOX
Patient is a 61y old  Male who presents with a chief complaint of     SUBJECTIVE / OVERNIGHT EVENTS: Patient seen and examined at bedside. No acute events overnight. Denies CP/SOB. He wants to go home.    MEDICATIONS  (STANDING):  amLODIPine   Tablet 5 milliGRAM(s) Oral daily  aspirin  chewable 81 milliGRAM(s) Oral daily  calcium acetate 667 milliGRAM(s) Oral three times a day with meals  chlorhexidine 2% Cloths 1 Application(s) Topical <User Schedule>  chlorhexidine 4% Liquid 1 Application(s) Topical <User Schedule>  clopidogrel Tablet 75 milliGRAM(s) Oral daily  dextrose 5%. 1000 milliLiter(s) (100 mL/Hr) IV Continuous <Continuous>  dextrose 5%. 1000 milliLiter(s) (50 mL/Hr) IV Continuous <Continuous>  doxercalciferol Injectable 2.5 MICROGram(s) IV Push once  folic acid 1 milliGRAM(s) Oral daily  gabapentin 100 milliGRAM(s) Oral every 8 hours  glucagon  Injectable 1 milliGRAM(s) IntraMuscular once  heparin   Injectable 5000 Unit(s) SubCutaneous every 12 hours  insulin lispro (ADMELOG) corrective regimen sliding scale   SubCutaneous three times a day before meals  insulin lispro (ADMELOG) corrective regimen sliding scale   SubCutaneous at bedtime  lisinopril 2.5 milliGRAM(s) Oral daily  loratadine 10 milliGRAM(s) Oral daily  metoprolol tartrate 25 milliGRAM(s) Oral two times a day    MEDICATIONS  (PRN):  benzonatate 100 milliGRAM(s) Oral every 8 hours PRN Cough      CAPILLARY BLOOD GLUCOSE      POCT Blood Glucose.: 140 mg/dL (27 Jun 2022 11:34)  POCT Blood Glucose.: 165 mg/dL (27 Jun 2022 07:31)  POCT Blood Glucose.: 196 mg/dL (26 Jun 2022 21:40)  POCT Blood Glucose.: 216 mg/dL (26 Jun 2022 16:20)    I&O's Summary    26 Jun 2022 07:01  -  27 Jun 2022 07:00  --------------------------------------------------------  IN: 160 mL / OUT: 0 mL / NET: 160 mL        PHYSICAL EXAM:  Vital Signs Last 24 Hrs  T(C): 36.6 (27 Jun 2022 12:53), Max: 36.7 (26 Jun 2022 20:03)  T(F): 97.8 (27 Jun 2022 12:53), Max: 98 (26 Jun 2022 20:03)  HR: 74 (27 Jun 2022 12:53) (59 - 74)  BP: 117/54 (27 Jun 2022 12:53) (116/66 - 130/64)  BP(mean): --  RR: 18 (27 Jun 2022 12:53) (18 - 18)  SpO2: 98% (27 Jun 2022 12:53) (97% - 99%)    GEN: male in NAD, appears comfortable, no diaphoresis  EYES: No scleral injection, PERRL, EOMI  ENTM: neck supple & symmetric without tracheal deviation, moist membranes, no gross hearing impairment, thyroid gland not enlarged  CV: +S1/S2, no m/r/g, no abdominal bruit, no LE edema  RESP: breathing comfortably, no respiratory accessory muscle use, CTAB, no w/r/r  GI: normoactive BS, soft, NTND, no rebounding/guarding, no palpable masses    LABS:                        8.0    5.31  )-----------( 158      ( 27 Jun 2022 06:28 )             26.2     06-27    141  |  100  |  88<H>  ----------------------------<  167<H>  4.2   |  23  |  7.95<H>    Ca    7.6<L>      27 Jun 2022 06:28  Phos  4.1     06-27  Mg     1.9     06-27    TPro  7.0  /  Alb  3.6  /  TBili  0.3  /  DBili  x   /  AST  36  /  ALT  69<H>  /  AlkPhos  135<H>  06-27      CARDIAC MARKERS ( 27 Jun 2022 06:28 )  x     / x     / 119 U/L / x     / 5.6 ng/mL  CARDIAC MARKERS ( 26 Jun 2022 19:41 )  x     / x     / 127 U/L / x     / 6.0 ng/mL  CARDIAC MARKERS ( 25 Jun 2022 18:23 )  x     / x     / 242 U/L / x     / 9.9 ng/mL            RADIOLOGY & ADDITIONAL TESTS:  Results Reviewed:   Imaging Personally Reviewed:  Electrocardiogram Personally Reviewed:    COORDINATION OF CARE:  Care Discussed with Consultants/Other Providers [Y/N]:  Prior or Outpatient Records Reviewed [Y/N]:

## 2022-06-27 NOTE — CONSULT NOTE ADULT - NS ATTEND AMEND GEN_ALL_CORE FT
Pt seen and examined.  Severe TVD.  Risks/benefits CABG d/w pt.  Pt refusing surgery at this time.  Cardiology aware.

## 2022-06-27 NOTE — PROGRESS NOTE ADULT - PROBLEM SELECTOR PLAN 1
Asymptomatic, ST depressions laterally. unclear if 2/2 to poor renal clearance vs MI (less likely)    -Troponins lateral  -Cardiology consulted: Enedelia  -For LHC  -Continue with DAPT & beta blocker

## 2022-06-27 NOTE — CONSULT NOTE ADULT - PROBLEM SELECTOR RECOMMENDATION 9
Cath Report Pre-Damon: pLAD 90% ISR, long diffuse, CX 80% diffuse, RCA 70% diffuse   c/w ASA 81, Lopressor 25 BID  Start Atorvastatin 80mg QD  DC Plavix, f/u P2Y12  Pt currently refusing surgery at this time  Labs and imaging reviewed with Dr. Horta

## 2022-06-27 NOTE — PROGRESS NOTE ADULT - PROBLEM SELECTOR PLAN 6
- Resolved  - home meds restarted, metoprolol tartrate 25mg BID and lisinopril 2.5mg QD  - started amlodipine 5mg QD

## 2022-06-27 NOTE — CONSULT NOTE ADULT - TIME BILLING
Agree with above NP note.  a/p   62y/o M w/ h/o ESRD on HD via LUE AVF (doesn't make urine), CAD s/p stent (plavix/asa), T2DM, HTN, HLD, anemia, presenting with weakness after skipping multiple dialysis sessions admitted for hyperkalemia with EKG changes, severe acidosis requiring emergent dialysis. sp MICU     #NSTEMI , CAD sp PCI   -s/p cath with severe prox/mid lad instent restenosis with lcx/rca disease  -CTS called for poss cabg  -CTS eval noted, patient apparently refusing CABG  -will discuss further with pt and if still refused will likely return for pci of LAD  -f/u echo   -continue asa, plavix     #ESRD /hyperkalemia   --management per renal

## 2022-06-27 NOTE — CHART NOTE - NSCHARTNOTEFT_GEN_A_CORE
s/p diagnostic  [facility]   prelim report shows pLAD 90% ISR, long diffuse  CX 80% diffuse  RCA 70%  diffuse   RRA access - no hematoma or bleeding     Plan per Dr Lucas s/p diagnostic  [facility]   prelim report shows pLAD 90% ISR, long diffuse  CX 80% diffuse  RCA 70%  diffuse   RRA access - no hematoma or bleeding     Plan per Dr Lucas    CTS evaluation for CABG ( Dr Chad Mcdaniels will choose the surgeon - notified of cath results)  Continue ASA  Plavix stopped and P2Y12 ordered   Heparin gtt not warranted tonight as d/w Dr Lucas     Condition stable. No complaints

## 2022-06-27 NOTE — CONSULT NOTE ADULT - ASSESSMENT
ECHO 1/19/21: EF 50%; Mild segmental left ventricular systolic dysfunction. The inferior wall, and the inferoseptum are hypokinetic.    a/p   62y/o M w/ h/o ESRD on HD via LUE AVF (doesn't make urine), CAD s/p stent (plavix/asa), T2DM, HTN, HLD, anemia, presenting with weakness after skipping multiple dialysis sessions admitted for hyperkalemia with EKG changes, severe acidosis requiring emergent dialysis. sp MICU     #NSTEMI , CAD sp PCI   -ECG with some st depressions to v5. v6   -no cp or sob   -check echo   -+ CAD risk factors will plan for cardiac cath   -c/w plavix, asa     #ESRD /hyperkalemia   --management per renal    
Patient with ESKD presenting with severe hyperkalemia and metabolic acidosis. 
62y/o M w/ h/o ESRD on HD via LUE AVF (doesn't make urine), CAD s/p stent (plavix/asa), T2DM, HTN, HLD, anemia, presenting with weakness. Pt states that he just came back from [facility] and was getting dialysis only once a week for 3 months, last dialysis was 5d ago. States that he was unable to receive more dialysis due to financial constraints. Has been progressively feeling weak, tired, had episodes of nausea and vomiting. Denies fevers, chills, chest pain, cough, sob, abdominal pain, back pain. [facility] nephrology. In the ER was found to be hyperkalemia 6.7 with EKG changes, acidosis, house nephrology and MICU consulted for emergent dialysis. Received calcium gluconate 2g IVPB, Humalog 5U IV push, dextrose 50% 50mL, Lokelma 10g. Currently receiving sodium bicarb infusion 150meq.    Pt s/p cardiac catherization revealing multi vessel disease. CT surgery consulted for CABG evaluation with Dr. Horta.

## 2022-06-27 NOTE — CONSULT NOTE ADULT - SUBJECTIVE AND OBJECTIVE BOX
History of Present Illness:  62y/o M w/ h/o ESRD on HD via LUE AVF (doesn't make urine), CAD s/p stent (plavix/asa), T2DM, HTN, HLD, anemia, presenting with weakness. Pt states that he just came back from [facility] and was getting dialysis only once a week for 3 months, last dialysis was 5d ago. States that he was unable to receive more dialysis due to financial constraints. Has been progressively feeling weak, tired, had episodes of nausea and vomiting. Denies fevers, chills, chest pain, cough, sob, abdominal pain, back pain. [facility] nephrology. In the ER was found to be hyperkalemia 6.7 with EKG changes, acidosis, house nephrology and MICU consulted for emergent dialysis. Received calcium gluconate 2g IVPB, Humalog 5U IV push, dextrose 50% 50mL, Lokelma 10g. Currently receiving sodium bicarb infusion 150meq.    Pt s/p cardiac catherization revealing multi vessel disease. CT surgery consulted for CABG evaluation with Dr. Horta.     Past Medical History  ESRD on hemodialysis  M/W/Saturday at home in [facility]    T2DM (type 2 diabetes mellitus)    CAD (coronary artery disease)  s/p sent in 06/2020    HTN (hypertension)        Past Surgical History  AV fistula        MEDICATIONS  (STANDING):  amLODIPine   Tablet 5 milliGRAM(s) Oral daily  aspirin  chewable 81 milliGRAM(s) Oral daily  calcium acetate 667 milliGRAM(s) Oral three times a day with meals  chlorhexidine 2% Cloths 1 Application(s) Topical <User Schedule>  chlorhexidine 4% Liquid 1 Application(s) Topical <User Schedule>  dextrose 5%. 1000 milliLiter(s) (100 mL/Hr) IV Continuous <Continuous>  dextrose 5%. 1000 milliLiter(s) (50 mL/Hr) IV Continuous <Continuous>  doxercalciferol Injectable 2.5 MICROGram(s) IV Push once  folic acid 1 milliGRAM(s) Oral daily  gabapentin 100 milliGRAM(s) Oral every 8 hours  glucagon  Injectable 1 milliGRAM(s) IntraMuscular once  heparin   Injectable 5000 Unit(s) SubCutaneous every 12 hours  insulin lispro (ADMELOG) corrective regimen sliding scale   SubCutaneous three times a day before meals  insulin lispro (ADMELOG) corrective regimen sliding scale   SubCutaneous at bedtime  lisinopril 2.5 milliGRAM(s) Oral daily  loratadine 10 milliGRAM(s) Oral daily  metoprolol tartrate 25 milliGRAM(s) Oral two times a day    MEDICATIONS  (PRN):  benzonatate 100 milliGRAM(s) Oral every 8 hours PRN Cough    Antiplatelet therapy: Plavix                          Last dose/amt: 6/27    Allergies: No Known Allergies      SOCIAL HISTORY:  Smoker: [ ] Yes  [ x] No        PACK YEARS:   >10 years 1ppd                      WHEN QUIT? 1 year  ETOH use: [x ] Yes  [ ] No              FREQUENCY / QUANTITY: socially  Ilicit Drug use:  [ ] Yes  [x ] No  Occupation: no longer working  Live with: Son  Assist device use: denies    Relevant Family History  FAMILY HISTORY:  No pertinent family history in first degree relatives        Review of Systems  GENERAL:  Fevers[] chills[] sweats[] fatigue[] weight loss[] weight gain []                                        NEURO:  parathesias[] seizures []  syncope []  confusion []                                                                                  EYES: glasses[]  blurry vision[]  discharge[] pain[] glaucoma []                                                                            ENMT:  difficulty hearing []  vertigo[]  dysphagia[] epistaxis[] recent dental work []                                      CV:  chest pain[] palpitations[] DIXON [] diaphoresis [] edema[]                                                                                             RESPIRATORY:  wheezing[] SOB[] cough [] sputum[] hemoptysis[]                                                                    GI:  nausea[]  vomiting []  diarrhea[] constipation [] melena []                                                                        : hematuria[ ]  dysuria[ ] urgency[] incontinence[]                                                                                              MUSKULOSKELETAL:  arthritis[ ]  joint swelling [ ] muscle weakness [ ]                                                                  SKIN/BREAST:  rash[ ] itching [ ]  hair loss[ ] masses[ ]                                                                                                PSYCH:  dementia [ ] depresion [ ] anxiety[ ]                                                                                                                  HEME/LYMPH:  bruises easily[ ] enlarged lymph nodes[ ] tender lymph nodes[ ]                                                 ENDOCRINE:  cold intolerance[ ] heat intolerance[ ] polydipsia[ ]                                                                              PHYSICAL EXAM  Vital Signs Last 24 Hrs  T(C): 36.5 (27 Jun 2022 21:10), Max: 36.8 (27 Jun 2022 16:05)  T(F): 97.7 (27 Jun 2022 21:10), Max: 98.3 (27 Jun 2022 16:05)  HR: 98 (27 Jun 2022 21:10) (57 - 98)  BP: 145/75 (27 Jun 2022 21:10) (111/46 - 151/72)  BP(mean): --  RR: 18 (27 Jun 2022 21:10) (14 - 18)  SpO2: 100% (27 Jun 2022 21:10) (95% - 100%)    General: Well nourished, well developed, no acute distress.                                                         Neuro: Normal exam oriented to person/place & time with no focal motor or sensory  deficits.                    Eyes: Normal exam of conjunctiva & lids, pupils equally reactive.   ENT: Normal exam of nasal/oral mucosa with absence of cyanosis.   Neck: Normal exam of jugular veins, trachea & thyroid.   Chest: Normal lung exam with good air movement absence of wheezes, rales, or rhonchi:                                                                          CV:  Auscultation: normal [x ] S3[ ] S4[ ] Irregular [ ] Rub[ ] Clicks[ ]  Murmurs none:[x ]systolic [ ]  diastolic [ ] holosystolic [ ]  Carotids: No Bruits[x ] Other____________ Abdominal Aorta: normal [x ] nonpalpable[ ]                                                                         GI: Normal exam of abdomen, liver & spleen with no noted masses or tenderness.                                                                                              Extremities: Normal no evidence of cyanosis or deformity Edema: none[x ]trace[ ]1+[ ]2+[ ]3+[ ]4+[ ]  Lower Extremity Pulses: Right[x ] Left[ x]Varicosities[ ]  SKIN : Normal exam to inspection & palation.                                                           LABS:                        8.0    5.31  )-----------( 158      ( 27 Jun 2022 06:28 )             26.2     06-27    141  |  100  |  88<H>  ----------------------------<  167<H>  4.2   |  23  |  7.95<H>    Ca    7.6<L>      27 Jun 2022 06:28  Phos  4.1     06-27  Mg     1.9     06-27    TPro  7.0  /  Alb  3.6  /  TBili  0.3  /  DBili  x   /  AST  36  /  ALT  69<H>  /  AlkPhos  135<H>  06-27        CARDIAC MARKERS ( 27 Jun 2022 06:28 )  x     / x     / 119 U/L / x     / 5.6 ng/mL  CARDIAC MARKERS ( 26 Jun 2022 19:41 )  x     / x     / 127 U/L / x     / 6.0 ng/mL          Cardiac Cath: pLAD 90% ISR, long diffuse, CX 80% diffuse, RCA 70%  diffuse       TTE / TOBI: < from: Transthoracic Echocardiogram (06.27.22 @ 12:02) >  Conclusions:  1. Mild-moderate mitral regurgitation.  2. Severely dilated left atrium.  LA volume index = 59  cc/m2.  3. Normal left ventricular internal dimensions and wall  thicknesses.  4. Mild segmental left ventricular systolic dysfunction  with preserved ejection fraction. The inferior and  inferoseptal walls are mildly hypokinetic.  5. Moderate diastolic dysfunction (Stage II).  6. Normal right ventricular size and function.  7. Estimated pulmonary artery systolic pressure equals 37  mm Hg, assuming right atrial pressure equals 8 mm Hg,  consistent with borderline pulmonary pressures.  *** Compared with echocardiogram of 1/19/2021, no  significant changes noted.                    
[facility] DIVISION OF KIDNEY DISEASES AND HYPERTENSION -- [phone]  -- INITIAL CONSULT NOTE  --------------------------------------------------------------------------------  If any questions, please feel free to contact me  NS pager: [phone], [facility]: [phone]  Dennis Ramírez M.D.  Nephrology Fellow  --------------------------------------------------------------------------------    HPI:  61y.o. Male with PMHx ESRD on HD, CAD, DM2, HTN presented to the ED for dialysis. Patient reports that he usually has HD three times per week at [facility] [place],  in Feb/2022 had a family emergency and had to travel to [facility] where he was doing dialysis once a week only (due to financial issues, could not have more sessions per week). He initially was planning to be there for 1 month, but ended up staying for 4 months. He returned to [place] on Sunday 6/19/22, and presented on Monday 6/20 to his outpatient HD where he was not dialyzed since "he has been out for too long". He reports last HD was on Thrusday 6/16/22 in [facility]. He reports feeling weak, fatigue and had episode of vomiting. In the ED fund hyperkalemic and severely acidotic.     Nephrology consulted for urgent HD      PAST HISTORY  --------------------------------------------------------------------------------  PAST MEDICAL & SURGICAL HISTORY:  ESRD on hemodialysis  M/W/Saturday at home in [facility]      T2DM (type 2 diabetes mellitus)      CAD (coronary artery disease)  s/p sent in 06/2020      HTN (hypertension)      AV fistula        FAMILY HISTORY:  non contributory   PAST SOCIAL HISTORY:  Denies smoking, alcohol    ALLERGIES & MEDICATIONS  --------------------------------------------------------------------------------  Allergies    No Known Allergies    Intolerances      Standing Inpatient Medications  sodium bicarbonate  Infusion 0.144 mEq/kG/Hr IV Continuous <Continuous>    PRN Inpatient Medications      REVIEW OF SYSTEMS  --------------------------------------------------------------------------------  Gen: +fatigue +weakness No fevers/chills  Skin: No rashes  Head/Eyes/Ears: Normal hearing,   Respiratory: No dyspnea, cough  CV: No chest pain  GI: +nausea +vomiting.   : No dysuria, hematuria  MSK: +LE edema    All other systems were reviewed and are negative, except as noted.    VITALS/PHYSICAL EXAM  --------------------------------------------------------------------------------  T(C): 36.8 (06-21-22 @ 00:35), Max: 36.8 (06-21-22 @ 00:35)  HR: 63 (06-21-22 @ 00:35) (58 - 63)  BP: 212/100 (06-21-22 @ 00:35) (206/97 - 212/100)  RR: 19 (06-21-22 @ 00:35) (16 - 19)  SpO2: 100% (06-21-22 @ 00:35) (99% - 100%)  Wt(kg): --  Height (cm): 162.6 (06-20-22 @ 19:38)  Weight (kg): 52 (06-20-22 @ 19:38)  BMI (kg/m2): 19.7 (06-20-22 @ 19:38)  BSA (m2): 1.54 (06-20-22 @ 19:38)      Physical Exam:  	Gen: acutely ill appearing   	HEENT: MMM  	Pulm: CTA B/L  	CV: S1S2,   	Abd: Soft, +BS, ND  	Ext: trace LE edema B/L  	Neuro: Awake, A&O x3  	Skin: Warm and dry               Psych: normal affect and mood  	Vascular access: left AVF +thrill and +bruit.     LABS/STUDIES  --------------------------------------------------------------------------------              8.7    5.11  >-----------<  111      [06-21-22 @ 00:38]              27.0     136  |  102  |  137  ----------------------------<  114      [06-21-22 @ 00:38]  6.7   |  10  |  18.36        Ca     6.4     [06-21-22 @ 00:38]    TPro  8.8  /  Alb  4.8  /  TBili  0.4  /  DBili  x   /  AST  38  /  ALT  34  /  AlkPhos  147  [06-21-22 @ 00:38]    PT/INR: PT 18.3 , INR 1.58       [06-21-22 @ 00:38]  PTT: 36.8       [06-21-22 @ 00:38]      Creatinine Trend:  SCr 18.36 [06-21 @ 00:38]          HBsAb 83.0      [07-08-21 @ 10:31]  HBsAg Nonreact      [07-08-21 @ 10:31]  HBcAb Nonreact      [01-17-21 @ 00:52]  HCV 0.15, Nonreact      [07-08-21 @ 10:31]  HIV Nonreact      [01-24-21 @ 09:30]    Free Light Chains: kappa 21.39, lambda 20.26, ratio = 1.06      [01-22 @ 20:37]  Immunofixation Serum:   IgG Lambda Band Identified    Reference Range: None Detected      [01-22-21 @ 20:37]  SPEP Interpretation: Gamma-Migrating Paraprotein Identified      [01-22-21 @ 20:37]  
CARDIOLOGY CONSULT - Dr. Mcdaniels -- COVERAGE FOR [facility]         HPI:  62y/o M w/ h/o ESRD on HD via LUE AVF (doesn't make urine), CAD s/p stent (plavix/asa), T2DM, HTN, HLD, anemia, presenting with weakness. Pt states that he just came back from [facility] and was getting dialysis only once a week for 3 months, last dialysis was 5d ago. States that he was unable to receive more dialysis due to financial constraints. Has been progressively feeling weak, tired, had episodes of nausea and vomiting. Denies fevers, chills, chest pain, cough, sob, abdominal pain, back pain. [facility] nephrology.  In the ER was found to be hyperkalemia 6.7 with EKG changes, acidosis, house nephrology and MICU consulted for emergent dialysis. Received calcium gluconate 2g IVPB, Humalog 5U IV push, dextrose 50% 50mL, Lokelma 10g. Currently receiving sodium bicarb infusion 150meq.   pt denies any chest pain or sob   ROS otherwise negative           PAST MEDICAL & SURGICAL HISTORY:  ESRD on hemodialysis  M/W/Saturday at home in [facility]      T2DM (type 2 diabetes mellitus)      CAD (coronary artery disease)  s/p sent in 06/2020      HTN (hypertension)      AV fistula              PREVIOUS DIAGNOSTIC TESTING:    ECHO 1/19/21: EF 50%; Mild segmental left ventricular systolic dysfunction. The inferior wall, and the inferoseptum are hypokinetic.    MEDICATIONS:  Home Medications:      MEDICATIONS  (STANDING):  amLODIPine   Tablet 5 milliGRAM(s) Oral daily  aspirin  chewable 81 milliGRAM(s) Oral daily  calcium acetate 667 milliGRAM(s) Oral three times a day with meals  chlorhexidine 2% Cloths 1 Application(s) Topical <User Schedule>  chlorhexidine 4% Liquid 1 Application(s) Topical <User Schedule>  clopidogrel Tablet 75 milliGRAM(s) Oral daily  dextrose 5%. 1000 milliLiter(s) (100 mL/Hr) IV Continuous <Continuous>  dextrose 5%. 1000 milliLiter(s) (50 mL/Hr) IV Continuous <Continuous>  doxercalciferol Injectable 2.5 MICROGram(s) IV Push once  folic acid 1 milliGRAM(s) Oral daily  gabapentin 100 milliGRAM(s) Oral every 8 hours  glucagon  Injectable 1 milliGRAM(s) IntraMuscular once  heparin   Injectable 5000 Unit(s) SubCutaneous every 12 hours  insulin lispro (ADMELOG) corrective regimen sliding scale   SubCutaneous three times a day before meals  insulin lispro (ADMELOG) corrective regimen sliding scale   SubCutaneous at bedtime  lisinopril 2.5 milliGRAM(s) Oral daily  loratadine 10 milliGRAM(s) Oral daily  metoprolol tartrate 25 milliGRAM(s) Oral two times a day      FAMILY HISTORY:  No pertinent family history in first degree relatives        SOCIAL HISTORY:    [ x]  former smoker     Allergies    No Known Allergies    Intolerances    	    REVIEW OF SYSTEMS:  CONSTITUTIONAL:  weakness   EYES: No eye pain, visual disturbances, or discharge  ENMT:  No difficulty hearing, tinnitus, vertigo; No sinus or throat pain  NECK: No pain or stiffness  RESPIRATORY: No cough, wheezing, chills or hemoptysis; No Shortness of Breath  CARDIOVASCULAR: No chest pain, palpitations, passing out, dizziness, or leg swelling  GASTROINTESTINAL: No abdominal or epigastric pain. No nausea, vomiting, or hematemesis; No diarrhea or constipation. No melena or hematochezia.  GENITOURINARY: No dysuria, frequency, hematuria, or incontinence  NEUROLOGICAL: No headaches, memory loss, loss of strength, numbness, or tremors  SKIN: No itching, burning, rashes, or lesions   	    [x ] All others negative	  [ ] Unable to obtain    PHYSICAL EXAM:  T(C): 36.6 (06-27-22 @ 04:00), Max: 36.7 (06-26-22 @ 20:03)  HR: 59 (06-27-22 @ 04:00) (59 - 66)  BP: 130/64 (06-27-22 @ 04:00) (116/66 - 130/64)  RR: 18 (06-27-22 @ 04:00) (18 - 18)  SpO2: 97% (06-27-22 @ 04:00) (97% - 99%)  Wt(kg): --  I&O's Summary    26 Jun 2022 07:01  -  27 Jun 2022 07:00  --------------------------------------------------------  IN: 160 mL / OUT: 0 mL / NET: 160 mL        Appearance: Normal	  Psychiatry: A & O x 3, Mood & affect appropriate  HEENT:   Normal oral mucosa, PERRL, EOMI	  Lymphatic: No lymphadenopathy  Cardiovascular: Normal S1 S2,RRR, No JVD, No murmurs  Respiratory: Lungs clear to auscultation	  Gastrointestinal:  Soft, Non-tender, + BS	  Skin: No rashes, No ecchymoses, No cyanosis	  Neurologic: Non-focal  Extremities: Normal range of motion, No clubbing, cyanosis or edema  Vascular: Peripheral pulses palpable 2+ bilaterally    TELEMETRY: 	    ECG:  nsr hr 68 vpm . LVH with repol   old infer/ ant infarct 	  RADIOLOGY:  < from: Xray Chest 2 Views PA/Lat (06.21.22 @ 00:38) >    FINDINGS:  Heart/Vascular: Heart is mildly enlarged.  Pulmonary: Clear lungs. No pneumothorax or pleural effusion.  Bones: No focal consolidation.    Impression:  Clear lungs.  Cardiomegaly      < end of copied text >    OTHER: 	  	  LABS:	 	    CARDIAC MARKERS:  Troponin T, High Sensitivity Result: 6342 ng/L (06-27 @ 06:28)  Troponin T, High Sensitivity Result: 6469 ng/L (06-26 @ 19:41)  Troponin T, High Sensitivity Result: 6433 ng/L (06-26 @ 06:52)  Troponin T, High Sensitivity Result: 6318 ng/L (06-25 @ 18:23)  Troponin T, High Sensitivity Result: 6292 ng/L (06-25 @ 12:46)  Troponin T, High Sensitivity Result: 5681 ng/L (06-25 @ 06:38)  Troponin T, High Sensitivity Result: 4821 ng/L (06-25 @ 01:56)  Troponin T, High Sensitivity Result: 4166 ng/L (06-24 @ 17:03)  Troponin T, High Sensitivity Result: 3758 ng/L (06-24 @ 10:10)                                  8.0    5.31  )-----------( 158      ( 27 Jun 2022 06:28 )             26.2     06-27    141  |  100  |  88<H>  ----------------------------<  167<H>  4.2   |  23  |  7.95<H>    Ca    7.6<L>      27 Jun 2022 06:28  Phos  4.1     06-27  Mg     1.9     06-27    TPro  7.0  /  Alb  3.6  /  TBili  0.3  /  DBili  x   /  AST  36  /  ALT  69<H>  /  AlkPhos  135<H>  06-27      proBNP:   Lipid Profile:   HgA1c:   TSH:

## 2022-06-27 NOTE — PROGRESS NOTE ADULT - ASSESSMENT
61M Hx ESRD on HD via LUE AVF (doesn't make urine), CAD s/p stent (plavix/asa), T2DM, HTN, HLD, anemia, presenting with weakness after skipping multiple dialysis sessions admitted for hyperkalemia with EKG changes, severe acidosis requiring emergent dialysis. Admitted to MICU now downgraded to the floors. Course c/b troponinemia.

## 2022-06-27 NOTE — PROGRESS NOTE ADULT - PROBLEM SELECTOR PLAN 2
- s/p emergent dialysis 6/21  - UO: no urine output at baseline   - nephro following, recs appreciated  - CM to establish outpatient HD

## 2022-06-28 VITALS
RESPIRATION RATE: 18 BRPM | HEART RATE: 71 BPM | TEMPERATURE: 98 F | DIASTOLIC BLOOD PRESSURE: 74 MMHG | OXYGEN SATURATION: 99 % | SYSTOLIC BLOOD PRESSURE: 132 MMHG

## 2022-06-28 LAB
ANION GAP SERPL CALC-SCNC: 23 MMOL/L — HIGH (ref 5–17)
APTT BLD: 34.4 SEC — SIGNIFICANT CHANGE UP (ref 27.5–35.5)
BLD GP AB SCN SERPL QL: NEGATIVE — SIGNIFICANT CHANGE UP
BUN SERPL-MCNC: 121 MG/DL — HIGH (ref 7–23)
CA-I BLD-SCNC: 0.85 MMOL/L — LOW (ref 1.15–1.33)
CALCIUM SERPL-MCNC: 7.1 MG/DL — LOW (ref 8.4–10.5)
CHLORIDE SERPL-SCNC: 95 MMOL/L — LOW (ref 96–108)
CO2 SERPL-SCNC: 17 MMOL/L — LOW (ref 22–31)
CREAT SERPL-MCNC: 9.44 MG/DL — HIGH (ref 0.5–1.3)
EGFR: 6 ML/MIN/1.73M2 — LOW
FIBRINOGEN PPP-MCNC: 928 MG/DL — HIGH (ref 330–520)
GLUCOSE BLDC GLUCOMTR-MCNC: 123 MG/DL — HIGH (ref 70–99)
GLUCOSE BLDC GLUCOMTR-MCNC: 148 MG/DL — HIGH (ref 70–99)
GLUCOSE BLDC GLUCOMTR-MCNC: 152 MG/DL — HIGH (ref 70–99)
GLUCOSE BLDC GLUCOMTR-MCNC: 256 MG/DL — HIGH (ref 70–99)
GLUCOSE SERPL-MCNC: 136 MG/DL — HIGH (ref 70–99)
HCT VFR BLD CALC: 25.6 % — LOW (ref 39–50)
HGB BLD-MCNC: 7.8 G/DL — LOW (ref 13–17)
MAGNESIUM SERPL-MCNC: 2.1 MG/DL — SIGNIFICANT CHANGE UP (ref 1.6–2.6)
MCHC RBC-ENTMCNC: 29.4 PG — SIGNIFICANT CHANGE UP (ref 27–34)
MCHC RBC-ENTMCNC: 30.5 GM/DL — LOW (ref 32–36)
MCV RBC AUTO: 96.6 FL — SIGNIFICANT CHANGE UP (ref 80–100)
MRSA PCR RESULT.: SIGNIFICANT CHANGE UP
NRBC # BLD: 0 /100 WBCS — SIGNIFICANT CHANGE UP (ref 0–0)
NT-PROBNP SERPL-SCNC: HIGH PG/ML (ref 0–300)
PA ADP PRP-ACNC: 314 PRU — SIGNIFICANT CHANGE UP (ref 194–417)
PLATELET # BLD AUTO: 165 K/UL — SIGNIFICANT CHANGE UP (ref 150–400)
POTASSIUM SERPL-MCNC: 5.6 MMOL/L — HIGH (ref 3.5–5.3)
POTASSIUM SERPL-SCNC: 5.6 MMOL/L — HIGH (ref 3.5–5.3)
RBC # BLD: 2.65 M/UL — LOW (ref 4.2–5.8)
RBC # FLD: 14.9 % — HIGH (ref 10.3–14.5)
RH IG SCN BLD-IMP: POSITIVE — SIGNIFICANT CHANGE UP
S AUREUS DNA NOSE QL NAA+PROBE: SIGNIFICANT CHANGE UP
SARS-COV-2 RNA SPEC QL NAA+PROBE: SIGNIFICANT CHANGE UP
SODIUM SERPL-SCNC: 135 MMOL/L — SIGNIFICANT CHANGE UP (ref 135–145)
T3 SERPL-MCNC: 47 NG/DL — LOW (ref 80–200)
T4 AB SER-ACNC: 3.6 UG/DL — LOW (ref 4.6–12)
TROPONIN T, HIGH SENSITIVITY RESULT: 5440 NG/L — HIGH (ref 0–51)
TSH SERPL-MCNC: 3.85 UIU/ML — SIGNIFICANT CHANGE UP (ref 0.27–4.2)
WBC # BLD: 6.09 K/UL — SIGNIFICANT CHANGE UP (ref 3.8–10.5)
WBC # FLD AUTO: 6.09 K/UL — SIGNIFICANT CHANGE UP (ref 3.8–10.5)

## 2022-06-28 PROCEDURE — 99291 CRITICAL CARE FIRST HOUR: CPT | Mod: 25

## 2022-06-28 PROCEDURE — U0003: CPT

## 2022-06-28 PROCEDURE — 84295 ASSAY OF SERUM SODIUM: CPT

## 2022-06-28 PROCEDURE — 84480 ASSAY TRIIODOTHYRONINE (T3): CPT

## 2022-06-28 PROCEDURE — 99261: CPT

## 2022-06-28 PROCEDURE — 93005 ELECTROCARDIOGRAM TRACING: CPT

## 2022-06-28 PROCEDURE — 87641 MR-STAPH DNA AMP PROBE: CPT

## 2022-06-28 PROCEDURE — 80053 COMPREHEN METABOLIC PANEL: CPT

## 2022-06-28 PROCEDURE — 86901 BLOOD TYPING SEROLOGIC RH(D): CPT

## 2022-06-28 PROCEDURE — 90935 HEMODIALYSIS ONE EVALUATION: CPT | Mod: GC

## 2022-06-28 PROCEDURE — 83540 ASSAY OF IRON: CPT

## 2022-06-28 PROCEDURE — 85027 COMPLETE CBC AUTOMATED: CPT

## 2022-06-28 PROCEDURE — 82607 VITAMIN B-12: CPT

## 2022-06-28 PROCEDURE — 86706 HEP B SURFACE ANTIBODY: CPT

## 2022-06-28 PROCEDURE — 83550 IRON BINDING TEST: CPT

## 2022-06-28 PROCEDURE — 82553 CREATINE MB FRACTION: CPT

## 2022-06-28 PROCEDURE — 82550 ASSAY OF CK (CPK): CPT

## 2022-06-28 PROCEDURE — 82947 ASSAY GLUCOSE BLOOD QUANT: CPT

## 2022-06-28 PROCEDURE — 71046 X-RAY EXAM CHEST 2 VIEWS: CPT

## 2022-06-28 PROCEDURE — 93306 TTE W/DOPPLER COMPLETE: CPT

## 2022-06-28 PROCEDURE — 86900 BLOOD TYPING SEROLOGIC ABO: CPT

## 2022-06-28 PROCEDURE — 96374 THER/PROPH/DIAG INJ IV PUSH: CPT

## 2022-06-28 PROCEDURE — 85018 HEMOGLOBIN: CPT

## 2022-06-28 PROCEDURE — 84484 ASSAY OF TROPONIN QUANT: CPT

## 2022-06-28 PROCEDURE — C1894: CPT

## 2022-06-28 PROCEDURE — 82330 ASSAY OF CALCIUM: CPT

## 2022-06-28 PROCEDURE — C1769: CPT

## 2022-06-28 PROCEDURE — 85576 BLOOD PLATELET AGGREGATION: CPT

## 2022-06-28 PROCEDURE — 80061 LIPID PANEL: CPT

## 2022-06-28 PROCEDURE — 83036 HEMOGLOBIN GLYCOSYLATED A1C: CPT

## 2022-06-28 PROCEDURE — 82962 GLUCOSE BLOOD TEST: CPT

## 2022-06-28 PROCEDURE — 85730 THROMBOPLASTIN TIME PARTIAL: CPT

## 2022-06-28 PROCEDURE — 99233 SBSQ HOSP IP/OBS HIGH 50: CPT

## 2022-06-28 PROCEDURE — 83880 ASSAY OF NATRIURETIC PEPTIDE: CPT

## 2022-06-28 PROCEDURE — 84100 ASSAY OF PHOSPHORUS: CPT

## 2022-06-28 PROCEDURE — 83735 ASSAY OF MAGNESIUM: CPT

## 2022-06-28 PROCEDURE — 99152 MOD SED SAME PHYS/QHP 5/>YRS: CPT

## 2022-06-28 PROCEDURE — 82435 ASSAY OF BLOOD CHLORIDE: CPT

## 2022-06-28 PROCEDURE — C1887: CPT

## 2022-06-28 PROCEDURE — 82803 BLOOD GASES ANY COMBINATION: CPT

## 2022-06-28 PROCEDURE — 84443 ASSAY THYROID STIM HORMONE: CPT

## 2022-06-28 PROCEDURE — 85025 COMPLETE CBC W/AUTO DIFF WBC: CPT

## 2022-06-28 PROCEDURE — U0005: CPT

## 2022-06-28 PROCEDURE — 82652 VIT D 1 25-DIHYDROXY: CPT

## 2022-06-28 PROCEDURE — 82728 ASSAY OF FERRITIN: CPT

## 2022-06-28 PROCEDURE — 84436 ASSAY OF TOTAL THYROXINE: CPT

## 2022-06-28 PROCEDURE — 87637 SARSCOV2&INF A&B&RSV AMP PRB: CPT

## 2022-06-28 PROCEDURE — 82310 ASSAY OF CALCIUM: CPT

## 2022-06-28 PROCEDURE — 82306 VITAMIN D 25 HYDROXY: CPT

## 2022-06-28 PROCEDURE — 80074 ACUTE HEPATITIS PANEL: CPT

## 2022-06-28 PROCEDURE — 85014 HEMATOCRIT: CPT

## 2022-06-28 PROCEDURE — 80048 BASIC METABOLIC PNL TOTAL CA: CPT

## 2022-06-28 PROCEDURE — 87640 STAPH A DNA AMP PROBE: CPT

## 2022-06-28 PROCEDURE — 85610 PROTHROMBIN TIME: CPT

## 2022-06-28 PROCEDURE — 85384 FIBRINOGEN ACTIVITY: CPT

## 2022-06-28 PROCEDURE — 83970 ASSAY OF PARATHORMONE: CPT

## 2022-06-28 PROCEDURE — 86850 RBC ANTIBODY SCREEN: CPT

## 2022-06-28 PROCEDURE — 83605 ASSAY OF LACTIC ACID: CPT

## 2022-06-28 PROCEDURE — 82746 ASSAY OF FOLIC ACID SERUM: CPT

## 2022-06-28 PROCEDURE — 84132 ASSAY OF SERUM POTASSIUM: CPT

## 2022-06-28 PROCEDURE — 87340 HEPATITIS B SURFACE AG IA: CPT

## 2022-06-28 PROCEDURE — 93458 L HRT ARTERY/VENTRICLE ANGIO: CPT

## 2022-06-28 PROCEDURE — 36415 COLL VENOUS BLD VENIPUNCTURE: CPT

## 2022-06-28 RX ORDER — ATORVASTATIN CALCIUM 80 MG/1
1 TABLET, FILM COATED ORAL
Qty: 30 | Refills: 0
Start: 2022-06-28 | End: 2022-07-27

## 2022-06-28 RX ORDER — CALCIUM ACETATE 667 MG
1 TABLET ORAL
Qty: 90 | Refills: 0
Start: 2022-06-28 | End: 2022-07-27

## 2022-06-28 RX ORDER — CLOPIDOGREL BISULFATE 75 MG/1
1 TABLET, FILM COATED ORAL
Qty: 30 | Refills: 0
Start: 2022-06-28 | End: 2022-07-27

## 2022-06-28 RX ORDER — DOXERCALCIFEROL 2.5 UG/1
2.5 CAPSULE ORAL ONCE
Refills: 0 | Status: COMPLETED | OUTPATIENT
Start: 2022-06-28 | End: 2022-06-28

## 2022-06-28 RX ORDER — CLOPIDOGREL BISULFATE 75 MG/1
75 TABLET, FILM COATED ORAL DAILY
Refills: 0 | Status: DISCONTINUED | OUTPATIENT
Start: 2022-06-28 | End: 2022-06-28

## 2022-06-28 RX ADMIN — Medication 1 MILLIGRAM(S): at 12:41

## 2022-06-28 RX ADMIN — HEPARIN SODIUM 5000 UNIT(S): 5000 INJECTION INTRAVENOUS; SUBCUTANEOUS at 18:03

## 2022-06-28 RX ADMIN — LORATADINE 10 MILLIGRAM(S): 10 TABLET ORAL at 12:41

## 2022-06-28 RX ADMIN — DOXERCALCIFEROL 2.5 MICROGRAM(S): 2.5 CAPSULE ORAL at 14:44

## 2022-06-28 RX ADMIN — CHLORHEXIDINE GLUCONATE 1 APPLICATION(S): 213 SOLUTION TOPICAL at 06:44

## 2022-06-28 RX ADMIN — Medication 81 MILLIGRAM(S): at 12:40

## 2022-06-28 RX ADMIN — ATORVASTATIN CALCIUM 80 MILLIGRAM(S): 80 TABLET, FILM COATED ORAL at 21:45

## 2022-06-28 RX ADMIN — Medication 667 MILLIGRAM(S): at 12:41

## 2022-06-28 RX ADMIN — GABAPENTIN 100 MILLIGRAM(S): 400 CAPSULE ORAL at 21:45

## 2022-06-28 RX ADMIN — HEPARIN SODIUM 5000 UNIT(S): 5000 INJECTION INTRAVENOUS; SUBCUTANEOUS at 05:22

## 2022-06-28 RX ADMIN — Medication 667 MILLIGRAM(S): at 10:27

## 2022-06-28 RX ADMIN — Medication 2: at 21:45

## 2022-06-28 RX ADMIN — CLOPIDOGREL BISULFATE 75 MILLIGRAM(S): 75 TABLET, FILM COATED ORAL at 18:01

## 2022-06-28 RX ADMIN — GABAPENTIN 100 MILLIGRAM(S): 400 CAPSULE ORAL at 05:22

## 2022-06-28 RX ADMIN — CHLORHEXIDINE GLUCONATE 1 APPLICATION(S): 213 SOLUTION TOPICAL at 05:24

## 2022-06-28 RX ADMIN — Medication 667 MILLIGRAM(S): at 18:02

## 2022-06-28 RX ADMIN — LISINOPRIL 2.5 MILLIGRAM(S): 2.5 TABLET ORAL at 18:03

## 2022-06-28 RX ADMIN — Medication 25 MILLIGRAM(S): at 18:01

## 2022-06-28 RX ADMIN — DOXERCALCIFEROL 2.5 MICROGRAM(S): 2.5 CAPSULE ORAL at 14:50

## 2022-06-28 NOTE — DIETITIAN INITIAL EVALUATION ADULT - NSICDXPASTMEDICALHX_GEN_ALL_CORE_FT
PAST MEDICAL HISTORY:  CAD (coronary artery disease) s/p sent in 06/2020    ESRD on hemodialysis M/W/Saturday at home in [facility]    HTN (hypertension)     T2DM (type 2 diabetes mellitus)

## 2022-06-28 NOTE — DIETITIAN INITIAL EVALUATION ADULT - ENERGY INTAKE
Pt reports good PO intake in-house, flowsheets indicate the same. Interested in trying Nepro 1x/day to optimize nutritional intake in setting of HD - RD to honor. Adequate (%)

## 2022-06-28 NOTE — PROGRESS NOTE ADULT - SUBJECTIVE AND OBJECTIVE BOX
[facility] DIVISION OF KIDNEY DISEASES AND HYPERTENSION   FOLLOW UP NOTE    --------------------------------------------------------------------------------  Chief Complaint: ESRD on HD    24 hour events/subjective: Pt. was seen and examined today. Overnight events  noted. Last HD done on 6/25/22. Pt underwent LHC on 6/27/22 and found to have multivessel disease and being evaluated for CABG by CTS.      PAST HISTORY  --------------------------------------------------------------------------------  No significant changes to PMH, PSH, FHx, SHx, unless otherwise noted    ALLERGIES & MEDICATIONS  --------------------------------------------------------------------------------  Allergies    No Known Allergies    Intolerances      Standing Inpatient Medications  amLODIPine   Tablet 5 milliGRAM(s) Oral daily  aspirin  chewable 81 milliGRAM(s) Oral daily  atorvastatin 80 milliGRAM(s) Oral at bedtime  calcium acetate 667 milliGRAM(s) Oral three times a day with meals  chlorhexidine 2% Cloths 1 Application(s) Topical <User Schedule>  chlorhexidine 4% Liquid 1 Application(s) Topical <User Schedule>  dextrose 5%. 1000 milliLiter(s) IV Continuous <Continuous>  dextrose 5%. 1000 milliLiter(s) IV Continuous <Continuous>  doxercalciferol Injectable 2.5 MICROGram(s) IV Push once  folic acid 1 milliGRAM(s) Oral daily  gabapentin 100 milliGRAM(s) Oral every 8 hours  glucagon  Injectable 1 milliGRAM(s) IntraMuscular once  heparin   Injectable 5000 Unit(s) SubCutaneous every 12 hours  insulin lispro (ADMELOG) corrective regimen sliding scale   SubCutaneous three times a day before meals  insulin lispro (ADMELOG) corrective regimen sliding scale   SubCutaneous at bedtime  lisinopril 2.5 milliGRAM(s) Oral daily  loratadine 10 milliGRAM(s) Oral daily  metoprolol tartrate 25 milliGRAM(s) Oral two times a day    PRN Inpatient Medications  benzonatate 100 milliGRAM(s) Oral every 8 hours PRN      REVIEW OF SYSTEMS  --------------------------------------------------------------------------------  Gen: No fevers/chills  Head/Eyes/Ears: Normal hearing,   Respiratory: No dyspnea, cough  CV: No chest pain  GI: No abdominal pain, diarrhea  : on HD  MSK: No  edema  Skin: No rashes  Heme: No easy bruising or bleeding    All other systems were reviewed and are negative, except as noted.    VITALS/PHYSICAL EXAM  --------------------------------------------------------------------------------  T(C): 36.7 (06-28-22 @ 04:00), Max: 36.8 (06-27-22 @ 16:05)  HR: 57 (06-28-22 @ 04:00) (57 - 74)  BP: 129/72 (06-28-22 @ 04:00) (111/46 - 158/77)  RR: 17 (06-28-22 @ 04:00) (14 - 18)  SpO2: 95% (06-28-22 @ 04:00) (95% - 100%)  Wt(kg): --  Height (cm): 162.6 (06-27-22 @ 16:05)  Weight (kg): 55.2 (06-27-22 @ 16:05)  BMI (kg/m2): 20.9 (06-27-22 @ 16:05)  BSA (m2): 1.58 (06-27-22 @ 16:05)      06-27-22 @ 07:01  -  06-28-22 @ 07:00  --------------------------------------------------------  IN: 400 mL / OUT: 0 mL / NET: 400 mL      Physical Exam:  	Gen: NAD, well-appearing  	HEENT: on room air  	Pulm: CTA B/L  	CV: normal S1S2; no rub  	Abd: soft                      Back : No sacral edema  	: No kwok  	LE: no edema  	Skin: Warm, without rashes  	Vascular access: KWASI DICKERSON      LABS/STUDIES  --------------------------------------------------------------------------------              8.0    5.31  >-----------<  158      [06-27-22 @ 06:28]              26.2     141  |  100  |  88  ----------------------------<  167      [06-27-22 @ 06:28]  4.2   |  23  |  7.95        Ca     7.6     [06-27-22 @ 06:28]      Mg     1.9     [06-27-22 @ 06:28]      Phos  4.1     [06-27-22 @ 06:28]          [06-27-22 @ 06:28]    Creatinine Trend:  SCr 7.95 [06-27 @ 06:28]  SCr 5.09 [06-26 @ 06:52]  SCr 5.49 [06-25 @ 01:56]  SCr 8.72 [06-24 @ 10:10]  SCr 6.52 [06-23 @ 07:17]    HBsAb 590.4      [06-21-22 @ 00:39]  HBsAg Nonreact      [06-24-22 @ 10:10]  HCV 0.14, Nonreact      [06-24-22 @ 10:10]

## 2022-06-28 NOTE — DIETITIAN INITIAL EVALUATION ADULT - OTHER CALCULATIONS
Fluid needs deferred to team. Energy, protein needs based on most recent weight: 117.7 lbs/53.4 kg (06-28)

## 2022-06-28 NOTE — DIETITIAN INITIAL EVALUATION ADULT - NSFNSADHERENCEPTAFT_GEN_A_CORE
Pt noted with hx of DM2. Per H&P, was taking Januvia PTA, and checks his blood glucose 1-2x/day with typical reading 120-140 mg/dl.  A1c 6.4% indicates good glycemic control.   Is aware of renal recommendations and tries to limit high K+, phosphorus, and sodium foods.

## 2022-06-28 NOTE — DIETITIAN INITIAL EVALUATION ADULT - REASON INDICATOR FOR ASSESSMENT
Pt seen for length of stay assessment. Source of information: medical record, pt.  Chart reviewed, events noted.

## 2022-06-28 NOTE — PROGRESS NOTE ADULT - SUBJECTIVE AND OBJECTIVE BOX
CARDIOLOGY FOLLOW UP - Dr. Mcdaniels-- coverage for [facility]  DATE OF SERVICE: 6/28/22     CC no cp or sob       REVIEW OF SYSTEMS:  CONSTITUTIONAL: No fever, weight loss, or fatigue  RESPIRATORY: No cough, wheezing, chills or hemoptysis; No Shortness of Breath  CARDIOVASCULAR: No chest pain, palpitations, passing out, dizziness, or leg swelling  GASTROINTESTINAL: No abdominal or epigastric pain. No nausea, vomiting, or hematemesis; No diarrhea or constipation. No melena or hematochezia.  VASCULAR: No edema     PHYSICAL EXAM:  T(C): 36.3 (06-28-22 @ 13:00), Max: 36.8 (06-27-22 @ 16:05)  HR: 56 (06-28-22 @ 13:00) (55 - 68)  BP: 135/82 (06-28-22 @ 13:00) (111/46 - 158/77)  RR: 18 (06-28-22 @ 13:00) (14 - 18)  SpO2: 99% (06-28-22 @ 13:00) (95% - 100%)  Wt(kg): --  I&O's Summary    27 Jun 2022 07:01  -  28 Jun 2022 07:00  --------------------------------------------------------  IN: 400 mL / OUT: 0 mL / NET: 400 mL    28 Jun 2022 07:01  -  28 Jun 2022 13:21  --------------------------------------------------------  IN: 310 mL / OUT: 0 mL / NET: 310 mL        Appearance: Normal	  Cardiovascular: Normal S1 S2,RRR, No JVD, No murmurs  Respiratory: Lungs clear to auscultation	  Gastrointestinal:  Soft, Non-tender, + BS	  Extremities: Normal range of motion, No clubbing, cyanosis or edema      Home Medications:      MEDICATIONS  (STANDING):  amLODIPine   Tablet 5 milliGRAM(s) Oral daily  aspirin  chewable 81 milliGRAM(s) Oral daily  atorvastatin 80 milliGRAM(s) Oral at bedtime  calcium acetate 667 milliGRAM(s) Oral three times a day with meals  chlorhexidine 2% Cloths 1 Application(s) Topical <User Schedule>  chlorhexidine 4% Liquid 1 Application(s) Topical <User Schedule>  dextrose 5%. 1000 milliLiter(s) (100 mL/Hr) IV Continuous <Continuous>  dextrose 5%. 1000 milliLiter(s) (50 mL/Hr) IV Continuous <Continuous>  doxercalciferol Injectable 2.5 MICROGram(s) IV Push once  doxercalciferol Injectable 2.5 MICROGram(s) IV Push once  folic acid 1 milliGRAM(s) Oral daily  gabapentin 100 milliGRAM(s) Oral every 8 hours  glucagon  Injectable 1 milliGRAM(s) IntraMuscular once  heparin   Injectable 5000 Unit(s) SubCutaneous every 12 hours  insulin lispro (ADMELOG) corrective regimen sliding scale   SubCutaneous three times a day before meals  insulin lispro (ADMELOG) corrective regimen sliding scale   SubCutaneous at bedtime  lisinopril 2.5 milliGRAM(s) Oral daily  loratadine 10 milliGRAM(s) Oral daily  metoprolol tartrate 25 milliGRAM(s) Oral two times a day      TELEMETRY:  nsr	    ECG:  	  RADIOLOGY:   DIAGNOSTIC TESTING:  [ ] Echocardiogram:  [ ]  Catheterization:  < from: Cardiac Catheterization (06.27.22 @ 19:31) >  Diagnostic Conclusions:     Significant multivessel CAD.   Recommend CTS evaluation.       < end of copied text >    [ ] Stress Test:    OTHER: 	    LABS:	 	    Creatine Kinase, Serum: 119 U/L [30 - 200] (06-27 @ 06:28)  CKMB Units: 5.6 ng/mL [0.0 - 6.7] (06-27 @ 06:28)  Troponin T, High Sensitivity Result: 6342 ng/L [0 - 51] (06-27 @ 06:28)  CKMB Units: 6.0 ng/mL [0.0 - 6.7] (06-26 @ 19:41)  Creatine Kinase, Serum: 127 U/L [30 - 200] (06-26 @ 19:41)  Troponin T, High Sensitivity Result: 6469 ng/L [0 - 51] (06-26 @ 19:41)  Troponin T, High Sensitivity Result: 6433 ng/L [0 - 51] (06-26 @ 06:52)  Creatine Kinase, Serum: 242 U/L [30 - 200] (06-25 @ 18:23)  CKMB Units: 9.9 ng/mL [0.0 - 6.7] (06-25 @ 18:23)  Troponin T, High Sensitivity Result: 6318 ng/L [0 - 51] (06-25 @ 18:23)  Creatine Kinase, Serum: 234 U/L [30 - 200] (06-25 @ 12:46)  CKMB Units: 9.0 ng/mL [0.0 - 6.7] (06-25 @ 12:46)  Troponin T, High Sensitivity Result: 6292 ng/L [0 - 51] (06-25 @ 12:46)  Troponin T, High Sensitivity Result: 5681 ng/L [0 - 51] (06-25 @ 06:38)  Troponin T, High Sensitivity Result: 4821 ng/L [0 - 51] (06-25 @ 01:56)  Creatine Kinase, Serum: 331 U/L [30 - 200] (06-24 @ 17:03)  CKMB Units: 12.1 ng/mL [0.0 - 6.7] (06-24 @ 17:03)  Troponin T, High Sensitivity Result: 4166 ng/L [0 - 51] (06-24 @ 17:03)  Troponin T, High Sensitivity Result: 3758 ng/L [0 - 51] (06-24 @ 10:10)  CKMB Units: 12.7 ng/mL [0.0 - 6.7] (06-24 @ 10:10)  Creatine Kinase, Serum: 364 U/L [30 - 200] (06-24 @ 10:10)                          8.0    5.31  )-----------( 158      ( 27 Jun 2022 06:28 )             26.2     06-27    141  |  100  |  88<H>  ----------------------------<  167<H>  4.2   |  23  |  7.95<H>    Ca    7.6<L>      27 Jun 2022 06:28  Phos  4.1     06-27  Mg     1.9     06-27    TPro  7.0  /  Alb  3.6  /  TBili  0.3  /  DBili  x   /  AST  36  /  ALT  69<H>  /  AlkPhos  135<H>  06-27

## 2022-06-28 NOTE — DIETITIAN INITIAL EVALUATION ADULT - REASON
Nutrition-focused physical examination deferred at this time - pt somewhat agitated, stated he wants to be discharged. No overt signs of fat/muscle wasting visually observed.

## 2022-06-28 NOTE — DIETITIAN INITIAL EVALUATION ADULT - PERSON TAUGHT/METHOD
Attempted to provide Renal HD, DM, heart healthy nutrition education but pt declined. Discussed importance of adequate protein-energy consumption to meet estimated nutrient needs. Encouraged intake of meals and oral nutrition supplements as tolerated. Pt noted with good comprehension and made aware RD remains available for further questions/concerns./verbal instruction/patient instructed

## 2022-06-28 NOTE — DIETITIAN INITIAL EVALUATION ADULT - OTHER INFO
Per chart, pt currently ordered for admelog SSI, atorvastatin, phoslo, and folic acid in-house.    Reports UBW 52 kg. Denies recent wt changes.   Dosing wt: 55.2 kg/121.6 lbs (06-27)  Wt history per chart: 117.7 lbs (06-28), 115 lbs (09/09/21), 123 lbs (07/07/21), 125 lbs (06/10/21). Overall 6% weight decrease noted since last year. RD to continue to monitor weight trends as able/available.     Per chart: Last HD today with 1200 ml fluid removed.    Pt made aware RD remains available.

## 2022-06-28 NOTE — DIETITIAN INITIAL EVALUATION ADULT - NS FNS DIET ORDER
Diet, Renal Restrictions:   For patients receiving Renal Replacement - No Protein Restr, No Conc K, No Conc Phos, Low Sodium  Consistent Carbohydrate {Evening Snack} (CSTCHOSN)  DASH/TLC {Sodium & Cholesterol Restricted} (DASH) (06-21-22 @ 08:47) [Active]

## 2022-06-28 NOTE — DIETITIAN INITIAL EVALUATION ADULT - PERTINENT LABORATORY DATA
06-28    135  |  95<L>  |  121<H>  ----------------------------<  136<H>  5.6<H>   |  17<L>  |  9.44<H>    Ca    7.1<L>      28 Jun 2022 13:19  Phos  4.1     06-27  Mg     2.1     06-28    TPro  7.0  /  Alb  3.6  /  TBili  0.3  /  DBili  x   /  AST  36  /  ALT  69<H>  /  AlkPhos  135<H>  06-27    CAPILLARY BLOOD GLUCOSE  POCT Blood Glucose.: 152 mg/dL (28 Jun 2022 16:46)  POCT Blood Glucose.: 123 mg/dL (28 Jun 2022 12:09)  POCT Blood Glucose.: 148 mg/dL (28 Jun 2022 07:57)  POCT Blood Glucose.: 145 mg/dL (27 Jun 2022 21:18)    A1C with Estimated Average Glucose Result: 6.4 % (06-22-22 @ 01:28)  A1C with Estimated Average Glucose Result: 6.4 % (06-21-22 @ 00:39)  A1C with Estimated Average Glucose Result: 8.1 % (07-10-21 @ 10:15)

## 2022-06-28 NOTE — PROGRESS NOTE ADULT - PROBLEM SELECTOR PLAN 6
- Resolved  - home meds restarted, metoprolol tartrate 25mg BID and lisinopril 2.5mg QD  - started amlodipine 5 mg QD

## 2022-06-28 NOTE — PROGRESS NOTE ADULT - PROBLEM SELECTOR PLAN 10
DVT ppx: heparin subq  Diet: consistent carb  Dispo: pending setup of outpt HD
DVT ppx: heparin subq  Diet: consistent carb  Dispo: pending setup of outpt HD    Prefers to update family
On Januvia at home  - ISS, monitor BG  - continue home gabapentin
DVT ppx: heparin subq  Diet: consistent carb  Dispo: pending setup of outpt HD

## 2022-06-28 NOTE — PROGRESS NOTE ADULT - SUBJECTIVE AND OBJECTIVE BOX
Patient is a 61y old  Male who presents with a chief complaint of HD (27 Jun 2022 09:08)      SUBJECTIVE / OVERNIGHT EVENTS: Patient seen and examined at bedside. No acute events overnight. He had LHC showing triple vessel disease. No pain at access site. Denies CP/SOB.    MEDICATIONS  (STANDING):  amLODIPine   Tablet 5 milliGRAM(s) Oral daily  aspirin  chewable 81 milliGRAM(s) Oral daily  atorvastatin 80 milliGRAM(s) Oral at bedtime  calcium acetate 667 milliGRAM(s) Oral three times a day with meals  chlorhexidine 2% Cloths 1 Application(s) Topical <User Schedule>  chlorhexidine 4% Liquid 1 Application(s) Topical <User Schedule>  clopidogrel Tablet 75 milliGRAM(s) Oral daily  dextrose 5%. 1000 milliLiter(s) (50 mL/Hr) IV Continuous <Continuous>  dextrose 5%. 1000 milliLiter(s) (100 mL/Hr) IV Continuous <Continuous>  doxercalciferol Injectable 2.5 MICROGram(s) IV Push once  doxercalciferol Injectable 2.5 MICROGram(s) IV Push once  folic acid 1 milliGRAM(s) Oral daily  gabapentin 100 milliGRAM(s) Oral every 8 hours  glucagon  Injectable 1 milliGRAM(s) IntraMuscular once  heparin   Injectable 5000 Unit(s) SubCutaneous every 12 hours  insulin lispro (ADMELOG) corrective regimen sliding scale   SubCutaneous three times a day before meals  insulin lispro (ADMELOG) corrective regimen sliding scale   SubCutaneous at bedtime  lisinopril 2.5 milliGRAM(s) Oral daily  loratadine 10 milliGRAM(s) Oral daily  metoprolol tartrate 25 milliGRAM(s) Oral two times a day    MEDICATIONS  (PRN):  benzonatate 100 milliGRAM(s) Oral every 8 hours PRN Cough      CAPILLARY BLOOD GLUCOSE      POCT Blood Glucose.: 123 mg/dL (28 Jun 2022 12:09)  POCT Blood Glucose.: 148 mg/dL (28 Jun 2022 07:57)  POCT Blood Glucose.: 145 mg/dL (27 Jun 2022 21:18)  POCT Blood Glucose.: 140 mg/dL (27 Jun 2022 16:13)    I&O's Summary    27 Jun 2022 07:01  -  28 Jun 2022 07:00  --------------------------------------------------------  IN: 400 mL / OUT: 0 mL / NET: 400 mL    28 Jun 2022 07:01  -  28 Jun 2022 13:49  --------------------------------------------------------  IN: 310 mL / OUT: 0 mL / NET: 310 mL        PHYSICAL EXAM:  Vital Signs Last 24 Hrs  T(C): 36.3 (28 Jun 2022 13:00), Max: 36.8 (27 Jun 2022 16:05)  T(F): 97.3 (28 Jun 2022 13:00), Max: 98.3 (27 Jun 2022 16:05)  HR: 56 (28 Jun 2022 13:00) (55 - 68)  BP: 135/82 (28 Jun 2022 13:00) (111/46 - 158/77)  BP(mean): --  RR: 18 (28 Jun 2022 13:00) (14 - 18)  SpO2: 99% (28 Jun 2022 13:00) (95% - 100%)    GEN: male in NAD, appears comfortable, no diaphoresis  EYES: No scleral injection, PERRL, EOMI  ENTM: neck supple & symmetric without tracheal deviation, moist membranes, no gross hearing impairment, thyroid gland not enlarged  CV: +S1/S2, no m/r/g, no abdominal bruit, no LE edema  RESP: breathing comfortably, no respiratory accessory muscle use, CTAB, no w/r/r  GI: normoactive BS, soft, NTND, no rebounding/guarding, no palpable masses    LABS:                        7.8    6.09  )-----------( 165      ( 28 Jun 2022 13:19 )             25.6     06-27    141  |  100  |  88<H>  ----------------------------<  167<H>  4.2   |  23  |  7.95<H>    Ca    7.6<L>      27 Jun 2022 06:28  Phos  4.1     06-27  Mg     1.9     06-27    TPro  7.0  /  Alb  3.6  /  TBili  0.3  /  DBili  x   /  AST  36  /  ALT  69<H>  /  AlkPhos  135<H>  06-27    PTT - ( 28 Jun 2022 13:19 )  PTT:34.4 sec  CARDIAC MARKERS ( 27 Jun 2022 06:28 )  x     / x     / 119 U/L / x     / 5.6 ng/mL  CARDIAC MARKERS ( 26 Jun 2022 19:41 )  x     / x     / 127 U/L / x     / 6.0 ng/mL            RADIOLOGY & ADDITIONAL TESTS:  Results Reviewed:   Imaging Personally Reviewed:  Electrocardiogram Personally Reviewed:    COORDINATION OF CARE:  Care Discussed with Consultants/Other Providers [Y/N]:  Prior or Outpatient Records Reviewed [Y/N]:

## 2022-06-28 NOTE — PROGRESS NOTE ADULT - ASSESSMENT
ECHO 1/19/21: EF 50%; Mild segmental left ventricular systolic dysfunction. The inferior wall, and the inferoseptum are hypokinetic.    a/p   62y/o M w/ h/o ESRD on HD via LUE AVF (doesn't make urine), CAD s/p stent (plavix/asa), T2DM, HTN, HLD, anemia, presenting with weakness after skipping multiple dialysis sessions admitted for hyperkalemia with EKG changes, severe acidosis requiring emergent dialysis. sp MICU     #NSTEMI , CAD sp PCI   -ECG with some st depressions to v5. v6   -no cp or sob   -check echo   -cardaic cath with  Significant multivessel CAD  -CTS eval  noted- Pt currently refusing surgery at this time  -likely return for pci of LAD- if pt agrees   -c/w , asa statin , bb    #ESRD /hyperkalemia   -management per renal     ECHO 1/19/21: EF 50%; Mild segmental left ventricular systolic dysfunction. The inferior wall, and the inferoseptum are hypokinetic.    a/p   62y/o M w/ h/o ESRD on HD via LUE AVF (doesn't make urine), CAD s/p stent (plavix/asa), T2DM, HTN, HLD, anemia, presenting with weakness after skipping multiple dialysis sessions admitted for hyperkalemia with EKG changes, severe acidosis requiring emergent dialysis. sp MICU     #NSTEMI , CAD sp PCI   -ECG with some st depressions to v5. v6   -no cp or sob   -echo with ef 55% Mild segmental systolic dysfunction with preserved ejection fraction. The inferior and inferoseptal walls are mildly hypokinetic, Moderate diastolic dysfunction (Stage II).  -cardiac cath with  Significant multivessel CAD  -CTS eval  noted- Pt currently refusing surgery at this time  -pt refuses any cardiac intervention including  pci of LAD-  -c/w , asa statin , bb    #ESRD /hyperkalemia   -management per renal

## 2022-06-28 NOTE — DIETITIAN INITIAL EVALUATION ADULT - PERTINENT MEDS FT
MEDICATIONS  (STANDING):  amLODIPine   Tablet 5 milliGRAM(s) Oral daily  aspirin  chewable 81 milliGRAM(s) Oral daily  atorvastatin 80 milliGRAM(s) Oral at bedtime  calcium acetate 667 milliGRAM(s) Oral three times a day with meals  chlorhexidine 2% Cloths 1 Application(s) Topical <User Schedule>  chlorhexidine 4% Liquid 1 Application(s) Topical <User Schedule>  clopidogrel Tablet 75 milliGRAM(s) Oral daily  dextrose 5%. 1000 milliLiter(s) (50 mL/Hr) IV Continuous <Continuous>  dextrose 5%. 1000 milliLiter(s) (100 mL/Hr) IV Continuous <Continuous>  folic acid 1 milliGRAM(s) Oral daily  gabapentin 100 milliGRAM(s) Oral every 8 hours  glucagon  Injectable 1 milliGRAM(s) IntraMuscular once  heparin   Injectable 5000 Unit(s) SubCutaneous every 12 hours  insulin lispro (ADMELOG) corrective regimen sliding scale   SubCutaneous three times a day before meals  insulin lispro (ADMELOG) corrective regimen sliding scale   SubCutaneous at bedtime  lisinopril 2.5 milliGRAM(s) Oral daily  loratadine 10 milliGRAM(s) Oral daily  metoprolol tartrate 25 milliGRAM(s) Oral two times a day    MEDICATIONS  (PRN):  benzonatate 100 milliGRAM(s) Oral every 8 hours PRN Cough

## 2022-06-28 NOTE — DIETITIAN INITIAL EVALUATION ADULT - REASON FOR ADMISSION
Hyperkalemia    "61M Hx ESRD on HD via LUE AVF (doesn't make urine), CAD s/p stent (plavix/asa), T2DM, HTN, HLD, anemia, presenting with weakness after skipping multiple dialysis sessions admitted for hyperkalemia with EKG changes, severe acidosis requiring emergent dialysis. Admitted to MICU now downgraded to the floors. Course c/b troponinemia."

## 2022-06-28 NOTE — PROGRESS NOTE ADULT - PROBLEM SELECTOR PLAN 9
On Januvia at home  - ISS, monitor BG  - continue home gabapentin
S/p stent on ASA and plavix at home  - continue home meds
On Januvia at home  - ISS, monitor BG  - continue home gabapentin
On Januvia at home  - ISS, monitor BG  - continue home gabapentin
DVT ppx: heparin subq  Diet: consistent carb  Dispo: pending setup of outpt HD    Prefers to update family

## 2022-06-28 NOTE — PROGRESS NOTE ADULT - PROVIDER SPECIALTY LIST ADULT
Internal Medicine
Internal Medicine
MICU
Nephrology
MICU
Nephrology
Cardiology
Nephrology
Internal Medicine
Nephrology
Internal Medicine
Nephrology
Internal Medicine
Internal Medicine

## 2022-06-28 NOTE — PROGRESS NOTE ADULT - PROBLEM SELECTOR PROBLEM 9
DM (diabetes mellitus)
DM (diabetes mellitus)
CAD (coronary artery disease)
DM (diabetes mellitus)
Prophylactic measure

## 2022-06-28 NOTE — DIETITIAN INITIAL EVALUATION ADULT - NSFNSNUTRHOMESUPPLEMENTFT_GEN_A_CORE
Pt reports use of a multivitamin supplement PTA. Denies use of any additional nutrition/dietary supplements PTA.

## 2022-06-28 NOTE — PROGRESS NOTE ADULT - ASSESSMENT
Pt. with ESRD on HD, admitted with HTN crises, hyperkalemia and acidosis in the setting of missed HD.     ESRD on HD: Patient on HD TTS. Had maintenance HD yesterday. Has been Tolerating HD well. AVF functioning well and BP stable during HD. Last HD done on 6/25/22. Labs reviewed. Pt clinically stable. Plan for HD today.  Monitor BMP.    Hypocalcemia: in the setting of ESRD, hyperphosphatemia. Will use high Ca bath during HD today and give hectorol with HD. Continue Ca acetate 1 tab TID with meals. Recommend to give IV Calcium gluconate to maintain ionized calcium level >1.1. Check iPTH, 25-OH Vit D levels. Monitor Ca, phos.     Hyperkalemia: resolved after urgent HD on admission. Continue to give low K diet. Monitor K.     Acidosis: resolved. monitor serum CO2 level.    Pt. with ESRD on HD, admitted with HTN crises, hyperkalemia and acidosis in the setting of missed HD.     ESRD on HD: Patient on HD TTS. Seen on maintenance HD today. Has been Tolerating HD well. AVF functioning well and BP stable during HD. Plan for next maintenance HD on Thursday.  Monitor BMP.    Hypocalcemia: in the setting of ESRD, hyperphosphatemia. continue high Ca bath during HD today and give hectorol with HD. Continue Ca acetate. Recommend to give IV Calcium gluconate to maintain ionized calcium level >1.1. Check iPTH, 25-OH Vit D levels. Monitor Ca, phos.     Hyperkalemia: resolved after urgent HD on admission. Continue to give low K diet. Monitor K.     Acidosis: resolved. monitor serum CO2 level.

## 2022-06-28 NOTE — PROGRESS NOTE ADULT - PROBLEM SELECTOR PLAN 1
Asymptomatic, ST depressions laterally. unclear if 2/2 to poor renal clearance vs MI (less likely)    -Troponins lateral  -Cardiology consulted: Enedelia  -[facility] showing triple vessel disease --> refused CABG after being evaluated by CT surgery & refused high risk PCI to LAD, he states he's old and if he dies he dies, he has capacity  -Continue with DAPT & beta blocker & high dose statin

## 2022-06-28 NOTE — DIETITIAN INITIAL EVALUATION ADULT - ADD RECOMMEND
1) Continue DASH, Renal, Consistent Carbohydrate diet.   2) Recommend Nepro 1x/day to trial.   3) Monitor PO intake, GI tolerance, skin integrity, labs, weight, and bowel movement regularity.   4) Honor food preferences as feasible. Assist with meals PRN and encourage PO intake.  5) RD remains available upon request and will follow-up per protocol.

## 2022-06-28 NOTE — DIETITIAN INITIAL EVALUATION ADULT - ORAL INTAKE PTA/DIET HISTORY
Pt interviewed at bedside. Reports good appetite PTA, follows a renal, diabetic diet. Confirms NKFA.

## 2022-06-28 NOTE — PROGRESS NOTE ADULT - NSPROGADDITIONALINFOA_GEN_ALL_CORE
61M Hx ESRD on HD via LUE AVF (doesn't make urine), CAD s/p stent (plavix/asa), T2DM, HTN, HLD, anemia, presenting with weakness after skipping multiple dialysis sessions admitted for hyperkalemia with EKG changes, severe acidosis requiring emergent dialysis
Patient medically optimized for discharge once outpatient HD set up. He does not want further cardiac work up and he has capacity.

## 2022-06-28 NOTE — DIETITIAN INITIAL EVALUATION ADULT - NSFNSGIIOFT_GEN_A_CORE
Denies nausea, vomiting, constipation, diarrhea. Reports last BM 6/28. Pt not currently on bowel regimen.

## 2022-06-30 ENCOUNTER — NON-APPOINTMENT (OUTPATIENT)
Age: 62
End: 2022-06-30

## 2022-07-21 NOTE — ASU PREOP CHECKLIST - NOTHING BY MOUTH SINCE
27-Jun-2022 12:00 abrasion over L knee with bandaid. scattered age appropriate bruising on lower extremities

## 2022-10-05 NOTE — PROGRESS NOTE ADULT - PROBLEM SELECTOR PLAN 2
History of Present Illness





- Stated complaint


Stated Complaint: PAIN IN LOWER BACK





- Chief complaint


Chief Complaint: Back Pain





- History obtained from


History obtained from: Patient





- Additonal information


Additional information: 





41-year-old man with history of chronic hypertension, diabetes, presents with 

left lower back strain over the past couple of days, progressively worsening.  

Denies urinary symptoms, numbness, weakness.  Does endorse mild muscle cramping 

this morning





Review of Systems


Constitutional: denies: Fever


: denies: Dysuria


Musculoskeletal: reports: Back pain





PD PAST MEDICAL HISTORY





- Past Medical History


Cardiovascular: Hypertension, High cholesterol


Respiratory: None


Neuro: None


Endocrine/Autoimmune: None


GI: Other (Umbilical hernia)


GYN: None


: None


HEENT: None


Musculoskeletal: Osteoarthritis, Gout (Pt and wife didn't mention, but found hx 

in EHR)


Derm: None





- Past Surgical History


Past Surgical History: Yes


Ortho: Knee replacement


Cardiovascular: Vascular surgery





- Present Medications


Home Medications: 


                                Ambulatory Orders











 Medication  Instructions  Recorded  Confirmed


 


Terbinafine [Lamisil] 250 mg PO DAILY 12/02/19 12/02/19


 


Blood Sugar Diagnostic [Freestyle 1 each  ACHS #100 strip 12/04/19 





Lite Test Strip]   


 


Blood-Glucose Meter [Glucometer] 1 each  ACHS #1 each 12/04/19 


 


Insulin Aspart [NovoLOG] 3 - 11 unit SUBQ 12/04/19 





 0800,1200,1700,2100 #2 pen  


 


Insulin Aspart [NovoLOG] 7 unit SUBQ TIDWM #2 pen 12/04/19 


 


Insulin Glargine [Lantus Solostar] 25 unit SUBQ BID #4 pen 12/04/19 


 


Lancets 1 each  ACHS #100 each 12/04/19 


 


Losartan [Cozaar] 50 mg PO DAILY #30 tablet 12/04/19 


 


Pen Needle, Diabetic [Insulin Pen 1 each  ACHS #100 dis.needle 12/04/19 





Needle]   


 


HYDROcod/ACETAM 5/325 [Norco 5/325] 1 tablet PO BID PRN #10 tablet 03/11/22 


 


Sulfamethox/Trimeth 800/160 1 each PO BID #14 tablet 03/11/22 





[Bactrim Ds 800/160]   


 


Doxycycline Hyclate 100 mg PO BID 7 Days #14 cap 04/02/22 


 


methocarbamoL [Robaxin] 500 mg PO Q6H #20 tablet 10/05/22 














- Allergies


Allergies/Adverse Reactions: 


                                    Allergies











Allergy/AdvReac Type Severity Reaction Status Date / Time


 


No Known Drug Allergies Allergy   Verified 10/05/22 04:53














- Social History


Does the pt smoke?: No


Smoking Status: Never smoker


Does the pt drink ETOH?: No


Does the pt have substance abuse?: No





- Immunizations


Immunizations are current?: Yes





- POLST


Patient has POLST: No


POLST Status: Full Code





PD ED PE NORMAL





- Vitals


Vital signs reviewed: Yes





- General


General: Alert and oriented X 3, No acute distress, Well developed/nourished





- HEENT


HEENT: Atraumatic, PERRL, EOMI





- Back


Back: No CVA TTP, No spinal TTP, Other (Left lower  to palpation and 

muscular distribution.)





Results





- Vitals


Vitals: 





                               Vital Signs - 24 hr











  10/05/22





  04:51


 


Temperature 36.2 C L


 


Heart Rate 74


 


Respiratory 14





Rate 


 


Blood Pressure 216/109 H


 


O2 Saturation 95








                                     Oxygen











O2 Source                      Room air

















PD MEDICAL DECISION MAKING





- ED course


ED course: 


49yM p/w L lower back strain. symptomatic care discussed. return precautions 

given. f/u with pmd. 








Departure





- Departure


Disposition: 01 Home, Self Care


Clinical Impression: 


 Back pain





Condition: Good


Instructions:  ED Low Back Pain Injury


Prescriptions: 


methocarbamoL [Robaxin] 500 mg PO Q6H #20 tablet


Comments: 


You are seen in the emergency department for lower back muscle strain.  Please 

use cool packs alternating with hot packs for 20 minutes every hour, rest and 

take ibuprofen 600 mg as needed every 6 hours for pain. in addition to this I am

 sending Robaxin to the [facility] in [place].  This is a muscle relaxer.  Do 

not drive or operate heavy machinery as this can be sedating.  Return to the 

emergency department if you have any new or worsening symptoms or concerns.  

Follow-up with your primary doctor. - VBG pH 7.05 on admission  - s/p bicarb gtt  - resolved with urgent HD - potassium 6.7 with ekg changes, s/p lokelma, calcium gluconate, insulin, dextrose  - corrected after urgent HD  - monitor BMPs

## 2023-03-14 NOTE — ASU PREOP CHECKLIST - TAMPON REMOVED
March 14, 2023        Tasia Morel  [address]  [place] [address]        Dear Tasia Morel:      We made an attempt to contact you in regards to scheduling a Fibroscan with our Hepatology clinic, as you are being referred by Fannie Roman NP. To this date we have not yet received a call back.      Please contact the clinic at [phone] at your earliest convenience. Failure to do so will limit our ability to give you the best comprehensive care. We look forward to hearing from you.        Sincerely,      Malaika  Clinical   [facility]                  [facility]    
n/a

## 2023-10-20 NOTE — RAPID RESPONSE TEAM SUMMARY - NSDATETIMERRT_GEN_ALL_CORE
Labs today  Will call with results  Declines compound medication  Instructed to start OTC multivitamins  F/U with Derm if no improvement  Verbalizes understanding  
Labs today  Will call with results  Instructed to start OTC multivitamin  F/U if no improvement  
19-Jan-2021

## 2024-05-14 NOTE — ED PROVIDER NOTE - CAS EDP CONSULT REGARDING 1
Follow Up Office Visit      Date: 2024   Patient Name: Nacho Craven  : 2011   MRN: [id number]     Chief Complaint:    Chief Complaint   Patient presents with    red eyes       History of Present Illness: Nacho Craven is a 13 y.o. male who is here today for onset 2 days ago of recurrent irritation redness itching and some discharge from his left eye, having been seen for similar problem on 2024, at that time having also some spread secondarily to the right eye, diagnosed as a probable viral conjunctivitis and treated symptomatically with Patanol eyedrops.  He is not aware of any foreign body having gotten the eye, no exposures to conjunctivitis known, utilizing the previous prescribed Patanol drops noting he does have significant improvement in his symptoms in the last day.  There is no involvement of right eye.  No nasal congestion drainage rhinorrhea cough headache sore throat or earache associated with his symptoms.    Subjective      Review of Systems:   Review of Systems    I have reviewed the patients family history, social history, past medical history, past surgical history and have updated it as appropriate.     Medications:     Current Outpatient Medications:     olopatadine (PATANOL) 0.1 % ophthalmic solution, Administer 1 drop to both eyes 2 (Two) Times a Day. As needed for conjunctivitis, Disp: 5 mL, Rfl: 0    Allergies:   No Known Allergies    Objective     Physical Exam: Please see above  Vital Signs:   Vitals:    24 1604   BP: 96/70   BP Location: Left arm   Patient Position: Sitting   Cuff Size: Small Adult   Pulse: 80   Temp: 98.2 °F (36.8 °C)   TempSrc: Temporal   SpO2: 98%   Weight: 49.1 kg (108 lb 3.2 oz)     There is no height or weight on file to calculate BMI.  Pediatric BMI = No height and weight on file for this encounter.. BMI is within normal parameters. No other follow-up for BMI required.       Physical Exam  Constitutional:       General: He is active.  "He is not in acute distress.     Appearance: Normal appearance. He is not toxic-appearing.   HENT:      Right Ear: Tympanic membrane and ear canal normal.      Left Ear: Tympanic membrane and ear canal normal.      Nose: No congestion or rhinorrhea.      Mouth/Throat:      Mouth: Mucous membranes are moist.      Pharynx: Oropharynx is clear. No oropharyngeal exudate or posterior oropharyngeal erythema.   Eyes:      Comments: Left ocular conjunctivae with very faintly discernible injection, no chemosis or proptosis, no discharge, no lid edema or erythema, EOMI movement normal with pupil reactive, right eye completely normal, vision subjectively intact bilaterally   Neurological:      Mental Status: He is alert.         Procedures    Results:   Labs:   No results found for: \"HGBA1C\", \"CMP\", \"CBCDIFFPANEL\", \"CREAT\", \"TSH\"     POCT Results (if applicable):   No results found for this or any previous visit.      Assessment / Plan      Assessment/Plan:   Diagnoses and all orders for this visit:    1. Acute viral conjunctivitis of left eye (Primary)  Assessment & Plan:  2-day history of an acute left-sided conjunctivitis, high likelihood viral in etiology with asymmetric involvement making it unlikely allergic basis, no foreign body, no purulent discharge that would be suspicious of a bacterial etiology.  Clinically improved by history.  Continue use of previous prescribed Patanol drops along with warm moist compresses.  Advised of contagiousness to touch.  Apply eyedrops to the right eye if does become involved.  Advise if not resolving over the next couple days.  I also did discuss potential but unlikely development of a preseptal cellulitis and if this were to occur then he would need to be evaluated promptly.        Follow Up:   Return if symptoms worsen or fail to improve.      At [facility], we believe that sharing information builds trust and better relationships. You are receiving this note because you recently " visited [facility]. It is possible you will see health information before a provider has talked with you about it. This kind of information can be easy to misunderstand. To help you fully understand what it means for your health, we urge you to discuss this note with your provider.    Cornel Curran MD  [facility]    consult

## 2024-08-26 NOTE — CONSULT NOTE ADULT - PROVIDER SPECIALTY LIST ADULT
From: Allen Amezcua  To: Geovanny Tomlinson  Sent: 8/26/2024 9:28 AM CDT  Subject: Prescriptions    Most of my prescriptions need to be refilled.   
CT Surgery
Cardiology
Nephrology

## 2024-11-03 NOTE — DIETITIAN INITIAL EVALUATION ADULT. - PERTINENT LABORATORY DATA
----- Message from Francisco J Bay CNP sent at 11/3/2024  9:09 AM CST -----  Please notify the patient urine culture is remarkable for a urinary tract infection continue with antibiotics follow-up as planned.   01-19 @ 06:01: Na 136, BUN 40<H>, Cr 7.53<H>, <H>, K+ 3.7, Phos 4.8<H>, Mg 2.2  01-19 @ 00:52: Na 137, BUN 40<H>, Cr 7.14<H>, <H>, K+ 3.5, Phos 4.2, Mg 2.2  01-18 @ 17:36: Na 136, BUN 34<H>, Cr 5.78<H>, <H>, K+ 3.8, Mg 2.1  01-17: HbA1c 6.2%

## 2024-11-07 NOTE — ED CLERICAL - BED REQUESTED
[de-identified] : I, Katherine Bauman, am scribing for Dr. Brown in his presence for the chief complaint, physical exam, studies, assessment, and/or plan. 21-Jun-2022 02:59

## 2025-02-18 NOTE — DIETITIAN INITIAL EVALUATION ADULT - NSPROEDAREADYLEARN_GEN_A_NUR
-- DO NOT REPLY / DO NOT REPLY ALL --  -- This inbox is not monitored. If this was sent to the wrong provider or department, reroute message to P E[facility] Reroute pool. --  -- Message is from Engagement Center Operations (ECO) --    General Patient Message: Patient has questions on what he should since he tested positive for covid would like a call back from RN    Caller Information       Contact Date/Time Type Contact Phone/Fax    02/18/2025 03:51 PM CST Email (Outgoing) Valentin Peña (Self)     02/18/2025 04:28 PM CST Phone (Incoming) Valentin Peña [phone]            Alternative phone number: no    Can a detailed message be left? Yes - [employer] Message  and Yes - Voicemail   Patient has been advised the message will be addressed within 2-3 business days.                 interest in learning